# Patient Record
Sex: MALE | Race: OTHER | Employment: OTHER
[De-identification: names, ages, dates, MRNs, and addresses within clinical notes are randomized per-mention and may not be internally consistent; named-entity substitution may affect disease eponyms.]

---

## 2017-02-07 ENCOUNTER — OFFICE VISIT (OUTPATIENT)
Dept: FAMILY MEDICINE CLINIC | Facility: CLINIC | Age: 82
End: 2017-02-07

## 2017-02-07 VITALS
HEIGHT: 64 IN | SYSTOLIC BLOOD PRESSURE: 132 MMHG | DIASTOLIC BLOOD PRESSURE: 66 MMHG | TEMPERATURE: 97.9 F | BODY MASS INDEX: 26.15 KG/M2 | WEIGHT: 153.2 LBS | HEART RATE: 73 BPM

## 2017-02-07 DIAGNOSIS — E78.5 HYPERLIPIDEMIA, UNSPECIFIED HYPERLIPIDEMIA TYPE: ICD-10-CM

## 2017-02-07 DIAGNOSIS — E11.9 TYPE 2 DIABETES MELLITUS WITHOUT COMPLICATION, WITHOUT LONG-TERM CURRENT USE OF INSULIN (HCC): Primary | ICD-10-CM

## 2017-02-07 DIAGNOSIS — L03.115 CELLULITIS OF RIGHT LEG: ICD-10-CM

## 2017-02-07 PROCEDURE — 4040F PNEUMOC VAC/ADMIN/RCVD: CPT | Performed by: FAMILY MEDICINE

## 2017-02-07 PROCEDURE — G8598 ASA/ANTIPLAT THER USED: HCPCS | Performed by: FAMILY MEDICINE

## 2017-02-07 PROCEDURE — G8427 DOCREV CUR MEDS BY ELIG CLIN: HCPCS | Performed by: FAMILY MEDICINE

## 2017-02-07 PROCEDURE — G8484 FLU IMMUNIZE NO ADMIN: HCPCS | Performed by: FAMILY MEDICINE

## 2017-02-07 PROCEDURE — 1123F ACP DISCUSS/DSCN MKR DOCD: CPT | Performed by: FAMILY MEDICINE

## 2017-02-07 PROCEDURE — 1036F TOBACCO NON-USER: CPT | Performed by: FAMILY MEDICINE

## 2017-02-07 PROCEDURE — 99213 OFFICE O/P EST LOW 20 MIN: CPT | Performed by: FAMILY MEDICINE

## 2017-02-07 PROCEDURE — G8420 CALC BMI NORM PARAMETERS: HCPCS | Performed by: FAMILY MEDICINE

## 2017-02-07 ASSESSMENT — PATIENT HEALTH QUESTIONNAIRE - PHQ9
1. LITTLE INTEREST OR PLEASURE IN DOING THINGS: 0
SUM OF ALL RESPONSES TO PHQ QUESTIONS 1-9: 0
SUM OF ALL RESPONSES TO PHQ9 QUESTIONS 1 & 2: 0
2. FEELING DOWN, DEPRESSED OR HOPELESS: 0

## 2017-02-07 ASSESSMENT — ENCOUNTER SYMPTOMS
SORE THROAT: 0
CONSTIPATION: 0
NAUSEA: 0
ABDOMINAL PAIN: 0
SHORTNESS OF BREATH: 0
COUGH: 0

## 2017-02-28 PROCEDURE — 36415 COLL VENOUS BLD VENIPUNCTURE: CPT

## 2017-02-28 PROCEDURE — 84484 ASSAY OF TROPONIN QUANT: CPT

## 2017-02-28 PROCEDURE — 81001 URINALYSIS AUTO W/SCOPE: CPT

## 2017-05-09 ENCOUNTER — OFFICE VISIT (OUTPATIENT)
Dept: FAMILY MEDICINE CLINIC | Age: 82
End: 2017-05-09
Payer: MEDICARE

## 2017-05-09 VITALS
HEIGHT: 64 IN | HEART RATE: 72 BPM | WEIGHT: 154 LBS | SYSTOLIC BLOOD PRESSURE: 128 MMHG | OXYGEN SATURATION: 91 % | TEMPERATURE: 97.9 F | BODY MASS INDEX: 26.29 KG/M2 | DIASTOLIC BLOOD PRESSURE: 60 MMHG

## 2017-05-09 DIAGNOSIS — E78.5 HYPERLIPIDEMIA, UNSPECIFIED HYPERLIPIDEMIA TYPE: ICD-10-CM

## 2017-05-09 DIAGNOSIS — E11.9 TYPE 2 DIABETES MELLITUS WITHOUT COMPLICATION, WITHOUT LONG-TERM CURRENT USE OF INSULIN (HCC): Primary | ICD-10-CM

## 2017-05-09 DIAGNOSIS — Z23 NEED FOR PROPHYLACTIC VACCINATION AGAINST STREPTOCOCCUS PNEUMONIAE (PNEUMOCOCCUS): ICD-10-CM

## 2017-05-09 DIAGNOSIS — I25.810 CORONARY ARTERY DISEASE INVOLVING CORONARY BYPASS GRAFT OF NATIVE HEART WITHOUT ANGINA PECTORIS: ICD-10-CM

## 2017-05-09 LAB — HBA1C MFR BLD: 6.5 %

## 2017-05-09 PROCEDURE — G8420 CALC BMI NORM PARAMETERS: HCPCS | Performed by: FAMILY MEDICINE

## 2017-05-09 PROCEDURE — G0009 ADMIN PNEUMOCOCCAL VACCINE: HCPCS | Performed by: FAMILY MEDICINE

## 2017-05-09 PROCEDURE — 1123F ACP DISCUSS/DSCN MKR DOCD: CPT | Performed by: FAMILY MEDICINE

## 2017-05-09 PROCEDURE — 90732 PPSV23 VACC 2 YRS+ SUBQ/IM: CPT | Performed by: FAMILY MEDICINE

## 2017-05-09 PROCEDURE — 4040F PNEUMOC VAC/ADMIN/RCVD: CPT | Performed by: FAMILY MEDICINE

## 2017-05-09 PROCEDURE — G8427 DOCREV CUR MEDS BY ELIG CLIN: HCPCS | Performed by: FAMILY MEDICINE

## 2017-05-09 PROCEDURE — 1036F TOBACCO NON-USER: CPT | Performed by: FAMILY MEDICINE

## 2017-05-09 PROCEDURE — G8598 ASA/ANTIPLAT THER USED: HCPCS | Performed by: FAMILY MEDICINE

## 2017-05-09 PROCEDURE — 83036 HEMOGLOBIN GLYCOSYLATED A1C: CPT | Performed by: FAMILY MEDICINE

## 2017-05-09 PROCEDURE — 99215 OFFICE O/P EST HI 40 MIN: CPT | Performed by: FAMILY MEDICINE

## 2017-05-09 RX ORDER — CHOLECALCIFEROL (VITAMIN D3) 1250 MCG
50000 CAPSULE ORAL WEEKLY
COMMUNITY
End: 2018-03-22 | Stop reason: ALTCHOICE

## 2017-05-09 RX ORDER — AMMONIUM LACTATE 12 G/100G
1 LOTION TOPICAL PRN
COMMUNITY
End: 2018-01-28

## 2017-05-09 ASSESSMENT — ENCOUNTER SYMPTOMS
SORE THROAT: 0
NAUSEA: 0
SHORTNESS OF BREATH: 0
ABDOMINAL PAIN: 0
CONSTIPATION: 0

## 2017-05-09 ASSESSMENT — PATIENT HEALTH QUESTIONNAIRE - PHQ9
2. FEELING DOWN, DEPRESSED OR HOPELESS: 0
SUM OF ALL RESPONSES TO PHQ9 QUESTIONS 1 & 2: 0
1. LITTLE INTEREST OR PLEASURE IN DOING THINGS: 0
SUM OF ALL RESPONSES TO PHQ QUESTIONS 1-9: 0

## 2017-06-05 ENCOUNTER — HOSPITAL ENCOUNTER (OUTPATIENT)
Age: 82
Discharge: HOME | End: 2017-06-05
Payer: MEDICARE

## 2017-06-05 DIAGNOSIS — E78.5 HYPERLIPIDEMIA, UNSPECIFIED HYPERLIPIDEMIA TYPE: ICD-10-CM

## 2017-06-05 DIAGNOSIS — E11.9 TYPE 2 DIABETES MELLITUS WITHOUT COMPLICATION, WITHOUT LONG-TERM CURRENT USE OF INSULIN (HCC): ICD-10-CM

## 2017-06-05 LAB
ALBUMIN SERPL-MCNC: 3.5 G/DL (ref 3.5–5.2)
ALBUMIN/GLOBULIN RATIO: ABNORMAL (ref 1–2.5)
ALP BLD-CCNC: 80 U/L (ref 40–129)
ALT SERPL-CCNC: 24 U/L (ref 5–41)
ANION GAP SERPL CALCULATED.3IONS-SCNC: 13 MMOL/L (ref 9–17)
AST SERPL-CCNC: 20 U/L
BILIRUB SERPL-MCNC: 0.59 MG/DL (ref 0.3–1.2)
BUN BLDV-MCNC: 23 MG/DL (ref 8–23)
BUN/CREAT BLD: ABNORMAL (ref 9–20)
CALCIUM SERPL-MCNC: 8.9 MG/DL (ref 8.6–10.4)
CHLORIDE BLD-SCNC: 104 MMOL/L (ref 98–107)
CHOLESTEROL/HDL RATIO: 4.8
CHOLESTEROL: 133 MG/DL
CO2: 24 MMOL/L (ref 20–31)
CREAT SERPL-MCNC: 1.18 MG/DL (ref 0.7–1.2)
CREATININE URINE: 137 MG/DL (ref 39–259)
GFR AFRICAN AMERICAN: >60 ML/MIN
GFR NON-AFRICAN AMERICAN: 58 ML/MIN
GFR SERPL CREATININE-BSD FRML MDRD: ABNORMAL ML/MIN/{1.73_M2}
GFR SERPL CREATININE-BSD FRML MDRD: ABNORMAL ML/MIN/{1.73_M2}
GLUCOSE BLD-MCNC: 110 MG/DL (ref 70–99)
HDLC SERPL-MCNC: 28 MG/DL
LDL CHOLESTEROL: 72 MG/DL (ref 0–130)
MICROALBUMIN/CREAT 24H UR: 13 MG/L
MICROALBUMIN/CREAT UR-RTO: 9 MCG/MG CREAT
POTASSIUM SERPL-SCNC: 4.1 MMOL/L (ref 3.7–5.3)
SODIUM BLD-SCNC: 141 MMOL/L (ref 135–144)
TOTAL PROTEIN: 7.9 G/DL (ref 6.4–8.3)
TRIGL SERPL-MCNC: 167 MG/DL
VLDLC SERPL CALC-MCNC: ABNORMAL MG/DL (ref 1–30)

## 2017-06-05 PROCEDURE — 80061 LIPID PANEL: CPT

## 2017-06-05 PROCEDURE — 82570 ASSAY OF URINE CREATININE: CPT

## 2017-06-05 PROCEDURE — 80053 COMPREHEN METABOLIC PANEL: CPT

## 2017-06-05 PROCEDURE — 82043 UR ALBUMIN QUANTITATIVE: CPT

## 2017-06-05 PROCEDURE — 36415 COLL VENOUS BLD VENIPUNCTURE: CPT

## 2017-07-03 DIAGNOSIS — R35.0 FREQUENCY OF URINATION: Primary | ICD-10-CM

## 2017-07-03 RX ORDER — TAMSULOSIN HYDROCHLORIDE 0.4 MG/1
0.4 CAPSULE ORAL DAILY
Qty: 30 CAPSULE | Refills: 0 | Status: SHIPPED | OUTPATIENT
Start: 2017-07-03 | End: 2017-09-12 | Stop reason: SDUPTHER

## 2017-07-03 RX ORDER — OXYBUTYNIN CHLORIDE 5 MG/1
5 TABLET, EXTENDED RELEASE ORAL DAILY
Qty: 30 TABLET | Refills: 0 | Status: SHIPPED | OUTPATIENT
Start: 2017-07-03 | End: 2017-09-12 | Stop reason: SDUPTHER

## 2017-07-05 ENCOUNTER — TELEPHONE (OUTPATIENT)
Dept: UROLOGY | Age: 82
End: 2017-07-05

## 2017-09-01 DIAGNOSIS — R35.0 FREQUENCY OF URINATION: ICD-10-CM

## 2017-09-01 RX ORDER — OXYBUTYNIN CHLORIDE 5 MG/1
5 TABLET, EXTENDED RELEASE ORAL DAILY
Qty: 30 TABLET | Refills: 0 | OUTPATIENT
Start: 2017-09-01 | End: 2017-10-01

## 2017-09-01 RX ORDER — TAMSULOSIN HYDROCHLORIDE 0.4 MG/1
0.4 CAPSULE ORAL DAILY
Qty: 30 CAPSULE | Refills: 0 | OUTPATIENT
Start: 2017-09-01

## 2017-09-12 DIAGNOSIS — R35.0 FREQUENCY OF URINATION: ICD-10-CM

## 2017-09-12 RX ORDER — OXYBUTYNIN CHLORIDE 5 MG/1
5 TABLET, EXTENDED RELEASE ORAL DAILY
Qty: 30 TABLET | Refills: 0 | Status: SHIPPED | OUTPATIENT
Start: 2017-09-12 | End: 2017-11-08 | Stop reason: SDUPTHER

## 2017-09-12 RX ORDER — TAMSULOSIN HYDROCHLORIDE 0.4 MG/1
0.4 CAPSULE ORAL DAILY
Qty: 30 CAPSULE | Refills: 0 | Status: SHIPPED | OUTPATIENT
Start: 2017-09-12 | End: 2017-11-08 | Stop reason: SDUPTHER

## 2017-09-18 ENCOUNTER — APPOINTMENT (OUTPATIENT)
Dept: GENERAL RADIOLOGY | Age: 82
DRG: 181 | End: 2017-09-18
Payer: MEDICARE

## 2017-09-18 ENCOUNTER — APPOINTMENT (OUTPATIENT)
Dept: CT IMAGING | Age: 82
DRG: 181 | End: 2017-09-18
Payer: MEDICARE

## 2017-09-18 ENCOUNTER — HOSPITAL ENCOUNTER (INPATIENT)
Age: 82
LOS: 7 days | Discharge: HOME | DRG: 181 | End: 2017-09-25
Attending: EMERGENCY MEDICINE | Admitting: INTERNAL MEDICINE
Payer: MEDICARE

## 2017-09-18 ENCOUNTER — APPOINTMENT (OUTPATIENT)
Dept: ULTRASOUND IMAGING | Age: 82
DRG: 181 | End: 2017-09-18
Payer: MEDICARE

## 2017-09-18 DIAGNOSIS — R10.11 ABDOMINAL PAIN, RIGHT UPPER QUADRANT: Primary | ICD-10-CM

## 2017-09-18 LAB
-: ABNORMAL
ABSOLUTE EOS #: 0.3 K/UL (ref 0–0.4)
ABSOLUTE LYMPH #: 1.3 K/UL (ref 1–4.8)
ABSOLUTE MONO #: 0.9 K/UL (ref 0.1–1.2)
ALBUMIN SERPL-MCNC: 3.5 G/DL (ref 3.5–5.2)
ALBUMIN/GLOBULIN RATIO: 0.7 (ref 1–2.5)
ALP BLD-CCNC: 275 U/L (ref 40–129)
ALT SERPL-CCNC: 232 U/L (ref 5–41)
AMORPHOUS: ABNORMAL
ANION GAP SERPL CALCULATED.3IONS-SCNC: 13 MMOL/L (ref 9–17)
AST SERPL-CCNC: 289 U/L
BACTERIA: ABNORMAL
BASOPHILS # BLD: 0 %
BASOPHILS ABSOLUTE: 0 K/UL (ref 0–0.2)
BILIRUB SERPL-MCNC: 1.07 MG/DL (ref 0.3–1.2)
BILIRUBIN DIRECT: 0.48 MG/DL
BILIRUBIN URINE: NEGATIVE
BILIRUBIN, INDIRECT: 0.59 MG/DL (ref 0–1)
BUN BLDV-MCNC: 20 MG/DL (ref 8–23)
BUN/CREAT BLD: ABNORMAL (ref 9–20)
CALCIUM SERPL-MCNC: 8.8 MG/DL (ref 8.6–10.4)
CASTS UA: ABNORMAL /LPF (ref 0–2)
CHLORIDE BLD-SCNC: 97 MMOL/L (ref 98–107)
CO2: 26 MMOL/L (ref 20–31)
COLOR: YELLOW
COMMENT UA: ABNORMAL
CREAT SERPL-MCNC: 1.16 MG/DL (ref 0.7–1.2)
CRYSTALS, UA: ABNORMAL /HPF
DIFFERENTIAL TYPE: NORMAL
EOSINOPHILS RELATIVE PERCENT: 4 %
EPITHELIAL CELLS UA: ABNORMAL /HPF (ref 0–5)
GFR AFRICAN AMERICAN: >60 ML/MIN
GFR NON-AFRICAN AMERICAN: 59 ML/MIN
GFR SERPL CREATININE-BSD FRML MDRD: ABNORMAL ML/MIN/{1.73_M2}
GFR SERPL CREATININE-BSD FRML MDRD: ABNORMAL ML/MIN/{1.73_M2}
GLOBULIN: ABNORMAL G/DL (ref 1.5–3.8)
GLUCOSE BLD-MCNC: 151 MG/DL (ref 70–99)
GLUCOSE URINE: NEGATIVE
HCT VFR BLD CALC: 43.8 % (ref 41–53)
HEMOGLOBIN: 14.9 G/DL (ref 13.5–17.5)
KETONES, URINE: NEGATIVE
LACTIC ACID, WHOLE BLOOD: 1.9 MMOL/L (ref 0.7–2.1)
LACTIC ACID: NORMAL MMOL/L
LEUKOCYTE ESTERASE, URINE: ABNORMAL
LIPASE: 33 U/L (ref 13–60)
LYMPHOCYTES # BLD: 16 %
MCH RBC QN AUTO: 29.1 PG (ref 26–34)
MCHC RBC AUTO-ENTMCNC: 34.1 G/DL (ref 31–37)
MCV RBC AUTO: 85.4 FL (ref 80–100)
MONOCYTES # BLD: 11 %
MUCUS: ABNORMAL
NITRITE, URINE: NEGATIVE
OTHER OBSERVATIONS UA: ABNORMAL
PDW BLD-RTO: 14 % (ref 12.5–15.4)
PH UA: 6.5 (ref 5–8)
PLATELET # BLD: 259 K/UL (ref 140–450)
PLATELET ESTIMATE: NORMAL
PMV BLD AUTO: 6.9 FL (ref 6–12)
POC TROPONIN I: 0.01 NG/ML (ref 0–0.1)
POC TROPONIN I: 0.02 NG/ML (ref 0–0.1)
POC TROPONIN INTERP: NORMAL
POC TROPONIN INTERP: NORMAL
POTASSIUM SERPL-SCNC: 4 MMOL/L (ref 3.7–5.3)
PROTEIN UA: NEGATIVE
RBC # BLD: 5.13 M/UL (ref 4.5–5.9)
RBC # BLD: NORMAL 10*6/UL
RBC UA: ABNORMAL /HPF (ref 0–2)
RENAL EPITHELIAL, UA: ABNORMAL /HPF
SEG NEUTROPHILS: 69 %
SEGMENTED NEUTROPHILS ABSOLUTE COUNT: 5.3 K/UL (ref 1.8–7.7)
SODIUM BLD-SCNC: 136 MMOL/L (ref 135–144)
SPECIFIC GRAVITY UA: 1.01 (ref 1–1.03)
THYROXINE, FREE: 1.53 NG/DL (ref 0.93–1.7)
TOTAL PROTEIN: 8.3 G/DL (ref 6.4–8.3)
TRICHOMONAS: ABNORMAL
TSH SERPL DL<=0.05 MIU/L-ACNC: 0.26 MIU/L (ref 0.3–5)
TURBIDITY: CLEAR
URINE HGB: NEGATIVE
UROBILINOGEN, URINE: NORMAL
WBC # BLD: 7.8 K/UL (ref 3.5–11)
WBC # BLD: NORMAL 10*3/UL
WBC UA: ABNORMAL /HPF (ref 0–5)
YEAST: ABNORMAL

## 2017-09-18 PROCEDURE — 83605 ASSAY OF LACTIC ACID: CPT

## 2017-09-18 PROCEDURE — 84443 ASSAY THYROID STIM HORMONE: CPT

## 2017-09-18 PROCEDURE — 2580000003 HC RX 258: Performed by: EMERGENCY MEDICINE

## 2017-09-18 PROCEDURE — 6360000002 HC RX W HCPCS: Performed by: EMERGENCY MEDICINE

## 2017-09-18 PROCEDURE — 93005 ELECTROCARDIOGRAM TRACING: CPT

## 2017-09-18 PROCEDURE — 96374 THER/PROPH/DIAG INJ IV PUSH: CPT

## 2017-09-18 PROCEDURE — 81001 URINALYSIS AUTO W/SCOPE: CPT

## 2017-09-18 PROCEDURE — 87086 URINE CULTURE/COLONY COUNT: CPT

## 2017-09-18 PROCEDURE — 85025 COMPLETE CBC W/AUTO DIFF WBC: CPT

## 2017-09-18 PROCEDURE — 71020 XR CHEST STANDARD TWO VW: CPT

## 2017-09-18 PROCEDURE — 1200000000 HC SEMI PRIVATE

## 2017-09-18 PROCEDURE — 94762 N-INVAS EAR/PLS OXIMTRY CONT: CPT

## 2017-09-18 PROCEDURE — 83690 ASSAY OF LIPASE: CPT

## 2017-09-18 PROCEDURE — 80076 HEPATIC FUNCTION PANEL: CPT

## 2017-09-18 PROCEDURE — 84484 ASSAY OF TROPONIN QUANT: CPT

## 2017-09-18 PROCEDURE — 76705 ECHO EXAM OF ABDOMEN: CPT

## 2017-09-18 PROCEDURE — 99285 EMERGENCY DEPT VISIT HI MDM: CPT

## 2017-09-18 PROCEDURE — 74176 CT ABD & PELVIS W/O CONTRAST: CPT

## 2017-09-18 PROCEDURE — 80048 BASIC METABOLIC PNL TOTAL CA: CPT

## 2017-09-18 PROCEDURE — 84439 ASSAY OF FREE THYROXINE: CPT

## 2017-09-18 RX ADMIN — CEFTRIAXONE SODIUM 1 G: 1 INJECTION, POWDER, FOR SOLUTION INTRAMUSCULAR; INTRAVENOUS at 22:53

## 2017-09-18 ASSESSMENT — ENCOUNTER SYMPTOMS
SHORTNESS OF BREATH: 1
EYE DISCHARGE: 0
COUGH: 0
VOMITING: 0
NAUSEA: 0
EYE PAIN: 0
DIARRHEA: 0
ABDOMINAL PAIN: 1
SORE THROAT: 0

## 2017-09-18 ASSESSMENT — PAIN SCALES - GENERAL: PAINLEVEL_OUTOF10: 5

## 2017-09-19 ENCOUNTER — APPOINTMENT (OUTPATIENT)
Dept: CT IMAGING | Age: 82
DRG: 181 | End: 2017-09-19
Payer: MEDICARE

## 2017-09-19 PROBLEM — R79.89 ELEVATED LFTS: Status: ACTIVE | Noted: 2017-09-19

## 2017-09-19 PROBLEM — R91.8 PULMONARY NODULES/LESIONS, MULTIPLE: Status: ACTIVE | Noted: 2017-09-19

## 2017-09-19 PROBLEM — C79.9 METASTATIC CANCER (HCC): Status: ACTIVE | Noted: 2017-09-19

## 2017-09-19 PROBLEM — N30.00 ACUTE CYSTITIS: Status: ACTIVE | Noted: 2017-09-19

## 2017-09-19 PROBLEM — K80.20 CHOLELITHIASIS: Status: ACTIVE | Noted: 2017-09-19

## 2017-09-19 LAB
CULTURE: NORMAL
CULTURE: NORMAL
ESTIMATED AVERAGE GLUCOSE: 148 MG/DL
GLUCOSE BLD-MCNC: 104 MG/DL (ref 75–110)
GLUCOSE BLD-MCNC: 111 MG/DL (ref 75–110)
GLUCOSE BLD-MCNC: 220 MG/DL (ref 75–110)
HBA1C MFR BLD: 6.8 % (ref 4–6)
Lab: NORMAL
SPECIMEN DESCRIPTION: NORMAL
STATUS: NORMAL

## 2017-09-19 PROCEDURE — 99222 1ST HOSP IP/OBS MODERATE 55: CPT | Performed by: INTERNAL MEDICINE

## 2017-09-19 PROCEDURE — 36415 COLL VENOUS BLD VENIPUNCTURE: CPT

## 2017-09-19 PROCEDURE — 83036 HEMOGLOBIN GLYCOSYLATED A1C: CPT

## 2017-09-19 PROCEDURE — S0028 INJECTION, FAMOTIDINE, 20 MG: HCPCS | Performed by: NURSE PRACTITIONER

## 2017-09-19 PROCEDURE — 1200000000 HC SEMI PRIVATE

## 2017-09-19 PROCEDURE — 82947 ASSAY GLUCOSE BLOOD QUANT: CPT

## 2017-09-19 PROCEDURE — 6360000002 HC RX W HCPCS: Performed by: INTERNAL MEDICINE

## 2017-09-19 PROCEDURE — 6370000000 HC RX 637 (ALT 250 FOR IP): Performed by: NURSE PRACTITIONER

## 2017-09-19 PROCEDURE — 94762 N-INVAS EAR/PLS OXIMTRY CONT: CPT

## 2017-09-19 PROCEDURE — 99221 1ST HOSP IP/OBS SF/LOW 40: CPT | Performed by: SURGERY

## 2017-09-19 PROCEDURE — 2580000003 HC RX 258: Performed by: NURSE PRACTITIONER

## 2017-09-19 PROCEDURE — 99223 1ST HOSP IP/OBS HIGH 75: CPT | Performed by: INTERNAL MEDICINE

## 2017-09-19 PROCEDURE — 71250 CT THORAX DX C-: CPT

## 2017-09-19 PROCEDURE — 2500000003 HC RX 250 WO HCPCS: Performed by: NURSE PRACTITIONER

## 2017-09-19 PROCEDURE — 6360000002 HC RX W HCPCS: Performed by: NURSE PRACTITIONER

## 2017-09-19 PROCEDURE — 6370000000 HC RX 637 (ALT 250 FOR IP): Performed by: INTERNAL MEDICINE

## 2017-09-19 PROCEDURE — 2580000003 HC RX 258: Performed by: INTERNAL MEDICINE

## 2017-09-19 RX ORDER — LEVOTHYROXINE SODIUM 0.12 MG/1
125 TABLET ORAL DAILY
Status: DISCONTINUED | OUTPATIENT
Start: 2017-09-20 | End: 2017-09-25 | Stop reason: HOSPADM

## 2017-09-19 RX ORDER — POTASSIUM CHLORIDE 7.45 MG/ML
10 INJECTION INTRAVENOUS PRN
Status: DISCONTINUED | OUTPATIENT
Start: 2017-09-19 | End: 2017-09-25 | Stop reason: HOSPADM

## 2017-09-19 RX ORDER — SODIUM CHLORIDE 9 MG/ML
INJECTION, SOLUTION INTRAVENOUS CONTINUOUS
Status: DISCONTINUED | OUTPATIENT
Start: 2017-09-19 | End: 2017-09-21

## 2017-09-19 RX ORDER — OXYBUTYNIN CHLORIDE 5 MG/1
5 TABLET, EXTENDED RELEASE ORAL DAILY
Status: DISCONTINUED | OUTPATIENT
Start: 2017-09-19 | End: 2017-09-25 | Stop reason: HOSPADM

## 2017-09-19 RX ORDER — GLIMEPIRIDE 2 MG/1
2 TABLET ORAL 2 TIMES DAILY WITH MEALS
Status: DISCONTINUED | OUTPATIENT
Start: 2017-09-19 | End: 2017-09-25 | Stop reason: HOSPADM

## 2017-09-19 RX ORDER — NICOTINE POLACRILEX 4 MG
15 LOZENGE BUCCAL PRN
Status: DISCONTINUED | OUTPATIENT
Start: 2017-09-19 | End: 2017-09-25 | Stop reason: HOSPADM

## 2017-09-19 RX ORDER — MAGNESIUM SULFATE 1 G/100ML
1 INJECTION INTRAVENOUS PRN
Status: DISCONTINUED | OUTPATIENT
Start: 2017-09-19 | End: 2017-09-25 | Stop reason: HOSPADM

## 2017-09-19 RX ORDER — ATORVASTATIN CALCIUM 20 MG/1
20 TABLET, FILM COATED ORAL NIGHTLY
Status: DISCONTINUED | OUTPATIENT
Start: 2017-09-19 | End: 2017-09-19

## 2017-09-19 RX ORDER — ALBUTEROL SULFATE 2.5 MG/3ML
2.5 SOLUTION RESPIRATORY (INHALATION)
Status: DISCONTINUED | OUTPATIENT
Start: 2017-09-19 | End: 2017-09-19

## 2017-09-19 RX ORDER — ONDANSETRON 2 MG/ML
4 INJECTION INTRAMUSCULAR; INTRAVENOUS EVERY 6 HOURS PRN
Status: DISCONTINUED | OUTPATIENT
Start: 2017-09-19 | End: 2017-09-25 | Stop reason: HOSPADM

## 2017-09-19 RX ORDER — SODIUM CHLORIDE 0.9 % (FLUSH) 0.9 %
10 SYRINGE (ML) INJECTION PRN
Status: DISCONTINUED | OUTPATIENT
Start: 2017-09-19 | End: 2017-09-25 | Stop reason: HOSPADM

## 2017-09-19 RX ORDER — DEXTROSE MONOHYDRATE 25 G/50ML
12.5 INJECTION, SOLUTION INTRAVENOUS PRN
Status: DISCONTINUED | OUTPATIENT
Start: 2017-09-19 | End: 2017-09-25 | Stop reason: HOSPADM

## 2017-09-19 RX ORDER — SODIUM CHLORIDE 0.9 % (FLUSH) 0.9 %
10 SYRINGE (ML) INJECTION EVERY 12 HOURS SCHEDULED
Status: DISCONTINUED | OUTPATIENT
Start: 2017-09-19 | End: 2017-09-25 | Stop reason: HOSPADM

## 2017-09-19 RX ORDER — DIPHENHYDRAMINE HCL 25 MG
25 TABLET ORAL EVERY 6 HOURS PRN
Status: DISCONTINUED | OUTPATIENT
Start: 2017-09-19 | End: 2017-09-25 | Stop reason: HOSPADM

## 2017-09-19 RX ORDER — ALBUTEROL SULFATE 2.5 MG/3ML
2.5 SOLUTION RESPIRATORY (INHALATION) EVERY 6 HOURS PRN
Status: DISCONTINUED | OUTPATIENT
Start: 2017-09-19 | End: 2017-09-25 | Stop reason: HOSPADM

## 2017-09-19 RX ORDER — DEXTROSE MONOHYDRATE 50 MG/ML
100 INJECTION, SOLUTION INTRAVENOUS PRN
Status: DISCONTINUED | OUTPATIENT
Start: 2017-09-19 | End: 2017-09-25 | Stop reason: HOSPADM

## 2017-09-19 RX ORDER — TAMSULOSIN HYDROCHLORIDE 0.4 MG/1
0.4 CAPSULE ORAL DAILY
Status: DISCONTINUED | OUTPATIENT
Start: 2017-09-19 | End: 2017-09-25 | Stop reason: HOSPADM

## 2017-09-19 RX ADMIN — DIPHENHYDRAMINE HCL 25 MG: 25 TABLET ORAL at 14:43

## 2017-09-19 RX ADMIN — INSULIN LISPRO 2 UNITS: 100 INJECTION, SOLUTION INTRAVENOUS; SUBCUTANEOUS at 22:58

## 2017-09-19 RX ADMIN — CEFTRIAXONE SODIUM 1 G: 1 INJECTION, POWDER, FOR SOLUTION INTRAMUSCULAR; INTRAVENOUS at 18:49

## 2017-09-19 RX ADMIN — TAMSULOSIN HYDROCHLORIDE 0.4 MG: 0.4 CAPSULE ORAL at 14:44

## 2017-09-19 RX ADMIN — GLIMEPIRIDE 2 MG: 2 TABLET ORAL at 18:55

## 2017-09-19 RX ADMIN — ENOXAPARIN SODIUM 40 MG: 40 INJECTION SUBCUTANEOUS at 14:43

## 2017-09-19 RX ADMIN — FAMOTIDINE 20 MG: 10 INJECTION, SOLUTION INTRAVENOUS at 14:43

## 2017-09-19 RX ADMIN — OXYBUTYNIN CHLORIDE 5 MG: 5 TABLET, FILM COATED, EXTENDED RELEASE ORAL at 14:44

## 2017-09-19 RX ADMIN — SODIUM CHLORIDE: 9 INJECTION, SOLUTION INTRAVENOUS at 14:40

## 2017-09-19 RX ADMIN — LEVOTHYROXINE SODIUM 137 MCG: 112 TABLET ORAL at 14:43

## 2017-09-20 LAB
ABSOLUTE EOS #: 0.3 K/UL (ref 0–0.4)
ABSOLUTE LYMPH #: 1.6 K/UL (ref 1–4.8)
ABSOLUTE MONO #: 0.8 K/UL (ref 0.1–1.2)
ALBUMIN SERPL-MCNC: 3 G/DL (ref 3.5–5.2)
ALBUMIN/GLOBULIN RATIO: 0.6 (ref 1–2.5)
ALP BLD-CCNC: 223 U/L (ref 40–129)
ALT SERPL-CCNC: 122 U/L (ref 5–41)
ANION GAP SERPL CALCULATED.3IONS-SCNC: 15 MMOL/L (ref 9–17)
AST SERPL-CCNC: 69 U/L
BASOPHILS # BLD: 1 %
BASOPHILS ABSOLUTE: 0 K/UL (ref 0–0.2)
BILIRUB SERPL-MCNC: 0.51 MG/DL (ref 0.3–1.2)
BILIRUBIN DIRECT: 0.15 MG/DL
BILIRUBIN, INDIRECT: 0.36 MG/DL (ref 0–1)
BUN BLDV-MCNC: 14 MG/DL (ref 8–23)
BUN/CREAT BLD: ABNORMAL (ref 9–20)
CALCIUM IONIZED: 1.09 MMOL/L (ref 1.13–1.33)
CALCIUM SERPL-MCNC: 8.5 MG/DL (ref 8.6–10.4)
CHLORIDE BLD-SCNC: 102 MMOL/L (ref 98–107)
CO2: 23 MMOL/L (ref 20–31)
CREAT SERPL-MCNC: 1.23 MG/DL (ref 0.7–1.2)
DIFFERENTIAL TYPE: NORMAL
EOSINOPHILS RELATIVE PERCENT: 4 %
GFR AFRICAN AMERICAN: >60 ML/MIN
GFR NON-AFRICAN AMERICAN: 55 ML/MIN
GFR SERPL CREATININE-BSD FRML MDRD: ABNORMAL ML/MIN/{1.73_M2}
GFR SERPL CREATININE-BSD FRML MDRD: ABNORMAL ML/MIN/{1.73_M2}
GLOBULIN: ABNORMAL G/DL (ref 1.5–3.8)
GLUCOSE BLD-MCNC: 107 MG/DL (ref 75–110)
GLUCOSE BLD-MCNC: 121 MG/DL (ref 75–110)
GLUCOSE BLD-MCNC: 122 MG/DL (ref 70–99)
GLUCOSE BLD-MCNC: 185 MG/DL (ref 75–110)
GLUCOSE BLD-MCNC: 245 MG/DL (ref 75–110)
HCT VFR BLD CALC: 45.7 % (ref 41–53)
HEMOGLOBIN: 15.5 G/DL (ref 13.5–17.5)
INR BLD: 1.1
LACTIC ACID, WHOLE BLOOD: 1.3 MMOL/L (ref 0.7–2.1)
LACTIC ACID: NORMAL MMOL/L
LIPASE: 27 U/L (ref 13–60)
LYMPHOCYTES # BLD: 19 %
MCH RBC QN AUTO: 29.5 PG (ref 26–34)
MCHC RBC AUTO-ENTMCNC: 33.9 G/DL (ref 31–37)
MCV RBC AUTO: 86.9 FL (ref 80–100)
MONOCYTES # BLD: 9 %
PDW BLD-RTO: 14.2 % (ref 12.5–15.4)
PLATELET # BLD: 268 K/UL (ref 140–450)
PLATELET ESTIMATE: NORMAL
PMV BLD AUTO: 6.6 FL (ref 6–12)
POTASSIUM SERPL-SCNC: 4.5 MMOL/L (ref 3.7–5.3)
PROTHROMBIN TIME: 11.6 SEC (ref 9.4–12.6)
RBC # BLD: 5.26 M/UL (ref 4.5–5.9)
RBC # BLD: NORMAL 10*6/UL
SEG NEUTROPHILS: 67 %
SEGMENTED NEUTROPHILS ABSOLUTE COUNT: 5.7 K/UL (ref 1.8–7.7)
SODIUM BLD-SCNC: 140 MMOL/L (ref 135–144)
THYROGLOBULIN AB: >3000 IU/ML (ref 0–40)
THYROGLOBULIN: 0.4 NG/ML (ref 0.3–49.7)
TOTAL PROTEIN: 7.9 G/DL (ref 6.4–8.3)
WBC # BLD: 8.5 K/UL (ref 3.5–11)
WBC # BLD: NORMAL 10*3/UL

## 2017-09-20 PROCEDURE — 6370000000 HC RX 637 (ALT 250 FOR IP): Performed by: NURSE PRACTITIONER

## 2017-09-20 PROCEDURE — 84432 ASSAY OF THYROGLOBULIN: CPT

## 2017-09-20 PROCEDURE — S0028 INJECTION, FAMOTIDINE, 20 MG: HCPCS | Performed by: NURSE PRACTITIONER

## 2017-09-20 PROCEDURE — 6370000000 HC RX 637 (ALT 250 FOR IP): Performed by: INTERNAL MEDICINE

## 2017-09-20 PROCEDURE — 99232 SBSQ HOSP IP/OBS MODERATE 35: CPT | Performed by: INTERNAL MEDICINE

## 2017-09-20 PROCEDURE — 85610 PROTHROMBIN TIME: CPT

## 2017-09-20 PROCEDURE — 83690 ASSAY OF LIPASE: CPT

## 2017-09-20 PROCEDURE — 36415 COLL VENOUS BLD VENIPUNCTURE: CPT

## 2017-09-20 PROCEDURE — 76937 US GUIDE VASCULAR ACCESS: CPT

## 2017-09-20 PROCEDURE — 82947 ASSAY GLUCOSE BLOOD QUANT: CPT

## 2017-09-20 PROCEDURE — 83605 ASSAY OF LACTIC ACID: CPT

## 2017-09-20 PROCEDURE — 86800 THYROGLOBULIN ANTIBODY: CPT

## 2017-09-20 PROCEDURE — 1200000000 HC SEMI PRIVATE

## 2017-09-20 PROCEDURE — 2500000003 HC RX 250 WO HCPCS: Performed by: NURSE PRACTITIONER

## 2017-09-20 PROCEDURE — 94762 N-INVAS EAR/PLS OXIMTRY CONT: CPT

## 2017-09-20 PROCEDURE — 99232 SBSQ HOSP IP/OBS MODERATE 35: CPT | Performed by: SURGERY

## 2017-09-20 PROCEDURE — 6360000002 HC RX W HCPCS: Performed by: INTERNAL MEDICINE

## 2017-09-20 PROCEDURE — 2580000003 HC RX 258: Performed by: INTERNAL MEDICINE

## 2017-09-20 PROCEDURE — 80048 BASIC METABOLIC PNL TOTAL CA: CPT

## 2017-09-20 PROCEDURE — 82330 ASSAY OF CALCIUM: CPT

## 2017-09-20 PROCEDURE — 80076 HEPATIC FUNCTION PANEL: CPT

## 2017-09-20 PROCEDURE — 99233 SBSQ HOSP IP/OBS HIGH 50: CPT | Performed by: INTERNAL MEDICINE

## 2017-09-20 PROCEDURE — 85025 COMPLETE CBC W/AUTO DIFF WBC: CPT

## 2017-09-20 PROCEDURE — 6360000002 HC RX W HCPCS: Performed by: NURSE PRACTITIONER

## 2017-09-20 PROCEDURE — 2580000003 HC RX 258: Performed by: NURSE PRACTITIONER

## 2017-09-20 RX ORDER — NICOTINE POLACRILEX 4 MG
15 LOZENGE BUCCAL PRN
Status: DISCONTINUED | OUTPATIENT
Start: 2017-09-20 | End: 2017-09-25 | Stop reason: HOSPADM

## 2017-09-20 RX ORDER — DEXTROSE MONOHYDRATE 50 MG/ML
100 INJECTION, SOLUTION INTRAVENOUS PRN
Status: DISCONTINUED | OUTPATIENT
Start: 2017-09-20 | End: 2017-09-25 | Stop reason: HOSPADM

## 2017-09-20 RX ORDER — DEXTROSE MONOHYDRATE 25 G/50ML
12.5 INJECTION, SOLUTION INTRAVENOUS PRN
Status: DISCONTINUED | OUTPATIENT
Start: 2017-09-20 | End: 2017-09-25 | Stop reason: HOSPADM

## 2017-09-20 RX ADMIN — GLIMEPIRIDE 2 MG: 2 TABLET ORAL at 09:23

## 2017-09-20 RX ADMIN — OXYBUTYNIN CHLORIDE 5 MG: 5 TABLET, FILM COATED, EXTENDED RELEASE ORAL at 09:23

## 2017-09-20 RX ADMIN — FAMOTIDINE 20 MG: 10 INJECTION, SOLUTION INTRAVENOUS at 20:48

## 2017-09-20 RX ADMIN — CEFTRIAXONE SODIUM 1 G: 1 INJECTION, POWDER, FOR SOLUTION INTRAMUSCULAR; INTRAVENOUS at 19:29

## 2017-09-20 RX ADMIN — TAMSULOSIN HYDROCHLORIDE 0.4 MG: 0.4 CAPSULE ORAL at 09:23

## 2017-09-20 RX ADMIN — INSULIN LISPRO 1 UNITS: 100 INJECTION, SOLUTION INTRAVENOUS; SUBCUTANEOUS at 20:48

## 2017-09-20 RX ADMIN — INSULIN LISPRO 2 UNITS: 100 INJECTION, SOLUTION INTRAVENOUS; SUBCUTANEOUS at 13:15

## 2017-09-20 RX ADMIN — GLIMEPIRIDE 2 MG: 2 TABLET ORAL at 19:29

## 2017-09-20 RX ADMIN — ENOXAPARIN SODIUM 40 MG: 40 INJECTION SUBCUTANEOUS at 09:23

## 2017-09-20 RX ADMIN — LEVOTHYROXINE SODIUM 125 MCG: 125 TABLET ORAL at 09:22

## 2017-09-20 RX ADMIN — Medication 10 ML: at 09:22

## 2017-09-20 RX ADMIN — FAMOTIDINE 20 MG: 10 INJECTION, SOLUTION INTRAVENOUS at 09:23

## 2017-09-21 ENCOUNTER — APPOINTMENT (OUTPATIENT)
Dept: GENERAL RADIOLOGY | Age: 82
DRG: 181 | End: 2017-09-21
Payer: MEDICARE

## 2017-09-21 ENCOUNTER — APPOINTMENT (OUTPATIENT)
Dept: CT IMAGING | Age: 82
DRG: 181 | End: 2017-09-21
Payer: MEDICARE

## 2017-09-21 LAB
ANION GAP SERPL CALCULATED.3IONS-SCNC: 17 MMOL/L (ref 9–17)
BUN BLDV-MCNC: 14 MG/DL (ref 8–23)
BUN/CREAT BLD: ABNORMAL (ref 9–20)
CALCIUM SERPL-MCNC: 8.6 MG/DL (ref 8.6–10.4)
CHLORIDE BLD-SCNC: 102 MMOL/L (ref 98–107)
CO2: 19 MMOL/L (ref 20–31)
CREAT SERPL-MCNC: 1.29 MG/DL (ref 0.7–1.2)
GFR AFRICAN AMERICAN: >60 ML/MIN
GFR NON-AFRICAN AMERICAN: 52 ML/MIN
GFR SERPL CREATININE-BSD FRML MDRD: ABNORMAL ML/MIN/{1.73_M2}
GFR SERPL CREATININE-BSD FRML MDRD: ABNORMAL ML/MIN/{1.73_M2}
GLUCOSE BLD-MCNC: 149 MG/DL (ref 70–99)
GLUCOSE BLD-MCNC: 170 MG/DL (ref 75–110)
GLUCOSE BLD-MCNC: 92 MG/DL (ref 75–110)
POTASSIUM SERPL-SCNC: 4.4 MMOL/L (ref 3.7–5.3)
SODIUM BLD-SCNC: 138 MMOL/L (ref 135–144)

## 2017-09-21 PROCEDURE — S0028 INJECTION, FAMOTIDINE, 20 MG: HCPCS | Performed by: NURSE PRACTITIONER

## 2017-09-21 PROCEDURE — 36415 COLL VENOUS BLD VENIPUNCTURE: CPT

## 2017-09-21 PROCEDURE — 6370000000 HC RX 637 (ALT 250 FOR IP): Performed by: INTERNAL MEDICINE

## 2017-09-21 PROCEDURE — 2580000003 HC RX 258: Performed by: INTERNAL MEDICINE

## 2017-09-21 PROCEDURE — 32405 CT NEEDLE BIOPSY LUNG PERCUTANEOUS: CPT

## 2017-09-21 PROCEDURE — 6360000002 HC RX W HCPCS: Performed by: RADIOLOGY

## 2017-09-21 PROCEDURE — 88333 PATH CONSLTJ SURG CYTO XM 1: CPT

## 2017-09-21 PROCEDURE — 6370000000 HC RX 637 (ALT 250 FOR IP): Performed by: NURSE PRACTITIONER

## 2017-09-21 PROCEDURE — 71010 XR CHEST PORTABLE: CPT

## 2017-09-21 PROCEDURE — 1200000000 HC SEMI PRIVATE

## 2017-09-21 PROCEDURE — 2500000003 HC RX 250 WO HCPCS: Performed by: NURSE PRACTITIONER

## 2017-09-21 PROCEDURE — 88342 IMHCHEM/IMCYTCHM 1ST ANTB: CPT

## 2017-09-21 PROCEDURE — 94762 N-INVAS EAR/PLS OXIMTRY CONT: CPT

## 2017-09-21 PROCEDURE — 88305 TISSUE EXAM BY PATHOLOGIST: CPT

## 2017-09-21 PROCEDURE — 6360000002 HC RX W HCPCS

## 2017-09-21 PROCEDURE — 99232 SBSQ HOSP IP/OBS MODERATE 35: CPT | Performed by: INTERNAL MEDICINE

## 2017-09-21 PROCEDURE — 88173 CYTOPATH EVAL FNA REPORT: CPT

## 2017-09-21 PROCEDURE — 0BBJ3ZX EXCISION OF LEFT LOWER LUNG LOBE, PERCUTANEOUS APPROACH, DIAGNOSTIC: ICD-10-PCS | Performed by: RADIOLOGY

## 2017-09-21 PROCEDURE — 10022 CT FNA WITH IMAGING GUIDED: CPT

## 2017-09-21 PROCEDURE — 2580000003 HC RX 258: Performed by: NURSE PRACTITIONER

## 2017-09-21 PROCEDURE — 82947 ASSAY GLUCOSE BLOOD QUANT: CPT

## 2017-09-21 PROCEDURE — 80048 BASIC METABOLIC PNL TOTAL CA: CPT

## 2017-09-21 PROCEDURE — 6360000002 HC RX W HCPCS: Performed by: INTERNAL MEDICINE

## 2017-09-21 RX ORDER — FENTANYL CITRATE 50 UG/ML
INJECTION, SOLUTION INTRAMUSCULAR; INTRAVENOUS
Status: COMPLETED | OUTPATIENT
Start: 2017-09-21 | End: 2017-09-21

## 2017-09-21 RX ORDER — MIDAZOLAM HYDROCHLORIDE 1 MG/ML
INJECTION INTRAMUSCULAR; INTRAVENOUS
Status: COMPLETED | OUTPATIENT
Start: 2017-09-21 | End: 2017-09-21

## 2017-09-21 RX ORDER — ACETAMINOPHEN 325 MG/1
650 TABLET ORAL EVERY 4 HOURS PRN
Status: DISCONTINUED | OUTPATIENT
Start: 2017-09-21 | End: 2017-09-25 | Stop reason: HOSPADM

## 2017-09-21 RX ORDER — SODIUM CHLORIDE 9 MG/ML
INJECTION, SOLUTION INTRAVENOUS CONTINUOUS
Status: DISCONTINUED | OUTPATIENT
Start: 2017-09-21 | End: 2017-09-24

## 2017-09-21 RX ADMIN — OXYBUTYNIN CHLORIDE 5 MG: 5 TABLET, FILM COATED, EXTENDED RELEASE ORAL at 17:10

## 2017-09-21 RX ADMIN — MIDAZOLAM HYDROCHLORIDE 0.5 MG: 1 INJECTION, SOLUTION INTRAMUSCULAR; INTRAVENOUS at 11:43

## 2017-09-21 RX ADMIN — INSULIN LISPRO 1 UNITS: 100 INJECTION, SOLUTION INTRAVENOUS; SUBCUTANEOUS at 17:21

## 2017-09-21 RX ADMIN — GLIMEPIRIDE 2 MG: 2 TABLET ORAL at 17:11

## 2017-09-21 RX ADMIN — FENTANYL CITRATE 50 MCG: 50 INJECTION INTRAMUSCULAR; INTRAVENOUS at 11:43

## 2017-09-21 RX ADMIN — Medication 10 ML: at 21:49

## 2017-09-21 RX ADMIN — CEFTRIAXONE SODIUM 1 G: 1 INJECTION, POWDER, FOR SOLUTION INTRAMUSCULAR; INTRAVENOUS at 17:20

## 2017-09-21 RX ADMIN — FAMOTIDINE 20 MG: 10 INJECTION, SOLUTION INTRAVENOUS at 21:49

## 2017-09-21 RX ADMIN — TAMSULOSIN HYDROCHLORIDE 0.4 MG: 0.4 CAPSULE ORAL at 17:10

## 2017-09-21 RX ADMIN — FAMOTIDINE 20 MG: 10 INJECTION, SOLUTION INTRAVENOUS at 17:13

## 2017-09-21 ASSESSMENT — PAIN SCALES - GENERAL: PAINLEVEL_OUTOF10: 0

## 2017-09-22 PROBLEM — L28.2 PRURITIC RASH: Status: ACTIVE | Noted: 2017-09-22

## 2017-09-22 LAB
GLUCOSE BLD-MCNC: 136 MG/DL (ref 75–110)
GLUCOSE BLD-MCNC: 203 MG/DL (ref 75–110)
GLUCOSE BLD-MCNC: 250 MG/DL (ref 75–110)
GLUCOSE BLD-MCNC: 77 MG/DL (ref 75–110)

## 2017-09-22 PROCEDURE — 6360000002 HC RX W HCPCS: Performed by: INTERNAL MEDICINE

## 2017-09-22 PROCEDURE — 99232 SBSQ HOSP IP/OBS MODERATE 35: CPT | Performed by: INTERNAL MEDICINE

## 2017-09-22 PROCEDURE — 97116 GAIT TRAINING THERAPY: CPT

## 2017-09-22 PROCEDURE — 82947 ASSAY GLUCOSE BLOOD QUANT: CPT

## 2017-09-22 PROCEDURE — G8978 MOBILITY CURRENT STATUS: HCPCS

## 2017-09-22 PROCEDURE — 99232 SBSQ HOSP IP/OBS MODERATE 35: CPT | Performed by: SURGERY

## 2017-09-22 PROCEDURE — 1200000000 HC SEMI PRIVATE

## 2017-09-22 PROCEDURE — 2580000003 HC RX 258: Performed by: INTERNAL MEDICINE

## 2017-09-22 PROCEDURE — 97161 PT EVAL LOW COMPLEX 20 MIN: CPT

## 2017-09-22 PROCEDURE — 6370000000 HC RX 637 (ALT 250 FOR IP): Performed by: NURSE PRACTITIONER

## 2017-09-22 PROCEDURE — S0028 INJECTION, FAMOTIDINE, 20 MG: HCPCS | Performed by: NURSE PRACTITIONER

## 2017-09-22 PROCEDURE — 6370000000 HC RX 637 (ALT 250 FOR IP): Performed by: INTERNAL MEDICINE

## 2017-09-22 PROCEDURE — 2500000003 HC RX 250 WO HCPCS: Performed by: NURSE PRACTITIONER

## 2017-09-22 PROCEDURE — 6360000002 HC RX W HCPCS: Performed by: NURSE PRACTITIONER

## 2017-09-22 PROCEDURE — 2580000003 HC RX 258: Performed by: NURSE PRACTITIONER

## 2017-09-22 PROCEDURE — G8979 MOBILITY GOAL STATUS: HCPCS

## 2017-09-22 PROCEDURE — G8980 MOBILITY D/C STATUS: HCPCS

## 2017-09-22 PROCEDURE — 94762 N-INVAS EAR/PLS OXIMTRY CONT: CPT

## 2017-09-22 RX ORDER — DIAPER,BRIEF,INFANT-TODD,DISP
EACH MISCELLANEOUS 4 TIMES DAILY
Status: DISCONTINUED | OUTPATIENT
Start: 2017-09-22 | End: 2017-09-25 | Stop reason: HOSPADM

## 2017-09-22 RX ADMIN — FAMOTIDINE 20 MG: 10 INJECTION, SOLUTION INTRAVENOUS at 21:00

## 2017-09-22 RX ADMIN — INSULIN LISPRO 3 UNITS: 100 INJECTION, SOLUTION INTRAVENOUS; SUBCUTANEOUS at 12:18

## 2017-09-22 RX ADMIN — Medication 10 ML: at 09:12

## 2017-09-22 RX ADMIN — ENOXAPARIN SODIUM 40 MG: 40 INJECTION SUBCUTANEOUS at 09:03

## 2017-09-22 RX ADMIN — FAMOTIDINE 20 MG: 10 INJECTION, SOLUTION INTRAVENOUS at 09:03

## 2017-09-22 RX ADMIN — HYDROCORTISONE: 10 CREAM TOPICAL at 21:13

## 2017-09-22 RX ADMIN — INSULIN LISPRO 1 UNITS: 100 INJECTION, SOLUTION INTRAVENOUS; SUBCUTANEOUS at 21:01

## 2017-09-22 RX ADMIN — OXYBUTYNIN CHLORIDE 5 MG: 5 TABLET, FILM COATED, EXTENDED RELEASE ORAL at 09:03

## 2017-09-22 RX ADMIN — GLIMEPIRIDE 2 MG: 2 TABLET ORAL at 09:03

## 2017-09-22 RX ADMIN — GLIMEPIRIDE 2 MG: 2 TABLET ORAL at 18:45

## 2017-09-22 RX ADMIN — CEFTRIAXONE SODIUM 1 G: 1 INJECTION, POWDER, FOR SOLUTION INTRAMUSCULAR; INTRAVENOUS at 18:45

## 2017-09-22 RX ADMIN — HYDROCORTISONE: 10 CREAM TOPICAL at 18:57

## 2017-09-22 RX ADMIN — LEVOTHYROXINE SODIUM 125 MCG: 125 TABLET ORAL at 09:02

## 2017-09-22 RX ADMIN — TAMSULOSIN HYDROCHLORIDE 0.4 MG: 0.4 CAPSULE ORAL at 09:03

## 2017-09-23 LAB
GLUCOSE BLD-MCNC: 125 MG/DL (ref 75–110)
GLUCOSE BLD-MCNC: 181 MG/DL (ref 75–110)
GLUCOSE BLD-MCNC: 182 MG/DL (ref 75–110)
GLUCOSE BLD-MCNC: 212 MG/DL (ref 75–110)

## 2017-09-23 PROCEDURE — 82947 ASSAY GLUCOSE BLOOD QUANT: CPT

## 2017-09-23 PROCEDURE — 1200000000 HC SEMI PRIVATE

## 2017-09-23 PROCEDURE — 99232 SBSQ HOSP IP/OBS MODERATE 35: CPT | Performed by: INTERNAL MEDICINE

## 2017-09-23 PROCEDURE — 6360000002 HC RX W HCPCS: Performed by: NURSE PRACTITIONER

## 2017-09-23 PROCEDURE — 6370000000 HC RX 637 (ALT 250 FOR IP): Performed by: INTERNAL MEDICINE

## 2017-09-23 PROCEDURE — 6370000000 HC RX 637 (ALT 250 FOR IP): Performed by: NURSE PRACTITIONER

## 2017-09-23 PROCEDURE — S0028 INJECTION, FAMOTIDINE, 20 MG: HCPCS | Performed by: NURSE PRACTITIONER

## 2017-09-23 PROCEDURE — 2500000003 HC RX 250 WO HCPCS: Performed by: NURSE PRACTITIONER

## 2017-09-23 PROCEDURE — 94762 N-INVAS EAR/PLS OXIMTRY CONT: CPT

## 2017-09-23 PROCEDURE — 2580000003 HC RX 258: Performed by: NURSE PRACTITIONER

## 2017-09-23 RX ORDER — FAMOTIDINE 20 MG/1
20 TABLET, FILM COATED ORAL 2 TIMES DAILY
Status: DISCONTINUED | OUTPATIENT
Start: 2017-09-23 | End: 2017-09-25

## 2017-09-23 RX ADMIN — FAMOTIDINE 20 MG: 20 TABLET, FILM COATED ORAL at 21:48

## 2017-09-23 RX ADMIN — FAMOTIDINE 20 MG: 10 INJECTION, SOLUTION INTRAVENOUS at 09:51

## 2017-09-23 RX ADMIN — GLIMEPIRIDE 2 MG: 2 TABLET ORAL at 09:42

## 2017-09-23 RX ADMIN — ENOXAPARIN SODIUM 40 MG: 40 INJECTION SUBCUTANEOUS at 09:51

## 2017-09-23 RX ADMIN — TAMSULOSIN HYDROCHLORIDE 0.4 MG: 0.4 CAPSULE ORAL at 09:42

## 2017-09-23 RX ADMIN — Medication 10 ML: at 21:48

## 2017-09-23 RX ADMIN — LEVOTHYROXINE SODIUM 125 MCG: 125 TABLET ORAL at 06:49

## 2017-09-23 RX ADMIN — GLIMEPIRIDE 2 MG: 2 TABLET ORAL at 18:03

## 2017-09-23 RX ADMIN — INSULIN LISPRO 1 UNITS: 100 INJECTION, SOLUTION INTRAVENOUS; SUBCUTANEOUS at 21:54

## 2017-09-23 RX ADMIN — INSULIN LISPRO 1 UNITS: 100 INJECTION, SOLUTION INTRAVENOUS; SUBCUTANEOUS at 13:00

## 2017-09-23 RX ADMIN — INSULIN LISPRO 2 UNITS: 100 INJECTION, SOLUTION INTRAVENOUS; SUBCUTANEOUS at 17:59

## 2017-09-23 RX ADMIN — OXYBUTYNIN CHLORIDE 5 MG: 5 TABLET, FILM COATED, EXTENDED RELEASE ORAL at 09:42

## 2017-09-23 ASSESSMENT — PAIN SCALES - GENERAL: PAINLEVEL_OUTOF10: 0

## 2017-09-24 PROBLEM — N17.9 AKI (ACUTE KIDNEY INJURY) (HCC): Status: ACTIVE | Noted: 2017-09-24

## 2017-09-24 LAB
ABSOLUTE EOS #: 0.4 K/UL (ref 0–0.4)
ABSOLUTE LYMPH #: 1.7 K/UL (ref 1–4.8)
ABSOLUTE MONO #: 0.7 K/UL (ref 0.1–1.2)
ALBUMIN SERPL-MCNC: 3.2 G/DL (ref 3.5–5.2)
ALBUMIN/GLOBULIN RATIO: 0.7 (ref 1–2.5)
ALP BLD-CCNC: 148 U/L (ref 40–129)
ALT SERPL-CCNC: 70 U/L (ref 5–41)
ANION GAP SERPL CALCULATED.3IONS-SCNC: 13 MMOL/L (ref 9–17)
AST SERPL-CCNC: 40 U/L
BASOPHILS # BLD: 1 %
BASOPHILS ABSOLUTE: 0 K/UL (ref 0–0.2)
BILIRUB SERPL-MCNC: 0.4 MG/DL (ref 0.3–1.2)
BUN BLDV-MCNC: 17 MG/DL (ref 8–23)
BUN/CREAT BLD: ABNORMAL (ref 9–20)
CALCIUM SERPL-MCNC: 8.7 MG/DL (ref 8.6–10.4)
CHLORIDE BLD-SCNC: 103 MMOL/L (ref 98–107)
CO2: 25 MMOL/L (ref 20–31)
CREAT SERPL-MCNC: 1.33 MG/DL (ref 0.7–1.2)
DIFFERENTIAL TYPE: NORMAL
EOSINOPHILS RELATIVE PERCENT: 5 %
GFR AFRICAN AMERICAN: >60 ML/MIN
GFR NON-AFRICAN AMERICAN: 51 ML/MIN
GFR SERPL CREATININE-BSD FRML MDRD: ABNORMAL ML/MIN/{1.73_M2}
GFR SERPL CREATININE-BSD FRML MDRD: ABNORMAL ML/MIN/{1.73_M2}
GLUCOSE BLD-MCNC: 117 MG/DL (ref 75–110)
GLUCOSE BLD-MCNC: 130 MG/DL (ref 75–110)
GLUCOSE BLD-MCNC: 164 MG/DL (ref 70–99)
GLUCOSE BLD-MCNC: 170 MG/DL (ref 75–110)
GLUCOSE BLD-MCNC: 191 MG/DL (ref 75–110)
HCT VFR BLD CALC: 46.3 % (ref 41–53)
HEMOGLOBIN: 15.9 G/DL (ref 13.5–17.5)
LYMPHOCYTES # BLD: 22 %
MCH RBC QN AUTO: 29.4 PG (ref 26–34)
MCHC RBC AUTO-ENTMCNC: 34.3 G/DL (ref 31–37)
MCV RBC AUTO: 85.8 FL (ref 80–100)
MONOCYTES # BLD: 9 %
PDW BLD-RTO: 14.1 % (ref 12.5–15.4)
PHOSPHORUS: 2.8 MG/DL (ref 2.5–4.5)
PLATELET # BLD: 333 K/UL (ref 140–450)
PLATELET ESTIMATE: NORMAL
PMV BLD AUTO: 6.7 FL (ref 6–12)
POTASSIUM SERPL-SCNC: 4.3 MMOL/L (ref 3.7–5.3)
RBC # BLD: 5.4 M/UL (ref 4.5–5.9)
RBC # BLD: NORMAL 10*6/UL
SEG NEUTROPHILS: 63 %
SEGMENTED NEUTROPHILS ABSOLUTE COUNT: 4.9 K/UL (ref 1.8–7.7)
SERUM OSMOLALITY: 297 MOSM/KG (ref 275–295)
SODIUM BLD-SCNC: 141 MMOL/L (ref 135–144)
TOTAL PROTEIN: 8 G/DL (ref 6.4–8.3)
WBC # BLD: 7.7 K/UL (ref 3.5–11)
WBC # BLD: NORMAL 10*3/UL

## 2017-09-24 PROCEDURE — 6370000000 HC RX 637 (ALT 250 FOR IP): Performed by: NURSE PRACTITIONER

## 2017-09-24 PROCEDURE — 6360000002 HC RX W HCPCS: Performed by: NURSE PRACTITIONER

## 2017-09-24 PROCEDURE — 94762 N-INVAS EAR/PLS OXIMTRY CONT: CPT

## 2017-09-24 PROCEDURE — 99232 SBSQ HOSP IP/OBS MODERATE 35: CPT | Performed by: INTERNAL MEDICINE

## 2017-09-24 PROCEDURE — 6370000000 HC RX 637 (ALT 250 FOR IP): Performed by: INTERNAL MEDICINE

## 2017-09-24 PROCEDURE — 2580000003 HC RX 258: Performed by: NURSE PRACTITIONER

## 2017-09-24 PROCEDURE — 80053 COMPREHEN METABOLIC PANEL: CPT

## 2017-09-24 PROCEDURE — 99232 SBSQ HOSP IP/OBS MODERATE 35: CPT | Performed by: FAMILY MEDICINE

## 2017-09-24 PROCEDURE — 82947 ASSAY GLUCOSE BLOOD QUANT: CPT

## 2017-09-24 PROCEDURE — 2580000003 HC RX 258: Performed by: FAMILY MEDICINE

## 2017-09-24 PROCEDURE — 1200000000 HC SEMI PRIVATE

## 2017-09-24 PROCEDURE — 83930 ASSAY OF BLOOD OSMOLALITY: CPT

## 2017-09-24 PROCEDURE — 84100 ASSAY OF PHOSPHORUS: CPT

## 2017-09-24 PROCEDURE — 36415 COLL VENOUS BLD VENIPUNCTURE: CPT

## 2017-09-24 PROCEDURE — 85025 COMPLETE CBC W/AUTO DIFF WBC: CPT

## 2017-09-24 RX ORDER — SODIUM CHLORIDE 9 MG/ML
INJECTION, SOLUTION INTRAVENOUS CONTINUOUS
Status: DISCONTINUED | OUTPATIENT
Start: 2017-09-24 | End: 2017-09-25 | Stop reason: HOSPADM

## 2017-09-24 RX ADMIN — GLIMEPIRIDE 2 MG: 2 TABLET ORAL at 09:31

## 2017-09-24 RX ADMIN — INSULIN LISPRO 1 UNITS: 100 INJECTION, SOLUTION INTRAVENOUS; SUBCUTANEOUS at 18:45

## 2017-09-24 RX ADMIN — FAMOTIDINE 20 MG: 20 TABLET, FILM COATED ORAL at 09:31

## 2017-09-24 RX ADMIN — LEVOTHYROXINE SODIUM 125 MCG: 125 TABLET ORAL at 06:02

## 2017-09-24 RX ADMIN — Medication 10 ML: at 21:36

## 2017-09-24 RX ADMIN — ENOXAPARIN SODIUM 40 MG: 40 INJECTION SUBCUTANEOUS at 09:31

## 2017-09-24 RX ADMIN — TAMSULOSIN HYDROCHLORIDE 0.4 MG: 0.4 CAPSULE ORAL at 09:31

## 2017-09-24 RX ADMIN — Medication 10 ML: at 09:34

## 2017-09-24 RX ADMIN — GLIMEPIRIDE 2 MG: 2 TABLET ORAL at 18:35

## 2017-09-24 RX ADMIN — SODIUM CHLORIDE: 9 INJECTION, SOLUTION INTRAVENOUS at 21:46

## 2017-09-24 RX ADMIN — OXYBUTYNIN CHLORIDE 5 MG: 5 TABLET, FILM COATED, EXTENDED RELEASE ORAL at 09:31

## 2017-09-24 RX ADMIN — FAMOTIDINE 20 MG: 20 TABLET, FILM COATED ORAL at 21:40

## 2017-09-24 ASSESSMENT — ENCOUNTER SYMPTOMS
NAUSEA: 0
VOMITING: 0
DIARRHEA: 0
ABDOMINAL PAIN: 0
SHORTNESS OF BREATH: 0
WHEEZING: 0
CONSTIPATION: 0

## 2017-09-25 VITALS
TEMPERATURE: 98 F | OXYGEN SATURATION: 96 % | HEIGHT: 64 IN | HEART RATE: 60 BPM | WEIGHT: 145.8 LBS | DIASTOLIC BLOOD PRESSURE: 65 MMHG | RESPIRATION RATE: 27 BRPM | SYSTOLIC BLOOD PRESSURE: 126 MMHG | BODY MASS INDEX: 24.89 KG/M2

## 2017-09-25 LAB
-: NORMAL
AMORPHOUS: NORMAL
ANION GAP SERPL CALCULATED.3IONS-SCNC: 11 MMOL/L (ref 9–17)
BACTERIA: NORMAL
BILIRUBIN URINE: NEGATIVE
BUN BLDV-MCNC: 16 MG/DL (ref 8–23)
BUN/CREAT BLD: ABNORMAL (ref 9–20)
CALCIUM SERPL-MCNC: 8.6 MG/DL (ref 8.6–10.4)
CASTS UA: NORMAL /LPF (ref 0–8)
CHLORIDE BLD-SCNC: 105 MMOL/L (ref 98–107)
CO2: 22 MMOL/L (ref 20–31)
COLOR: YELLOW
COMMENT UA: ABNORMAL
CREAT SERPL-MCNC: 1.22 MG/DL (ref 0.7–1.2)
CREATININE URINE: 54.1 MG/DL (ref 39–259)
CRYSTALS, UA: NORMAL /HPF
EOSINOPHIL,URINE: NORMAL
EPITHELIAL CELLS UA: NORMAL /HPF (ref 0–5)
GFR AFRICAN AMERICAN: >60 ML/MIN
GFR NON-AFRICAN AMERICAN: 56 ML/MIN
GFR SERPL CREATININE-BSD FRML MDRD: ABNORMAL ML/MIN/{1.73_M2}
GFR SERPL CREATININE-BSD FRML MDRD: ABNORMAL ML/MIN/{1.73_M2}
GLUCOSE BLD-MCNC: 104 MG/DL (ref 75–110)
GLUCOSE BLD-MCNC: 121 MG/DL (ref 70–99)
GLUCOSE BLD-MCNC: 151 MG/DL (ref 75–110)
GLUCOSE BLD-MCNC: 209 MG/DL (ref 75–110)
GLUCOSE URINE: NEGATIVE
KETONES, URINE: NEGATIVE
LEUKOCYTE ESTERASE, URINE: ABNORMAL
MICROALBUMIN/CREAT 24H UR: <12 MG/L
MICROALBUMIN/CREAT UR-RTO: 22 MCG/MG CREAT
MUCUS: NORMAL
NITRITE, URINE: NEGATIVE
OSMOLALITY URINE: 411 MOSM/KG (ref 80–1300)
OTHER OBSERVATIONS UA: NORMAL
PH UA: 6.5 (ref 5–8)
POTASSIUM SERPL-SCNC: 4.1 MMOL/L (ref 3.7–5.3)
PROTEIN UA: NEGATIVE
RBC UA: NORMAL /HPF (ref 0–4)
RENAL EPITHELIAL, UA: NORMAL /HPF
SODIUM BLD-SCNC: 138 MMOL/L (ref 135–144)
SODIUM,UR: 121 MMOL/L
SPECIFIC GRAVITY UA: 1.01 (ref 1–1.03)
SURGICAL PATHOLOGY REPORT: NORMAL
SURGICAL PATHOLOGY REPORT: NORMAL
TRICHOMONAS: NORMAL
TURBIDITY: CLEAR
URINE HGB: NEGATIVE
UROBILINOGEN, URINE: NORMAL
WBC UA: NORMAL /HPF (ref 0–5)
YEAST: NORMAL

## 2017-09-25 PROCEDURE — 82043 UR ALBUMIN QUANTITATIVE: CPT

## 2017-09-25 PROCEDURE — 82947 ASSAY GLUCOSE BLOOD QUANT: CPT

## 2017-09-25 PROCEDURE — 82570 ASSAY OF URINE CREATININE: CPT

## 2017-09-25 PROCEDURE — 99232 SBSQ HOSP IP/OBS MODERATE 35: CPT | Performed by: INTERNAL MEDICINE

## 2017-09-25 PROCEDURE — 99239 HOSP IP/OBS DSCHRG MGMT >30: CPT | Performed by: FAMILY MEDICINE

## 2017-09-25 PROCEDURE — 83935 ASSAY OF URINE OSMOLALITY: CPT

## 2017-09-25 PROCEDURE — 94762 N-INVAS EAR/PLS OXIMTRY CONT: CPT

## 2017-09-25 PROCEDURE — 81001 URINALYSIS AUTO W/SCOPE: CPT

## 2017-09-25 PROCEDURE — 80048 BASIC METABOLIC PNL TOTAL CA: CPT

## 2017-09-25 PROCEDURE — 36415 COLL VENOUS BLD VENIPUNCTURE: CPT

## 2017-09-25 PROCEDURE — 84300 ASSAY OF URINE SODIUM: CPT

## 2017-09-25 PROCEDURE — 6360000002 HC RX W HCPCS: Performed by: NURSE PRACTITIONER

## 2017-09-25 PROCEDURE — 6370000000 HC RX 637 (ALT 250 FOR IP): Performed by: NURSE PRACTITIONER

## 2017-09-25 PROCEDURE — 6370000000 HC RX 637 (ALT 250 FOR IP): Performed by: INTERNAL MEDICINE

## 2017-09-25 PROCEDURE — 87086 URINE CULTURE/COLONY COUNT: CPT

## 2017-09-25 PROCEDURE — 87205 SMEAR GRAM STAIN: CPT

## 2017-09-25 RX ORDER — FAMOTIDINE 20 MG/1
20 TABLET, FILM COATED ORAL DAILY
Status: DISCONTINUED | OUTPATIENT
Start: 2017-09-26 | End: 2017-09-25 | Stop reason: HOSPADM

## 2017-09-25 RX ORDER — FAMOTIDINE 20 MG/1
20 TABLET, FILM COATED ORAL DAILY
Qty: 60 TABLET | Refills: 3 | Status: SHIPPED | OUTPATIENT
Start: 2017-09-26 | End: 2018-06-13

## 2017-09-25 RX ADMIN — HYDROCORTISONE: 10 CREAM TOPICAL at 09:00

## 2017-09-25 RX ADMIN — GLIMEPIRIDE 2 MG: 2 TABLET ORAL at 11:07

## 2017-09-25 RX ADMIN — INSULIN LISPRO 2 UNITS: 100 INJECTION, SOLUTION INTRAVENOUS; SUBCUTANEOUS at 12:24

## 2017-09-25 RX ADMIN — TAMSULOSIN HYDROCHLORIDE 0.4 MG: 0.4 CAPSULE ORAL at 11:07

## 2017-09-25 RX ADMIN — OXYBUTYNIN CHLORIDE 5 MG: 5 TABLET, FILM COATED, EXTENDED RELEASE ORAL at 11:07

## 2017-09-25 RX ADMIN — ENOXAPARIN SODIUM 40 MG: 40 INJECTION SUBCUTANEOUS at 09:42

## 2017-09-25 RX ADMIN — LEVOTHYROXINE SODIUM 125 MCG: 125 TABLET ORAL at 09:42

## 2017-09-25 RX ADMIN — FAMOTIDINE 20 MG: 20 TABLET, FILM COATED ORAL at 11:07

## 2017-09-25 ASSESSMENT — ENCOUNTER SYMPTOMS
WHEEZING: 0
DIARRHEA: 0
SHORTNESS OF BREATH: 0
ABDOMINAL PAIN: 0
CONSTIPATION: 0
VOMITING: 0
NAUSEA: 0

## 2017-09-25 ASSESSMENT — PAIN SCALES - GENERAL: PAINLEVEL_OUTOF10: 0

## 2017-09-26 LAB
CULTURE: NO GROWTH
CULTURE: NORMAL
Lab: NORMAL
SPECIMEN DESCRIPTION: NORMAL
STATUS: NORMAL

## 2017-10-03 ENCOUNTER — OFFICE VISIT (OUTPATIENT)
Dept: ONCOLOGY | Age: 82
End: 2017-10-03
Payer: MEDICARE

## 2017-10-03 VITALS
HEART RATE: 66 BPM | BODY MASS INDEX: 26.14 KG/M2 | WEIGHT: 152.3 LBS | TEMPERATURE: 98.6 F | DIASTOLIC BLOOD PRESSURE: 64 MMHG | SYSTOLIC BLOOD PRESSURE: 123 MMHG

## 2017-10-03 DIAGNOSIS — C73 PAPILLARY CARCINOMA OF THYROID (HCC): Primary | ICD-10-CM

## 2017-10-03 DIAGNOSIS — C78.00 MALIGNANT NEOPLASM METASTATIC TO LUNG, UNSPECIFIED LATERALITY (HCC): ICD-10-CM

## 2017-10-03 PROCEDURE — G8598 ASA/ANTIPLAT THER USED: HCPCS | Performed by: INTERNAL MEDICINE

## 2017-10-03 PROCEDURE — G8484 FLU IMMUNIZE NO ADMIN: HCPCS | Performed by: INTERNAL MEDICINE

## 2017-10-03 PROCEDURE — G8427 DOCREV CUR MEDS BY ELIG CLIN: HCPCS | Performed by: INTERNAL MEDICINE

## 2017-10-03 PROCEDURE — 1123F ACP DISCUSS/DSCN MKR DOCD: CPT | Performed by: INTERNAL MEDICINE

## 2017-10-03 PROCEDURE — 1111F DSCHRG MED/CURRENT MED MERGE: CPT | Performed by: INTERNAL MEDICINE

## 2017-10-03 PROCEDURE — G8419 CALC BMI OUT NRM PARAM NOF/U: HCPCS | Performed by: INTERNAL MEDICINE

## 2017-10-03 PROCEDURE — 99211 OFF/OP EST MAY X REQ PHY/QHP: CPT

## 2017-10-03 PROCEDURE — 4040F PNEUMOC VAC/ADMIN/RCVD: CPT | Performed by: INTERNAL MEDICINE

## 2017-10-03 PROCEDURE — 1036F TOBACCO NON-USER: CPT | Performed by: INTERNAL MEDICINE

## 2017-10-03 PROCEDURE — 99215 OFFICE O/P EST HI 40 MIN: CPT | Performed by: INTERNAL MEDICINE

## 2017-10-03 NOTE — PROGRESS NOTES
irritation       Current Outpatient Prescriptions   Medication Sig Dispense Refill    famotidine (PEPCID) 20 MG tablet Take 1 tablet by mouth daily 60 tablet 3    oxybutynin (DITROPAN-XL) 5 MG extended release tablet Take 1 tablet by mouth daily Pt must make an appointment for further refills 30 tablet 0    tamsulosin (FLOMAX) 0.4 MG capsule Take 1 capsule by mouth daily Pt must make an appointment before any further refills will be given 30 capsule 0    Cholecalciferol (VITAMIN D3) 13596 UNITS CAPS Take 50,000 Units by mouth once a week      ammonium lactate (LAC-HYDRIN) 12 % lotion Apply 1 Bottle topically as needed for Dry Skin Apply topically as needed.  tolnaftate (TINACTIN) 1 % external solution Apply 1 Bottle topically nightly Apply topically nightly.  levothyroxine (LEVOXYL) 137 MCG tablet TAKE 1 TABLET DAILY      Sennosides 25 MG TABS Take 1 tablet by mouth daily as needed      nitroGLYCERIN (NITROSTAT) 0.4 MG SL tablet Place 0.4 mg under the tongue. As directed      glimepiride (AMARYL) 4 MG tablet Take 2 mg by mouth daily       docusate sodium (COLACE) 100 MG capsule Take 100 mg by mouth 2 times daily as needed for Constipation.  atorvastatin (LIPITOR) 20 MG tablet   Take 20 mg by mouth nightly       aspirin 81 MG tablet Take 81 mg by mouth daily. No current facility-administered medications for this visit. Social History     Social History    Marital status:       Spouse name: N/A    Number of children: N/A    Years of education: N/A     Occupational History    Retired [employer]     Social History Main Topics    Smoking status: Never Smoker    Smokeless tobacco: Never Used    Alcohol use No    Drug use: No    Sexual activity: Not on file     Other Topics Concern    Not on file     Social History Narrative       Family History   Problem Relation Age of Onset    Cancer Mother      stomach    Other Father      pneumonia      REVIEW OF SYSTEM: medicine hepatobiliary imaging study may be helpful. ASSESSMENT:    Mr. Bala Yepez is a 35-year-old gentleman with a history of metastatic papillary thyroid cancer. He has history of total thyroidectomy in 2010 and also had modified radical neck dissection for metastatic lymph nodes. I discussed the results of lung biopsy with patient and his family. I explained that papillary thyroid cancer usually to have good prognosis despite metastatic disease. His disease is limited to the lungs only. Among patients with small pulmonary metastases but no other metastases outside of the neck, the 10-year survival rate is 30 to 50 percent. At this point for his recurrent disease I discussed the option of treatments Including TSH suppression, radioactive iodine and tyrosine kinase inhibitors. He is in relatively good health. He is planning to have appointment with endocrinologist at [facility]. I will see him in 4-6 weeks for further recommendations  . During today's visit, the patient and the family had a number of reasonable questions which were answered to their satisfaction. They verbalized understanding of the information provided and they agreed to proceed as outlined above. PLAN:   Return to clinic in 4-6 weeks  [facility] for second opinion      Dustin Gee MD  Hematologist/Medical Oncologist        This note is created with the assistance of a speech recognition program.  While intending to generate a document that actually reflects the content of the visit, the document can still have some errors including those of syntax and sound a like substitutions which may escape proof reading. It such instances, actual meaning can be extrapolated by contextual diversion.

## 2017-10-03 NOTE — MR AVS SNAPSHOT
Abdominal pain, right upper quadrant    Acute cystitis    Elevated LFTs    Metastatic cancer ([facility]    Pulmonary nodule    Type 2 diabetes mellitus without complication, without long-term current use of insulin ([facility]    Coronary artery disease involving coronary bypass graft of native heart without angina pectoris    Hyperlipidemia    Cervical radiculopathy, chronic    Arthropathy of cervical facet joint (HCC)    Cervical facet joint syndrome    Cervical spondylosis    Cervical disc herniation    Spinal stenosis in cervical region    Vertigo    Acquired hypothyroidism    H/O malignant neoplasm of thyroid    Cervicalgia    Degeneration of cervical intervertebral disc    Sprain of neck    Block, bundle branch, left    Mitral valve insufficiency      Immunizations as of 10/3/2017     Name Date    H1N1 2/23/2010    Influenza Virus Vaccine 11/9/2015, 10/1/2013    Influenza Whole 9/15/2011    Influenza, Quadv, 3 Years and older, IM 10/4/2016    Pneumococcal Polysaccharide (Grticcnpu22) 5/9/2017, 11/9/2015, 9/15/2011      Preventive Care        Date Due    Tetanus Combination Vaccine (1 - Tdap) 2/13/1947    Zoster Vaccine 2/13/1988    Yearly Flu Vaccine (1) 9/1/2017    Pneumococcal Vaccines (two) for all adults aged 72 and over (2 of 2 - PCV13) 5/9/2018            [employer] Signup           Our records indicate that you have an active [employer] account. You can view your After Visit Summary by going to [email or web address] and logging in with your [employer] username and password. If you don't have a [employer] username and password but a parent or guardian has access to your record, the parent or guardian should login with their own [employer] username and password and access your record to view the After Visit Summary. Additional Information  If you have questions, please contact the physician practice where you receive care. Remember, [employer] is NOT to be used for urgent needs.  For

## 2017-10-04 ENCOUNTER — APPOINTMENT (OUTPATIENT)
Dept: GENERAL RADIOLOGY | Age: 82
End: 2017-10-04
Payer: MEDICARE

## 2017-10-04 ENCOUNTER — HOSPITAL ENCOUNTER (EMERGENCY)
Age: 82
Discharge: HOME | End: 2017-10-04
Attending: EMERGENCY MEDICINE
Payer: MEDICARE

## 2017-10-04 VITALS
SYSTOLIC BLOOD PRESSURE: 114 MMHG | RESPIRATION RATE: 20 BRPM | HEIGHT: 64 IN | OXYGEN SATURATION: 99 % | WEIGHT: 152 LBS | HEART RATE: 66 BPM | DIASTOLIC BLOOD PRESSURE: 67 MMHG | TEMPERATURE: 97 F | BODY MASS INDEX: 25.95 KG/M2

## 2017-10-04 DIAGNOSIS — R07.81 RIB PAIN ON RIGHT SIDE: Primary | ICD-10-CM

## 2017-10-04 PROCEDURE — 71101 X-RAY EXAM UNILAT RIBS/CHEST: CPT

## 2017-10-04 PROCEDURE — 6370000000 HC RX 637 (ALT 250 FOR IP): Performed by: EMERGENCY MEDICINE

## 2017-10-04 PROCEDURE — 99283 EMERGENCY DEPT VISIT LOW MDM: CPT

## 2017-10-04 RX ORDER — IBUPROFEN 800 MG/1
800 TABLET ORAL ONCE
Status: COMPLETED | OUTPATIENT
Start: 2017-10-04 | End: 2017-10-04

## 2017-10-04 RX ORDER — IBUPROFEN 800 MG/1
800 TABLET ORAL EVERY 8 HOURS PRN
Qty: 30 TABLET | Refills: 0 | Status: SHIPPED | OUTPATIENT
Start: 2017-10-04 | End: 2017-12-12 | Stop reason: ALTCHOICE

## 2017-10-04 RX ADMIN — IBUPROFEN 800 MG: 800 TABLET, FILM COATED ORAL at 13:20

## 2017-10-04 ASSESSMENT — PAIN DESCRIPTION - ORIENTATION: ORIENTATION: RIGHT

## 2017-10-04 ASSESSMENT — PAIN DESCRIPTION - LOCATION: LOCATION: RIB CAGE

## 2017-10-04 ASSESSMENT — PAIN DESCRIPTION - PAIN TYPE: TYPE: ACUTE PAIN

## 2017-10-04 ASSESSMENT — PAIN SCALES - GENERAL
PAINLEVEL_OUTOF10: 6
PAINLEVEL_OUTOF10: 6

## 2017-10-04 NOTE — ED PROVIDER NOTES
I performed a history and physical examination of the patient and discussed management with the resident. I reviewed the residents note and agree with the documented findings and plan of care. Any areas of disagreement are noted on the chart. I was personally present for the key portions of any procedures. I have documented in the chart those procedures where I was not present during the key portions. I have reviewed the emergency nurses triage note. I agree with the chief complaint, past medical history, past surgical history, allergies, medications, social and family history as documented unless otherwise noted below. Documentation of the HPI, Physical Exam and Medical Decision Making performed by medical students or scribes is based on my personal performance of the HPI, PE and MDM. For Phys Assistant/ Nurse Practitioner cases/documentation I have personally evaluated this patient and have completed at least one if not all key elements of the E/M (history, physical exam, and MDM). Additional findings are as noted. Entirely asymptomatic now. Normal vitals, normal xrays. Has follow up.   Comfortable - will discharge    Bobby Ochoa MD  Attending Emergency  Physician           Bobby Ochoa MD  10/04/17 [id number]

## 2017-10-04 NOTE — ED AVS SNAPSHOT
further refills will be given       tolnaftate 1 % external solution   Commonly known as:  TINACTIN   Apply 1 Bottle topically nightly Apply topically nightly. Vitamin D3 05893 units Caps   Take 50,000 Units by mouth once a week            Where to Get Your Medications      You can get these medications from any pharmacy     Bring a paper prescription for each of these medications     ibuprofen 800 MG tablet               Allergies as of 10/4/2017        Reactions    Seasonal     Other reaction(s): Other: See Comments  seasonal upper respiratory irritation      Immunizations as of 10/4/2017     Name Date Dose VIS Date Route    H1N1 2/23/2010   -- --    Influenza Virus Vaccine 11/9/2015 0.5 mL 8/7/2015 Intramuscular    Influenza Virus Vaccine 10/1/2013 -- -- --    Influenza Whole 9/15/2011   -- --    Influenza, Quadv, 3 Years and older, IM 10/4/2016 0.5 mL 8/7/2015 Intramuscular    Pneumococcal Polysaccharide (Derbqxvwz46) 5/9/2017 0.5 mL 4/24/2015 Intramuscular    Pneumococcal Polysaccharide (Pvicoczts46) 11/9/2015 0.5 mL 10/6/2009 Intramuscular    Pneumococcal Polysaccharide (Epimwfhnf59) 9/15/2011   -- --         After Visit Summary    This summary was created for you. Thank you for entrusting your care to us. The following information includes details about your hospital/visit stay along with steps you should take to help with your recovery once you leave the hospital.  In this packet, you will find information about the topics listed below:    · Instructions about your medications including a list of your home medications  · A summary of your hospital visit  · Follow-up appointments once you have left the hospital  · Your care plan at home      You may receive a survey regarding the care you received during your stay. Your input is valuable to us. We encourage you to complete and return your survey in the envelope provided. We hope you will choose us in the future for your healthcare needs. Patient Information     Patient Name BOBO Vo 1928      Care Provided at:     Name Address Phone       [facility] Domitila Weiss [address] [place] [id number] [phone]            Your Visit    Here you will find information about your visit, including the reason for your visit. Please take this sheet with you when you visit your doctor or other health care provider in the future. It will help determine the best possible medical care for you at that time. If you have any questions once you leave the hospital, please call the department phone number listed below. Diagnoses this visit     Your diagnosis was RIB PAIN ON RIGHT SIDE. Visit Information     Date of Visit Department Dept Phone    10/4/2017 [facility] [phone]      You were seen by     You were seen by Estella Enriquez MD and Meet Menchaca MD.       Follow-up Appointments    Below is a list of your follow-up and future appointments. This may not be a complete list as you may have made appointments directly with providers that we are not aware of or your providers may have made some for you. Please call your providers to confirm appointments. It is important to keep your appointments. Please bring your current insurance card, photo ID, co-pay, and all medication bottles to your appointment. If self-pay, payment is expected at the time of service. Follow-up Information     Follow up with Mason Boston MD. Call today. Specialty:  Family Medicine    Why:  for follow up of your rib pain    Contact information:    [address] [facility] [address]  [phone]        Future Appointments     10/9/2017 1:45 PM     Appointment with Mason Boston MD at [address] [address] ([phone])   Please arrive 15 minutes prior to appointment, bring photo ID and insurance card.    Fabienne Uriarte  [id number]       2017 2:40 PM Appointment with Crystal Rodriguez MD at [facility] ([phone])   Please arrive 15 minutes prior to appointment, bring photo ID and insurance card. [facility]         Preventive Care        Date Due    Tetanus Combination Vaccine (1 - Tdap) 2/13/1947    Zoster Vaccine 2/13/1988    Yearly Flu Vaccine (1) 9/1/2017    Pneumococcal Vaccines (two) for all adults aged 72 and over (2 of 2 - PCV13) 5/9/2018                 Care Plan Once You Return Home    This section includes instructions you will need to follow once you leave the hospital.  Your care team will discuss these with you, so you and those caring for you know how to best care for your health needs at home. This section may also include educational information about certain health topics that may be of help to you. Important Information if you smoke or are exposed to smoking       SMOKING: QUIT SMOKING. THIS IS THE MOST IMPORTANT ACTION YOU CAN TAKE TO IMPROVE YOUR CURRENT AND FUTURE HEALTH. Call the [address] [place] [address] [place] [facility] [place] NOW ([phone])    Smoking harms nonsmokers. When nonsmokers are around people who smoke, they absorb nicotine, carbon monoxide, and other ingredients of tobacco smoke. DO NOT SMOKE AROUND CHILDREN     Children exposed to secondhand smoke are at an increased risk of:  Sudden Infant Death Syndrome (SIDS), acute respiratory infections, inflammation of the middle ear, and severe asthma. Over a longer time, it causes heart disease and lung cancer. There is no safe level of exposure to secondhand smoke. [employer] Signup     Our records indicate that you have an active [employer] account. You can view your After Visit Summary by going to [email or web address] and logging in with your digitalbox username and password.       If you don't have a [employer] username and password but a parent or guardian has access to your record, the parent or guardian should login with their own [employer] username and password and access your record to view the After Visit Summary. Additional Information  If you have questions, please contact the physician practice where you receive care. Remember, MyChart is NOT to be used for urgent needs. For medical emergencies, dial 911. For questions regarding your MyChart account call [phone]. If you have a clinical question, please call your doctor's office. View your information online  ? Review your current list of  medications, immunization, and allergies. ? Review your future test results online . ? Review your discharge instructions provided by your caregivers at discharge    Certain functionality such as prescription refills, scheduling appointments or sending messages to your provider are not activated if your provider does not use [employer] in his/her office    For questions regarding your MyChart account call [phone]. If you have a clinical question, please call your doctor's office. The information on all pages of the After Visit Summary has been reviewed with me, the patient and/or responsible adult, by my health care provider(s). I had the opportunity to ask questions regarding this information. I understand I should dispose of my armband safely at home to protect my health information. A complete copy of the After Visit Summary has been given to me, the patient and/or responsible adult. Patient Signature/Responsible Adult: ___________________________________    Nurse Signature: ___________________________________  Resident/MLP Signature: ___________________________________  Attending Signature: ___________________________________    Date:____________Time:____________              Discharge Instructions       Please take this medication as prescribed and only as needed for pain. Please follow-up with family doctor by calling today to set up an appointment within the next week. Please continue home medication as prescribed. He can also take this allergy medication as needed. Please come back to the ER for any worsening symptoms including worsening chest pain, shortness of breath, wheezing, or any other concerning symptoms. Musculoskeletal Chest Pain: Care Instructions  Your Care Instructions  Chest pain is not always a sign that something is wrong with your heart or that you have another serious problem. The doctor thinks your chest pain is caused by strained muscles or ligaments, inflamed chest cartilage, or another problem in your chest, rather than by your heart. You may need more tests to find the cause of your chest pain. Follow-up care is a key part of your treatment and safety. Be sure to make and go to all appointments, and call your doctor if you are having problems. Its also a good idea to know your test results and keep a list of the medicines you take. How can you care for yourself at home? · Take pain medicines exactly as directed. ¨ If the doctor gave you a prescription medicine for pain, take it as prescribed. ¨ If you are not taking a prescription pain medicine, ask your doctor if you can take an over-the-counter medicine. · Rest and protect the sore area. · Stop, change, or take a break from any activity that may be causing your pain or soreness. · Put ice or a cold pack on the sore area for 10 to 20 minutes at a time. Try to do this every 1 to 2 hours for the next 3 days (when you are awake) or until the swelling goes down. Put a thin cloth between the ice and your skin. · After 2 or 3 days, apply a heating pad set on low or a warm cloth to the area that hurts. Some doctors suggest that you go back and forth between hot and cold. · Do not wrap or tape your ribs for support.  This may cause you to take smaller breaths, which could increase your risk of lung problems. · Mentholated creams such as Bengay or Icy Hot may soothe sore muscles. Follow the instructions on the package. · Follow your doctor's instructions for exercising. · Gentle stretching and massage may help you get better faster. Stretch slowly to the point just before pain begins, and hold the stretch for at least 15 to 30 seconds. Do this 3 or 4 times a day. Stretch just after you have applied heat. · As your pain gets better, slowly return to your normal activities. Any increased pain may be a sign that you need to rest a while longer. When should you call for help? Call 911 anytime you think you may need emergency care. For example, call if:  · You have chest pain or pressure. This may occur with:  ¨ Sweating. ¨ Shortness of breath. ¨ Nausea or vomiting. ¨ Pain that spreads from the chest to the neck, jaw, or one or both shoulders or arms. ¨ Dizziness or lightheadedness. ¨ A fast or uneven pulse. After calling 911, chew 1 adult-strength aspirin. Wait for an ambulance. Do not try to drive yourself. · You have sudden chest pain and shortness of breath, or you cough up blood. Call your doctor now or seek immediate medical care if:  · You have any trouble breathing. · Your chest pain gets worse. · Your chest pain occurs consistently with exercise and is relieved by rest.  Watch closely for changes in your health, and be sure to contact your doctor if:  · Your chest pain does not get better after 1 week. Where can you learn more? Go to [email or web address] and sign in to your [employer] account. Enter 1894 4412 in the [facility] box to learn more about \"Musculoskeletal Chest Pain: Care Instructions. \"     If you do not have an account, please click on the \"Sign Up Now\" link. Current as of: March 20, 2017  Content Version: 11.3  © 8281-5559 [employer], [employer].  Care instructions adapted under

## 2017-10-04 NOTE — ED NOTES
HPI    Patient is a 79 yo male with a past medical history of thyroid cancer and recently discovered lung cancer presents today with chief complaint of left sided chest wall pain. He states the pain started 1.5 hours ago while he was watching the television. He states the pain is non-radiating, sharp, and constant in the right axillary line 4th intercostal space and 5th rib. The pain doesn't get worse upon palpation or movement of his arms during ROM testing. Nothing makes the pain better or worse. He did not eat breakfast before the pain started and did not take any pain medications. He denies any shortness of breath, abdominal pain, numbness, tingling, diaphoresis. He states he has had similar pain before in the left sided axillary line but it subsided within an hour.

## 2017-10-05 ENCOUNTER — CARE COORDINATION (OUTPATIENT)
Dept: CARE COORDINATION | Age: 82
End: 2017-10-05

## 2017-10-05 LAB
EKG ATRIAL RATE: 60 BPM
EKG P-R INTERVAL: 234 MS
EKG Q-T INTERVAL: 510 MS
EKG QRS DURATION: 200 MS
EKG QTC CALCULATION (BAZETT): 510 MS
EKG R AXIS: -40 DEGREES
EKG T AXIS: -5 DEGREES
EKG VENTRICULAR RATE: 60 BPM

## 2017-10-05 NOTE — CARE COORDINATION
Ambulatory Care Coordination ED Follow up Call       Reason for ED Visit:  Right rib pain  Care Management Risk Score: CMRS 1  How are you feeling? :     significantly improved  Patient Reports the following:  none             Contact [facility] regarding any worsening symptoms from above. Did you call your PCP prior to going to the ED? No          Post Discharge Status:  What health concerns since you left the Emergency Room?  na    Do you have wounds that you are caring for at home? No    Do you have a follow up appt scheduled? yes    Review of Instructions:                                 Do you have any questions regarding your discharge instructions?:  No  Medications:    What questions do you have about your medications? No  Are you taking your medications as directed? If not - why? Yes   Can you afford your medications? yes  ADLS:  Do you need assistance of any kind at home? No   What assistance is needed?  na      FU appts/Provider:    Future Appointments  Date Time Provider Olga Duffy   10/9/2017 1:45 PM MD [facility]   11/14/2017 2:40 PM Trinna Nyhan, MD [facility] Maintenance Due   Topic Date Due    DTaP/Tdap/Td vaccine (1 - Tdap) [birth year]/13/1947    Zostavax vaccine  02/13/1988    Flu vaccine (1) 09/01/2017     Patient advised to contact PCP office to have HM items/records faxed to PCP Office directly?   N/A   Patient reports pain has decreased in severity, reminded of appt with pcp for Monday 10/9/17

## 2017-10-06 ASSESSMENT — ENCOUNTER SYMPTOMS
WHEEZING: 0
RHINORRHEA: 0
COUGH: 0
SHORTNESS OF BREATH: 0
ABDOMINAL PAIN: 0
SORE THROAT: 0
VOMITING: 0
NAUSEA: 0
DIARRHEA: 0
CONSTIPATION: 0

## 2017-10-06 NOTE — ED PROVIDER NOTES
[facility] ED  Emergency Department Encounter  Emergency Medicine Resident     Pt Name: Hernan Forrester  MRN: [id number]  [place] 2/13/1928  Date of evaluation: 10/6/17  PCP:  Sunshine Chamberlain MD    [address] [place] [address]       Chief Complaint   Patient presents with    Rib Pain     right side pain for 1 day, pt denies any chest pain or SOB. Pt denies any injury. Family states recently diagnosed with lung cancer. NAD noted. Will continue to monitor        HISTORY OF PRESENT ILLNESS  (Location/Symptom, Timing/Onset, Context/Setting, Quality, Duration, Modifying Factors, Severity.)      Hernan Forrester is a 80 y.o. male who presents with Complaints of right rib pain for the last day. Patient has a history of lung cancer and follows up with / Kylah Hernandez. Should also follows up with his family doctor. Patient accompanied by daughters who state that this was a recent diagnosis and that patient has not started any treatment yet. Patient states that he had a similar pain on the left side years ago that resolved on its own. Patient denies taking any medication for this current right rib pain. He denies any recent trauma, associated shortness of breath, chest pain, cough, URI symptoms, abdominal pain, nausea, vomiting, fevers, chills, or changes in bowel or urinary habits. PAST MEDICAL / SURGICAL / SOCIAL / FAMILY HISTORY      has a past medical history of Cancer ([address] Carotid artery stenosis; Cataract; Cerebrovascular disease; Heart attack ([address] Hypertension; Hypothyroidism; and Type II or unspecified type diabetes mellitus without mention of complication, not stated as uncontrolled. has a past surgical history that includes shoulder surgery; Cardiac surgery; Coronary artery bypass graft; Appendectomy; Total Thyroidectomy; and Pacemaker insertion (03/2015). Social History     Social History    Marital status:       Spouse name: N/A    Number of children: N/A    Years of education: N/A by mouth 2 times daily as needed for Constipation. Historical Provider, MD   atorvastatin (LIPITOR) 20 MG tablet   Take 20 mg by mouth nightly     Historical Provider, MD   aspirin 81 MG tablet Take 81 mg by mouth daily. Historical Provider, MD       REVIEW OF SYSTEMS    (2-9 systems for level 4, 10 or more for level 5)      Review of Systems   Constitutional: Negative for activity change, appetite change, chills and fever. HENT: Negative for congestion, rhinorrhea and sore throat. Eyes: Negative for visual disturbance. Respiratory: Negative for cough, shortness of breath and wheezing. Cardiovascular: Negative for chest pain. Gastrointestinal: Negative for abdominal pain, constipation, diarrhea, nausea and vomiting. Endocrine: Negative for polyuria. Genitourinary: Negative for dysuria, frequency, hematuria and urgency. Musculoskeletal: Positive for arthralgias. Negative for neck pain and neck stiffness. Neurological: Negative for headaches. PHYSICAL EXAM   (up to 7 for level 4, 8 or more for level 5)      INITIAL VITALS:   /67  Pulse 66  Temp 97 °F (36.1 °C) (Oral)   Resp 20  Ht 5' 4\" (1.626 m)  Wt 152 lb (68.9 kg)  SpO2 99%  BMI 26.09 kg/m2    Physical Exam   Constitutional: He is oriented to person, place, and time. He appears well-developed and well-nourished. No distress. Patient is alert and oriented x4, does not appear to be in acute distress, lying comfortably in bed   HENT:   Head: Normocephalic and atraumatic. Eyes: Conjunctivae and EOM are normal. Pupils are equal, round, and reactive to light. Right eye exhibits no discharge. Left eye exhibits no discharge. Neck: Normal range of motion. Neck supple. Cardiovascular: Normal rate, regular rhythm, normal heart sounds and intact distal pulses. Exam reveals no gallop and no friction rub. No murmur heard. Pulmonary/Chest: Effort normal and breath sounds normal. No respiratory distress.  He has no wheezes. He has no rales. He exhibits no tenderness. Abdominal: Soft. Bowel sounds are normal. He exhibits no distension and no mass. There is no tenderness. There is no rebound and no guarding. Musculoskeletal: Normal range of motion. He exhibits no edema, tenderness or deformity. On exam patient had mild tenderness to palpation of the right mid axillary lower rib, no signs of infection, no bruising,   Neurological: He is alert and oriented to person, place, and time. Skin: Skin is warm and dry. He is not diaphoretic. DIFFERENTIAL  DIAGNOSIS     PLAN (LABS / IMAGING / EKG):  Orders Placed This Encounter   Procedures    XR RIBS RIGHT INCLUDE CHEST (MIN 3 VIEWS)       MEDICATIONS ORDERED:  Orders Placed This Encounter   Medications    ibuprofen (ADVIL;MOTRIN) tablet 800 mg    ibuprofen (ADVIL;MOTRIN) 800 MG tablet     Sig: Take 1 tablet by mouth every 8 hours as needed for Pain     Dispense:  30 tablet     Refill:  0       DDX: Costochondritis, rib fracture, cellulitis, PE, pneumonia,    DIAGNOSTIC RESULTS / EMERGENCY DEPARTMENT COURSE / MDM     LABS:  No results found for this visit on 10/04/17. IMPRESSION: 20-year-old male with history of lung cancer presented with complaints of right-sided rib pain ×1 day without trauma. Stable vitals. Patient does not appear to be in acute distress but did have some tenderness about patient in the right mid axillary rib region. We'll obtain chest x-ray and we'll also give patient was given for pain. Results were unremarkable for any acute findings. Upon reevaluation patient symptom had completely improved and he is denying complaints at this time. Patient states that he has follow-up with his oncologist.  Patient advised to also follow up with his family doctor by calling today to set up a follow-up appointment within a week. Pt will be sent home with Motrin given that that did help improve his symptoms.   Patient also advised to come back to the ER for

## 2017-10-09 ENCOUNTER — OFFICE VISIT (OUTPATIENT)
Dept: FAMILY MEDICINE CLINIC | Age: 82
End: 2017-10-09
Payer: MEDICARE

## 2017-10-09 VITALS
HEART RATE: 60 BPM | SYSTOLIC BLOOD PRESSURE: 132 MMHG | WEIGHT: 154.4 LBS | OXYGEN SATURATION: 97 % | HEIGHT: 64 IN | DIASTOLIC BLOOD PRESSURE: 64 MMHG | TEMPERATURE: 97.5 F | BODY MASS INDEX: 26.36 KG/M2

## 2017-10-09 DIAGNOSIS — E11.9 TYPE 2 DIABETES MELLITUS WITHOUT COMPLICATION, WITHOUT LONG-TERM CURRENT USE OF INSULIN (HCC): Primary | ICD-10-CM

## 2017-10-09 DIAGNOSIS — I25.810 CORONARY ARTERY DISEASE INVOLVING CORONARY BYPASS GRAFT OF NATIVE HEART WITHOUT ANGINA PECTORIS: ICD-10-CM

## 2017-10-09 PROCEDURE — 1111F DSCHRG MED/CURRENT MED MERGE: CPT | Performed by: FAMILY MEDICINE

## 2017-10-09 PROCEDURE — 1036F TOBACCO NON-USER: CPT | Performed by: FAMILY MEDICINE

## 2017-10-09 PROCEDURE — 1123F ACP DISCUSS/DSCN MKR DOCD: CPT | Performed by: FAMILY MEDICINE

## 2017-10-09 PROCEDURE — G8427 DOCREV CUR MEDS BY ELIG CLIN: HCPCS | Performed by: FAMILY MEDICINE

## 2017-10-09 PROCEDURE — G8598 ASA/ANTIPLAT THER USED: HCPCS | Performed by: FAMILY MEDICINE

## 2017-10-09 PROCEDURE — 4040F PNEUMOC VAC/ADMIN/RCVD: CPT | Performed by: FAMILY MEDICINE

## 2017-10-09 PROCEDURE — 99213 OFFICE O/P EST LOW 20 MIN: CPT | Performed by: FAMILY MEDICINE

## 2017-10-09 PROCEDURE — G8484 FLU IMMUNIZE NO ADMIN: HCPCS | Performed by: FAMILY MEDICINE

## 2017-10-09 PROCEDURE — G8419 CALC BMI OUT NRM PARAM NOF/U: HCPCS | Performed by: FAMILY MEDICINE

## 2017-10-09 RX ORDER — GLIMEPIRIDE 2 MG/1
2 TABLET ORAL
Status: ON HOLD | COMMUNITY
End: 2020-01-01 | Stop reason: SDUPTHER

## 2017-10-09 RX ORDER — CALCIUM CARBONATE 200(500)MG
1 TABLET,CHEWABLE ORAL DAILY
Status: ON HOLD | COMMUNITY
End: 2018-03-07

## 2017-10-09 ASSESSMENT — ENCOUNTER SYMPTOMS
SORE THROAT: 0
NAUSEA: 0
ABDOMINAL PAIN: 0
DIARRHEA: 0
CONSTIPATION: 0
COUGH: 0
SHORTNESS OF BREATH: 0

## 2017-10-09 NOTE — PROGRESS NOTES
Subjective:      Patient ID: Mayo Eid is a 80 y.o. male. Visit Information    Have you changed or started any medications since your last visit including any over-the-counter medicines, vitamins, or herbal medicines? Yes- Amaryl decreased to 2 mg daily. Are you having any side effects from any of your medications? -  no  Have you stopped taking any of your medications? Is so, why? -  yes - Sennosides 25 mg. Have you seen any other physician or provider since your last visit? Yes - Records Obtained  Have you had any other diagnostic tests since your last visit? Yes - Records Obtained  Have you been seen in the emergency room and/or had an admission to a hospital since we last saw you? Yes - Records Obtained  Have you had your routine dental cleaning in the past 6 months? no    Have you activated your [employer] account? If not, what are your barriers? Yes     Patient Care Team:  Khalif Curran MD as PCP - Falvia Ghosh MD as PCP - S Attributed Provider  Huyen Thompson MD as Consulting Physician (Gastroenterology)    Medical History Review  Past Medical, Family, and Social History reviewed and does not contribute to the patient presenting condition    Health Maintenance   Topic Date Due    DTaP/Tdap/Td vaccine (1 - Tdap) 02/13/1947    Zostavax vaccine  02/13/1988    Flu vaccine (1) 09/01/2017    Pneumococcal low/med risk (2 of 2 - PCV13) 05/09/2018     HPI  This 63-year-old male is seen in the office today follow-up from the hospital he had right rib pain and they gave him some Motrin which helped him has. He also has history of for diabetes his blood sugars have been running good he is on Amaryl, also he has gallstones and the lung . nodule he will be going to [facility] they think it may be metastasis from the thyroid. He was also treated for UTI recently  Review of Systems   Constitutional: Negative for appetite change.    HENT: Negative for ear pain, postnasal drip and sore throat. Eyes: Negative for visual disturbance. Respiratory: Negative for cough and shortness of breath. Cardiovascular: Negative for chest pain and leg swelling. Gastrointestinal: Negative for abdominal pain, constipation, diarrhea and nausea. Endocrine: Negative for polydipsia and polyuria. Genitourinary: Negative for frequency and urgency. Musculoskeletal: Negative for arthralgias. Neurological: Negative for dizziness and headaches. Objective:   Physical Exam   Constitutional: He is oriented to person, place, and time. He appears well-developed and well-nourished. /64   Pulse 60   Temp 97.5 °F (36.4 °C) (Oral)   Ht 5' 4\" (1.626 m)   Wt 154 lb 6.4 oz (70 kg)   SpO2 97%   BMI 26.50 kg/m²    HENT:   Head: Normocephalic. Mouth/Throat: Oropharynx is clear and moist.   Cardiovascular: Normal rate and regular rhythm. Pulmonary/Chest: Breath sounds normal.   Abdominal: Soft. There is no tenderness. Musculoskeletal: Normal range of motion. He exhibits no edema. Lymphadenopathy:     He has no cervical adenopathy. Neurological: He is alert and oriented to person, place, and time. Nursing note and vitals reviewed. Assessment:      1. Type 2 diabetes mellitus without complication, without long-term current use of insulin ([facility])  Controlled    2. Coronary artery disease involving coronary bypass graft of native heart without angina pectoris  Stable under care of a cardiologist           Plan:        No orders of the defined types were placed in this encounter. No orders of the defined types were placed in this encounter. Return in about 3 months (around 1/9/2018) for diabetes.     Continue current medications reviewed from the chart

## 2017-11-08 DIAGNOSIS — R35.0 FREQUENCY OF URINATION: ICD-10-CM

## 2017-11-08 RX ORDER — TAMSULOSIN HYDROCHLORIDE 0.4 MG/1
0.4 CAPSULE ORAL DAILY
Qty: 30 CAPSULE | Refills: 0 | Status: SHIPPED | OUTPATIENT
Start: 2017-11-08 | End: 2017-12-05 | Stop reason: SDUPTHER

## 2017-11-08 RX ORDER — OXYBUTYNIN CHLORIDE 5 MG/1
5 TABLET, EXTENDED RELEASE ORAL DAILY
Qty: 30 TABLET | Refills: 0 | Status: SHIPPED | OUTPATIENT
Start: 2017-11-08 | End: 2017-12-05 | Stop reason: SDUPTHER

## 2017-11-08 NOTE — TELEPHONE ENCOUNTER
Pt was last seen in the office on 6/21/16 and has an upcoming appt on 12/5/17. Ok to refill one month supply to last until then.

## 2017-12-05 ENCOUNTER — OFFICE VISIT (OUTPATIENT)
Dept: UROLOGY | Age: 82
End: 2017-12-05
Payer: MEDICARE

## 2017-12-05 VITALS
SYSTOLIC BLOOD PRESSURE: 119 MMHG | DIASTOLIC BLOOD PRESSURE: 60 MMHG | HEART RATE: 60 BPM | WEIGHT: 155.2 LBS | HEIGHT: 64 IN | TEMPERATURE: 97.3 F | BODY MASS INDEX: 26.5 KG/M2

## 2017-12-05 DIAGNOSIS — N13.8 BPH WITH OBSTRUCTION/LOWER URINARY TRACT SYMPTOMS: Primary | ICD-10-CM

## 2017-12-05 DIAGNOSIS — R35.0 FREQUENCY OF URINATION: ICD-10-CM

## 2017-12-05 DIAGNOSIS — N40.1 BPH WITH OBSTRUCTION/LOWER URINARY TRACT SYMPTOMS: Primary | ICD-10-CM

## 2017-12-05 PROCEDURE — G8484 FLU IMMUNIZE NO ADMIN: HCPCS | Performed by: UROLOGY

## 2017-12-05 PROCEDURE — G8419 CALC BMI OUT NRM PARAM NOF/U: HCPCS | Performed by: UROLOGY

## 2017-12-05 PROCEDURE — 99214 OFFICE O/P EST MOD 30 MIN: CPT | Performed by: UROLOGY

## 2017-12-05 PROCEDURE — 1123F ACP DISCUSS/DSCN MKR DOCD: CPT | Performed by: UROLOGY

## 2017-12-05 PROCEDURE — G8598 ASA/ANTIPLAT THER USED: HCPCS | Performed by: UROLOGY

## 2017-12-05 PROCEDURE — 1036F TOBACCO NON-USER: CPT | Performed by: UROLOGY

## 2017-12-05 PROCEDURE — 4040F PNEUMOC VAC/ADMIN/RCVD: CPT | Performed by: UROLOGY

## 2017-12-05 PROCEDURE — G8427 DOCREV CUR MEDS BY ELIG CLIN: HCPCS | Performed by: UROLOGY

## 2017-12-05 RX ORDER — OXYBUTYNIN CHLORIDE 5 MG/1
5 TABLET, EXTENDED RELEASE ORAL DAILY
Qty: 90 TABLET | Refills: 0 | Status: SHIPPED | OUTPATIENT
Start: 2017-12-05 | End: 2018-02-16 | Stop reason: SDUPTHER

## 2017-12-05 RX ORDER — TAMSULOSIN HYDROCHLORIDE 0.4 MG/1
0.4 CAPSULE ORAL DAILY
Qty: 90 CAPSULE | Refills: 3 | Status: SHIPPED | OUTPATIENT
Start: 2017-12-05 | End: 2018-10-16 | Stop reason: SDUPTHER

## 2017-12-05 ASSESSMENT — ENCOUNTER SYMPTOMS
ABDOMINAL PAIN: 0
WHEEZING: 0
VOMITING: 0
COLOR CHANGE: 0
EYE PAIN: 0
SHORTNESS OF BREATH: 0
EYE REDNESS: 0
BACK PAIN: 0
NAUSEA: 0
COUGH: 0

## 2017-12-05 NOTE — PROGRESS NOTES
Lab/Culture results: none    Imaging Reviewed during this Office Visit: none    (results were independently reviewed by physician and radiology report verified)    PAST MEDICAL, FAMILY AND SOCIAL HISTORY UPDATE:  Past Medical History:   Diagnosis Date    Cancer [facility]     Carotid artery stenosis     Cataract     Cerebrovascular disease     Heart attack     Hypertension     Hypothyroidism     Type II or unspecified type diabetes mellitus without mention of complication, not stated as uncontrolled      Past Surgical History:   Procedure Laterality Date    APPENDECTOMY      CARDIAC SURGERY      CORONARY ARTERY BYPASS GRAFT      PACEMAKER INSERTION  03/2015    SHOULDER SURGERY      TOTAL THYROIDECTOMY       Family History   Problem Relation Age of Onset    Cancer Mother      stomach    Other Father      pneumonia     Outpatient Prescriptions Marked as Taking for the 12/5/17 encounter (Office Visit) with Angelique Lipscomb MD   Medication Sig Dispense Refill    oxybutynin (DITROPAN-XL) 5 MG extended release tablet Take 1 tablet by mouth daily 90 tablet 0    tamsulosin (FLOMAX) 0.4 MG capsule Take 1 capsule by mouth daily 90 capsule 3    glimepiride (AMARYL) 2 MG tablet Take 2 mg by mouth every morning (before breakfast)      calcium carbonate (TUMS) 500 MG chewable tablet Take 1 tablet by mouth daily      ibuprofen (ADVIL;MOTRIN) 800 MG tablet Take 1 tablet by mouth every 8 hours as needed for Pain 30 tablet 0    Cholecalciferol (VITAMIN D3) 68766 UNITS CAPS Take 50,000 Units by mouth once a week      ammonium lactate (LAC-HYDRIN) 12 % lotion Apply 1 Bottle topically as needed for Dry Skin Apply topically as needed.  levothyroxine (LEVOXYL) 137 MCG tablet TAKE 1 TABLET DAILY      nitroGLYCERIN (NITROSTAT) 0.4 MG SL tablet Place 0.4 mg under the tongue.  As directed      docusate sodium (COLACE) 100 MG capsule Take 100 mg by mouth nightly       atorvastatin (LIPITOR) 20 MG tablet   Take 20 mg by Plan:         No new issues. Doing well with Flomax and Oxybutynin. Refills provided. F/U in 1 year. Return in about 1 year (around 12/5/2018). Prescriptions Ordered:  Orders Placed This Encounter   Medications    oxybutynin (DITROPAN-XL) 5 MG extended release tablet     Sig: Take 1 tablet by mouth daily     Dispense:  90 tablet     Refill:  0    tamsulosin (FLOMAX) 0.4 MG capsule     Sig: Take 1 capsule by mouth daily     Dispense:  90 capsule     Refill:  3      Orders Placed:  No orders of the defined types were placed in this encounter.          Alex Ayala MD

## 2017-12-12 ENCOUNTER — OFFICE VISIT (OUTPATIENT)
Dept: FAMILY MEDICINE CLINIC | Age: 82
End: 2017-12-12
Payer: MEDICARE

## 2017-12-12 VITALS
DIASTOLIC BLOOD PRESSURE: 68 MMHG | BODY MASS INDEX: 26.56 KG/M2 | SYSTOLIC BLOOD PRESSURE: 112 MMHG | OXYGEN SATURATION: 97 % | TEMPERATURE: 97.4 F | WEIGHT: 155.6 LBS | HEART RATE: 66 BPM | HEIGHT: 64 IN

## 2017-12-12 DIAGNOSIS — I25.810 CORONARY ARTERY DISEASE INVOLVING CORONARY BYPASS GRAFT OF NATIVE HEART WITHOUT ANGINA PECTORIS: ICD-10-CM

## 2017-12-12 DIAGNOSIS — Z23 NEED FOR INFLUENZA VACCINATION: ICD-10-CM

## 2017-12-12 DIAGNOSIS — E11.9 TYPE 2 DIABETES MELLITUS WITHOUT COMPLICATION, WITHOUT LONG-TERM CURRENT USE OF INSULIN (HCC): Primary | ICD-10-CM

## 2017-12-12 DIAGNOSIS — E78.5 HYPERLIPIDEMIA, UNSPECIFIED HYPERLIPIDEMIA TYPE: ICD-10-CM

## 2017-12-12 PROBLEM — L28.2 PRURITIC RASH: Status: RESOLVED | Noted: 2017-09-22 | Resolved: 2017-12-12

## 2017-12-12 PROCEDURE — G8598 ASA/ANTIPLAT THER USED: HCPCS | Performed by: FAMILY MEDICINE

## 2017-12-12 PROCEDURE — G8484 FLU IMMUNIZE NO ADMIN: HCPCS | Performed by: FAMILY MEDICINE

## 2017-12-12 PROCEDURE — 99214 OFFICE O/P EST MOD 30 MIN: CPT | Performed by: FAMILY MEDICINE

## 2017-12-12 PROCEDURE — 1123F ACP DISCUSS/DSCN MKR DOCD: CPT | Performed by: FAMILY MEDICINE

## 2017-12-12 PROCEDURE — 4040F PNEUMOC VAC/ADMIN/RCVD: CPT | Performed by: FAMILY MEDICINE

## 2017-12-12 PROCEDURE — G8427 DOCREV CUR MEDS BY ELIG CLIN: HCPCS | Performed by: FAMILY MEDICINE

## 2017-12-12 PROCEDURE — G0008 ADMIN INFLUENZA VIRUS VAC: HCPCS | Performed by: FAMILY MEDICINE

## 2017-12-12 PROCEDURE — 1036F TOBACCO NON-USER: CPT | Performed by: FAMILY MEDICINE

## 2017-12-12 PROCEDURE — G8419 CALC BMI OUT NRM PARAM NOF/U: HCPCS | Performed by: FAMILY MEDICINE

## 2017-12-12 PROCEDURE — 90686 IIV4 VACC NO PRSV 0.5 ML IM: CPT | Performed by: FAMILY MEDICINE

## 2017-12-12 ASSESSMENT — ENCOUNTER SYMPTOMS
BACK PAIN: 0
COUGH: 0
CONSTIPATION: 0
ABDOMINAL PAIN: 0
SHORTNESS OF BREATH: 0
SINUS PRESSURE: 0
SORE THROAT: 0
NAUSEA: 0
DIARRHEA: 0

## 2018-01-27 ENCOUNTER — APPOINTMENT (OUTPATIENT)
Dept: CT IMAGING | Age: 83
End: 2018-01-27
Payer: MEDICARE

## 2018-01-27 ENCOUNTER — APPOINTMENT (OUTPATIENT)
Dept: GENERAL RADIOLOGY | Age: 83
End: 2018-01-27
Payer: MEDICARE

## 2018-01-27 ENCOUNTER — HOSPITAL ENCOUNTER (EMERGENCY)
Age: 83
Discharge: HOME | End: 2018-01-28
Attending: EMERGENCY MEDICINE
Payer: MEDICARE

## 2018-01-27 VITALS
RESPIRATION RATE: 29 BRPM | DIASTOLIC BLOOD PRESSURE: 58 MMHG | SYSTOLIC BLOOD PRESSURE: 152 MMHG | HEART RATE: 65 BPM | OXYGEN SATURATION: 95 %

## 2018-01-27 DIAGNOSIS — S80.812A ABRASION OF LEFT LOWER EXTREMITY, INITIAL ENCOUNTER: ICD-10-CM
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
## 2018-01-27 DIAGNOSIS — M79.604 RIGHT LEG PAIN: ICD-10-CM

## 2018-01-27 PROCEDURE — 99285 EMERGENCY DEPT VISIT HI MDM: CPT

## 2018-01-27 PROCEDURE — 71045 X-RAY EXAM CHEST 1 VIEW: CPT

## 2018-01-27 PROCEDURE — 72170 X-RAY EXAM OF PELVIS: CPT

## 2018-01-27 ASSESSMENT — PAIN DESCRIPTION - ORIENTATION: ORIENTATION: RIGHT

## 2018-01-27 ASSESSMENT — PAIN SCALES - WONG BAKER: WONGBAKER_NUMERICALRESPONSE: 6

## 2018-01-28 ENCOUNTER — APPOINTMENT (OUTPATIENT)
Dept: CT IMAGING | Age: 83
End: 2018-01-28
Payer: MEDICARE

## 2018-01-28 ENCOUNTER — APPOINTMENT (OUTPATIENT)
Dept: GENERAL RADIOLOGY | Age: 83
End: 2018-01-28
Payer: MEDICARE

## 2018-01-28 VITALS
DIASTOLIC BLOOD PRESSURE: 56 MMHG | HEART RATE: 63 BPM | RESPIRATION RATE: 16 BRPM | TEMPERATURE: 97.6 F | BODY MASS INDEX: 27.31 KG/M2 | OXYGEN SATURATION: 97 % | WEIGHT: 160 LBS | HEIGHT: 64 IN | SYSTOLIC BLOOD PRESSURE: 121 MMHG

## 2018-01-28 DIAGNOSIS — S16.1XXA STRAIN OF NECK MUSCLE, INITIAL ENCOUNTER: Primary | ICD-10-CM

## 2018-01-28 DIAGNOSIS — S16.1XXA ACUTE STRAIN OF NECK MUSCLE, INITIAL ENCOUNTER: ICD-10-CM

## 2018-01-28 LAB
ABSOLUTE EOS #: 0.4 K/UL (ref 0–0.4)
ABSOLUTE IMMATURE GRANULOCYTE: ABNORMAL K/UL (ref 0–0.3)
ABSOLUTE LYMPH #: 1.6 K/UL (ref 1–4.8)
ABSOLUTE MONO #: 0.8 K/UL (ref 0.1–1.3)
ALBUMIN SERPL-MCNC: 3.4 G/DL (ref 3.5–5.2)
ALBUMIN/GLOBULIN RATIO: ABNORMAL (ref 1–2.5)
ALP BLD-CCNC: 110 U/L (ref 40–129)
ALT SERPL-CCNC: 21 U/L (ref 5–41)
ANION GAP SERPL CALCULATED.3IONS-SCNC: 13 MMOL/L (ref 9–17)
AST SERPL-CCNC: 20 U/L
BASOPHILS # BLD: 1 % (ref 0–2)
BASOPHILS ABSOLUTE: 0.1 K/UL (ref 0–0.2)
BILIRUB SERPL-MCNC: 0.34 MG/DL (ref 0.3–1.2)
BILIRUBIN DIRECT: 0.09 MG/DL
BILIRUBIN, INDIRECT: 0.25 MG/DL (ref 0–1)
BUN BLDV-MCNC: 17 MG/DL (ref 8–23)
BUN/CREAT BLD: ABNORMAL (ref 9–20)
CALCIUM SERPL-MCNC: 8.7 MG/DL (ref 8.6–10.4)
CHLORIDE BLD-SCNC: 99 MMOL/L (ref 98–107)
CO2: 25 MMOL/L (ref 20–31)
CREAT SERPL-MCNC: 1.06 MG/DL (ref 0.7–1.2)
DIFFERENTIAL TYPE: ABNORMAL
EOSINOPHILS RELATIVE PERCENT: 4 % (ref 0–4)
ETHANOL PERCENT: <0.01 %
ETHANOL: <10 MG/DL
GFR AFRICAN AMERICAN: >60 ML/MIN
GFR NON-AFRICAN AMERICAN: >60 ML/MIN
GFR SERPL CREATININE-BSD FRML MDRD: ABNORMAL ML/MIN/{1.73_M2}
GFR SERPL CREATININE-BSD FRML MDRD: ABNORMAL ML/MIN/{1.73_M2}
GLOBULIN: ABNORMAL G/DL (ref 1.5–3.8)
GLUCOSE BLD-MCNC: 211 MG/DL (ref 70–99)
HCT VFR BLD CALC: 45.6 % (ref 41–53)
HEMOGLOBIN: 15.8 G/DL (ref 13.5–17.5)
IMMATURE GRANULOCYTES: ABNORMAL %
LIPASE: 31 U/L (ref 13–60)
LYMPHOCYTES # BLD: 19 % (ref 24–44)
MCH RBC QN AUTO: 29.7 PG (ref 26–34)
MCHC RBC AUTO-ENTMCNC: 34.6 G/DL (ref 31–37)
MCV RBC AUTO: 85.8 FL (ref 80–100)
MONOCYTES # BLD: 9 % (ref 1–7)
NRBC AUTOMATED: ABNORMAL PER 100 WBC
PDW BLD-RTO: 13.9 % (ref 11.5–14.9)
PLATELET # BLD: 245 K/UL (ref 150–450)
PLATELET ESTIMATE: ABNORMAL
PMV BLD AUTO: 7.3 FL (ref 6–12)
POTASSIUM SERPL-SCNC: 4.7 MMOL/L (ref 3.7–5.3)
RBC # BLD: 5.31 M/UL (ref 4.5–5.9)
RBC # BLD: ABNORMAL 10*6/UL
SEG NEUTROPHILS: 67 % (ref 36–66)
SEGMENTED NEUTROPHILS ABSOLUTE COUNT: 5.5 K/UL (ref 1.3–9.1)
SODIUM BLD-SCNC: 137 MMOL/L (ref 135–144)
TOTAL PROTEIN: 8.3 G/DL (ref 6.4–8.3)
TROPONIN INTERP: NORMAL
TROPONIN T: <0.03 NG/ML
WBC # BLD: 8.3 K/UL (ref 3.5–11)
WBC # BLD: ABNORMAL 10*3/UL

## 2018-01-28 PROCEDURE — 90715 TDAP VACCINE 7 YRS/> IM: CPT | Performed by: EMERGENCY MEDICINE

## 2018-01-28 PROCEDURE — G0480 DRUG TEST DEF 1-7 CLASSES: HCPCS

## 2018-01-28 PROCEDURE — 83690 ASSAY OF LIPASE: CPT

## 2018-01-28 PROCEDURE — 73590 X-RAY EXAM OF LOWER LEG: CPT

## 2018-01-28 PROCEDURE — 73552 X-RAY EXAM OF FEMUR 2/>: CPT

## 2018-01-28 PROCEDURE — 85025 COMPLETE CBC W/AUTO DIFF WBC: CPT

## 2018-01-28 PROCEDURE — 72125 CT NECK SPINE W/O DYE: CPT

## 2018-01-28 PROCEDURE — 70450 CT HEAD/BRAIN W/O DYE: CPT

## 2018-01-28 PROCEDURE — 84484 ASSAY OF TROPONIN QUANT: CPT

## 2018-01-28 PROCEDURE — 2580000003 HC RX 258: Performed by: EMERGENCY MEDICINE

## 2018-01-28 PROCEDURE — 6370000000 HC RX 637 (ALT 250 FOR IP): Performed by: EMERGENCY MEDICINE

## 2018-01-28 PROCEDURE — 90471 IMMUNIZATION ADMIN: CPT | Performed by: EMERGENCY MEDICINE

## 2018-01-28 PROCEDURE — 99283 EMERGENCY DEPT VISIT LOW MDM: CPT

## 2018-01-28 PROCEDURE — 74177 CT ABD & PELVIS W/CONTRAST: CPT

## 2018-01-28 PROCEDURE — 6360000004 HC RX CONTRAST MEDICATION: Performed by: EMERGENCY MEDICINE

## 2018-01-28 PROCEDURE — 73562 X-RAY EXAM OF KNEE 3: CPT

## 2018-01-28 PROCEDURE — 96375 TX/PRO/DX INJ NEW DRUG ADDON: CPT

## 2018-01-28 PROCEDURE — 80076 HEPATIC FUNCTION PANEL: CPT

## 2018-01-28 PROCEDURE — 96374 THER/PROPH/DIAG INJ IV PUSH: CPT

## 2018-01-28 PROCEDURE — 6360000002 HC RX W HCPCS: Performed by: EMERGENCY MEDICINE

## 2018-01-28 PROCEDURE — 80048 BASIC METABOLIC PNL TOTAL CA: CPT

## 2018-01-28 PROCEDURE — 36415 COLL VENOUS BLD VENIPUNCTURE: CPT

## 2018-01-28 RX ORDER — MORPHINE SULFATE 2 MG/ML
2 INJECTION, SOLUTION INTRAMUSCULAR; INTRAVENOUS ONCE
Status: COMPLETED | OUTPATIENT
Start: 2018-01-28 | End: 2018-01-28

## 2018-01-28 RX ORDER — SODIUM CHLORIDE 0.9 % (FLUSH) 0.9 %
10 SYRINGE (ML) INJECTION PRN
Status: DISCONTINUED | OUTPATIENT
Start: 2018-01-28 | End: 2018-01-28 | Stop reason: HOSPADM

## 2018-01-28 RX ORDER — HYDROCODONE BITARTRATE AND ACETAMINOPHEN 5; 325 MG/1; MG/1
1 TABLET ORAL ONCE
Status: COMPLETED | OUTPATIENT
Start: 2018-01-28 | End: 2018-01-28

## 2018-01-28 RX ORDER — POLYETHYLENE GLYCOL 3350 17 G/17G
17 POWDER, FOR SOLUTION ORAL DAILY PRN
Qty: 527 G | Refills: 1 | Status: SHIPPED | OUTPATIENT
Start: 2018-01-28 | End: 2018-03-07 | Stop reason: HOSPADM

## 2018-01-28 RX ORDER — ONDANSETRON 2 MG/ML
4 INJECTION INTRAMUSCULAR; INTRAVENOUS ONCE
Status: COMPLETED | OUTPATIENT
Start: 2018-01-28 | End: 2018-01-28

## 2018-01-28 RX ORDER — 0.9 % SODIUM CHLORIDE 0.9 %
100 INTRAVENOUS SOLUTION INTRAVENOUS ONCE
Status: COMPLETED | OUTPATIENT
Start: 2018-01-28 | End: 2018-01-28

## 2018-01-28 RX ORDER — HYDROCODONE BITARTRATE AND ACETAMINOPHEN 5; 325 MG/1; MG/1
1 TABLET ORAL EVERY 8 HOURS PRN
Qty: 12 TABLET | Refills: 0 | Status: SHIPPED | OUTPATIENT
Start: 2018-01-28 | End: 2018-01-31

## 2018-01-28 RX ADMIN — TETANUS TOXOID, REDUCED DIPHTHERIA TOXOID AND ACELLULAR PERTUSSIS VACCINE, ADSORBED 0.5 ML: 5; 2.5; 8; 8; 2.5 SUSPENSION INTRAMUSCULAR at 02:29

## 2018-01-28 RX ADMIN — SODIUM CHLORIDE 100 ML: 9 INJECTION, SOLUTION INTRAVENOUS at 01:32

## 2018-01-28 RX ADMIN — ONDANSETRON 4 MG: 2 INJECTION INTRAMUSCULAR; INTRAVENOUS at 01:53

## 2018-01-28 RX ADMIN — HYDROCODONE BITARTRATE AND ACETAMINOPHEN 1 TABLET: 5; 325 TABLET ORAL at 16:47

## 2018-01-28 RX ADMIN — Medication 10 ML: at 01:32

## 2018-01-28 RX ADMIN — MORPHINE SULFATE 2 MG: 2 INJECTION, SOLUTION INTRAMUSCULAR; INTRAVENOUS at 01:53

## 2018-01-28 RX ADMIN — IOPAMIDOL 100 ML: 755 INJECTION, SOLUTION INTRAVENOUS at 01:32

## 2018-01-28 ASSESSMENT — PAIN DESCRIPTION - PAIN TYPE: TYPE: ACUTE PAIN

## 2018-01-28 ASSESSMENT — PAIN SCALES - GENERAL
PAINLEVEL_OUTOF10: 8
PAINLEVEL_OUTOF10: 6
PAINLEVEL_OUTOF10: 3
PAINLEVEL_OUTOF10: 10
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
## 2018-02-01 ENCOUNTER — OFFICE VISIT (OUTPATIENT)
Dept: FAMILY MEDICINE CLINIC | Age: 83
End: 2018-02-01
Payer: MEDICARE

## 2018-02-01 VITALS
DIASTOLIC BLOOD PRESSURE: 58 MMHG | HEART RATE: 65 BPM | SYSTOLIC BLOOD PRESSURE: 118 MMHG | OXYGEN SATURATION: 98 % | TEMPERATURE: 97.9 F | BODY MASS INDEX: 26.91 KG/M2 | WEIGHT: 156.8 LBS

## 2018-02-01 DIAGNOSIS — E11.9 TYPE 2 DIABETES MELLITUS WITHOUT COMPLICATION, WITHOUT LONG-TERM CURRENT USE OF INSULIN (HCC): ICD-10-CM

## 2018-02-01 DIAGNOSIS — S16.1XXA STRAIN OF NECK MUSCLE, INITIAL ENCOUNTER: Primary | ICD-10-CM

## 2018-02-01 PROCEDURE — 99213 OFFICE O/P EST LOW 20 MIN: CPT | Performed by: FAMILY MEDICINE

## 2018-02-01 ASSESSMENT — ENCOUNTER SYMPTOMS
NAUSEA: 0
COUGH: 0
SHORTNESS OF BREATH: 0
DIARRHEA: 0
ABDOMINAL PAIN: 0
CONSTIPATION: 0
SORE THROAT: 0

## 2018-02-16 DIAGNOSIS — R35.0 FREQUENCY OF URINATION: ICD-10-CM

## 2018-02-16 RX ORDER — OXYBUTYNIN CHLORIDE 5 MG/1
TABLET, EXTENDED RELEASE ORAL
Qty: 90 TABLET | Refills: 3 | Status: SHIPPED | OUTPATIENT
Start: 2018-02-16 | End: 2018-03-29 | Stop reason: ALTCHOICE

## 2018-02-26 ENCOUNTER — OFFICE VISIT (OUTPATIENT)
Dept: FAMILY MEDICINE CLINIC | Age: 83
End: 2018-02-26
Payer: MEDICARE

## 2018-02-26 VITALS
TEMPERATURE: 97.9 F | WEIGHT: 160.4 LBS | BODY MASS INDEX: 27.53 KG/M2 | SYSTOLIC BLOOD PRESSURE: 128 MMHG | OXYGEN SATURATION: 98 % | HEART RATE: 75 BPM | DIASTOLIC BLOOD PRESSURE: 78 MMHG

## 2018-02-26 DIAGNOSIS — E11.9 TYPE 2 DIABETES MELLITUS WITHOUT COMPLICATION, WITHOUT LONG-TERM CURRENT USE OF INSULIN (HCC): ICD-10-CM

## 2018-02-26 DIAGNOSIS — S81.802A WOUND OF LEFT LOWER EXTREMITY, INITIAL ENCOUNTER: Primary | ICD-10-CM

## 2018-02-26 PROCEDURE — 99213 OFFICE O/P EST LOW 20 MIN: CPT | Performed by: FAMILY MEDICINE

## 2018-02-26 RX ORDER — CEPHALEXIN 500 MG/1
500 CAPSULE ORAL 2 TIMES DAILY
Qty: 14 CAPSULE | Refills: 0 | Status: SHIPPED | OUTPATIENT
Start: 2018-02-26 | End: 2018-02-28

## 2018-02-26 ASSESSMENT — ENCOUNTER SYMPTOMS
ABDOMINAL PAIN: 0
NAUSEA: 0
SHORTNESS OF BREATH: 0
CONSTIPATION: 0
SORE THROAT: 0
DIARRHEA: 0

## 2018-02-26 NOTE — PROGRESS NOTES
shortness of breath. Cardiovascular: Positive for leg swelling. Negative for chest pain. Gastrointestinal: Negative for abdominal pain, constipation, diarrhea and nausea. Endocrine: Negative for polydipsia and polyuria. Genitourinary: Negative for frequency and urgency. Musculoskeletal: Negative for arthralgias. Skin: Positive for wound. Neurological: Negative for dizziness and headaches. Objective:   Physical Exam   Constitutional: He is oriented to person, place, and time. He appears well-developed and well-nourished. /78 (Site: Left Arm, Position: Sitting, Cuff Size: Small Adult)   Pulse 75   Temp 97.9 °F (36.6 °C) (Oral)   Wt 160 lb 6.4 oz (72.8 kg)   SpO2 98%   BMI 27.53 kg/m²    HENT:   Head: Normocephalic. Mouth/Throat: Oropharynx is clear and moist.   Cardiovascular: Normal rate and regular rhythm. Pulmonary/Chest: Breath sounds normal.   Abdominal: Soft. There is no tenderness. Musculoskeletal: He exhibits edema. He exhibits no tenderness. Minimal pedal edema present   Lymphadenopathy:     He has no cervical adenopathy. Neurological: He is alert and oriented to person, place, and time. Skin:   Scabbed wound present on his left leg there is some redness present   Nursing note and vitals reviewed. Assessment:      1. Wound of left lower extremity, initial encounter  Start antibiotics    2. Type 2 diabetes mellitus without complication, without long-term current use of insulin ([facility] [address]  Controlled            Plan:        No orders of the defined types were placed in this encounter.     Orders Placed This Encounter   Medications    cephALEXin (KEFLEX) 500 MG capsule     Sig: Take 1 capsule by mouth 2 times daily     Dispense:  14 capsule     Refill:  0     Keep scheduled appointment  Continue current medications reviewed from the chart

## 2018-02-27 ENCOUNTER — TELEPHONE (OUTPATIENT)
Dept: FAMILY MEDICINE CLINIC | Age: 83
End: 2018-02-27

## 2018-02-27 ENCOUNTER — HOSPITAL ENCOUNTER (INPATIENT)
Age: 83
LOS: 9 days | Discharge: NURSING, REHAB OR LONG-TERM CARE | DRG: 418 | End: 2018-03-09
Attending: EMERGENCY MEDICINE | Admitting: INTERNAL MEDICINE
Payer: MEDICARE

## 2018-02-27 ENCOUNTER — APPOINTMENT (OUTPATIENT)
Dept: CT IMAGING | Age: 83
DRG: 418 | End: 2018-02-27
Payer: MEDICARE

## 2018-02-27 ENCOUNTER — APPOINTMENT (OUTPATIENT)
Dept: GENERAL RADIOLOGY | Age: 83
DRG: 418 | End: 2018-02-27
Payer: MEDICARE

## 2018-02-27 DIAGNOSIS — R79.89 ELEVATED LFTS: Primary | ICD-10-CM

## 2018-02-27 DIAGNOSIS — K80.50 BILIARY COLIC: ICD-10-CM

## 2018-02-27 LAB
ABSOLUTE EOS #: 0.1 K/UL (ref 0–0.4)
ABSOLUTE IMMATURE GRANULOCYTE: ABNORMAL K/UL (ref 0–0.3)
ABSOLUTE LYMPH #: 0.4 K/UL (ref 1–4.8)
ABSOLUTE MONO #: 0.5 K/UL (ref 0.1–1.3)
ALBUMIN SERPL-MCNC: 3.6 G/DL (ref 3.5–5.2)
ALBUMIN/GLOBULIN RATIO: ABNORMAL (ref 1–2.5)
ALP BLD-CCNC: 264 U/L (ref 40–129)
ALT SERPL-CCNC: 258 U/L (ref 5–41)
ANION GAP SERPL CALCULATED.3IONS-SCNC: 13 MMOL/L (ref 9–17)
AST SERPL-CCNC: 345 U/L
BASOPHILS # BLD: 0 % (ref 0–2)
BASOPHILS ABSOLUTE: 0 K/UL (ref 0–0.2)
BILIRUB SERPL-MCNC: 2.44 MG/DL (ref 0.3–1.2)
BUN BLDV-MCNC: 22 MG/DL (ref 8–23)
BUN/CREAT BLD: ABNORMAL (ref 9–20)
CALCIUM SERPL-MCNC: 8.8 MG/DL (ref 8.6–10.4)
CHLORIDE BLD-SCNC: 96 MMOL/L (ref 98–107)
CO2: 26 MMOL/L (ref 20–31)
CREAT SERPL-MCNC: 1.13 MG/DL (ref 0.7–1.2)
DIFFERENTIAL TYPE: ABNORMAL
EKG ATRIAL RATE: 250 BPM
EKG P AXIS: 42 DEGREES
EKG Q-T INTERVAL: 416 MS
EKG QRS DURATION: 164 MS
EKG QTC CALCULATION (BAZETT): 436 MS
EKG R AXIS: -50 DEGREES
EKG T AXIS: 114 DEGREES
EKG VENTRICULAR RATE: 66 BPM
EOSINOPHILS RELATIVE PERCENT: 1 % (ref 0–4)
GFR AFRICAN AMERICAN: >60 ML/MIN
GFR NON-AFRICAN AMERICAN: >60 ML/MIN
GFR SERPL CREATININE-BSD FRML MDRD: ABNORMAL ML/MIN/{1.73_M2}
GFR SERPL CREATININE-BSD FRML MDRD: ABNORMAL ML/MIN/{1.73_M2}
GLUCOSE BLD-MCNC: 193 MG/DL (ref 75–110)
GLUCOSE BLD-MCNC: 199 MG/DL (ref 70–99)
HCT VFR BLD CALC: 44.4 % (ref 41–53)
HEMOGLOBIN: 15.2 G/DL (ref 13.5–17.5)
IMMATURE GRANULOCYTES: ABNORMAL %
LIPASE: 30 U/L (ref 13–60)
LYMPHOCYTES # BLD: 4 % (ref 24–44)
MCH RBC QN AUTO: 29.7 PG (ref 26–34)
MCHC RBC AUTO-ENTMCNC: 34.3 G/DL (ref 31–37)
MCV RBC AUTO: 86.5 FL (ref 80–100)
MONOCYTES # BLD: 5 % (ref 1–7)
NRBC AUTOMATED: ABNORMAL PER 100 WBC
PDW BLD-RTO: 14.3 % (ref 11.5–14.9)
PLATELET # BLD: 205 K/UL (ref 150–450)
PLATELET ESTIMATE: ABNORMAL
PMV BLD AUTO: 7.6 FL (ref 6–12)
POTASSIUM SERPL-SCNC: 4.5 MMOL/L (ref 3.7–5.3)
RBC # BLD: 5.13 M/UL (ref 4.5–5.9)
RBC # BLD: ABNORMAL 10*6/UL
SEG NEUTROPHILS: 90 % (ref 36–66)
SEGMENTED NEUTROPHILS ABSOLUTE COUNT: 9.5 K/UL (ref 1.3–9.1)
SODIUM BLD-SCNC: 135 MMOL/L (ref 135–144)
TOTAL PROTEIN: 8.3 G/DL (ref 6.4–8.3)
TROPONIN INTERP: NORMAL
TROPONIN T: <0.03 NG/ML
WBC # BLD: 10.7 K/UL (ref 3.5–11)
WBC # BLD: ABNORMAL 10*3/UL

## 2018-02-27 PROCEDURE — 93005 ELECTROCARDIOGRAM TRACING: CPT

## 2018-02-27 PROCEDURE — 71045 X-RAY EXAM CHEST 1 VIEW: CPT

## 2018-02-27 PROCEDURE — 36415 COLL VENOUS BLD VENIPUNCTURE: CPT

## 2018-02-27 PROCEDURE — 85025 COMPLETE CBC W/AUTO DIFF WBC: CPT

## 2018-02-27 PROCEDURE — 99285 EMERGENCY DEPT VISIT HI MDM: CPT

## 2018-02-27 PROCEDURE — 6360000002 HC RX W HCPCS: Performed by: EMERGENCY MEDICINE

## 2018-02-27 PROCEDURE — 2580000003 HC RX 258: Performed by: EMERGENCY MEDICINE

## 2018-02-27 PROCEDURE — 82947 ASSAY GLUCOSE BLOOD QUANT: CPT

## 2018-02-27 PROCEDURE — 74177 CT ABD & PELVIS W/CONTRAST: CPT

## 2018-02-27 PROCEDURE — 96374 THER/PROPH/DIAG INJ IV PUSH: CPT

## 2018-02-27 PROCEDURE — 80053 COMPREHEN METABOLIC PANEL: CPT

## 2018-02-27 PROCEDURE — 6360000004 HC RX CONTRAST MEDICATION: Performed by: EMERGENCY MEDICINE

## 2018-02-27 PROCEDURE — 84484 ASSAY OF TROPONIN QUANT: CPT

## 2018-02-27 PROCEDURE — 83690 ASSAY OF LIPASE: CPT

## 2018-02-27 RX ORDER — SODIUM CHLORIDE 0.9 % (FLUSH) 0.9 %
10 SYRINGE (ML) INJECTION PRN
Status: DISCONTINUED | OUTPATIENT
Start: 2018-02-27 | End: 2018-02-28

## 2018-02-27 RX ORDER — ONDANSETRON 2 MG/ML
4 INJECTION INTRAMUSCULAR; INTRAVENOUS ONCE
Status: COMPLETED | OUTPATIENT
Start: 2018-02-27 | End: 2018-02-27

## 2018-02-27 RX ORDER — 0.9 % SODIUM CHLORIDE 0.9 %
100 INTRAVENOUS SOLUTION INTRAVENOUS ONCE
Status: COMPLETED | OUTPATIENT
Start: 2018-02-27 | End: 2018-02-27

## 2018-02-27 RX ORDER — SULFAMETHOXAZOLE AND TRIMETHOPRIM 800; 160 MG/1; MG/1
1 TABLET ORAL 2 TIMES DAILY
Qty: 20 TABLET | Refills: 0 | Status: SHIPPED | OUTPATIENT
Start: 2018-02-27 | End: 2018-02-28

## 2018-02-27 RX ADMIN — Medication 10 ML: at 23:00

## 2018-02-27 RX ADMIN — SODIUM CHLORIDE 100 ML: 9 INJECTION, SOLUTION INTRAVENOUS at 23:00

## 2018-02-27 RX ADMIN — IOPAMIDOL 100 ML: 755 INJECTION, SOLUTION INTRAVENOUS at 22:57

## 2018-02-27 RX ADMIN — ONDANSETRON 4 MG: 2 INJECTION INTRAMUSCULAR; INTRAVENOUS at 21:55

## 2018-02-27 ASSESSMENT — PAIN DESCRIPTION - FREQUENCY: FREQUENCY: CONTINUOUS

## 2018-02-27 ASSESSMENT — PAIN DESCRIPTION - ORIENTATION: ORIENTATION: LEFT

## 2018-02-27 ASSESSMENT — PAIN SCALES - GENERAL: PAINLEVEL_OUTOF10: 7

## 2018-02-27 ASSESSMENT — PAIN DESCRIPTION - DESCRIPTORS: DESCRIPTORS: CONSTANT

## 2018-02-27 ASSESSMENT — PAIN DESCRIPTION - ONSET: ONSET: ON-GOING

## 2018-02-27 NOTE — LETTER
[address] [address]  Phone: [phone]             March 2, 2018      To Whom It May Concern:        Sincerely,       Yan Trammell RN         Signature:__________________________________

## 2018-02-28 ENCOUNTER — APPOINTMENT (OUTPATIENT)
Dept: ULTRASOUND IMAGING | Age: 83
DRG: 418 | End: 2018-02-28
Payer: MEDICARE

## 2018-02-28 PROBLEM — K81.0 ACUTE CHOLECYSTITIS: Status: ACTIVE | Noted: 2018-02-28

## 2018-02-28 PROBLEM — N39.0 UTI (URINARY TRACT INFECTION): Status: ACTIVE | Noted: 2018-02-28

## 2018-02-28 PROBLEM — R10.9 ABDOMINAL PAIN: Status: ACTIVE | Noted: 2018-02-28

## 2018-02-28 PROBLEM — K80.20 SYMPTOMATIC CHOLELITHIASIS: Status: ACTIVE | Noted: 2018-02-28

## 2018-02-28 PROBLEM — R74.8 ELEVATED LIVER ENZYMES: Status: ACTIVE | Noted: 2018-02-28

## 2018-02-28 LAB
-: NORMAL
ALBUMIN SERPL-MCNC: 3 G/DL (ref 3.5–5.2)
ALBUMIN/GLOBULIN RATIO: ABNORMAL (ref 1–2.5)
ALP BLD-CCNC: 222 U/L (ref 40–129)
ALT SERPL-CCNC: 225 U/L (ref 5–41)
AMORPHOUS: NORMAL
ANION GAP SERPL CALCULATED.3IONS-SCNC: 13 MMOL/L (ref 9–17)
ANION GAP SERPL CALCULATED.3IONS-SCNC: 13 MMOL/L (ref 9–17)
AST SERPL-CCNC: 196 U/L
BACTERIA: NORMAL
BILIRUB SERPL-MCNC: 3.64 MG/DL (ref 0.3–1.2)
BILIRUBIN URINE: ABNORMAL
BUN BLDV-MCNC: 21 MG/DL (ref 8–23)
BUN BLDV-MCNC: 21 MG/DL (ref 8–23)
BUN/CREAT BLD: ABNORMAL (ref 9–20)
BUN/CREAT BLD: ABNORMAL (ref 9–20)
CALCIUM SERPL-MCNC: 8.3 MG/DL (ref 8.6–10.4)
CALCIUM SERPL-MCNC: 8.3 MG/DL (ref 8.6–10.4)
CASTS UA: NORMAL /LPF
CHLORIDE BLD-SCNC: 99 MMOL/L (ref 98–107)
CHLORIDE BLD-SCNC: 99 MMOL/L (ref 98–107)
CO2: 24 MMOL/L (ref 20–31)
CO2: 24 MMOL/L (ref 20–31)
COLOR: ABNORMAL
COMMENT UA: ABNORMAL
CREAT SERPL-MCNC: 1.33 MG/DL (ref 0.7–1.2)
CREAT SERPL-MCNC: 1.33 MG/DL (ref 0.7–1.2)
CRYSTALS, UA: NORMAL /HPF
EPITHELIAL CELLS UA: NORMAL /HPF
GFR AFRICAN AMERICAN: >60 ML/MIN
GFR AFRICAN AMERICAN: >60 ML/MIN
GFR NON-AFRICAN AMERICAN: 51 ML/MIN
GFR NON-AFRICAN AMERICAN: 51 ML/MIN
GFR SERPL CREATININE-BSD FRML MDRD: ABNORMAL ML/MIN/{1.73_M2}
GLUCOSE BLD-MCNC: 152 MG/DL (ref 70–99)
GLUCOSE BLD-MCNC: 152 MG/DL (ref 70–99)
GLUCOSE URINE: NEGATIVE
HCT VFR BLD CALC: 44.4 % (ref 41–53)
HEMOGLOBIN: 14.8 G/DL (ref 13.5–17.5)
INR BLD: 1.2
KETONES, URINE: NEGATIVE
LEUKOCYTE ESTERASE, URINE: NEGATIVE
MCH RBC QN AUTO: 29.1 PG (ref 26–34)
MCHC RBC AUTO-ENTMCNC: 33.2 G/DL (ref 31–37)
MCV RBC AUTO: 87.5 FL (ref 80–100)
MUCUS: NORMAL
NITRITE, URINE: POSITIVE
NRBC AUTOMATED: ABNORMAL PER 100 WBC
OTHER OBSERVATIONS UA: NORMAL
PDW BLD-RTO: 13.8 % (ref 11.5–14.9)
PH UA: 6 (ref 5–8)
PLATELET # BLD: 211 K/UL (ref 150–450)
PMV BLD AUTO: 7.6 FL (ref 6–12)
POTASSIUM SERPL-SCNC: 4.6 MMOL/L (ref 3.7–5.3)
POTASSIUM SERPL-SCNC: 4.6 MMOL/L (ref 3.7–5.3)
PROTEIN UA: ABNORMAL
PROTHROMBIN TIME: 12.1 SEC (ref 9.7–12)
RBC # BLD: 5.08 M/UL (ref 4.5–5.9)
RBC UA: NORMAL /HPF
RENAL EPITHELIAL, UA: NORMAL /HPF
SODIUM BLD-SCNC: 136 MMOL/L (ref 135–144)
SODIUM BLD-SCNC: 136 MMOL/L (ref 135–144)
SPECIFIC GRAVITY UA: 1.06 (ref 1–1.03)
TOTAL PROTEIN: 7.2 G/DL (ref 6.4–8.3)
TRICHOMONAS: NORMAL
TROPONIN INTERP: NORMAL
TROPONIN T: <0.03 NG/ML
TURBIDITY: CLEAR
URINE HGB: NEGATIVE
UROBILINOGEN, URINE: ABNORMAL
WBC # BLD: 15.3 K/UL (ref 3.5–11)
WBC UA: NORMAL /HPF
YEAST: NORMAL

## 2018-02-28 PROCEDURE — 99223 1ST HOSP IP/OBS HIGH 75: CPT | Performed by: INTERNAL MEDICINE

## 2018-02-28 PROCEDURE — 87086 URINE CULTURE/COLONY COUNT: CPT

## 2018-02-28 PROCEDURE — 6370000000 HC RX 637 (ALT 250 FOR IP): Performed by: NURSE PRACTITIONER

## 2018-02-28 PROCEDURE — 2580000003 HC RX 258: Performed by: NURSE PRACTITIONER

## 2018-02-28 PROCEDURE — 85027 COMPLETE CBC AUTOMATED: CPT

## 2018-02-28 PROCEDURE — 2580000003 HC RX 258: Performed by: EMERGENCY MEDICINE

## 2018-02-28 PROCEDURE — 76705 ECHO EXAM OF ABDOMEN: CPT

## 2018-02-28 PROCEDURE — 6360000002 HC RX W HCPCS: Performed by: NURSE PRACTITIONER

## 2018-02-28 PROCEDURE — 2580000003 HC RX 258: Performed by: SURGERY

## 2018-02-28 PROCEDURE — 80053 COMPREHEN METABOLIC PANEL: CPT

## 2018-02-28 PROCEDURE — 1200000000 HC SEMI PRIVATE

## 2018-02-28 PROCEDURE — 80048 BASIC METABOLIC PNL TOTAL CA: CPT

## 2018-02-28 PROCEDURE — 6360000002 HC RX W HCPCS: Performed by: EMERGENCY MEDICINE

## 2018-02-28 PROCEDURE — 99222 1ST HOSP IP/OBS MODERATE 55: CPT | Performed by: INTERNAL MEDICINE

## 2018-02-28 PROCEDURE — 85610 PROTHROMBIN TIME: CPT

## 2018-02-28 PROCEDURE — 36415 COLL VENOUS BLD VENIPUNCTURE: CPT

## 2018-02-28 RX ORDER — MORPHINE SULFATE 2 MG/ML
2 INJECTION, SOLUTION INTRAMUSCULAR; INTRAVENOUS
Status: DISCONTINUED | OUTPATIENT
Start: 2018-02-28 | End: 2018-03-02

## 2018-02-28 RX ORDER — BISACODYL 10 MG
10 SUPPOSITORY, RECTAL RECTAL DAILY PRN
Status: DISCONTINUED | OUTPATIENT
Start: 2018-02-28 | End: 2018-03-09 | Stop reason: HOSPADM

## 2018-02-28 RX ORDER — SODIUM CHLORIDE 0.9 % (FLUSH) 0.9 %
10 SYRINGE (ML) INJECTION PRN
Status: DISCONTINUED | OUTPATIENT
Start: 2018-02-28 | End: 2018-03-09 | Stop reason: HOSPADM

## 2018-02-28 RX ORDER — ERGOCALCIFEROL 1.25 MG/1
50000 CAPSULE ORAL WEEKLY
Status: DISCONTINUED | OUTPATIENT
Start: 2018-03-03 | End: 2018-03-09 | Stop reason: HOSPADM

## 2018-02-28 RX ORDER — SODIUM CHLORIDE 9 MG/ML
INJECTION, SOLUTION INTRAVENOUS CONTINUOUS
Status: DISCONTINUED | OUTPATIENT
Start: 2018-02-28 | End: 2018-03-03

## 2018-02-28 RX ORDER — POTASSIUM CHLORIDE 20 MEQ/1
40 TABLET, EXTENDED RELEASE ORAL PRN
Status: DISCONTINUED | OUTPATIENT
Start: 2018-02-28 | End: 2018-03-09 | Stop reason: HOSPADM

## 2018-02-28 RX ORDER — POTASSIUM CHLORIDE 7.45 MG/ML
10 INJECTION INTRAVENOUS PRN
Status: DISCONTINUED | OUTPATIENT
Start: 2018-02-28 | End: 2018-03-09 | Stop reason: HOSPADM

## 2018-02-28 RX ORDER — FAMOTIDINE 20 MG/1
20 TABLET, FILM COATED ORAL
Status: DISCONTINUED | OUTPATIENT
Start: 2018-02-28 | End: 2018-03-09 | Stop reason: HOSPADM

## 2018-02-28 RX ORDER — POTASSIUM CHLORIDE 20MEQ/15ML
40 LIQUID (ML) ORAL PRN
Status: DISCONTINUED | OUTPATIENT
Start: 2018-02-28 | End: 2018-03-09 | Stop reason: HOSPADM

## 2018-02-28 RX ORDER — SODIUM CHLORIDE 0.9 % (FLUSH) 0.9 %
10 SYRINGE (ML) INJECTION EVERY 12 HOURS SCHEDULED
Status: DISCONTINUED | OUTPATIENT
Start: 2018-02-28 | End: 2018-03-09 | Stop reason: HOSPADM

## 2018-02-28 RX ORDER — CALCIUM CARBONATE 200(500)MG
1 TABLET,CHEWABLE ORAL DAILY
Status: DISCONTINUED | OUTPATIENT
Start: 2018-02-28 | End: 2018-03-09 | Stop reason: HOSPADM

## 2018-02-28 RX ORDER — ASPIRIN 81 MG/1
81 TABLET ORAL DAILY
Status: DISCONTINUED | OUTPATIENT
Start: 2018-02-28 | End: 2018-03-02

## 2018-02-28 RX ORDER — MORPHINE SULFATE 4 MG/ML
4 INJECTION, SOLUTION INTRAMUSCULAR; INTRAVENOUS
Status: DISCONTINUED | OUTPATIENT
Start: 2018-02-28 | End: 2018-03-02

## 2018-02-28 RX ORDER — ACETAMINOPHEN 325 MG/1
650 TABLET ORAL EVERY 4 HOURS PRN
Status: DISCONTINUED | OUTPATIENT
Start: 2018-02-28 | End: 2018-03-09 | Stop reason: HOSPADM

## 2018-02-28 RX ORDER — OXYBUTYNIN CHLORIDE 5 MG/1
5 TABLET, EXTENDED RELEASE ORAL
Status: DISCONTINUED | OUTPATIENT
Start: 2018-02-28 | End: 2018-03-09 | Stop reason: HOSPADM

## 2018-02-28 RX ORDER — LEVOTHYROXINE SODIUM 137 UG/1
137 TABLET ORAL DAILY
Status: DISCONTINUED | OUTPATIENT
Start: 2018-02-28 | End: 2018-03-09 | Stop reason: HOSPADM

## 2018-02-28 RX ORDER — TAMSULOSIN HYDROCHLORIDE 0.4 MG/1
0.4 CAPSULE ORAL NIGHTLY
Status: DISCONTINUED | OUTPATIENT
Start: 2018-02-28 | End: 2018-03-09 | Stop reason: HOSPADM

## 2018-02-28 RX ORDER — NITROGLYCERIN 0.4 MG/1
0.4 TABLET SUBLINGUAL EVERY 5 MIN PRN
Status: DISCONTINUED | OUTPATIENT
Start: 2018-02-28 | End: 2018-03-09 | Stop reason: HOSPADM

## 2018-02-28 RX ORDER — ATORVASTATIN CALCIUM 20 MG/1
20 TABLET, FILM COATED ORAL NIGHTLY
Status: DISCONTINUED | OUTPATIENT
Start: 2018-02-28 | End: 2018-03-09 | Stop reason: HOSPADM

## 2018-02-28 RX ORDER — ONDANSETRON 2 MG/ML
4 INJECTION INTRAMUSCULAR; INTRAVENOUS EVERY 6 HOURS PRN
Status: DISCONTINUED | OUTPATIENT
Start: 2018-02-28 | End: 2018-03-02

## 2018-02-28 RX ORDER — DOCUSATE SODIUM 100 MG/1
100 CAPSULE, LIQUID FILLED ORAL NIGHTLY
Status: DISCONTINUED | OUTPATIENT
Start: 2018-02-28 | End: 2018-03-09 | Stop reason: HOSPADM

## 2018-02-28 RX ORDER — MAGNESIUM SULFATE 1 G/100ML
1 INJECTION INTRAVENOUS PRN
Status: DISCONTINUED | OUTPATIENT
Start: 2018-02-28 | End: 2018-03-09 | Stop reason: HOSPADM

## 2018-02-28 RX ADMIN — PIPERACILLIN SODIUM AND TAZOBACTAM SODIUM 3.38 G: 3; .375 INJECTION, POWDER, LYOPHILIZED, FOR SOLUTION INTRAVENOUS at 09:10

## 2018-02-28 RX ADMIN — PIPERACILLIN SODIUM AND TAZOBACTAM SODIUM 3.38 G: 3; .375 INJECTION, POWDER, LYOPHILIZED, FOR SOLUTION INTRAVENOUS at 23:24

## 2018-02-28 RX ADMIN — SODIUM CHLORIDE: 9 INJECTION, SOLUTION INTRAVENOUS at 09:10

## 2018-02-28 RX ADMIN — MORPHINE SULFATE 2 MG: 2 INJECTION, SOLUTION INTRAMUSCULAR; INTRAVENOUS at 10:39

## 2018-02-28 RX ADMIN — TAMSULOSIN HYDROCHLORIDE 0.4 MG: 0.4 CAPSULE ORAL at 20:08

## 2018-02-28 RX ADMIN — OXYBUTYNIN CHLORIDE 5 MG: 5 TABLET, FILM COATED, EXTENDED RELEASE ORAL at 18:37

## 2018-02-28 RX ADMIN — PIPERACILLIN SODIUM,TAZOBACTAM SODIUM 3.38 G: 3; .375 INJECTION, POWDER, FOR SOLUTION INTRAVENOUS at 00:02

## 2018-02-28 RX ADMIN — ENOXAPARIN SODIUM 40 MG: 40 INJECTION SUBCUTANEOUS at 09:45

## 2018-02-28 RX ADMIN — FAMOTIDINE 20 MG: 20 TABLET, FILM COATED ORAL at 18:37

## 2018-02-28 RX ADMIN — Medication 10 ML: at 09:14

## 2018-02-28 RX ADMIN — ATORVASTATIN CALCIUM 20 MG: 20 TABLET, FILM COATED ORAL at 20:08

## 2018-02-28 RX ADMIN — PIPERACILLIN SODIUM AND TAZOBACTAM SODIUM 3.38 G: 3; .375 INJECTION, POWDER, LYOPHILIZED, FOR SOLUTION INTRAVENOUS at 17:20

## 2018-02-28 ASSESSMENT — ENCOUNTER SYMPTOMS
SORE THROAT: 0
SPUTUM PRODUCTION: 0
CONSTIPATION: 0
BACK PAIN: 0
ORTHOPNEA: 0
COUGH: 0
BLURRED VISION: 0
DOUBLE VISION: 0
VOMITING: 1
SHORTNESS OF BREATH: 0
ABDOMINAL PAIN: 1
NAUSEA: 1
DIARRHEA: 0
WHEEZING: 0

## 2018-02-28 ASSESSMENT — PAIN SCALES - GENERAL
PAINLEVEL_OUTOF10: 3
PAINLEVEL_OUTOF10: 2
PAINLEVEL_OUTOF10: 0
PAINLEVEL_OUTOF10: 0
PAINLEVEL_OUTOF10: 6

## 2018-02-28 ASSESSMENT — PAIN DESCRIPTION - ONSET: ONSET: GRADUAL

## 2018-02-28 ASSESSMENT — PAIN DESCRIPTION - DESCRIPTORS: DESCRIPTORS: CONSTANT;CRAMPING

## 2018-02-28 ASSESSMENT — PAIN DESCRIPTION - FREQUENCY: FREQUENCY: INTERMITTENT

## 2018-02-28 ASSESSMENT — PAIN DESCRIPTION - PAIN TYPE: TYPE: ACUTE PAIN

## 2018-02-28 NOTE — PLAN OF CARE
Problem: Falls - Risk of  Intervention: Fall precautions  Pt. Free of falls and injuries this shift. Goal: Absence of falls  Outcome: Ongoing      Problem: Risk for Impaired Skin Integrity  Goal: Tissue integrity - skin and mucous membranes  Structural intactness and normal physiological function of skin and  mucous membranes. Outcome: Ongoing    Intervention: SKIN ASSESSMENT  Skin integrity improved/maintained this shift. See head to toe assessment.

## 2018-02-28 NOTE — ED PROVIDER NOTES
abdominal lab work. We'll give IV fluids, antiemetics and reassess. We'll also get cardiac enzymes and EKG in case he is having an atypical presentation of ACS. DIAGNOSTIC RESULTS     EKG: All EKG's are interpreted by the Emergency Department Physician who either signs or Co-signs this chart in the absence of a cardiologist.    EKG Interpretation    Interpreted by emergency department physician at 9:49 PM.    Compared to EKG from May 2015    Rhythm: normal sinus   Rate: normal  Axis: left  Ectopy: none  Conduction: left bundle branch block (complete)  ST Segments: nonspecific changes and depression in  I and aVl  T Waves: non specific changes  Q Waves: nonspecific    EKG  Impression: non-specific EKG, no significant changes from May 2015      RADIOLOGY:   I directly visualized the following  images and reviewed the radiologist interpretations:  CT ABDOMEN PELVIS W IV CONTRAST Additional Contrast? None   Preliminary Result   1. Hydropic gallbladder with gallstones. If there is concern for   cholecystitis, an ultrasound is recommended for further evaluation. 2. Otherwise no significant change. 3. Redemonstration of innumerable pulmonary nodules. 4. Colonic diverticulosis scratch the most in sigmoid colon, without evidence   of diverticulitis. 5. Stable prostatomegaly. 6. Fat containing right inguinal hernia.          XR CHEST PORTABLE   Final Result   No acute disease                 ED BEDSIDE ULTRASOUND:      LABS:  Labs Reviewed   CBC WITH AUTO DIFFERENTIAL - Abnormal; Notable for the following:        Result Value    Seg Neutrophils 90 (*)     Lymphocytes 4 (*)     Segs Absolute 9.50 (*)     Absolute Lymph # 0.40 (*)     All other components within normal limits   COMPREHENSIVE METABOLIC PANEL - Abnormal; Notable for the following:     Glucose 199 (*)     Chloride 96 (*)     Alkaline Phosphatase 264 (*)      (*)      (*)     Total Bilirubin 2.44 (*)     All other components within normal limits   POC GLUCOSE FINGERSTICK - Abnormal; Notable for the following:     POC Glucose 193 (*)     All other components within normal limits   TROPONIN   LIPASE   TROPONIN   UA W/REFLEX CULTURE         EMERGENCY DEPARTMENT COURSE:   Vitals:    Vitals:    02/27/18 2118 02/27/18 2120 02/27/18 2313   BP:  (!) 136/45 (!) 119/42   Pulse:  63 63   Resp:  19 29   Temp:  97.4 °F (36.3 °C) 98.3 °F (36.8 °C)   TempSrc: Oral Oral Oral   SpO2:  97% 92%   Weight: 160 lb (72.6 kg)     Height: 5' 5\" (1.651 m)       12:16 AM  CT scan shows no definitive evidence of acute cholecystitis however there is evidence of distention of the gallbladder as well as significant amount of gallstones. Patient's LFTs are also significantly elevated as above. Spoke with [address] [place] [address] practitioner with staff medicine service who accepted admission. I spoke with Dr. Carmen Gonzales general surgeon on call and discussed the case. He stated that he would see the patient in the morning. We'll give dose of IV antibiotics at this time in the event that this is an acute cholecystitis. CRITICAL CARE:      CONSULTS:  IP CONSULT TO INTERNAL MEDICINE  IP CONSULT TO GENERAL SURGERY      PROCEDURES:      FINAL IMPRESSION      1. Elevated LFTs    2.  Biliary colic            DISPOSITION/PLAN   DISPOSITION Admitted 02/28/2018 12:01:07 AM          PATIENT REFERRED TO:  Osvaldo Silver MD  [facility] [id number] [phone]            DISCHARGE MEDICATIONS:  New Prescriptions    No medications on file       (Please note that portions of this note were completed with a voice recognition program.  Efforts were made to edit the dictations but occasionally words are mis-transcribed.)    Damon Lundberg DO  Attending Emergency Physician          Damon Lundberg DO  02/28/18 0017

## 2018-02-28 NOTE — CONSULTS
General Surgery Consult      Pt Name: Marlowe Cabot  MRN: [id number]  YOB: 1928  Date of evaluation: 2/28/2018  Primary Care Physician: Sarahi Rivers MD   Patient evaluated at the request of  Dr. Safia Barton  Reason for evaluation: RUQ pain    SUBJECTIVE:   History of Chief Complaint:    Marlowe Cabot is a 80 y.o. male who presents with RUQ pain and nausea. He had a similar attack back in September and was seen by an outside surgeon and scheduled for surgery. This was cancelled as the patient was found to have a \"spot on his lung\" that the surgeon wanted to be worked up. The patient by then was feeling better. He returns with similar symptoms. CT scan was done in ED, which shows a distended hydropic gallbladder and multiple stones, with one stone appearing to be lodged in the cystic duct. LFTs were mildly elevated. Past Medical History   has a past medical history of Cancer ([address] Carotid artery stenosis; Cataract; Cerebrovascular disease; Heart attack; Hypertension; Hypothyroidism; and Type II or unspecified type diabetes mellitus without mention of complication, not stated as uncontrolled. Past Surgical History   has a past surgical history that includes shoulder surgery; Cardiac surgery; Coronary artery bypass graft; Appendectomy; Total Thyroidectomy; and Pacemaker insertion (03/2015). Medications  Prior to Admission medications    Medication Sig Start Date End Date Taking?  Authorizing Provider   oxybutynin (DITROPAN-XL) 5 MG extended release tablet TAKE 1 TABLET DAILY 2/16/18   Evelyn Perry MD   tamsulosin (FLOMAX) 0.4 MG capsule Take 1 capsule by mouth daily 12/5/17   Evelyn Perry MD   glimepiride (AMARYL) 2 MG tablet Take 2 mg by mouth every morning (before breakfast)    Historical Provider, MD   calcium carbonate (TUMS) 500 MG chewable tablet Take 1 tablet by mouth daily    Historical Provider, MD   famotidine (PEPCID) 20 MG tablet Take 1 tablet by mouth daily 9/26/17   Gustavo Mcgill MD   Cholecalciferol (VITAMIN D3) 17584 UNITS CAPS Take 50,000 Units by mouth once a week    Historical Provider, MD   levothyroxine (LEVOXYL) 137 MCG tablet TAKE 1 TABLET DAILY 7/29/16   Historical Provider, MD   nitroGLYCERIN (NITROSTAT) 0.4 MG SL tablet Place 0.4 mg under the tongue. As directed    Historical Provider, MD   docusate sodium (COLACE) 100 MG capsule Take 100 mg by mouth nightly     Historical Provider, MD   atorvastatin (LIPITOR) 20 MG tablet   Take 20 mg by mouth nightly     Historical Provider, MD   aspirin 81 MG tablet Take 81 mg by mouth daily. Historical Provider, MD     Allergies  is allergic to seasonal and keflex [cephalexin]. Family History  family history includes Cancer in his mother; Other in his father. Social History   reports that he has never smoked. He has never used smokeless tobacco. He reports that he does not drink alcohol or use drugs. Review of Systems:  General Denies any fever or chills  HEENT Denies any diplopia, tinnitus or vertigo  Resp Denies any shortness of breath, cough or wheezing  Cardiac Denies any chest pain, palpitations, claudication or edema  GI Denies any melena, hematochezia, hematemesis or pyrosis   Denies any frequency, urgency, hesitancy or incontinence  Heme Denies bruising or bleeding easily  Endocrine positive for DM  Neuro Denies any focal motor or sensory deficits    OBJECTIVE:   VITALS:  height is 5' 5\" (1.651 m) and weight is 160 lb (72.6 kg). His temperature is 98.9 °F (37.2 °C). His blood pressure is 109/43 (abnormal) and his pulse is 63. His respiration is 20 and oxygen saturation is 94%. CONSTITUTIONAL: Alert and oriented times 2, no acute distress and cooperative to examination with proper mood and affect. Poor historian  SKIN: Skin color, texture, turgor normal. No rashes or lesions. LYMPH: no cervical nodes, no inguinal nodes  HEENT: Head is normocephalic, atraumatic.  EOMI, PERRLA  NECK: Supple, symmetrical, trachea midline, no adenopathy, thyroid symmetric, not enlarged and no tenderness, skin normal  CHEST/LUNGS: chest symmetric with normal A/P diameter, normal respiratory rate and rhythm, lungs clear to auscultation without wheezes, rales or rhonchi. No accessory muscle use. CARDIOVASCULAR: Heart regular rate and rhythm   ABDOMEN: Normal shape. Normal bowel sounds. No bruits. Soft, nondistended, no masses or organomegaly. no evidence of hernia. Percussion: Normal without hepatosplenomegally. Tenderness: RUQ, with equivocal Kinney's  RECTAL: deferred, not clinically indicated  NEUROLOGIC: There are no focalizing motor or sensory deficits. CN II-XII are grossly intact.   EXTREMITIES: no cyanosis, no clubbing and no edema    LABS:     CBC with Differential:    Lab Results   Component Value Date    WBC 15.3 02/28/2018    RBC 5.08 02/28/2018    HGB 14.8 02/28/2018    HCT 44.4 02/28/2018     02/28/2018    MCV 87.5 02/28/2018    MCH 29.1 02/28/2018    MCHC 33.2 02/28/2018    RDW 13.8 02/28/2018    LYMPHOPCT 4 02/27/2018    MONOPCT 5 02/27/2018    BASOPCT 0 02/27/2018    MONOSABS 0.50 02/27/2018    LYMPHSABS 0.40 02/27/2018    EOSABS 0.10 02/27/2018    BASOSABS 0.00 02/27/2018    DIFFTYPE NOT REPORTED 02/27/2018     CMP:    Lab Results   Component Value Date     02/28/2018    K 4.6 02/28/2018    CL 99 02/28/2018    CO2 24 02/28/2018    BUN 21 02/28/2018    CREATININE 1.33 02/28/2018    GFRAA >60 02/28/2018    LABGLOM 51 02/28/2018    GLUCOSE 152 02/28/2018    GLUCOSE 143 04/16/2012    PROT 8.3 02/27/2018    LABALBU 3.6 02/27/2018    CALCIUM 8.3 02/28/2018    BILITOT 2.44 02/27/2018    ALKPHOS 264 02/27/2018     02/27/2018     02/27/2018     Albumin:    Lab Results   Component Value Date    LABALBU 3.6 02/27/2018     Calcium:    Lab Results   Component Value Date    CALCIUM 8.3 02/28/2018     Magnesium:    Lab Results   Component Value Date    MG 2.2 12/13/2012     Phosphorus:    Lab Results   Component Value Date    PHOS 2.8 09/24/2017     PT/INR:    Lab Results   Component Value Date    PROTIME 12.1 02/28/2018    INR 1.2 02/28/2018     U/A:    Lab Results   Component Value Date    COLORU YELLOW 09/25/2017    PROTEINU NEGATIVE 09/25/2017    PHUR 6.5 09/25/2017    WBCUA 10 TO 20 09/25/2017    RBCUA None 09/25/2017    MUCUS NOT REPORTED 09/25/2017    TRICHOMONAS NOT REPORTED 09/25/2017    YEAST NOT REPORTED 09/25/2017    BACTERIA NOT REPORTED 09/25/2017    SPECGRAV 1.010 09/25/2017    LEUKOCYTESUR MODERATE 09/25/2017    UROBILINOGEN Normal 09/25/2017    BILIRUBINUR NEGATIVE 09/25/2017    GLUCOSEU NEGATIVE 09/25/2017    AMORPHOUS NOT REPORTED 09/25/2017     AMYLASE:    Lab Results   Component Value Date    AMYLASE 118 12/13/2012     LIPASE:    Lab Results   Component Value Date    LIPASE 30 02/27/2018       RADIOLOGY:   I have personally reviewed the following films:  CT scan abd/pelvis:     Lower Chest: Postsurgical changes from prior CABG. Dense coronary artery  calcification is noted. Pacer leads are in the area of the right atrium and  right ventricle. Innumerable pulmonary nodules are again noted. Organs: Diffuse hepatic steatosis. No significant intrahepatic bile duct  dilatation. Stable 8 mm hypodensity in segment 4 of the liver, too small to  characterize, but likely represents a cyst.  There is an 8 mm vague  hypodensity in segment 2 of the liver, indeterminate, but may represent a  cyst or hemangioma. Hydropic gallbladder with gallstones. Bilateral adrenal  glands are unremarkable. Spleen demonstrates no acute abnormality. Stable  cysts in the right kidney measuring up to 15 mm. Stable cysts in the left  kidney measuring up to 3.5 cm. Additional scattered subcentimeter  hypodensities in both kidneys are too small to characterize, but likely  represent additional cysts. Otherwise bilateral kidneys enhance  symmetrically and without acute abnormality. No hydronephrosis.   Pancreas is  slightly atrophic without acute abnormality. GI/Bowel: No definite acute gastric abnormality. No acute small bowel  abnormality. No evidence of small bowel obstruction. Sigmoid  diverticulosis. No evidence of diverticulitis. No acute colonic  abnormality. Appendix is not visualized. No significant inflammatory change  is identified in the right lower quadrant to suggest acute appendicitis. Pelvis: Stable prostatomegaly. Peritoneum/Retroperitoneum: Fat containing right inguinal hernia. No  significant free fluid. No significant lymphadenopathy. No free air. IVC  is unremarkable. There is moderate atherosclerotic disease of the abdominal  aorta without evidence of aneurysmal dilatation. Bones/Soft Tissues: Osteopenia. Stable minimal wedging of the L3 vertebral  body. Moderate multilevel degenerative changes. Impression:      1. Hydropic gallbladder with gallstones. If there is concern for  cholecystitis, an ultrasound is recommended for further evaluation. 2. Otherwise no significant change. 3. Redemonstration of innumerable pulmonary nodules. 4. Colonic diverticulosis mostly in sigmoid colon, without evidence of  diverticulitis. 5. Stable prostatomegaly. 6. Fat containing right inguinal hernia. US:   PENDING       IMPRESSION:   1. Chronic cholecystitis    Principal Problem:    Symptomatic cholelithiasis  Active Problems:    Acquired hypothyroidism    Type 2 diabetes mellitus without complication, without long-term current use of insulin (HCC)    Abdominal pain    UTI (urinary tract infection)    Elevated liver enzymes  Resolved Problems:    * No resolved hospital problems. *      PLAN:   1. Await US results  2. Plan laparoscopic cholecystectomy pending medical clearance  3. Repeat CMP  4. If bilirubin persistent or worsens, may need MRCP  5. Antibiotics  6. Will follow closely        Thank you for this interesting consult and for allowing us to participate in the care of your patient. Please feel free to contact me with any questions or concerns.

## 2018-02-28 NOTE — H&P
*    Plan:    RUQ/epigastric abdominal pain  -liver enzymes elevated  -CT abdomen- hydropic gallbladder with gallstones  -GB  Ultrasound in am  -IV zosyn initiated in ED  --Continue on admission  -General Surgery consult with Dr. Garry Rico  --contacted in ED; will see in am    Urinary tract infection  -urine positive for nitrites  -Currently on Zosyn  -Urine culture pending    Diabetes Mellitus  -hold oral hypoglycemics for now  -POCT ac and hs with sliding scale coverage    Wound on left knee (proximal tibia)  -Wound care to see    Consultations:     [address] [facility]   2/28/2018  7:02 AM    [facility]: NORMAN Beard [address] Phone [phone]       IM Attending    Pt seen and examined today   I have discussed the care of pt , including pertinent history and exam findings,  with the resident. I have reviewed the key elements of all parts of the encounter with the resident. I agree with the assessment, plan and orders as documented by the resident.     Pt with hydropic gallbladder abnormal LFT   Likely acute cholecystitis   Elevated alk ph MRCP     Electronically signed by Marie Daniels MD on 2/28/2018 at 11:10 AM

## 2018-02-28 NOTE — ED NOTES
Called for report to [address] [place] [address] room is 68 NOT 8 [facility], RN  02/28/18 100 [address] [place] [address] ROBE Case  02/28/18 0093

## 2018-02-28 NOTE — CARE COORDINATION
CASE MANAGEMENT NOTE:    Admission Date:  2/27/2018 Gabe Duenas is a 80 y.o.  male    Admitted for : Acute cholecystitis [K81.0]  Symptomatic cholelithiasis [K80.20]    Met with:  Patient's Daughter, Kristi Marrufo, Via Phone, Per Pt's request    PCP:  Hortensia Ambriz                                Insurance:  [employer] Medicare      Current Residence/ Living Arrangements:  at home dependent on family care, Lives w/ Daughter            Current Services PTA:  No    Is patient agreeable to VNS: Yes    Freedom of choice provided: Yes         VNS chosen:  Yes, [address]    DME:  straight cane, walker, wheelchair and shower chair, Grab Bars in [address] [facility] [facility]: No    Nebulizer: No    Supplier: N/A    Potential Assistance Needed: Yes, VNS for possible Post op needs    SNF needed: No, Daughter states, he will refuse    Pharmacy:  [facility] [address]     Does Patient want to use MEDS to BEDS? No    Family Members/Caregivers that pt would like involved in their care:    Yes    If yes, list name here:  Daughter Kelle Jon    Transportation Provider:  Family                      Discharge Plan:  2/28/18 [employer] Medicare Pt. Lives in 2 story home, stays on 1st floor, w/ Daughter. DME cane, walker, w/c, [place], Grab bars in [place]. Agreeable to VNS, [facility] is following, Carlos will need signed/completed. GB US, MRI/MRCP, surgery/GI following. If Surgery is needed, pt. Needs medical clearance. IV Zosyn. Will continue to follow for needs. //KB               Readmission Risk              Readmission Risk:        9.5       Age 72 or Greater:  1    Admitted from SNF or Requires Paid or Family Care:  0    Currently has CHF,COPD,ARF,CRI,or is on dialysis:  0    Takes more than 5 Prescription Medications:  4    Takes Digoxin,Insulin,Anticoagulants,Narcotics or ASA/Plavix:  [address] in Past 12 Months:  0    On Disability:  0    Patient Considers own Health:  2.5          Electronically signed by: Joan Herring RN on 2/28/2018 at 9:32 AM

## 2018-02-28 NOTE — CARE COORDINATION
Notified, Lisa Aguirre, From [facility] ([facility]), VNS, pt's Daughter would like service for her Dad when dc home. He will run benefits, & inform writer.

## 2018-02-28 NOTE — FLOWSHEET NOTE
02/28/18 1113   Encounter Summary   Services provided to: Patient   Referral/Consult From: Zara   Continue Visiting (2/28/17)   Complexity of Encounter Low   Length of Encounter 15 minutes   Spiritual/Caodaism   Type Ritual   Intervention Anointing   Sacraments   Sacrament of Sick-Anointing Anointed  (Fr Brito 2/28/18)

## 2018-03-01 ENCOUNTER — ANESTHESIA EVENT (OUTPATIENT)
Dept: OPERATING ROOM | Age: 83
DRG: 418 | End: 2018-03-01
Payer: MEDICARE

## 2018-03-01 PROBLEM — S81.802A WOUND, OPEN, KNEE, LOWER LEG, OR ANKLE WITH COMPLICATION, LEFT, INITIAL ENCOUNTER: Status: ACTIVE | Noted: 2018-03-01

## 2018-03-01 PROBLEM — S91.002A WOUND, OPEN, KNEE, LOWER LEG, OR ANKLE WITH COMPLICATION, LEFT, INITIAL ENCOUNTER: Status: ACTIVE | Noted: 2018-03-01

## 2018-03-01 PROBLEM — S81.002A WOUND, OPEN, KNEE, LOWER LEG, OR ANKLE WITH COMPLICATION, LEFT, INITIAL ENCOUNTER: Status: ACTIVE | Noted: 2018-03-01

## 2018-03-01 LAB
ALBUMIN SERPL-MCNC: 2.5 G/DL (ref 3.5–5.2)
ALBUMIN/GLOBULIN RATIO: ABNORMAL (ref 1–2.5)
ALP BLD-CCNC: 192 U/L (ref 40–129)
ALT SERPL-CCNC: 162 U/L (ref 5–41)
ANION GAP SERPL CALCULATED.3IONS-SCNC: 12 MMOL/L (ref 9–17)
AST SERPL-CCNC: 104 U/L
BILIRUB SERPL-MCNC: 3.95 MG/DL (ref 0.3–1.2)
BILIRUBIN DIRECT: 3.11 MG/DL
BILIRUBIN, INDIRECT: 0.84 MG/DL (ref 0–1)
BUN BLDV-MCNC: 20 MG/DL (ref 8–23)
BUN/CREAT BLD: ABNORMAL (ref 9–20)
CALCIUM SERPL-MCNC: 8.3 MG/DL (ref 8.6–10.4)
CHLORIDE BLD-SCNC: 103 MMOL/L (ref 98–107)
CO2: 23 MMOL/L (ref 20–31)
CREAT SERPL-MCNC: 1.47 MG/DL (ref 0.7–1.2)
CULTURE: NO GROWTH
CULTURE: NORMAL
GFR AFRICAN AMERICAN: 55 ML/MIN
GFR NON-AFRICAN AMERICAN: 45 ML/MIN
GFR SERPL CREATININE-BSD FRML MDRD: ABNORMAL ML/MIN/{1.73_M2}
GFR SERPL CREATININE-BSD FRML MDRD: ABNORMAL ML/MIN/{1.73_M2}
GLOBULIN: ABNORMAL G/DL (ref 1.5–3.8)
GLUCOSE BLD-MCNC: 66 MG/DL (ref 70–99)
HCT VFR BLD CALC: 41.4 % (ref 41–53)
HEMOGLOBIN: 13.8 G/DL (ref 13.5–17.5)
Lab: NORMAL
MCH RBC QN AUTO: 29.3 PG (ref 26–34)
MCHC RBC AUTO-ENTMCNC: 33.4 G/DL (ref 31–37)
MCV RBC AUTO: 87.8 FL (ref 80–100)
NRBC AUTOMATED: NORMAL PER 100 WBC
PDW BLD-RTO: 14.1 % (ref 11.5–14.9)
PLATELET # BLD: 181 K/UL (ref 150–450)
PMV BLD AUTO: 7.5 FL (ref 6–12)
POTASSIUM SERPL-SCNC: 4.3 MMOL/L (ref 3.7–5.3)
RBC # BLD: 4.71 M/UL (ref 4.5–5.9)
SODIUM BLD-SCNC: 138 MMOL/L (ref 135–144)
SPECIMEN DESCRIPTION: NORMAL
SPECIMEN DESCRIPTION: NORMAL
STATUS: NORMAL
TOTAL PROTEIN: 6.9 G/DL (ref 6.4–8.3)
WBC # BLD: 8.5 K/UL (ref 3.5–11)

## 2018-03-01 PROCEDURE — 99232 SBSQ HOSP IP/OBS MODERATE 35: CPT | Performed by: INTERNAL MEDICINE

## 2018-03-01 PROCEDURE — 2580000003 HC RX 258: Performed by: SURGERY

## 2018-03-01 PROCEDURE — 85027 COMPLETE CBC AUTOMATED: CPT

## 2018-03-01 PROCEDURE — 99232 SBSQ HOSP IP/OBS MODERATE 35: CPT | Performed by: NURSE PRACTITIONER

## 2018-03-01 PROCEDURE — 6360000002 HC RX W HCPCS: Performed by: NURSE PRACTITIONER

## 2018-03-01 PROCEDURE — 6370000000 HC RX 637 (ALT 250 FOR IP): Performed by: NURSE PRACTITIONER

## 2018-03-01 PROCEDURE — 1200000000 HC SEMI PRIVATE

## 2018-03-01 PROCEDURE — 36415 COLL VENOUS BLD VENIPUNCTURE: CPT

## 2018-03-01 PROCEDURE — 80048 BASIC METABOLIC PNL TOTAL CA: CPT

## 2018-03-01 PROCEDURE — 80076 HEPATIC FUNCTION PANEL: CPT

## 2018-03-01 PROCEDURE — 2580000003 HC RX 258: Performed by: NURSE PRACTITIONER

## 2018-03-01 RX ADMIN — ENOXAPARIN SODIUM 30 MG: 30 INJECTION SUBCUTANEOUS at 11:55

## 2018-03-01 RX ADMIN — PIPERACILLIN SODIUM AND TAZOBACTAM SODIUM 3.38 G: 3; .375 INJECTION, POWDER, LYOPHILIZED, FOR SOLUTION INTRAVENOUS at 17:08

## 2018-03-01 RX ADMIN — ATORVASTATIN CALCIUM 20 MG: 20 TABLET, FILM COATED ORAL at 20:51

## 2018-03-01 RX ADMIN — OXYBUTYNIN CHLORIDE 5 MG: 5 TABLET, FILM COATED, EXTENDED RELEASE ORAL at 17:08

## 2018-03-01 RX ADMIN — DOCUSATE SODIUM 100 MG: 100 CAPSULE, LIQUID FILLED ORAL at 20:51

## 2018-03-01 RX ADMIN — SODIUM CHLORIDE: 9 INJECTION, SOLUTION INTRAVENOUS at 14:40

## 2018-03-01 RX ADMIN — TAMSULOSIN HYDROCHLORIDE 0.4 MG: 0.4 CAPSULE ORAL at 20:51

## 2018-03-01 RX ADMIN — FAMOTIDINE 20 MG: 20 TABLET, FILM COATED ORAL at 17:08

## 2018-03-01 RX ADMIN — PIPERACILLIN SODIUM AND TAZOBACTAM SODIUM 3.38 G: 3; .375 INJECTION, POWDER, LYOPHILIZED, FOR SOLUTION INTRAVENOUS at 07:53

## 2018-03-01 NOTE — PROGRESS NOTES
I spoke with the consult line at 10:09 am on 3/1/18. They took the information and gave it to Dr. Nael Enriquez. [address] [place] [address]

## 2018-03-01 NOTE — PROGRESS NOTES
General Surgery Progress Note            PATIENT NAME: Mariluz Lerner     TODAY'S DATE: 3/1/2018, 10:50 AM    SUBJECTIVE:    Pt  seen and examined. MRI unable to be done as the patient has a pacemaker     OBJECTIVE:   VITALS:  BP (!) 93/42   Pulse 62   Temp 98.1 °F (36.7 °C) (Oral)   Resp 18   Ht 5' 5\" (1.651 m)   Wt 160 lb (72.6 kg)   SpO2 96%   BMI 26.63 kg/m²      INTAKE/OUTPUT:      Intake/Output Summary (Last 24 hours) at 03/01/18 1050  Last data filed at 03/01/18 1012   Gross per 24 hour   Intake                0 ml   Output              775 ml   Net             -775 ml                 CONSTITUTIONAL:  awake and alert. no apparent distress  HEART:   Regular  LUNGS:   Clear  ABDOMEN:   Abdomen soft, minimally tender in the right upper quadrant.   Kinney's is equivocal, non-distended  EXTREMITIES:   No edema or tenderness    Data:  CBC with Differential:    Lab Results   Component Value Date    WBC 8.5 03/01/2018    RBC 4.71 03/01/2018    HGB 13.8 03/01/2018    HCT 41.4 03/01/2018     03/01/2018    MCV 87.8 03/01/2018    MCH 29.3 03/01/2018    MCHC 33.4 03/01/2018    RDW 14.1 03/01/2018    LYMPHOPCT 4 02/27/2018    MONOPCT 5 02/27/2018    BASOPCT 0 02/27/2018    MONOSABS 0.50 02/27/2018    LYMPHSABS 0.40 02/27/2018    EOSABS 0.10 02/27/2018    BASOSABS 0.00 02/27/2018    DIFFTYPE NOT REPORTED 02/27/2018     CMP:    Lab Results   Component Value Date     03/01/2018    K 4.3 03/01/2018     03/01/2018    CO2 23 03/01/2018    BUN 20 03/01/2018    CREATININE 1.47 03/01/2018    GFRAA 55 03/01/2018    LABGLOM 45 03/01/2018    GLUCOSE 66 03/01/2018    GLUCOSE 143 04/16/2012    PROT 6.9 03/01/2018    LABALBU 2.5 03/01/2018    CALCIUM 8.3 03/01/2018    BILITOT 3.95 03/01/2018    ALKPHOS 192 03/01/2018     03/01/2018     03/01/2018         ASSESSMENT     Principal Problem:    Symptomatic cholelithiasis  Active Problems:    Acquired hypothyroidism    Type 2 diabetes mellitus

## 2018-03-01 NOTE — CONSULTS
INITIAL CONSULTATION     HISTORY OF PRESENT ILLNESS: Rina Motta is a 80 y.o. maleadmitted to the hospital on 2/27/2018 for abd pain. Right upper quadrant/epigastric. Constant. Mild. Nausea and 2 episodes of vomiting. No fever or chills. No prior GI problems. No clear aggravating factors. He was found to have elevated LFTs. CT scan was suggestive of hydrops gallbladder.      PAST MEDICAL HISTORY:     Past Medical History:   Diagnosis Date    Cancer [facility])     Carotid artery stenosis     Cataract     Cerebrovascular disease     Heart attack     Hypertension     Hypothyroidism     Type II or unspecified type diabetes mellitus without mention of complication, not stated as uncontrolled         Past Surgical History:   Procedure Laterality Date    APPENDECTOMY      CARDIAC SURGERY      CORONARY ARTERY BYPASS GRAFT      PACEMAKER INSERTION  03/2015    SHOULDER SURGERY      TOTAL THYROIDECTOMY            CURRENT MEDICATIONS:       Current Facility-Administered Medications:     enoxaparin (LOVENOX) injection 30 mg, 30 mg, Subcutaneous, Daily, Pearl Wetzel CNP    aspirin EC tablet 81 mg, 81 mg, Oral, Daily, Pearl Wetzel CNP    atorvastatin (LIPITOR) tablet 20 mg, 20 mg, Oral, Nightly, Pearl Wetzel CNP, 20 mg at 02/28/18 2008    calcium carbonate (TUMS) chewable tablet 500 mg, 1 tablet, Oral, Daily, Pearl Wetzel CNP  [facility]  [START ON 3/3/2018] vitamin D (ERGOCALCIFEROL) capsule 50,000 Units, 50,000 Units, Oral, Weekly, Pearl Wetzel CNP    docusate sodium (COLACE) capsule 100 mg, 100 mg, Oral, Nightly, Pearl Wetzel CNP    famotidine (PEPCID) tablet 20 mg, 20 mg, Oral, Dinner, Pearl Wetzel CNP, 20 mg at 02/28/18 1837    levothyroxine (SYNTHROID) tablet 137 mcg, 137 mcg, Oral, Daily, Pearl Wetzel CNP    nitroGLYCERIN (NITROSTAT) SL tablet 0.4 mg, 0.4 mg, Sublingual, Q5 Min PRN, Pearl Wetzel CNP    oxybutynin (DITROPAN-XL) extended release tablet 5 mg, 5 mg, Oral, Dinner, Pearl Wetzel CNP, 5 mg at 02/28/18 1837    tamsulosin (FLOMAX) capsule 0.4 mg, 0.4 mg, Oral, Nightly, Curlene Binning, CNP, 0.4 mg at 02/28/18 2008    sodium chloride flush 0.9 % injection 10 mL, 10 mL, Intravenous, 2 times per day, Curlene Binning, CNP, 10 mL at 02/28/18 0914    sodium chloride flush 0.9 % injection 10 mL, 10 mL, Intravenous, PRN, Curlene Binning, CNP    potassium chloride (KLOR-CON M) extended release tablet 40 mEq, 40 mEq, Oral, PRN **OR** potassium chloride 20 MEQ/15ML (10%) oral solution 40 mEq, 40 mEq, Oral, PRN **OR** potassium chloride 10 mEq/100 mL IVPB (Peripheral Line), 10 mEq, Intravenous, PRN, Curlene Binning, CNP    magnesium sulfate 1 g in dextrose 5% 100 mL IVPB, 1 g, Intravenous, PRN, Curlene Binning, CNP    acetaminophen (TYLENOL) tablet 650 mg, 650 mg, Oral, Q4H PRN, Curlene Binning, CNP    morphine (PF) injection 2 mg, 2 mg, Intravenous, Q2H PRN, 2 mg at 02/28/18 1039 **OR** morphine injection 4 mg, 4 mg, Intravenous, Q2H PRN, Curlene Binning, CNP    magnesium hydroxide (MILK OF MAGNESIA) 400 MG/5ML suspension 30 mL, 30 mL, Oral, Daily PRN, Curlene Binning, CNP    bisacodyl (DULCOLAX) suppository 10 mg, 10 mg, Rectal, Daily PRN, Curlene Binning, CNP    ondansetron [facility]) injection 4 mg, 4 mg, Intravenous, Q6H PRN, Curlene Binning, CNP    piperacillin-tazobactam (ZOSYN) 3.375 g in dextrose 5 % 50 mL IVPB (mini-bag), 3.375 g, Intravenous, Q8H, Curlene Binning, CNP, Last Rate: 12.5 mL/hr at 03/01/18 0753, 3.375 g at 03/01/18 0753    0.9 % sodium chloride infusion, , Intravenous, Continuous, Yoselin Smith DO, Last Rate: 75 mL/hr at 02/28/18 0910       ALLERGIES:   Allergies   Allergen Reactions    Seasonal      Other reaction(s): Other: See Comments  seasonal upper respiratory irritation    Keflex [Cephalexin] Nausea And Vomiting          FAMILY HISTORY: The patient's family history was reviewed. SOCIAL HISTORY:   Social History     Social History    Marital status:       Spouse name: N/A    Number of subcentimeter hypodensities in both kidneys are too small to characterize, but likely represent additional cysts. Otherwise bilateral kidneys enhance symmetrically and without acute abnormality. No hydronephrosis. Pancreas is slightly atrophic without acute abnormality. GI/Bowel: No definite acute gastric abnormality. No acute small bowel abnormality. No evidence of small bowel obstruction. Sigmoid diverticulosis. No evidence of diverticulitis. No acute colonic abnormality. Appendix is not visualized. No significant inflammatory change is identified in the right lower quadrant to suggest acute appendicitis. Pelvis: Stable prostatomegaly. Peritoneum/Retroperitoneum: Fat containing right inguinal hernia. No significant free fluid. No significant lymphadenopathy. No free air. IVC is unremarkable. There is moderate atherosclerotic disease of the abdominal aorta without evidence of aneurysmal dilatation. Bones/Soft Tissues: Osteopenia. Stable minimal wedging of the L3 vertebral body. Moderate multilevel degenerative changes. 1. Hydropic gallbladder with gallstones. If there is concern for cholecystitis, an ultrasound is recommended for further evaluation. 2. Otherwise no significant change. 3. Redemonstration of innumerable pulmonary nodules. 4. Colonic diverticulosis mostly in sigmoid colon, without evidence of diverticulitis. 5. Stable prostatomegaly. 6. Fat containing right inguinal hernia. Us Gallbladder Ruq    Result Date: 2/28/2018  EXAMINATION: RIGHT UPPER QUADRANT ULTRASOUND 2/28/2018 10:45 am COMPARISON: 09/18/2017, CT 02/27/2017 HISTORY: ORDERING SYSTEM PROVIDED HISTORY: ABDOMINAL PAIN TECHNOLOGIST PROVIDED HISTORY: Ordering Physician Provided Reason for Exam: RUQ PAIN Acuity: Acute Type of Exam: Initial FINDINGS: LIVER:  Image portions of the liver show increased echogenicity with coarse echotexture suggesting hepatic steatosis with regional sparing by the gallbladder.  BILIARY SYSTEM:

## 2018-03-01 NOTE — CARE COORDINATION
ONGOING DISCHARGE PLAN:    Spoke with patient regarding discharge plan and patient confirms that plan is to return home with daughter and vns orlando Gupta started, signed, needs completed at d/c  Poss GB surgery , needs medical clearance  Cont on IV zosn    Will continue to follow for additional discharge needs.     Electronically signed by Ramona Johnson RN on 3/1/2018 at 9:26 AM

## 2018-03-01 NOTE — PROGRESS NOTES
Interval Follow-Up Note     Subjective:  Main problem biliary colic. He continues to report mild right upper quadrant discomfort. No nausea or vomiting. No chest pain shortness breath. He could not get MRI due to history of pacemaker. OBJECTIVE:   BP (!) 93/42   Pulse 62   Temp 98.1 °F (36.7 °C) (Oral)   Resp 18   Ht 5' 5\" (1.651 m)   Wt 160 lb (72.6 kg)   SpO2 96%   BMI 26.63 kg/m²   Body mass index is 26.63 kg/m². GEN: A O x 3 in NAD   HEENT: Conjunctivae clear. Eyelids normal. No lymphadenopathy. No thyroid mass. CV: s1s2, RRR, no rubs. PULM: No accessory muscle use. Normal breath sounds bilaterally. ABD/GI: Soft mild tenderness in right upper quadrant/gallbladder area ND +BS  EXT: No edema or rash. Warm skin. No joint swelling. Limited neuro exam intact.      Lab Results   Component Value Date    WBC 8.5 03/01/2018    HGB 13.8 03/01/2018    HCT 41.4 03/01/2018    MCV 87.8 03/01/2018     03/01/2018     03/01/2018    K 4.3 03/01/2018     03/01/2018    CO2 23 03/01/2018    BUN 20 03/01/2018    CREATININE 1.47 (H) 03/01/2018    LABPROT 7.1 12/13/2012    LABALBU 2.5 (L) 03/01/2018    BILITOT 3.95 (H) 03/01/2018    ALKPHOS 192 (H) 03/01/2018     (H) 03/01/2018     (H) 03/01/2018      Lab Results   Component Value Date    RBC 4.71 03/01/2018    HGB 13.8 03/01/2018    MCV 87.8 03/01/2018    MCH 29.3 03/01/2018    MCHC 33.4 03/01/2018    RDW 14.1 03/01/2018    MPV 7.5 03/01/2018    BASOPCT 0 02/27/2018    LYMPHSABS 0.40 (L) 02/27/2018    MONOSABS 0.50 02/27/2018    NEUTROABS 9.50 (H) 02/27/2018    EOSABS 0.10 02/27/2018    BASOSABS 0.00 02/27/2018        Past Medical History:   Diagnosis Date    Cancer ([facility] [address]     Carotid artery stenosis     Cataract     Cerebrovascular disease     Heart attack     Hypertension     Hypothyroidism     Type II or unspecified type diabetes mellitus without mention of complication, not stated as uncontrolled           Current Facility-Administered Medications:     enoxaparin (LOVENOX) injection 30 mg, 30 mg, Subcutaneous, Daily, Vena Jenniffer, CNP    aspirin EC tablet 81 mg, 81 mg, Oral, Daily, Vena Jenniffer, CNP    atorvastatin (LIPITOR) tablet 20 mg, 20 mg, Oral, Nightly, Vena Jenniffer, CNP, 20 mg at 02/28/18 2008    calcium carbonate (TUMS) chewable tablet 500 mg, 1 tablet, Oral, Daily, Vena Jenniffer, CNP  [facility]  [START ON 3/3/2018] vitamin D (ERGOCALCIFEROL) capsule 50,000 Units, 50,000 Units, Oral, Weekly, Vena Jenniffer, CNP    docusate sodium (COLACE) capsule 100 mg, 100 mg, Oral, Nightly, Vena Jenniffer, CNP    famotidine (PEPCID) tablet 20 mg, 20 mg, Oral, Dinner, Vena Jenniffer, CNP, 20 mg at 02/28/18 1837    levothyroxine (SYNTHROID) tablet 137 mcg, 137 mcg, Oral, Daily, Vena Jenniffer, CNP    nitroGLYCERIN (NITROSTAT) SL tablet 0.4 mg, 0.4 mg, Sublingual, Q5 Min PRN, Vena Jenniffer, CNP    oxybutynin (DITROPAN-XL) extended release tablet 5 mg, 5 mg, Oral, Dinner, Vena Jenniffer, CNP, 5 mg at 02/28/18 1837    tamsulosin [facility]) capsule 0.4 mg, 0.4 mg, Oral, Nightly, Vena Jenniffer, CNP, 0.4 mg at 02/28/18 2008    sodium chloride flush 0.9 % injection 10 mL, 10 mL, Intravenous, 2 times per day, Vena Jenniffer, CNP, 10 mL at 02/28/18 0914    sodium chloride flush 0.9 % injection 10 mL, 10 mL, Intravenous, PRN, Vena Jenniffer, CNP    potassium chloride (KLOR-CON M) extended release tablet 40 mEq, 40 mEq, Oral, PRN **OR** potassium chloride 20 MEQ/15ML (10%) oral solution 40 mEq, 40 mEq, Oral, PRN **OR** potassium chloride 10 mEq/100 mL IVPB (Peripheral Line), 10 mEq, Intravenous, PRN, Vena Jenniffer, CNP    magnesium sulfate 1 g in dextrose 5% 100 mL IVPB, 1 g, Intravenous, PRN, Vena Jenniffer, CNP    acetaminophen (TYLENOL) tablet 650 mg, 650 mg, Oral, Q4H PRN, Vena Jenniffer, CNP    morphine (PF) injection 2 mg, 2 mg, Intravenous, Q2H PRN, 2 mg at 02/28/18 1039 **OR** morphine injection 4 mg, 4 mg, Intravenous, Q2H PRN, Vena Jenniffer, CNP  Vivian Pettit magnesium hydroxide (MILK OF MAGNESIA) 400 MG/5ML suspension 30 mL, 30 mL, Oral, Daily PRN, Shekhar Hoot, CNP    bisacodyl (DULCOLAX) suppository 10 mg, 10 mg, Rectal, Daily PRN, Shekhar Hoot, CNP    ondansetron [facility]) injection 4 mg, 4 mg, Intravenous, Q6H PRN, Shekhar Hoot, CNP    piperacillin-tazobactam (ZOSYN) 3.375 g in dextrose 5 % 50 mL IVPB (mini-bag), 3.375 g, Intravenous, Q8H, Shekhar Hongot, CNP, Last Rate: 12.5 mL/hr at 03/01/18 0753, 3.375 g at 03/01/18 0753    0.9 % sodium chloride infusion, , Intravenous, Continuous, Wael Ora [place], DO, Last Rate: 75 mL/hr at 02/28/18 0910     Ct Abdomen Pelvis W Iv Contrast Additional Contrast? None    Result Date: 3/1/2018  EXAMINATION: CT OF THE ABDOMEN AND PELVIS WITH CONTRAST, 2/27/2018 11:01 pm TECHNIQUE: CT of the abdomen and pelvis was performed with the administration of intravenous contrast. Multiplanar reformatted images are provided for review. Dose modulation, iterative reconstruction, and/or weight based adjustment of the mA/kV was utilized to reduce the radiation dose to as low as reasonably achievable. COMPARISON: None HISTORY: ORDERING SYSTEM PROVIDED HISTORY: RUQ pain TECHNOLOGIST PROVIDED HISTORY: Additional Contrast?->None Ordering Physician Provided Reason for Exam: rt side abd pain Acuity: Unknown Type of Exam:  Unknown FINDINGS: Lower Chest: Postsurgical changes from prior CABG. Dense coronary artery calcification is noted. Pacer leads are in the area of the right atrium and right ventricle. Innumerable pulmonary nodules are again noted. Organs: Diffuse hepatic steatosis. No significant intrahepatic bile duct dilatation. Stable 8 mm hypodensity in segment 4 of the liver, too small to characterize, but likely represents a cyst.  There is an 8 mm vague hypodensity in segment 2 of the liver, indeterminate, but may represent a cyst or hemangioma. Hydropic gallbladder with gallstones. Bilateral adrenal glands are unremarkable.   Spleen demonstrates no acute abnormality. Stable cysts in the right kidney measuring up to 15 mm. Stable cysts in the left kidney measuring up to 3.5 cm. Additional scattered subcentimeter hypodensities in both kidneys are too small to characterize, but likely represent additional cysts. Otherwise bilateral kidneys enhance symmetrically and without acute abnormality. No hydronephrosis. Pancreas is slightly atrophic without acute abnormality. GI/Bowel: No definite acute gastric abnormality. No acute small bowel abnormality. No evidence of small bowel obstruction. Sigmoid diverticulosis. No evidence of diverticulitis. No acute colonic abnormality. Appendix is not visualized. No significant inflammatory change is identified in the right lower quadrant to suggest acute appendicitis. Pelvis: Stable prostatomegaly. Peritoneum/Retroperitoneum: Fat containing right inguinal hernia. No significant free fluid. No significant lymphadenopathy. No free air. IVC is unremarkable. There is moderate atherosclerotic disease of the abdominal aorta without evidence of aneurysmal dilatation. Bones/Soft Tissues: Osteopenia. Stable minimal wedging of the L3 vertebral body. Moderate multilevel degenerative changes. 1. Hydropic gallbladder with gallstones. If there is concern for cholecystitis, an ultrasound is recommended for further evaluation. 2. Otherwise no significant change. 3. Redemonstration of innumerable pulmonary nodules. 4. Colonic diverticulosis mostly in sigmoid colon, without evidence of diverticulitis. 5. Stable prostatomegaly. 6. Fat containing right inguinal hernia.      Us Gallbladder Ruq    Result Date: 2/28/2018  EXAMINATION: RIGHT UPPER QUADRANT ULTRASOUND 2/28/2018 10:45 am COMPARISON: 09/18/2017, CT 02/27/2017 HISTORY: ORDERING SYSTEM PROVIDED HISTORY: ABDOMINAL PAIN TECHNOLOGIST PROVIDED HISTORY: Ordering Physician Provided Reason for Exam: RUQ PAIN Acuity: Acute Type of Exam: Initial FINDINGS:

## 2018-03-01 NOTE — CONSULTS
[facility])            Consult        Date of Admission:  2/27/2018  Date of Consultation:  3/1/2018      PCP:  Katharine Limon MD      Reason for the consult :Cardiac risks stratification prior to cholecystectomy    History of Present Illness:  Doreen Malik is a 80 y.o. male  With known coronary artery disease prior coronary artery bypass was admitted with abdominal pain is found to have acute cholecystitis. I was asked to see him for cardiac risk assessment prior to surgery      PMH:   has a past medical history of Cancer ([address] Carotid artery stenosis; Cataract; Cerebrovascular disease; Heart attack; Hypertension; Hypothyroidism; and Type II or unspecified type diabetes mellitus without mention of complication, not stated as uncontrolled. PSH:   has a past surgical history that includes shoulder surgery; Cardiac surgery; Coronary artery bypass graft; Appendectomy; Total Thyroidectomy; and Pacemaker insertion (03/2015). Allergies: Allergies   Allergen Reactions    Seasonal      Other reaction(s): Other: See Comments  seasonal upper respiratory irritation    Keflex [Cephalexin] Nausea And Vomiting        Home Meds:    Prior to Admission medications    Medication Sig Start Date End Date Taking?  Authorizing Provider   oxybutynin (DITROPAN-XL) 5 MG extended release tablet TAKE 1 TABLET DAILY 2/16/18   Jossue Meadows MD   tamsulosin (FLOMAX) 0.4 MG capsule Take 1 capsule by mouth daily 12/5/17   Jossue Meadows MD   glimepiride (AMARYL) 2 MG tablet Take 2 mg by mouth every morning (before breakfast)    Historical Provider, MD   calcium carbonate (TUMS) 500 MG chewable tablet Take 1 tablet by mouth daily    Historical Provider, MD   famotidine (PEPCID) 20 MG tablet Take 1 tablet by mouth daily 9/26/17   Violet Smith MD   Cholecalciferol (VITAMIN D3) 78797 UNITS CAPS Take 50,000 Units by mouth once a week    Historical Provider, MD   levothyroxine (LEVOXYL) 137 MCG tablet Headaches, hearing loss or vertigo. · Cardiovascular: Per HPI  · Respiratory: No cough or wheezing, no sputum production. No hemoptysis     · Gastrointestinal: abdominal pains  · Genitourinary: No dysuria,hematuria. · Musculoskeletal:  No gait disturbance, weakness or joint complaints. · Integumentary: No rash or pruritis. · Neurological: No headache, No Hx CVA/TIA  · Psychiatric: No anxiety, or depression. · Endocrine: No temperature intolerance. · Hematologic/Lymphatic: No abnormal bruising or bleeding     Physical Exam   Vital Signs: BP (!) 108/58   Pulse 65   Temp 97.9 °F (36.6 °C) (Oral)   Resp 22   Ht 5' 5\" (1.651 m)   Wt 160 lb (72.6 kg)   SpO2 96%   BMI 26.63 kg/m²        Admission Weight: 160 lb (72.6 kg)     General appearance: Alert oriented and cooperative. In no acute distress    Skin:  Warm and Dry to touch    Head: Normocephalic, without obvious abnormality, atraumatic    Eyes: Conjunctivae unremarkable, EOM's intact. Neck: No JVD, No carotid bruit. Neck supple, trachea midline    Lungs: Clear to ausculation bilaterally, no use of accessory muscles. Heart[de-identified] Normal first and second heart sound    Abdomen: tender right upper quadrant    Extremities: No edema    Neurologic: Oriented to time, person and place, affect appropriate. No focal/major motor defects noted.       Psychiatric:  Appropriate mood, memory and judgment      Pertinent Testing:       EKG:  Paced rhythm          Labs:      CBC:   Recent Labs      02/27/18 2150 02/28/18   0732  03/01/18   0718   WBC  10.7  15.3*  8.5   HGB  15.2  14.8  13.8   HCT  44.4  44.4  41.4   MCV  86.5  87.5  87.8   PLT  205  211  181     BMP:   Recent Labs      02/27/18   2150  02/28/18   0732  03/01/18   0718   NA  135  136  136  138   K  4.5  4.6  4.6  4.3   CL  96*  99  99  103   CO2  26  24  24  23   BUN  22  21  21  20   CREATININE  1.13  1.33*  1.33*  1.47*     PT/INR:   Recent Labs      02/28/18   0732   PROTIME  12.1*   INR

## 2018-03-01 NOTE — PROGRESS NOTES
[facility].    Date:   3/1/2018  Patient name:  Otho Severs  Date of admission:  2/27/2018  9:12 PM  MRN:   [id number]  YOB: 1928      HPI overnight no fever chills nausea no vomiting          REVIEW OF SYSTEMS:    · Cardiovascular: Negative for lightheadedness/orthostatic symptoms ,chest pain, dyspnea on exertion, palpitations or loss of consciousness. · Respiratory: Negative for cough or wheezing, sputum production, hemoptysis, pleuritic pain. Gastrointestinal: Positive for a right upper quadrant pain nausea but no vomiting loss of appetite    OBJECTIVE:    BP (!) 108/58   Pulse 64   Temp 97.9 °F (36.6 °C) (Oral)   Resp 22   Ht 5' 5\" (1.651 m)   Wt 160 lb (72.6 kg)   SpO2 96%   BMI 26.63 kg/m²      · Lungs: clear to auscultation bilaterally,no wheeze. · Heart: regular rate and rhythm, S1, S2 normal, no murmur, click, rub or gallop  · Abdomen: soft, non-tender; bowel sounds normal; no masses,  no organomegaly  · Extremities: extremities normal, atraumatic, no cyanosis or edema  · Neurological:  Reflexes normal and symmetric. Sensation grossly normal  · Skin - no rash, no lump   · Eye- no icterus no redness  · GI-oral mucosa moist,  · Lymphatic system-no lymphadenopathy no splenomegaly  · Mouth- mucous membrane moist  · Head-normocephalic atraumatic  · Neck- supple no carotid bruit,Thyroid not palpable. Laboratory Testing:  CBC:   Recent Labs      03/01/18   0718   WBC  8.5   HGB  13.8   PLT  181     BMP:    Recent Labs      02/27/18   2150  02/28/18   0732  03/01/18   0718   NA  135  136  136  138   K  4.5  4.6  4.6  4.3   CL  96*  99  99  103   CO2  26  24  24  23   BUN  22  21  21  20   CREATININE  1.13  1.33*  1.33*  1.47*   GLUCOSE  199*  152*  152*  66*     S. Calcium:  Recent Labs      03/01/18   0718   CALCIUM  8.3*     S.  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    Phone [phone]   Fax: [phone]  Answering Service: [phone]

## 2018-03-01 NOTE — PROGRESS NOTES
lower leg, just below knee with dry adherent eschar. The patients pain isPain Level: 0 Pain Type: Acute pain. Assessment:     Patient Active Problem List   Diagnosis    Degeneration of cervical intervertebral disc    Vertigo    Acquired hypothyroidism    Spinal stenosis in cervical region    Cervical facet joint syndrome    Cervical spondylosis    Cervical disc herniation    Cervical radiculopathy, chronic    Arthropathy of cervical facet joint    Hyperlipidemia    Heart block AV second degree    H/O malignant neoplasm of thyroid    Block, bundle branch, left    Mitral valve insufficiency    Type 2 diabetes mellitus without complication, without long-term current use of insulin (HCC)    Coronary artery disease involving coronary bypass graft of native heart without angina pectoris    Pulmonary nodules/lesions, multiple    Calculus of gallbladder    Elevated LFTs    Metastatic cancer (HCC)    Pulmonary nodule    Calculus of bile duct without cholecystitis and without obstruction    Acute cholecystitis    Abdominal pain    Symptomatic cholelithiasis    UTI (urinary tract infection)    Elevated liver enzymes    Biliary colic       Measurements:  Wound 03/01/18 Leg left lower leg wound, just below knee (Active)   Number of days: 0       Incision 05/05/15 Chest Left (Active)   Number of days: 1031          Plan:     Treatment:  plurogel (left in room) to wound bed, cover with dry dressing.  Change daily           Electronically signed by BRIAN Singh, NP-C on 3/1/2018 at 11:23 AM

## 2018-03-01 NOTE — FLOWSHEET NOTE
03/01/18 1150   Encounter Summary   Services provided to: Patient   Referral/Consult From: Zara Jimenez Visiting (3/1/18)   Volunteer Visit Yes   Complexity of Encounter Low   Length of Encounter 15 minutes   Spiritual/Sikh   Type Spiritual support   Intervention Prayer

## 2018-03-02 ENCOUNTER — APPOINTMENT (OUTPATIENT)
Dept: GENERAL RADIOLOGY | Age: 83
DRG: 418 | End: 2018-03-02
Payer: MEDICARE

## 2018-03-02 ENCOUNTER — ANESTHESIA (OUTPATIENT)
Dept: OPERATING ROOM | Age: 83
DRG: 418 | End: 2018-03-02
Payer: MEDICARE

## 2018-03-02 VITALS — SYSTOLIC BLOOD PRESSURE: 133 MMHG | TEMPERATURE: 98.6 F | OXYGEN SATURATION: 90 % | DIASTOLIC BLOOD PRESSURE: 61 MMHG

## 2018-03-02 LAB
ALBUMIN SERPL-MCNC: 2.6 G/DL (ref 3.5–5.2)
ALBUMIN SERPL-MCNC: 2.7 G/DL (ref 3.5–5.2)
ALBUMIN SERPL-MCNC: 2.9 G/DL (ref 3.5–5.2)
ALBUMIN/GLOBULIN RATIO: ABNORMAL (ref 1–2.5)
ALP BLD-CCNC: 232 U/L (ref 40–129)
ALP BLD-CCNC: 252 U/L (ref 40–129)
ALP BLD-CCNC: 274 U/L (ref 40–129)
ALT SERPL-CCNC: 137 U/L (ref 5–41)
ALT SERPL-CCNC: 177 U/L (ref 5–41)
ALT SERPL-CCNC: 187 U/L (ref 5–41)
ANION GAP SERPL CALCULATED.3IONS-SCNC: 12 MMOL/L (ref 9–17)
ANION GAP SERPL CALCULATED.3IONS-SCNC: 16 MMOL/L (ref 9–17)
ANION GAP SERPL CALCULATED.3IONS-SCNC: 19 MMOL/L (ref 9–17)
AST SERPL-CCNC: 155 U/L
AST SERPL-CCNC: 158 U/L
AST SERPL-CCNC: 88 U/L
BILIRUB SERPL-MCNC: 2.41 MG/DL (ref 0.3–1.2)
BILIRUB SERPL-MCNC: 2.71 MG/DL (ref 0.3–1.2)
BILIRUB SERPL-MCNC: 2.79 MG/DL (ref 0.3–1.2)
BUN BLDV-MCNC: 18 MG/DL (ref 8–23)
BUN BLDV-MCNC: 19 MG/DL (ref 8–23)
BUN BLDV-MCNC: 19 MG/DL (ref 8–23)
BUN/CREAT BLD: ABNORMAL (ref 9–20)
CALCIUM SERPL-MCNC: 7.8 MG/DL (ref 8.6–10.4)
CALCIUM SERPL-MCNC: 7.9 MG/DL (ref 8.6–10.4)
CALCIUM SERPL-MCNC: 8.1 MG/DL (ref 8.6–10.4)
CHLORIDE BLD-SCNC: 101 MMOL/L (ref 98–107)
CHLORIDE BLD-SCNC: 102 MMOL/L (ref 98–107)
CHLORIDE BLD-SCNC: 103 MMOL/L (ref 98–107)
CO2: 15 MMOL/L (ref 20–31)
CO2: 19 MMOL/L (ref 20–31)
CO2: 22 MMOL/L (ref 20–31)
CREAT SERPL-MCNC: 1.36 MG/DL (ref 0.7–1.2)
CREAT SERPL-MCNC: 1.45 MG/DL (ref 0.7–1.2)
CREAT SERPL-MCNC: 1.46 MG/DL (ref 0.7–1.2)
GFR AFRICAN AMERICAN: 55 ML/MIN
GFR AFRICAN AMERICAN: 55 ML/MIN
GFR AFRICAN AMERICAN: 60 ML/MIN
GFR NON-AFRICAN AMERICAN: 45 ML/MIN
GFR NON-AFRICAN AMERICAN: 46 ML/MIN
GFR NON-AFRICAN AMERICAN: 49 ML/MIN
GFR SERPL CREATININE-BSD FRML MDRD: ABNORMAL ML/MIN/{1.73_M2}
GLUCOSE BLD-MCNC: 114 MG/DL (ref 70–99)
GLUCOSE BLD-MCNC: 198 MG/DL (ref 70–99)
GLUCOSE BLD-MCNC: 201 MG/DL (ref 70–99)
HCT VFR BLD CALC: 42.8 % (ref 41–53)
HEMOGLOBIN: 14.6 G/DL (ref 13.5–17.5)
MCH RBC QN AUTO: 29.6 PG (ref 26–34)
MCHC RBC AUTO-ENTMCNC: 34.1 G/DL (ref 31–37)
MCV RBC AUTO: 86.7 FL (ref 80–100)
NRBC AUTOMATED: NORMAL PER 100 WBC
PDW BLD-RTO: 14 % (ref 11.5–14.9)
PLATELET # BLD: 215 K/UL (ref 150–450)
PMV BLD AUTO: 7.6 FL (ref 6–12)
POTASSIUM SERPL-SCNC: 3.9 MMOL/L (ref 3.7–5.3)
POTASSIUM SERPL-SCNC: 4 MMOL/L (ref 3.7–5.3)
POTASSIUM SERPL-SCNC: 4 MMOL/L (ref 3.7–5.3)
RBC # BLD: 4.94 M/UL (ref 4.5–5.9)
SODIUM BLD-SCNC: 135 MMOL/L (ref 135–144)
SODIUM BLD-SCNC: 137 MMOL/L (ref 135–144)
SODIUM BLD-SCNC: 137 MMOL/L (ref 135–144)
TOTAL PROTEIN: 7.1 G/DL (ref 6.4–8.3)
TOTAL PROTEIN: 7.4 G/DL (ref 6.4–8.3)
TOTAL PROTEIN: 7.8 G/DL (ref 6.4–8.3)
WBC # BLD: 6.8 K/UL (ref 3.5–11)

## 2018-03-02 PROCEDURE — 6360000002 HC RX W HCPCS: Performed by: INTERNAL MEDICINE

## 2018-03-02 PROCEDURE — 36415 COLL VENOUS BLD VENIPUNCTURE: CPT

## 2018-03-02 PROCEDURE — 2580000003 HC RX 258: Performed by: SURGERY

## 2018-03-02 PROCEDURE — 3209999900 FLUORO FOR SURGICAL PROCEDURES

## 2018-03-02 PROCEDURE — 88304 TISSUE EXAM BY PATHOLOGIST: CPT

## 2018-03-02 PROCEDURE — C1729 CATH, DRAINAGE: HCPCS | Performed by: SURGERY

## 2018-03-02 PROCEDURE — 80053 COMPREHEN METABOLIC PANEL: CPT

## 2018-03-02 PROCEDURE — 85027 COMPLETE CBC AUTOMATED: CPT

## 2018-03-02 PROCEDURE — 6370000000 HC RX 637 (ALT 250 FOR IP): Performed by: NURSE PRACTITIONER

## 2018-03-02 PROCEDURE — 2580000003 HC RX 258: Performed by: NURSE PRACTITIONER

## 2018-03-02 PROCEDURE — 2580000003 HC RX 258

## 2018-03-02 PROCEDURE — 2780000010 HC IMPLANT OTHER: Performed by: SURGERY

## 2018-03-02 PROCEDURE — 2500000003 HC RX 250 WO HCPCS: Performed by: SURGERY

## 2018-03-02 PROCEDURE — 2500000003 HC RX 250 WO HCPCS

## 2018-03-02 PROCEDURE — 2720000010 HC SURG SUPPLY STERILE: Performed by: SURGERY

## 2018-03-02 PROCEDURE — 6360000002 HC RX W HCPCS: Performed by: NURSE PRACTITIONER

## 2018-03-02 PROCEDURE — C1758 CATHETER, URETERAL: HCPCS | Performed by: SURGERY

## 2018-03-02 PROCEDURE — 3700000001 HC ADD 15 MINUTES (ANESTHESIA): Performed by: SURGERY

## 2018-03-02 PROCEDURE — 0FT44ZZ RESECTION OF GALLBLADDER, PERCUTANEOUS ENDOSCOPIC APPROACH: ICD-10-PCS | Performed by: SURGERY

## 2018-03-02 PROCEDURE — 6360000002 HC RX W HCPCS: Performed by: SURGERY

## 2018-03-02 PROCEDURE — 3600000003 HC SURGERY LEVEL 3 BASE: Performed by: SURGERY

## 2018-03-02 PROCEDURE — 7100000000 HC PACU RECOVERY - FIRST 15 MIN: Performed by: SURGERY

## 2018-03-02 PROCEDURE — 7100000001 HC PACU RECOVERY - ADDTL 15 MIN: Performed by: SURGERY

## 2018-03-02 PROCEDURE — 6360000002 HC RX W HCPCS

## 2018-03-02 PROCEDURE — 2000000000 HC ICU R&B

## 2018-03-02 PROCEDURE — 6360000002 HC RX W HCPCS: Performed by: ANESTHESIOLOGY

## 2018-03-02 PROCEDURE — 94762 N-INVAS EAR/PLS OXIMTRY CONT: CPT

## 2018-03-02 PROCEDURE — 99232 SBSQ HOSP IP/OBS MODERATE 35: CPT | Performed by: INTERNAL MEDICINE

## 2018-03-02 PROCEDURE — 3700000000 HC ANESTHESIA ATTENDED CARE: Performed by: SURGERY

## 2018-03-02 PROCEDURE — 3600000013 HC SURGERY LEVEL 3 ADDTL 15MIN: Performed by: SURGERY

## 2018-03-02 RX ORDER — FENTANYL CITRATE 50 UG/ML
100 INJECTION, SOLUTION INTRAMUSCULAR; INTRAVENOUS
Status: DISCONTINUED | OUTPATIENT
Start: 2018-03-02 | End: 2018-03-09 | Stop reason: HOSPADM

## 2018-03-02 RX ORDER — NEOSTIGMINE METHYLSULFATE 5 MG/5 ML
SYRINGE (ML) INTRAVENOUS PRN
Status: DISCONTINUED | OUTPATIENT
Start: 2018-03-02 | End: 2018-03-02 | Stop reason: SDUPTHER

## 2018-03-02 RX ORDER — MORPHINE SULFATE 2 MG/ML
2 INJECTION, SOLUTION INTRAMUSCULAR; INTRAVENOUS EVERY 5 MIN PRN
Status: DISCONTINUED | OUTPATIENT
Start: 2018-03-02 | End: 2018-03-02 | Stop reason: HOSPADM

## 2018-03-02 RX ORDER — HYDRALAZINE HYDROCHLORIDE 20 MG/ML
10 INJECTION INTRAMUSCULAR; INTRAVENOUS EVERY 4 HOURS PRN
Status: DISCONTINUED | OUTPATIENT
Start: 2018-03-02 | End: 2018-03-09 | Stop reason: HOSPADM

## 2018-03-02 RX ORDER — MEPERIDINE HYDROCHLORIDE 50 MG/ML
12.5 INJECTION INTRAMUSCULAR; INTRAVENOUS; SUBCUTANEOUS EVERY 5 MIN PRN
Status: DISCONTINUED | OUTPATIENT
Start: 2018-03-02 | End: 2018-03-02 | Stop reason: HOSPADM

## 2018-03-02 RX ORDER — FENTANYL CITRATE 50 UG/ML
50 INJECTION, SOLUTION INTRAMUSCULAR; INTRAVENOUS EVERY 5 MIN PRN
Status: DISCONTINUED | OUTPATIENT
Start: 2018-03-02 | End: 2018-03-02 | Stop reason: HOSPADM

## 2018-03-02 RX ORDER — HYDROCODONE BITARTRATE AND ACETAMINOPHEN 5; 325 MG/1; MG/1
1 TABLET ORAL PRN
Status: DISCONTINUED | OUTPATIENT
Start: 2018-03-02 | End: 2018-03-02 | Stop reason: HOSPADM

## 2018-03-02 RX ORDER — ONDANSETRON 2 MG/ML
4 INJECTION INTRAMUSCULAR; INTRAVENOUS EVERY 4 HOURS PRN
Status: DISCONTINUED | OUTPATIENT
Start: 2018-03-02 | End: 2018-03-09 | Stop reason: HOSPADM

## 2018-03-02 RX ORDER — LIDOCAINE HYDROCHLORIDE 10 MG/ML
INJECTION, SOLUTION EPIDURAL; INFILTRATION; INTRACAUDAL; PERINEURAL PRN
Status: DISCONTINUED | OUTPATIENT
Start: 2018-03-02 | End: 2018-03-02 | Stop reason: SDUPTHER

## 2018-03-02 RX ORDER — FENTANYL CITRATE 50 UG/ML
25 INJECTION, SOLUTION INTRAMUSCULAR; INTRAVENOUS EVERY 5 MIN PRN
Status: COMPLETED | OUTPATIENT
Start: 2018-03-02 | End: 2018-03-02

## 2018-03-02 RX ORDER — ROCURONIUM BROMIDE 10 MG/ML
INJECTION, SOLUTION INTRAVENOUS PRN
Status: DISCONTINUED | OUTPATIENT
Start: 2018-03-02 | End: 2018-03-02 | Stop reason: SDUPTHER

## 2018-03-02 RX ORDER — DIPHENHYDRAMINE HYDROCHLORIDE 50 MG/ML
12.5 INJECTION INTRAMUSCULAR; INTRAVENOUS
Status: DISCONTINUED | OUTPATIENT
Start: 2018-03-02 | End: 2018-03-02 | Stop reason: HOSPADM

## 2018-03-02 RX ORDER — CLINDAMYCIN PHOSPHATE 150 MG/ML
INJECTION, SOLUTION INTRAVENOUS
Status: DISPENSED
Start: 2018-03-02 | End: 2018-03-02

## 2018-03-02 RX ORDER — ONDANSETRON 2 MG/ML
4 INJECTION INTRAMUSCULAR; INTRAVENOUS
Status: COMPLETED | OUTPATIENT
Start: 2018-03-02 | End: 2018-03-02

## 2018-03-02 RX ORDER — SODIUM CHLORIDE 9 MG/ML
INJECTION, SOLUTION INTRAVENOUS CONTINUOUS PRN
Status: DISCONTINUED | OUTPATIENT
Start: 2018-03-02 | End: 2018-03-02 | Stop reason: SDUPTHER

## 2018-03-02 RX ORDER — FENTANYL CITRATE 50 UG/ML
50 INJECTION, SOLUTION INTRAMUSCULAR; INTRAVENOUS
Status: DISCONTINUED | OUTPATIENT
Start: 2018-03-02 | End: 2018-03-09 | Stop reason: HOSPADM

## 2018-03-02 RX ORDER — ASPIRIN 81 MG/1
81 TABLET ORAL DAILY
Status: DISCONTINUED | OUTPATIENT
Start: 2018-03-04 | End: 2018-03-09 | Stop reason: HOSPADM

## 2018-03-02 RX ORDER — BUPIVACAINE HYDROCHLORIDE AND EPINEPHRINE 5; 5 MG/ML; UG/ML
INJECTION, SOLUTION EPIDURAL; INTRACAUDAL; PERINEURAL PRN
Status: DISCONTINUED | OUTPATIENT
Start: 2018-03-02 | End: 2018-03-02 | Stop reason: HOSPADM

## 2018-03-02 RX ORDER — HYDROCODONE BITARTRATE AND ACETAMINOPHEN 5; 325 MG/1; MG/1
2 TABLET ORAL PRN
Status: DISCONTINUED | OUTPATIENT
Start: 2018-03-02 | End: 2018-03-02 | Stop reason: HOSPADM

## 2018-03-02 RX ORDER — LABETALOL HYDROCHLORIDE 5 MG/ML
5 INJECTION, SOLUTION INTRAVENOUS EVERY 10 MIN PRN
Status: DISCONTINUED | OUTPATIENT
Start: 2018-03-02 | End: 2018-03-02 | Stop reason: HOSPADM

## 2018-03-02 RX ORDER — HYDRALAZINE HYDROCHLORIDE 20 MG/ML
5 INJECTION INTRAMUSCULAR; INTRAVENOUS EVERY 10 MIN PRN
Status: DISCONTINUED | OUTPATIENT
Start: 2018-03-02 | End: 2018-03-02 | Stop reason: HOSPADM

## 2018-03-02 RX ORDER — GLYCOPYRROLATE 0.2 MG/ML
INJECTION INTRAMUSCULAR; INTRAVENOUS PRN
Status: DISCONTINUED | OUTPATIENT
Start: 2018-03-02 | End: 2018-03-02 | Stop reason: SDUPTHER

## 2018-03-02 RX ORDER — MIDAZOLAM HYDROCHLORIDE 1 MG/ML
INJECTION INTRAMUSCULAR; INTRAVENOUS PRN
Status: DISCONTINUED | OUTPATIENT
Start: 2018-03-02 | End: 2018-03-02 | Stop reason: SDUPTHER

## 2018-03-02 RX ORDER — ETOMIDATE 2 MG/ML
INJECTION INTRAVENOUS PRN
Status: DISCONTINUED | OUTPATIENT
Start: 2018-03-02 | End: 2018-03-02 | Stop reason: SDUPTHER

## 2018-03-02 RX ADMIN — SODIUM CHLORIDE: 9 INJECTION, SOLUTION INTRAVENOUS at 09:56

## 2018-03-02 RX ADMIN — ROCURONIUM BROMIDE 30 MG: 10 INJECTION INTRAVENOUS at 10:03

## 2018-03-02 RX ADMIN — FENTANYL CITRATE 50 MCG: 50 INJECTION, SOLUTION INTRAMUSCULAR; INTRAVENOUS at 15:39

## 2018-03-02 RX ADMIN — TAMSULOSIN HYDROCHLORIDE 0.4 MG: 0.4 CAPSULE ORAL at 22:49

## 2018-03-02 RX ADMIN — PIPERACILLIN SODIUM AND TAZOBACTAM SODIUM 3.38 G: 3; .375 INJECTION, POWDER, LYOPHILIZED, FOR SOLUTION INTRAVENOUS at 15:34

## 2018-03-02 RX ADMIN — LIDOCAINE HYDROCHLORIDE 45 MG: 10 INJECTION, SOLUTION EPIDURAL; INFILTRATION; INTRACAUDAL; PERINEURAL at 10:03

## 2018-03-02 RX ADMIN — HYDRALAZINE HYDROCHLORIDE 10 MG: 20 INJECTION INTRAMUSCULAR; INTRAVENOUS at 15:23

## 2018-03-02 RX ADMIN — DEXTROSE 900 MG: 50 INJECTION, SOLUTION INTRAVENOUS at 10:14

## 2018-03-02 RX ADMIN — HYDROMORPHONE HYDROCHLORIDE 0.5 MG: 1 INJECTION, SOLUTION INTRAMUSCULAR; INTRAVENOUS; SUBCUTANEOUS at 22:49

## 2018-03-02 RX ADMIN — FENTANYL CITRATE 50 MCG: 50 INJECTION INTRAMUSCULAR; INTRAVENOUS at 12:15

## 2018-03-02 RX ADMIN — ROCURONIUM BROMIDE 10 MG: 10 INJECTION INTRAVENOUS at 10:59

## 2018-03-02 RX ADMIN — FENTANYL CITRATE 50 MCG: 50 INJECTION INTRAMUSCULAR; INTRAVENOUS at 12:03

## 2018-03-02 RX ADMIN — PIPERACILLIN SODIUM AND TAZOBACTAM SODIUM 3.38 G: 3; .375 INJECTION, POWDER, LYOPHILIZED, FOR SOLUTION INTRAVENOUS at 00:35

## 2018-03-02 RX ADMIN — HYDROMORPHONE HYDROCHLORIDE 0.5 MG: 1 INJECTION, SOLUTION INTRAMUSCULAR; INTRAVENOUS; SUBCUTANEOUS at 12:54

## 2018-03-02 RX ADMIN — FENTANYL CITRATE 50 MCG: 50 INJECTION INTRAMUSCULAR; INTRAVENOUS at 12:24

## 2018-03-02 RX ADMIN — HYDROMORPHONE HYDROCHLORIDE 0.5 MG: 1 INJECTION, SOLUTION INTRAMUSCULAR; INTRAVENOUS; SUBCUTANEOUS at 20:44

## 2018-03-02 RX ADMIN — ATORVASTATIN CALCIUM 20 MG: 20 TABLET, FILM COATED ORAL at 22:49

## 2018-03-02 RX ADMIN — FENTANYL CITRATE 25 MCG: 50 INJECTION INTRAMUSCULAR; INTRAVENOUS at 10:29

## 2018-03-02 RX ADMIN — MIDAZOLAM 1 MG: 1 INJECTION INTRAMUSCULAR; INTRAVENOUS at 10:00

## 2018-03-02 RX ADMIN — Medication 3 MG: at 11:58

## 2018-03-02 RX ADMIN — MORPHINE SULFATE 2 MG: 2 INJECTION, SOLUTION INTRAMUSCULAR; INTRAVENOUS at 14:03

## 2018-03-02 RX ADMIN — GLYCOPYRROLATE 0.6 MG: 0.2 INJECTION, SOLUTION INTRAMUSCULAR; INTRAVENOUS at 11:58

## 2018-03-02 RX ADMIN — FENTANYL CITRATE 100 MCG: 50 INJECTION, SOLUTION INTRAMUSCULAR; INTRAVENOUS at 17:03

## 2018-03-02 RX ADMIN — ETOMIDATE 10 MG: 2 INJECTION, SOLUTION INTRAVENOUS at 10:03

## 2018-03-02 RX ADMIN — FENTANYL CITRATE 25 MCG: 50 INJECTION INTRAMUSCULAR; INTRAVENOUS at 10:03

## 2018-03-02 RX ADMIN — LEVOTHYROXINE SODIUM 137 MCG: 0.14 TABLET ORAL at 05:17

## 2018-03-02 RX ADMIN — ONDANSETRON 4 MG: 2 INJECTION INTRAMUSCULAR; INTRAVENOUS at 12:01

## 2018-03-02 RX ADMIN — FENTANYL CITRATE 50 MCG: 50 INJECTION INTRAMUSCULAR; INTRAVENOUS at 10:33

## 2018-03-02 ASSESSMENT — PULMONARY FUNCTION TESTS
PIF_VALUE: 27
PIF_VALUE: 25
PIF_VALUE: 26
PIF_VALUE: 29
PIF_VALUE: 26
PIF_VALUE: 28
PIF_VALUE: 19
PIF_VALUE: 24
PIF_VALUE: 18
PIF_VALUE: 16
PIF_VALUE: 25
PIF_VALUE: 26
PIF_VALUE: 29
PIF_VALUE: 27
PIF_VALUE: 25
PIF_VALUE: 1
PIF_VALUE: 32
PIF_VALUE: 29
PIF_VALUE: 11
PIF_VALUE: 24
PIF_VALUE: 16
PIF_VALUE: 28
PIF_VALUE: 26
PIF_VALUE: 23
PIF_VALUE: 27
PIF_VALUE: 27
PIF_VALUE: 26
PIF_VALUE: 29
PIF_VALUE: 28
PIF_VALUE: 15
PIF_VALUE: 24
PIF_VALUE: 21
PIF_VALUE: 26
PIF_VALUE: 24
PIF_VALUE: 16
PIF_VALUE: 29
PIF_VALUE: 0
PIF_VALUE: 22
PIF_VALUE: 29
PIF_VALUE: 26
PIF_VALUE: 2
PIF_VALUE: 27
PIF_VALUE: 0
PIF_VALUE: 0
PIF_VALUE: 24
PIF_VALUE: 27
PIF_VALUE: 29
PIF_VALUE: 16
PIF_VALUE: 22
PIF_VALUE: 26
PIF_VALUE: 27
PIF_VALUE: 27
PIF_VALUE: 24
PIF_VALUE: 26
PIF_VALUE: 17
PIF_VALUE: 15
PIF_VALUE: 15
PIF_VALUE: 30
PIF_VALUE: 28
PIF_VALUE: 0
PIF_VALUE: 17
PIF_VALUE: 0
PIF_VALUE: 28
PIF_VALUE: 15
PIF_VALUE: 16
PIF_VALUE: 29
PIF_VALUE: 1
PIF_VALUE: 31
PIF_VALUE: 16
PIF_VALUE: 26
PIF_VALUE: 14
PIF_VALUE: 28
PIF_VALUE: 15
PIF_VALUE: 25
PIF_VALUE: 15
PIF_VALUE: 16
PIF_VALUE: 16
PIF_VALUE: 14
PIF_VALUE: 27
PIF_VALUE: 16
PIF_VALUE: 28
PIF_VALUE: 25
PIF_VALUE: 25
PIF_VALUE: 24
PIF_VALUE: 16
PIF_VALUE: 24
PIF_VALUE: 27
PIF_VALUE: 16
PIF_VALUE: 21
PIF_VALUE: 16
PIF_VALUE: 30
PIF_VALUE: 30
PIF_VALUE: 28
PIF_VALUE: 25
PIF_VALUE: 28
PIF_VALUE: 28
PIF_VALUE: 26
PIF_VALUE: 2
PIF_VALUE: 13
PIF_VALUE: 25
PIF_VALUE: 12
PIF_VALUE: 25
PIF_VALUE: 0
PIF_VALUE: 28
PIF_VALUE: 15
PIF_VALUE: 28
PIF_VALUE: 29
PIF_VALUE: 29
PIF_VALUE: 30
PIF_VALUE: 27
PIF_VALUE: 15
PIF_VALUE: 29
PIF_VALUE: 26
PIF_VALUE: 29
PIF_VALUE: 16
PIF_VALUE: 2
PIF_VALUE: 24
PIF_VALUE: 28
PIF_VALUE: 1
PIF_VALUE: 26
PIF_VALUE: 29
PIF_VALUE: 27
PIF_VALUE: 29
PIF_VALUE: 31
PIF_VALUE: 15
PIF_VALUE: 15
PIF_VALUE: 16
PIF_VALUE: 29
PIF_VALUE: 23
PIF_VALUE: 16
PIF_VALUE: 14
PIF_VALUE: 29
PIF_VALUE: 29
PIF_VALUE: 28
PIF_VALUE: 17
PIF_VALUE: 27
PIF_VALUE: 16
PIF_VALUE: 26
PIF_VALUE: 25
PIF_VALUE: 29
PIF_VALUE: 31
PIF_VALUE: 1
PIF_VALUE: 28

## 2018-03-02 ASSESSMENT — PAIN SCALES - GENERAL
PAINLEVEL_OUTOF10: 5
PAINLEVEL_OUTOF10: 7
PAINLEVEL_OUTOF10: 6
PAINLEVEL_OUTOF10: 0
PAINLEVEL_OUTOF10: 5
PAINLEVEL_OUTOF10: 4
PAINLEVEL_OUTOF10: 8
PAINLEVEL_OUTOF10: 10
PAINLEVEL_OUTOF10: 7

## 2018-03-02 ASSESSMENT — PAIN DESCRIPTION - PROGRESSION: CLINICAL_PROGRESSION: NOT CHANGED

## 2018-03-02 ASSESSMENT — PAIN DESCRIPTION - LOCATION: LOCATION: ABDOMEN

## 2018-03-02 ASSESSMENT — PAIN DESCRIPTION - ORIENTATION: ORIENTATION: MID

## 2018-03-02 ASSESSMENT — PAIN DESCRIPTION - ONSET: ONSET: AWAKENED FROM SLEEP

## 2018-03-02 ASSESSMENT — PAIN DESCRIPTION - PAIN TYPE: TYPE: SURGICAL PAIN

## 2018-03-02 ASSESSMENT — PAIN DESCRIPTION - FREQUENCY: FREQUENCY: CONTINUOUS

## 2018-03-02 ASSESSMENT — PAIN DESCRIPTION - DESCRIPTORS: DESCRIPTORS: ACHING

## 2018-03-02 NOTE — PROGRESS NOTES
Dr. Naveen Baumann here to see the patient. Update to urine, and firm abdomen. He states that is a little firm, but no more distended than earlier.

## 2018-03-02 NOTE — PROGRESS NOTES
[facility].    Date:   3/2/2018  Patient name:  Augustus Avina  Date of admission:  2/27/2018  9:12 PM  MRN:   [id number]  YOB: 1928      HPI overnight no fever chills nausea no vomiting  Post op drowsy            REVIEW OF SYSTEMS:    · Cardiovascular: Negative for lightheadedness/orthostatic symptoms ,chest pain, dyspnea on exertion, palpitations or loss of consciousness. · Respiratory: Negative for cough or wheezing, sputum production, hemoptysis, pleuritic pain. Gastrointestinal: post op abdo pain  Drowsy  Drain in plac  e    OBJECTIVE:    BP (!) 120/49   Pulse 60   Temp 97.7 °F (36.5 °C) (Infrared)   Resp 23   Ht 5' 5\" (1.651 m)   Wt 160 lb (72.6 kg)   SpO2 94%   BMI 26.63 kg/m²    drowsy  · Lungs: clear to auscultation bilaterally,no wheeze. · Heart: regular rate and rhythm, S1, S2 normal, no murmur, click, rub or gallop  · Abdomen soft tender with drain in situe  · Post lap geraldo  ·   · Extremities: extremities normal, atraumatic, no cyanosis or edema  · Neurological:  Reflexes normal and symmetric. Sensation grossly normal  · Skin - no rash, no lump   · Eye- no icterus no redness  · GI-oral mucosa moist,  · Lymphatic system-no lymphadenopathy no splenomegaly  · Mouth- mucous membrane moist  · Head-normocephalic atraumatic  · Neck- supple no carotid bruit,Thyroid not palpable. Laboratory Testing:  CBC:   Recent Labs      03/02/18   0655   WBC  6.8   HGB  14.6   PLT  215     BMP:    Recent Labs      03/01/18   0718  03/02/18   0655  03/02/18   1305   NA  138  137  137   K  4.3  4.0  3.9   CL  103  103  102   CO2  23  22  19*   BUN  20  19  19   CREATININE  1.47*  1.45*  1.46*   GLUCOSE  66*  114*  201*     S. Calcium:  Recent Labs      03/02/18   1305   CALCIUM  7.8*     S. Ionized Calcium:No results for input(s): IONCA in the last 72 hours.   S. Magnesium:No results for input(s): MG in the last 72 hours.  S. Phosphorus:No results for input(s): PHOS in the last 72 hours. S. Glucose:  Recent Labs      02/27/18   2116   POCGLU  193*     Glycosylated hemoglobin A1C: No results for input(s): LABA1C in the last 72 hours. INR:   Recent Labs      02/28/18   0732   INR  1.2     Hepatic functions:   Recent Labs      03/01/18   0718   03/02/18   1305   ALKPHOS  192*   < >  252*   ALT  162*   < >  177*   AST  104*   < >  158*   PROT  6.9   < >  7.4   BILITOT  3.95*   < >  2.71*   BILIDIR  3.11*   --    --    LABALBU  2.5*   < >  2.7*    < > = values in this interval not displayed. Pancreatic functions:No results for input(s): LACTA, AMYLASE in the last 72 hours. S. Lactic Acid: No results for input(s): LACTA in the last 72 hours. Cardiac enzymes:No results for input(s): CKTOTAL, CKMB, CKMBINDEX, TROPONINI in the last 72 hours. BNP:No results for input(s): BNP in the last 72 hours. Lipid profile: No results for input(s): CHOL, TRIG, HDL, LDLCALC in the last 72 hours. Invalid input(s): LDL  Blood Gases: No results found for: PH, PCO2, PO2, HCO3, O2SAT  Thyroid functions:   Lab Results   Component Value Date    TSH 0.26 09/18/2017        Imaging/Diagonstics:  EKG: Normal sinus rhythm    CXR: No acute cardiopulmonary findings.           ASSESSMENT:    Patient Active Problem List   Diagnosis    Degeneration of cervical intervertebral disc    Vertigo    Acquired hypothyroidism    Spinal stenosis in cervical region    Cervical facet joint syndrome    Cervical spondylosis    Cervical disc herniation    Cervical radiculopathy, chronic    Arthropathy of cervical facet joint    Hyperlipidemia    Heart block AV second degree    H/O malignant neoplasm of thyroid    Block, bundle branch, left    Mitral valve insufficiency    Type 2 diabetes mellitus without complication, without long-term current use of insulin (HCC)    Coronary artery disease involving coronary bypass graft of native heart without angina pectoris    Pulmonary nodules/lesions, multiple    Calculus of gallbladder    Elevated LFTs    Metastatic cancer (HCC)    Pulmonary nodule    Calculus of bile duct without cholecystitis and without obstruction    Acute cholecystitis    Abdominal pain    Symptomatic cholelithiasis    UTI (urinary tract infection)    Elevated liver enzymes    Biliary colic    Wound, open, knee, lower leg, or ankle with complication, left, initial encounter       PLAN:  Elderly gentleman with a right upper quadrant pain acute cholecystitis with gallstones  With papillary thyroid carcinoma status post surgery with the metastatic 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    Phone [phone]   Fax: [phone]  Answering Service: [phone]

## 2018-03-02 NOTE — ANESTHESIA PRE PROCEDURE
Department of Anesthesiology  Preprocedure Note       Name:  Ivon Armstrong   Age:  80 y.o.  :  1928                                          MRN:  [id number]         Date:  3/1/2018      Surgeon: Sloane Colin):  Philippe Graham DO    Procedure: Procedure(s):  CHOLECYSTECTOMY LAPAROSCOPIC WITH CHOLANGIOGRAM    Medications prior to admission:   Prior to Admission medications    Medication Sig Start Date End Date Taking? Authorizing Provider   oxybutynin (DITROPAN-XL) 5 MG extended release tablet TAKE 1 TABLET DAILY 18   Mariella Navarro MD   tamsulosin (FLOMAX) 0.4 MG capsule Take 1 capsule by mouth daily 17   Mariella Navarro MD   glimepiride (AMARYL) 2 MG tablet Take 2 mg by mouth every morning (before breakfast)    Historical Provider, MD   calcium carbonate (TUMS) 500 MG chewable tablet Take 1 tablet by mouth daily    Historical Provider, MD   famotidine (PEPCID) 20 MG tablet Take 1 tablet by mouth daily 17   Bennie Mayfield MD   Cholecalciferol (VITAMIN D3) 20637 UNITS CAPS Take 50,000 Units by mouth once a week    Historical Provider, MD   levothyroxine (LEVOXYL) 137 MCG tablet TAKE 1 TABLET DAILY 16   Historical Provider, MD   nitroGLYCERIN (NITROSTAT) 0.4 MG SL tablet Place 0.4 mg under the tongue. As directed    Historical Provider, MD   docusate sodium (COLACE) 100 MG capsule Take 100 mg by mouth nightly     Historical Provider, MD   atorvastatin (LIPITOR) 20 MG tablet   Take 20 mg by mouth nightly     Historical Provider, MD   aspirin 81 MG tablet Take 81 mg by mouth daily.       Historical Provider, MD       Current medications:    Current Facility-Administered Medications   Medication Dose Route Frequency Provider Last Rate Last Dose    enoxaparin (LOVENOX) injection 30 mg  30 mg Subcutaneous Daily Sonia Alexus CNP   30 mg at 18 1155    aspirin EC tablet 81 mg  81 mg Oral Daily Sonia ABENA Vaughan        atorvastatin (LIPITOR) tablet 20 mg  20 mg Oral Nightly Sonia ABENA Vaughan 20 mg at 02/28/18 2008    calcium carbonate (TUMS) chewable tablet 500 mg  1 tablet Oral Daily Pearl Wetzel CNP        [START ON 3/3/2018] vitamin D (ERGOCALCIFEROL) capsule 50,000 Units  50,000 Units Oral Weekly Pearl Wetzel CNP        docusate sodium (COLACE) capsule 100 mg  100 mg Oral Nightly Pearl Wetzel CNP        famotidine (PEPCID) tablet 20 mg  20 mg Oral Dinner Pearl Wetzel CNP   20 mg at 03/01/18 1708    levothyroxine (SYNTHROID) tablet 137 mcg  137 mcg Oral Daily Pearl Wetzel CNP        nitroGLYCERIN (NITROSTAT) SL tablet 0.4 mg  0.4 mg Sublingual Q5 Min PRN Pearl Wetzel CNP        oxybutynin (DITROPAN-XL) extended release tablet 5 mg  5 mg Oral Dinner Pearl Wetzel CNP   5 mg at 03/01/18 1708    tamsulosin (FLOMAX) capsule 0.4 mg  0.4 mg Oral Nightly Pearl Wetzel CNP   0.4 mg at 02/28/18 2008    sodium chloride flush 0.9 % injection 10 mL  10 mL Intravenous 2 times per day Pearl Wetzel CNP   10 mL at 02/28/18 0914    sodium chloride flush 0.9 % injection 10 mL  10 mL Intravenous PRN Pearl Wetzel CNP        potassium chloride (KLOR-CON M) extended release tablet 40 mEq  40 mEq Oral PRN Pearl Wetzel CNP        Or    potassium chloride 20 MEQ/15ML (10%) oral solution 40 mEq  40 mEq Oral PRN Pearl Wetzel CNP        Or    potassium chloride 10 mEq/100 mL IVPB (Peripheral Line)  10 mEq Intravenous PRN Pearl Wetzel CNP        magnesium sulfate 1 g in dextrose 5% 100 mL IVPB  1 g Intravenous PRN Paerl Wetzel CNP        acetaminophen (TYLENOL) tablet 650 mg  650 mg Oral Q4H PRN Pearl Wetzel CNP        morphine (PF) injection 2 mg  2 mg Intravenous Q2H PRN Pearl Wetzel CNP   2 mg at 02/28/18 1039    Or    morphine injection 4 mg  4 mg Intravenous Q2H PRN Pearl Wetzel CNP        magnesium hydroxide (MILK OF MAGNESIA) 400 MG/5ML suspension 30 mL  30 mL Oral Daily PRN Pearl Wetzel CNP        bisacodyl (DULCOLAX) suppository 10 mg  10 mg Rectal Daily PRN Pearl Wetzel, ABENA       [facility] ondansetron (ZOFRAN) injection 4 mg  4 mg Intravenous Q6H PRN Claudia Cavazos CNP        piperacillin-tazobactam (ZOSYN) 3.375 g in dextrose 5 % 50 mL IVPB (mini-bag)  3.375 g Intravenous Q8H Claudia Cavazos CNP 12.5 mL/hr at 03/01/18 1708 3.375 g at 03/01/18 1708    0.9 % sodium chloride infusion   Intravenous Continuous Yoselin Welch DO 75 mL/hr at 03/01/18 1440         Allergies: Allergies   Allergen Reactions    Seasonal      Other reaction(s):  Other: See Comments  seasonal upper respiratory irritation    Keflex [Cephalexin] Nausea And Vomiting       Problem List:    Patient Active Problem List   Diagnosis Code    Degeneration of cervical intervertebral disc M50.30    Vertigo R42    Acquired hypothyroidism E03.9    Spinal stenosis in cervical region M48.02    Cervical facet joint syndrome M12.88    Cervical spondylosis M47.812    Cervical disc herniation M50.20    Cervical radiculopathy, chronic M54.12    Arthropathy of cervical facet joint M12.88    Hyperlipidemia E78.5    Heart block AV second degree I44.1    H/O malignant neoplasm of thyroid Z85.850    Block, bundle branch, left I44.7    Mitral valve insufficiency I34.0    Type 2 diabetes mellitus without complication, without long-term current use of insulin (HCC) E11.9    Coronary artery disease involving coronary bypass graft of native heart without angina pectoris I25.810    Pulmonary nodules/lesions, multiple R91.8    Calculus of gallbladder K80.20    Elevated LFTs R79.89    Metastatic cancer (HCC) C79.9    Pulmonary nodule R91.1    Calculus of bile duct without cholecystitis and without obstruction K80.50    Acute cholecystitis K81.0    Abdominal pain R10.9    Symptomatic cholelithiasis K80.20    UTI (urinary tract infection) N39.0    Elevated liver enzymes O53.5    Biliary colic G35.81    Wound, open, knee, lower leg, or ankle with complication, left, initial encounter S81.002A, F04.779C, I05.197H       Past Medical History:        Diagnosis Date    Cancer ([facility] [address]     Carotid artery stenosis     Cataract     Cerebrovascular disease     Heart attack     Hypertension     Hypothyroidism     Type II or unspecified type diabetes mellitus without mention of complication, not stated as uncontrolled        Past Surgical History:        Procedure Laterality Date    APPENDECTOMY      CARDIAC SURGERY      CORONARY ARTERY BYPASS GRAFT      PACEMAKER INSERTION  03/2015    SHOULDER SURGERY      TOTAL THYROIDECTOMY         Social History:    Social History   Substance Use Topics    Smoking status: Never Smoker    Smokeless tobacco: Never Used    Alcohol use No                                Counseling given: Not Answered      Vital Signs (Current):   Vitals:    03/01/18 0637 03/01/18 1427 03/01/18 1551 03/01/18 1800   BP: (!) 93/42 (!) 108/58  (!) 113/51   Pulse: 62 65 64 60   Resp: 18 22  20   Temp: 98.1 °F (36.7 °C) 97.9 °F (36.6 °C)  97.6 °F (36.4 °C)   TempSrc: Oral Oral  Oral   SpO2: 96% 96%  97%   Weight:       Height:                                                  BP Readings from Last 3 Encounters:   03/01/18 (!) 113/51   02/26/18 128/78   02/01/18 (!) 118/58       NPO Status:                                                                                 BMI:   Wt Readings from Last 3 Encounters:   02/28/18 160 lb (72.6 kg)   02/26/18 160 lb 6.4 oz (72.8 kg)   02/01/18 156 lb 12.8 oz (71.1 kg)     Body mass index is 26.63 kg/m².     CBC:   Lab Results   Component Value Date    WBC 8.5 03/01/2018    RBC 4.71 03/01/2018    HGB 13.8 03/01/2018    HCT 41.4 03/01/2018    MCV 87.8 03/01/2018    RDW 14.1 03/01/2018     03/01/2018       CMP:   Lab Results   Component Value Date     03/01/2018    K 4.3 03/01/2018     03/01/2018    CO2 23 03/01/2018    BUN 20 03/01/2018    CREATININE 1.47 03/01/2018    GFRAA 55 03/01/2018    LABGLOM 45 03/01/2018    GLUCOSE 66 03/01/2018    GLUCOSE 143 04/16/2012    PROT 6.9

## 2018-03-03 LAB
ALBUMIN SERPL-MCNC: 2.7 G/DL (ref 3.5–5.2)
ALBUMIN/GLOBULIN RATIO: ABNORMAL (ref 1–2.5)
ALP BLD-CCNC: 230 U/L (ref 40–129)
ALT SERPL-CCNC: 139 U/L (ref 5–41)
ANION GAP SERPL CALCULATED.3IONS-SCNC: 16 MMOL/L (ref 9–17)
AST SERPL-CCNC: 89 U/L
BILIRUB SERPL-MCNC: 1.94 MG/DL (ref 0.3–1.2)
BUN BLDV-MCNC: 16 MG/DL (ref 8–23)
BUN/CREAT BLD: ABNORMAL (ref 9–20)
CALCIUM SERPL-MCNC: 7.8 MG/DL (ref 8.6–10.4)
CHLORIDE BLD-SCNC: 100 MMOL/L (ref 98–107)
CO2: 21 MMOL/L (ref 20–31)
CREAT SERPL-MCNC: 1.56 MG/DL (ref 0.7–1.2)
GFR AFRICAN AMERICAN: 51 ML/MIN
GFR NON-AFRICAN AMERICAN: 42 ML/MIN
GFR SERPL CREATININE-BSD FRML MDRD: ABNORMAL ML/MIN/{1.73_M2}
GFR SERPL CREATININE-BSD FRML MDRD: ABNORMAL ML/MIN/{1.73_M2}
GLUCOSE BLD-MCNC: 176 MG/DL (ref 70–99)
HCT VFR BLD CALC: 44.6 % (ref 41–53)
HEMOGLOBIN: 15 G/DL (ref 13.5–17.5)
MCH RBC QN AUTO: 29.3 PG (ref 26–34)
MCHC RBC AUTO-ENTMCNC: 33.7 G/DL (ref 31–37)
MCV RBC AUTO: 86.9 FL (ref 80–100)
NRBC AUTOMATED: ABNORMAL PER 100 WBC
PDW BLD-RTO: 14 % (ref 11.5–14.9)
PLATELET # BLD: 220 K/UL (ref 150–450)
PMV BLD AUTO: 7.1 FL (ref 6–12)
POTASSIUM SERPL-SCNC: 4.3 MMOL/L (ref 3.7–5.3)
RBC # BLD: 5.13 M/UL (ref 4.5–5.9)
SODIUM BLD-SCNC: 137 MMOL/L (ref 135–144)
TOTAL PROTEIN: 7.4 G/DL (ref 6.4–8.3)
WBC # BLD: 11.4 K/UL (ref 3.5–11)

## 2018-03-03 PROCEDURE — 2060000000 HC ICU INTERMEDIATE R&B

## 2018-03-03 PROCEDURE — 99232 SBSQ HOSP IP/OBS MODERATE 35: CPT | Performed by: INTERNAL MEDICINE

## 2018-03-03 PROCEDURE — 36415 COLL VENOUS BLD VENIPUNCTURE: CPT

## 2018-03-03 PROCEDURE — 6370000000 HC RX 637 (ALT 250 FOR IP): Performed by: NURSE PRACTITIONER

## 2018-03-03 PROCEDURE — 2580000003 HC RX 258: Performed by: NURSE PRACTITIONER

## 2018-03-03 PROCEDURE — 94762 N-INVAS EAR/PLS OXIMTRY CONT: CPT

## 2018-03-03 PROCEDURE — 85027 COMPLETE CBC AUTOMATED: CPT

## 2018-03-03 PROCEDURE — 6360000002 HC RX W HCPCS: Performed by: SURGERY

## 2018-03-03 PROCEDURE — 6360000002 HC RX W HCPCS: Performed by: NURSE PRACTITIONER

## 2018-03-03 PROCEDURE — 80053 COMPREHEN METABOLIC PANEL: CPT

## 2018-03-03 RX ADMIN — HYDROMORPHONE HYDROCHLORIDE 0.5 MG: 1 INJECTION, SOLUTION INTRAMUSCULAR; INTRAVENOUS; SUBCUTANEOUS at 02:54

## 2018-03-03 RX ADMIN — LEVOTHYROXINE SODIUM 137 MCG: 0.14 TABLET ORAL at 08:04

## 2018-03-03 RX ADMIN — FENTANYL CITRATE 100 MCG: 50 INJECTION, SOLUTION INTRAMUSCULAR; INTRAVENOUS at 22:56

## 2018-03-03 RX ADMIN — TAMSULOSIN HYDROCHLORIDE 0.4 MG: 0.4 CAPSULE ORAL at 21:18

## 2018-03-03 RX ADMIN — PIPERACILLIN SODIUM AND TAZOBACTAM SODIUM 3.38 G: 3; .375 INJECTION, POWDER, LYOPHILIZED, FOR SOLUTION INTRAVENOUS at 17:34

## 2018-03-03 RX ADMIN — PIPERACILLIN SODIUM AND TAZOBACTAM SODIUM 3.38 G: 3; .375 INJECTION, POWDER, LYOPHILIZED, FOR SOLUTION INTRAVENOUS at 23:59

## 2018-03-03 RX ADMIN — FENTANYL CITRATE 100 MCG: 50 INJECTION, SOLUTION INTRAMUSCULAR; INTRAVENOUS at 05:25

## 2018-03-03 RX ADMIN — DOCUSATE SODIUM 100 MG: 100 CAPSULE, LIQUID FILLED ORAL at 21:19

## 2018-03-03 RX ADMIN — PIPERACILLIN SODIUM AND TAZOBACTAM SODIUM 3.38 G: 3; .375 INJECTION, POWDER, LYOPHILIZED, FOR SOLUTION INTRAVENOUS at 08:01

## 2018-03-03 RX ADMIN — PIPERACILLIN SODIUM AND TAZOBACTAM SODIUM 3.38 G: 3; .375 INJECTION, POWDER, LYOPHILIZED, FOR SOLUTION INTRAVENOUS at 00:17

## 2018-03-03 RX ADMIN — FENTANYL CITRATE 100 MCG: 50 INJECTION, SOLUTION INTRAMUSCULAR; INTRAVENOUS at 09:18

## 2018-03-03 RX ADMIN — OXYBUTYNIN CHLORIDE 5 MG: 5 TABLET, FILM COATED, EXTENDED RELEASE ORAL at 17:40

## 2018-03-03 RX ADMIN — FAMOTIDINE 20 MG: 20 TABLET, FILM COATED ORAL at 17:40

## 2018-03-03 RX ADMIN — FENTANYL CITRATE 100 MCG: 50 INJECTION, SOLUTION INTRAMUSCULAR; INTRAVENOUS at 17:40

## 2018-03-03 RX ADMIN — ERGOCALCIFEROL 50000 UNITS: 1.25 CAPSULE ORAL at 08:04

## 2018-03-03 RX ADMIN — Medication 500 MG: at 08:09

## 2018-03-03 RX ADMIN — ENOXAPARIN SODIUM 30 MG: 30 INJECTION SUBCUTANEOUS at 08:11

## 2018-03-03 RX ADMIN — ATORVASTATIN CALCIUM 20 MG: 20 TABLET, FILM COATED ORAL at 21:18

## 2018-03-03 ASSESSMENT — PAIN SCALES - GENERAL
PAINLEVEL_OUTOF10: 2
PAINLEVEL_OUTOF10: 2
PAINLEVEL_OUTOF10: 0
PAINLEVEL_OUTOF10: 7
PAINLEVEL_OUTOF10: 8
PAINLEVEL_OUTOF10: 9
PAINLEVEL_OUTOF10: 8
PAINLEVEL_OUTOF10: 3

## 2018-03-03 NOTE — PLAN OF CARE
Problem: Falls - Risk of  Goal: Absence of falls  Outcome: Ongoing  Pt remains free from falls. Bed alarm on. Call light within reach. Problem: Risk for Impaired Skin Integrity  Goal: Tissue integrity - skin and mucous membranes  Structural intactness and normal physiological function of skin and  mucous membranes. Outcome: Ongoing  Patient turned q 2 hours when in bed. Pressure redistribution device(s) in use. Barrier cream applied when providing foreign care.     Problem: Pain:  Goal: Pain level will decrease  Pain level will decrease   Outcome: Ongoing  Pt medicated PRN for pain    Problem: Nutrition  Goal: Optimal nutrition therapy  Outcome: Ongoing  Pt on clear liquid diet

## 2018-03-03 NOTE — PROGRESS NOTES
for input(s): PHOS in the last 72 hours. S. Glucose:  No results for input(s): POCGLU in the last 72 hours. Glycosylated hemoglobin A1C: No results for input(s): LABA1C in the last 72 hours. INR:   No results for input(s): INR in the last 72 hours. Hepatic functions:   Recent Labs      03/01/18   0718   03/03/18   0504   ALKPHOS  192*   < >  230*   ALT  162*   < >  139*   AST  104*   < >  89*   PROT  6.9   < >  7.4   BILITOT  3.95*   < >  1.94*   BILIDIR  3.11*   --    --    LABALBU  2.5*   < >  2.7*    < > = values in this interval not displayed. Pancreatic functions:No results for input(s): LACTA, AMYLASE in the last 72 hours. S. Lactic Acid: No results for input(s): LACTA in the last 72 hours. Cardiac enzymes:No results for input(s): CKTOTAL, CKMB, CKMBINDEX, TROPONINI in the last 72 hours. BNP:No results for input(s): BNP in the last 72 hours. Lipid profile: No results for input(s): CHOL, TRIG, HDL, LDLCALC in the last 72 hours. Invalid input(s): LDL  Blood Gases: No results found for: PH, PCO2, PO2, HCO3, O2SAT  Thyroid functions:   Lab Results   Component Value Date    TSH 0.26 09/18/2017        Imaging/Diagonstics:  EKG: Normal sinus rhythm    CXR: No acute cardiopulmonary findings.           ASSESSMENT:    Patient Active Problem List   Diagnosis    Degeneration of cervical intervertebral disc    Vertigo    Acquired hypothyroidism    Spinal stenosis in cervical region    Cervical facet joint syndrome    Cervical spondylosis    Cervical disc herniation    Cervical radiculopathy, chronic    Arthropathy of cervical facet joint    Hyperlipidemia    Heart block AV second degree    H/O malignant neoplasm of thyroid    Block, bundle branch, left    Mitral valve insufficiency    Type 2 diabetes mellitus without complication, without long-term current use of insulin (HCC)    Coronary artery disease involving coronary bypass graft of native heart without angina pectoris    Pulmonary

## 2018-03-03 NOTE — PROGRESS NOTES
GI Progress notes    3/3/2018   11:16 AM    Name:  Moody Redman  MRN:    [id number]     Acct:     [de-identified]   Room:  2009/2009-01  IP Day: 3     Admit Date: 2/27/2018  9:12 PM  PCP: Parul Martinez MD    Subjective:     C/C:   Chief Complaint   Patient presents with    Illness       Interval History: Status: improved. Had GB surgery yesterday and is improving now  LFTs are getting  Better  Has no nausea or any vomiting  Has no bleeding  Operative findings were discussed with Dr Martine Rubinstein was not done sec to thin bile duct? ROS:  Constitutional: negative for chills, fevers and sweats  palpitations  Gastrointestinal: +  abdominal pain, constipation, diarrhea, nausea and vomiting      Medications: Allergies: Allergies   Allergen Reactions    Seasonal      Other reaction(s):  Other: See Comments  seasonal upper respiratory irritation    Keflex [Cephalexin] Nausea And Vomiting       Current Meds:   [START ON 3/4/2018] aspirin EC tablet 81 mg Daily   enoxaparin (LOVENOX) injection 30 mg Daily   ondansetron (ZOFRAN) injection 4 mg Q4H PRN   fentaNYL (SUBLIMAZE) injection 50 mcg Q1H PRN   Or    fentaNYL (SUBLIMAZE) injection 100 mcg Q1H PRN   hydrALAZINE (APRESOLINE) injection 10 mg Q4H PRN   HYDROmorphone (DILAUDID) injection 0.5 mg Q2H PRN   atorvastatin (LIPITOR) tablet 20 mg Nightly   calcium carbonate (TUMS) chewable tablet 500 mg Daily   vitamin D (ERGOCALCIFEROL) capsule 50,000 Units Weekly   docusate sodium (COLACE) capsule 100 mg Nightly   famotidine (PEPCID) tablet 20 mg Dinner   levothyroxine (SYNTHROID) tablet 137 mcg Daily   nitroGLYCERIN (NITROSTAT) SL tablet 0.4 mg Q5 Min PRN   oxybutynin (DITROPAN-XL) extended release tablet 5 mg Dinner   tamsulosin (FLOMAX) capsule 0.4 mg Nightly   sodium chloride flush 0.9 % injection 10 mL 2 times per day   sodium chloride flush 0.9 % injection 10 mL PRN   potassium chloride (KLOR-CON M) extended release tablet 40 mEq PRN   Or    potassium chloride 20 11.4 03/03/2018    RBC 5.13 03/03/2018    HGB 15.0 03/03/2018    HCT 44.6 03/03/2018     03/03/2018    MCV 86.9 03/03/2018    MCH 29.3 03/03/2018    MCHC 33.7 03/03/2018    RDW 14.0 03/03/2018    LYMPHOPCT 4 02/27/2018    MONOPCT 5 02/27/2018    BASOPCT 0 02/27/2018    MONOSABS 0.50 02/27/2018    LYMPHSABS 0.40 02/27/2018    EOSABS 0.10 02/27/2018    BASOSABS 0.00 02/27/2018    DIFFTYPE NOT REPORTED 02/27/2018     Hemoglobin/Hematocrit:  Lab Results   Component Value Date    HGB 15.0 03/03/2018    HCT 44.6 03/03/2018     CMP:  Lab Results   Component Value Date     03/03/2018    K 4.3 03/03/2018     03/03/2018    CO2 21 03/03/2018    BUN 16 03/03/2018    CREATININE 1.56 03/03/2018    GFRAA 51 03/03/2018    LABGLOM 42 03/03/2018    GLUCOSE 176 03/03/2018    GLUCOSE 143 04/16/2012    PROT 7.4 03/03/2018    LABALBU 2.7 03/03/2018    CALCIUM 7.8 03/03/2018    BILITOT 1.94 03/03/2018    ALKPHOS 230 03/03/2018    AST 89 03/03/2018     03/03/2018     BMP:  Lab Results   Component Value Date     03/03/2018    K 4.3 03/03/2018     03/03/2018    CO2 21 03/03/2018    BUN 16 03/03/2018    LABALBU 2.7 03/03/2018    CREATININE 1.56 03/03/2018    CALCIUM 7.8 03/03/2018    GFRAA 51 03/03/2018    LABGLOM 42 03/03/2018    GLUCOSE 176 03/03/2018    GLUCOSE 143 04/16/2012     PT/INR:    Lab Results   Component Value Date    PROTIME 12.1 02/28/2018    INR 1.2 02/28/2018     PTT:    Lab Results   Component Value Date    APTT 26.7 12/13/2012   [APTT}    Physical Examination:        General appearance: alert, cooperative and no distress  Mental Status: oriented to person, place and time and normal affect    Abdomen: soft, post op tender, nondistended, bowel sounds present     Assessment:        Primary Problem  Symptomatic cholelithiasis     Active Hospital Problems    Diagnosis Date Noted    Wound, open, knee, lower leg, or ankle with complication, left, initial encounter [S81.002A, S89.271I,

## 2018-03-03 NOTE — PLAN OF CARE
Problem: Falls - Risk of  Goal: Absence of falls  Outcome: Ongoing  Patient uses call light. Has made no attempts to get out of bed. Problem: Risk for Impaired Skin Integrity  Goal: Tissue integrity - skin and mucous membranes  Structural intactness and normal physiological function of skin and  mucous membranes. Outcome: Ongoing  Patient has the three stab wounds from surgery, a drain and a wound to left leg just below the knee and the dressing was changed by wound care nurse. Problem: Pain:  Goal: Pain level will decrease  Pain level will decrease   Outcome: Ongoing  Patient complained of pain as charted. Medicated as ordered.

## 2018-03-04 ENCOUNTER — APPOINTMENT (OUTPATIENT)
Dept: GENERAL RADIOLOGY | Age: 83
DRG: 418 | End: 2018-03-04
Payer: MEDICARE

## 2018-03-04 LAB
ALBUMIN SERPL-MCNC: 2.5 G/DL (ref 3.5–5.2)
ALBUMIN/GLOBULIN RATIO: ABNORMAL (ref 1–2.5)
ALP BLD-CCNC: 209 U/L (ref 40–129)
ALT SERPL-CCNC: 102 U/L (ref 5–41)
AMYLASE: 45 U/L (ref 28–100)
ANION GAP SERPL CALCULATED.3IONS-SCNC: 15 MMOL/L (ref 9–17)
AST SERPL-CCNC: 48 U/L
BILIRUB SERPL-MCNC: 1.56 MG/DL (ref 0.3–1.2)
BILIRUBIN DIRECT: 0.7 MG/DL
BILIRUBIN, INDIRECT: 0.86 MG/DL (ref 0–1)
BNP INTERPRETATION: ABNORMAL
BUN BLDV-MCNC: 13 MG/DL (ref 8–23)
BUN/CREAT BLD: ABNORMAL (ref 9–20)
CALCIUM SERPL-MCNC: 8.8 MG/DL (ref 8.6–10.4)
CHLORIDE BLD-SCNC: 100 MMOL/L (ref 98–107)
CO2: 23 MMOL/L (ref 20–31)
CREAT SERPL-MCNC: 1.36 MG/DL (ref 0.7–1.2)
GFR AFRICAN AMERICAN: 60 ML/MIN
GFR NON-AFRICAN AMERICAN: 49 ML/MIN
GFR SERPL CREATININE-BSD FRML MDRD: ABNORMAL ML/MIN/{1.73_M2}
GFR SERPL CREATININE-BSD FRML MDRD: ABNORMAL ML/MIN/{1.73_M2}
GLOBULIN: ABNORMAL G/DL (ref 1.5–3.8)
GLUCOSE BLD-MCNC: 209 MG/DL (ref 70–99)
HCT VFR BLD CALC: 44.9 % (ref 41–53)
HEMOGLOBIN: 14.8 G/DL (ref 13.5–17.5)
LIPASE: 24 U/L (ref 13–60)
MCH RBC QN AUTO: 29.4 PG (ref 26–34)
MCHC RBC AUTO-ENTMCNC: 32.9 G/DL (ref 31–37)
MCV RBC AUTO: 89.2 FL (ref 80–100)
NRBC AUTOMATED: ABNORMAL PER 100 WBC
PDW BLD-RTO: 14.5 % (ref 11.5–14.9)
PLATELET # BLD: 227 K/UL (ref 150–450)
PMV BLD AUTO: 7.2 FL (ref 6–12)
POTASSIUM SERPL-SCNC: 4.5 MMOL/L (ref 3.7–5.3)
PRO-BNP: 1427 PG/ML
RBC # BLD: 5.03 M/UL (ref 4.5–5.9)
SODIUM BLD-SCNC: 138 MMOL/L (ref 135–144)
TOTAL PROTEIN: 8.1 G/DL (ref 6.4–8.3)
WBC # BLD: 16.1 K/UL (ref 3.5–11)

## 2018-03-04 PROCEDURE — 6360000002 HC RX W HCPCS: Performed by: NURSE PRACTITIONER

## 2018-03-04 PROCEDURE — 6360000002 HC RX W HCPCS: Performed by: SURGERY

## 2018-03-04 PROCEDURE — 82150 ASSAY OF AMYLASE: CPT

## 2018-03-04 PROCEDURE — 71045 X-RAY EXAM CHEST 1 VIEW: CPT

## 2018-03-04 PROCEDURE — 2580000003 HC RX 258: Performed by: NURSE PRACTITIONER

## 2018-03-04 PROCEDURE — 83880 ASSAY OF NATRIURETIC PEPTIDE: CPT

## 2018-03-04 PROCEDURE — 83690 ASSAY OF LIPASE: CPT

## 2018-03-04 PROCEDURE — 99232 SBSQ HOSP IP/OBS MODERATE 35: CPT | Performed by: INTERNAL MEDICINE

## 2018-03-04 PROCEDURE — 80048 BASIC METABOLIC PNL TOTAL CA: CPT

## 2018-03-04 PROCEDURE — 6370000000 HC RX 637 (ALT 250 FOR IP): Performed by: INTERNAL MEDICINE

## 2018-03-04 PROCEDURE — 36415 COLL VENOUS BLD VENIPUNCTURE: CPT

## 2018-03-04 PROCEDURE — 80076 HEPATIC FUNCTION PANEL: CPT

## 2018-03-04 PROCEDURE — 6370000000 HC RX 637 (ALT 250 FOR IP): Performed by: NURSE PRACTITIONER

## 2018-03-04 PROCEDURE — 2060000000 HC ICU INTERMEDIATE R&B

## 2018-03-04 PROCEDURE — 85027 COMPLETE CBC AUTOMATED: CPT

## 2018-03-04 PROCEDURE — 6370000000 HC RX 637 (ALT 250 FOR IP): Performed by: SURGERY

## 2018-03-04 RX ORDER — METOPROLOL SUCCINATE 25 MG/1
25 TABLET, EXTENDED RELEASE ORAL DAILY
Status: DISCONTINUED | OUTPATIENT
Start: 2018-03-04 | End: 2018-03-09 | Stop reason: HOSPADM

## 2018-03-04 RX ORDER — POLYETHYLENE GLYCOL 3350 17 G/17G
17 POWDER, FOR SOLUTION ORAL DAILY
Status: DISCONTINUED | OUTPATIENT
Start: 2018-03-04 | End: 2018-03-09 | Stop reason: HOSPADM

## 2018-03-04 RX ADMIN — PIPERACILLIN SODIUM AND TAZOBACTAM SODIUM 3.38 G: 3; .375 INJECTION, POWDER, LYOPHILIZED, FOR SOLUTION INTRAVENOUS at 17:14

## 2018-03-04 RX ADMIN — ATORVASTATIN CALCIUM 20 MG: 20 TABLET, FILM COATED ORAL at 20:30

## 2018-03-04 RX ADMIN — FAMOTIDINE 20 MG: 20 TABLET, FILM COATED ORAL at 17:14

## 2018-03-04 RX ADMIN — LEVOTHYROXINE SODIUM 137 MCG: 0.14 TABLET ORAL at 05:03

## 2018-03-04 RX ADMIN — PIPERACILLIN SODIUM AND TAZOBACTAM SODIUM 3.38 G: 3; .375 INJECTION, POWDER, LYOPHILIZED, FOR SOLUTION INTRAVENOUS at 08:33

## 2018-03-04 RX ADMIN — DOCUSATE SODIUM 100 MG: 100 CAPSULE, LIQUID FILLED ORAL at 20:30

## 2018-03-04 RX ADMIN — FENTANYL CITRATE 100 MCG: 50 INJECTION, SOLUTION INTRAMUSCULAR; INTRAVENOUS at 20:30

## 2018-03-04 RX ADMIN — POLYETHYLENE GLYCOL 3350 17 G: 17 POWDER, FOR SOLUTION ORAL at 16:09

## 2018-03-04 RX ADMIN — METOPROLOL SUCCINATE 25 MG: 25 TABLET, EXTENDED RELEASE ORAL at 20:35

## 2018-03-04 RX ADMIN — Medication 10 ML: at 08:39

## 2018-03-04 RX ADMIN — ASPIRIN 81 MG: 81 TABLET, COATED ORAL at 08:31

## 2018-03-04 RX ADMIN — Medication 10 ML: at 16:05

## 2018-03-04 RX ADMIN — PIPERACILLIN SODIUM AND TAZOBACTAM SODIUM 3.38 G: 3; .375 INJECTION, POWDER, LYOPHILIZED, FOR SOLUTION INTRAVENOUS at 23:27

## 2018-03-04 RX ADMIN — OXYBUTYNIN CHLORIDE 5 MG: 5 TABLET, FILM COATED, EXTENDED RELEASE ORAL at 17:14

## 2018-03-04 RX ADMIN — FENTANYL CITRATE 100 MCG: 50 INJECTION, SOLUTION INTRAMUSCULAR; INTRAVENOUS at 16:04

## 2018-03-04 RX ADMIN — TAMSULOSIN HYDROCHLORIDE 0.4 MG: 0.4 CAPSULE ORAL at 20:30

## 2018-03-04 RX ADMIN — FENTANYL CITRATE 50 MCG: 50 INJECTION, SOLUTION INTRAMUSCULAR; INTRAVENOUS at 08:33

## 2018-03-04 RX ADMIN — Medication 500 MG: at 08:32

## 2018-03-04 RX ADMIN — ENOXAPARIN SODIUM 30 MG: 30 INJECTION SUBCUTANEOUS at 08:31

## 2018-03-04 ASSESSMENT — PAIN SCALES - GENERAL
PAINLEVEL_OUTOF10: 2
PAINLEVEL_OUTOF10: 6
PAINLEVEL_OUTOF10: 5
PAINLEVEL_OUTOF10: 7
PAINLEVEL_OUTOF10: 10

## 2018-03-04 NOTE — PROGRESS NOTES
No events overnight  Denies cp/sob/palpitations    Vitals:    03/04/18 0724   BP: 137/60   Pulse: 64   Resp: 18   Temp: 98.2 °F (36.8 °C)   SpO2: 95%     nad  rrr no mrg  ctab  abd s/nt/nd  Skin intact      bnp modestly elevated  cxr underinflated, lung fields unchanged from prior    Impression/  1. CAD hx CABG, doing well no symptoms. Remains on aspirin and statin  2. Lung volumes diminished, encourage IS  3. ICM, EF 30%  4. Remains on room air, no complaints  Plan/  Continue to monitor  I do not see signs of CHF at this point, will f/u tomorrow. If sob, reevaluate for diuretic need. Start toprol 25mg daily for chronic CHF.

## 2018-03-04 NOTE — PROGRESS NOTES
for input(s): PHOS in the last 72 hours. S. Glucose:  No results for input(s): POCGLU in the last 72 hours. Glycosylated hemoglobin A1C: No results for input(s): LABA1C in the last 72 hours. INR:   No results for input(s): INR in the last 72 hours. Hepatic functions:   Recent Labs      03/04/18   0530   ALKPHOS  209*   ALT  102*   AST  48*   PROT  8.1   BILITOT  1.56*   BILIDIR  0.70*   LABALBU  2.5*     Pancreatic functions:  Recent Labs      03/04/18   0530   AMYLASE  39     S. Lactic Acid: No results for input(s): LACTA in the last 72 hours. Cardiac enzymes:No results for input(s): CKTOTAL, CKMB, CKMBINDEX, TROPONINI in the last 72 hours. BNP:No results for input(s): BNP in the last 72 hours. Lipid profile: No results for input(s): CHOL, TRIG, HDL, LDLCALC in the last 72 hours. Invalid input(s): LDL  Blood Gases: No results found for: PH, PCO2, PO2, HCO3, O2SAT  Thyroid functions:   Lab Results   Component Value Date    TSH 0.26 09/18/2017        Imaging/Diagonstics:  EKG: Normal sinus rhythm    CXR: No acute cardiopulmonary findings.           ASSESSMENT:    Patient Active Problem List   Diagnosis    Degeneration of cervical intervertebral disc    Vertigo    Acquired hypothyroidism    Spinal stenosis in cervical region    Cervical facet joint syndrome    Cervical spondylosis    Cervical disc herniation    Cervical radiculopathy, chronic    Arthropathy of cervical facet joint    Hyperlipidemia    Heart block AV second degree    H/O malignant neoplasm of thyroid    Block, bundle branch, left    Mitral valve insufficiency    Type 2 diabetes mellitus without complication, without long-term current use of insulin (HCC)    Coronary artery disease involving coronary bypass graft of native heart without angina pectoris    Pulmonary nodules/lesions, multiple    Calculus of gallbladder    Elevated LFTs    Metastatic cancer ([facility] [address]    Pulmonary nodule    Calculus of bile duct without cholecystitis and without obstruction    Acute cholecystitis    Abdominal pain    Symptomatic cholelithiasis    UTI (urinary tract infection)    Elevated liver enzymes    Biliary colic    Wound, open, knee, lower leg, or ankle with complication, left, initial encounter       PLAN:  Elderly gentleman with a right upper quadrant pain acute cholecystitis with gallstones  With papillary thyroid carcinoma status post surgery with the 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    Phone [phone]   Fax: [phone]  Answering Service: [phone]

## 2018-03-04 NOTE — PROGRESS NOTES
GI Progress notes    3/4/2018   10:08 AM    Name:  Bryant Olivia  MRN:    [id number]     Acct:     [de-identified]   Room:  2100/2100-01  IP Day: 4     Admit Date: 2/27/2018  9:12 PM  PCP: Pedrito Monge MD    Subjective:     C/C:   Chief Complaint   Patient presents with    Illness       Interval History: Status: not changed. He is transferred out of ICU now  Has been c/o some abdominal pressure and is not able to pass any gas he says  Has been eating some  No nausea  No bleeding  LFTs are slowly improving      ROS:  Constitutional: negative for chills, fevers and sweats    Gastrointestinal: +  abdominal pain, constipation, diarrhea, nausea and vomiting      Medications: Allergies: Allergies   Allergen Reactions    Seasonal      Other reaction(s):  Other: See Comments  seasonal upper respiratory irritation    Keflex [Cephalexin] Nausea And Vomiting       Current Meds:   aspirin EC tablet 81 mg Daily   enoxaparin (LOVENOX) injection 30 mg Daily   ondansetron (ZOFRAN) injection 4 mg Q4H PRN   fentaNYL (SUBLIMAZE) injection 50 mcg Q1H PRN   Or    fentaNYL (SUBLIMAZE) injection 100 mcg Q1H PRN   hydrALAZINE (APRESOLINE) injection 10 mg Q4H PRN   HYDROmorphone (DILAUDID) injection 0.5 mg Q2H PRN   atorvastatin (LIPITOR) tablet 20 mg Nightly   calcium carbonate (TUMS) chewable tablet 500 mg Daily   vitamin D (ERGOCALCIFEROL) capsule 50,000 Units Weekly   docusate sodium (COLACE) capsule 100 mg Nightly   famotidine (PEPCID) tablet 20 mg Dinner   levothyroxine (SYNTHROID) tablet 137 mcg Daily   nitroGLYCERIN (NITROSTAT) SL tablet 0.4 mg Q5 Min PRN   oxybutynin (DITROPAN-XL) extended release tablet 5 mg Dinner   tamsulosin (FLOMAX) capsule 0.4 mg Nightly   sodium chloride flush 0.9 % injection 10 mL 2 times per day   sodium chloride flush 0.9 % injection 10 mL PRN   potassium chloride (KLOR-CON M) extended release tablet 40 mEq PRN   Or    potassium chloride 20 MEQ/15ML (10%) oral solution 40 mEq PRN   Or potassium chloride 10 mEq/100 mL IVPB (Peripheral Line) PRN   magnesium sulfate 1 g in dextrose 5% 100 mL IVPB PRN   acetaminophen (TYLENOL) tablet 650 mg Q4H PRN   magnesium hydroxide (MILK OF MAGNESIA) 400 MG/5ML suspension 30 mL Daily PRN   bisacodyl (DULCOLAX) suppository 10 mg Daily PRN   piperacillin-tazobactam (ZOSYN) 3.375 g in dextrose 5 % 50 mL IVPB (mini-bag) Q8H       Data:     Code Status:  Full Code    Family History   Problem Relation Age of Onset    Cancer Mother      stomach    Other Father      pneumonia       Social History     Social History    Marital status:      Spouse name: N/A    Number of children: N/A    Years of education: N/A     Occupational History    Retired [employer]     Social History Main Topics    Smoking status: Never Smoker    Smokeless tobacco: Never Used    Alcohol use No    Drug use: No    Sexual activity: Not on file     Other Topics Concern    Not on file     Social History Narrative    No narrative on file       Vitals:  /60   Pulse 64   Temp 98.2 °F (36.8 °C) (Oral)   Resp 18   Ht 5' 5\" (1.651 m)   Wt 160 lb 7.9 oz (72.8 kg)   SpO2 95%   BMI 26.71 kg/m²   Temp (24hrs), Av.1 °F (36.7 °C), Min:97.9 °F (36.6 °C), Max:98.4 °F (36.9 °C)    No results for input(s): POCGLU in the last 72 hours. I/O (24Hr):     Intake/Output Summary (Last 24 hours) at 18 1008  Last data filed at 18 0800   Gross per 24 hour   Intake             1320 ml   Output              645 ml   Net              675 ml       Labs:      CBC: Lab Results   Component Value Date    WBC 16.1 2018    RBC 5.03 2018    HGB 14.8 2018    HCT 44.9 2018    MCV 89.2 2018    MCH 29.4 2018    MCHC 32.9 2018    RDW 14.5 2018     2018    MPV 7.2 2018     CBC with Differential:  Lab Results   Component Value Date    WBC 16.1 2018    RBC 5.03 2018    HGB 14.8 2018    HCT 44.9 2018 02/28/2018    Symptomatic cholelithiasis [K80.20] 02/28/2018    UTI (urinary tract infection) [N39.0] 02/28/2018    Elevated liver enzymes [R74.8] 02/28/2018    Type 2 diabetes mellitus without complication, without long-term current use of insulin ([facility] [E11.9] 10/04/2016    Acquired hypothyroidism [E03.9] 09/19/2014     Past Medical History:   Diagnosis Date    Cancer ([facility]     Carotid artery stenosis     Cataract     Cerebrovascular disease     Heart attack     Hypertension     Hypothyroidism     Type II or unspecified type diabetes mellitus without mention of complication, not stated as uncontrolled         Plan:        1. Cont surgical f/u  2. F/u LFTs  3. May need KUB  4. Surgeon to see today  5.  He was encouraged to ambulate     Explained to the patient and d/W Nursing Staff  Will F/U with you  Please call or Page for any issues or change in status  Thanks    Electronically signed by Mirela Garces MD on 3/4/2018 at 10:08 AM

## 2018-03-05 ENCOUNTER — APPOINTMENT (OUTPATIENT)
Dept: GENERAL RADIOLOGY | Age: 83
DRG: 418 | End: 2018-03-05
Payer: MEDICARE

## 2018-03-05 LAB
ALBUMIN SERPL-MCNC: 2.9 G/DL (ref 3.5–5.2)
ALBUMIN/GLOBULIN RATIO: ABNORMAL (ref 1–2.5)
ALP BLD-CCNC: 200 U/L (ref 40–129)
ALT SERPL-CCNC: 78 U/L (ref 5–41)
ANION GAP SERPL CALCULATED.3IONS-SCNC: 13 MMOL/L (ref 9–17)
AST SERPL-CCNC: 34 U/L
BILIRUB SERPL-MCNC: 1.42 MG/DL (ref 0.3–1.2)
BILIRUBIN DIRECT: 0.59 MG/DL
BILIRUBIN, INDIRECT: 0.83 MG/DL (ref 0–1)
BUN BLDV-MCNC: 13 MG/DL (ref 8–23)
BUN/CREAT BLD: ABNORMAL (ref 9–20)
CALCIUM SERPL-MCNC: 9.2 MG/DL (ref 8.6–10.4)
CHLORIDE BLD-SCNC: 100 MMOL/L (ref 98–107)
CO2: 24 MMOL/L (ref 20–31)
CREAT SERPL-MCNC: 1.35 MG/DL (ref 0.7–1.2)
GFR AFRICAN AMERICAN: >60 ML/MIN
GFR NON-AFRICAN AMERICAN: 50 ML/MIN
GFR SERPL CREATININE-BSD FRML MDRD: ABNORMAL ML/MIN/{1.73_M2}
GFR SERPL CREATININE-BSD FRML MDRD: ABNORMAL ML/MIN/{1.73_M2}
GLOBULIN: ABNORMAL G/DL (ref 1.5–3.8)
GLUCOSE BLD-MCNC: 206 MG/DL (ref 70–99)
HCT VFR BLD CALC: 44.4 % (ref 41–53)
HEMOGLOBIN: 14.9 G/DL (ref 13.5–17.5)
MCH RBC QN AUTO: 29.3 PG (ref 26–34)
MCHC RBC AUTO-ENTMCNC: 33.6 G/DL (ref 31–37)
MCV RBC AUTO: 87.1 FL (ref 80–100)
NRBC AUTOMATED: ABNORMAL PER 100 WBC
PDW BLD-RTO: 14.6 % (ref 11.5–14.9)
PLATELET # BLD: 250 K/UL (ref 150–450)
PMV BLD AUTO: 7.2 FL (ref 6–12)
POTASSIUM SERPL-SCNC: 4.3 MMOL/L (ref 3.7–5.3)
RBC # BLD: 5.09 M/UL (ref 4.5–5.9)
SODIUM BLD-SCNC: 137 MMOL/L (ref 135–144)
SURGICAL PATHOLOGY REPORT: NORMAL
TOTAL PROTEIN: 8.7 G/DL (ref 6.4–8.3)
WBC # BLD: 14.6 K/UL (ref 3.5–11)

## 2018-03-05 PROCEDURE — 80076 HEPATIC FUNCTION PANEL: CPT

## 2018-03-05 PROCEDURE — 2580000003 HC RX 258: Performed by: NURSE PRACTITIONER

## 2018-03-05 PROCEDURE — 99232 SBSQ HOSP IP/OBS MODERATE 35: CPT | Performed by: INTERNAL MEDICINE

## 2018-03-05 PROCEDURE — 2060000000 HC ICU INTERMEDIATE R&B

## 2018-03-05 PROCEDURE — 93970 EXTREMITY STUDY: CPT

## 2018-03-05 PROCEDURE — 74018 RADEX ABDOMEN 1 VIEW: CPT

## 2018-03-05 PROCEDURE — 6370000000 HC RX 637 (ALT 250 FOR IP): Performed by: INTERNAL MEDICINE

## 2018-03-05 PROCEDURE — 6370000000 HC RX 637 (ALT 250 FOR IP): Performed by: NURSE PRACTITIONER

## 2018-03-05 PROCEDURE — 36415 COLL VENOUS BLD VENIPUNCTURE: CPT

## 2018-03-05 PROCEDURE — 85027 COMPLETE CBC AUTOMATED: CPT

## 2018-03-05 PROCEDURE — 6370000000 HC RX 637 (ALT 250 FOR IP): Performed by: SURGERY

## 2018-03-05 PROCEDURE — 6360000002 HC RX W HCPCS: Performed by: NURSE PRACTITIONER

## 2018-03-05 PROCEDURE — 6360000002 HC RX W HCPCS: Performed by: SURGERY

## 2018-03-05 PROCEDURE — 80048 BASIC METABOLIC PNL TOTAL CA: CPT

## 2018-03-05 RX ADMIN — Medication 10 ML: at 09:40

## 2018-03-05 RX ADMIN — OXYBUTYNIN CHLORIDE 5 MG: 5 TABLET, FILM COATED, EXTENDED RELEASE ORAL at 16:42

## 2018-03-05 RX ADMIN — PIPERACILLIN SODIUM AND TAZOBACTAM SODIUM 3.38 G: 3; .375 INJECTION, POWDER, LYOPHILIZED, FOR SOLUTION INTRAVENOUS at 18:53

## 2018-03-05 RX ADMIN — BISACODYL 10 MG: 10 SUPPOSITORY RECTAL at 11:47

## 2018-03-05 RX ADMIN — ENOXAPARIN SODIUM 30 MG: 30 INJECTION SUBCUTANEOUS at 09:37

## 2018-03-05 RX ADMIN — Medication 500 MG: at 09:37

## 2018-03-05 RX ADMIN — Medication 10 ML: at 19:53

## 2018-03-05 RX ADMIN — METOPROLOL SUCCINATE 25 MG: 25 TABLET, EXTENDED RELEASE ORAL at 09:37

## 2018-03-05 RX ADMIN — TAMSULOSIN HYDROCHLORIDE 0.4 MG: 0.4 CAPSULE ORAL at 19:53

## 2018-03-05 RX ADMIN — PIPERACILLIN SODIUM AND TAZOBACTAM SODIUM 3.38 G: 3; .375 INJECTION, POWDER, LYOPHILIZED, FOR SOLUTION INTRAVENOUS at 11:01

## 2018-03-05 RX ADMIN — ASPIRIN 81 MG: 81 TABLET, COATED ORAL at 09:37

## 2018-03-05 RX ADMIN — FAMOTIDINE 20 MG: 20 TABLET, FILM COATED ORAL at 16:42

## 2018-03-05 RX ADMIN — DOCUSATE SODIUM 100 MG: 100 CAPSULE, LIQUID FILLED ORAL at 19:53

## 2018-03-05 RX ADMIN — POLYETHYLENE GLYCOL 3350 17 G: 17 POWDER, FOR SOLUTION ORAL at 09:39

## 2018-03-05 RX ADMIN — ATORVASTATIN CALCIUM 20 MG: 20 TABLET, FILM COATED ORAL at 19:53

## 2018-03-05 RX ADMIN — LEVOTHYROXINE SODIUM 137 MCG: 0.14 TABLET ORAL at 05:13

## 2018-03-05 ASSESSMENT — PAIN SCALES - GENERAL: PAINLEVEL_OUTOF10: 0

## 2018-03-05 NOTE — PROGRESS NOTES
No cp  No sob  abd pain    rrr  Distended abd   Vitals:    03/05/18 1439   BP: (!) 145/62   Pulse: 68   Resp: 20   Temp: 98.6 °F (37 °C)   SpO2: 96%     n  Impression/  1. CAD  2. ICM, EF 30%  3. Abdominal distension, surgery evaluating, KUB ordered.     No changes

## 2018-03-05 NOTE — PROGRESS NOTES
diabetes mellitus without complication, without long-term current use of insulin (HCC)    Abdominal pain    UTI (urinary tract infection)    Elevated liver enzymes    Biliary colic    Wound, open, knee, lower leg, or ankle with complication, left, initial encounter  Resolved Problems:    * No resolved hospital problems. *  Status post laparoscopic cholecystectomy    Plan  1. We will obtain a KUB  2. Dulcolax suppository  3.  Up as tolerated

## 2018-03-05 NOTE — PROGRESS NOTES
Physical Therapy  DATE: 3/5/2018    NAME: Lupe Sanderson  MRN: [id number]   : 1928    Patient not seen this date for Physical Therapy due to:  [] Blood transfusion in progress  [] Hemodialysis  []  Patient Declined  [] Spine Precautions   [] Strict Bedrest  [] Surgery/ Procedure  [] Testing      [x] Other Await Doppler scan. [] PT being discontinued at this time. Patient independent. No further needs. [] PT being discontinued at this time as the patient has been transferred to palliative care. No further needs.     Vlad Hernandez, PT

## 2018-03-05 NOTE — FLOWSHEET NOTE
03/05/18 1229   Encounter Summary   Services provided to: Patient   Referral/Consult From: Zara Jimenez Visiting (3/5/18)   Volunteer Visit Yes   Complexity of Encounter Low   Length of Encounter 15 minutes   Spiritual/Rastafarian   Type Spiritual support   Intervention Communion   Sacraments   Communion Patient received communion

## 2018-03-06 LAB
-: ABNORMAL
ALBUMIN SERPL-MCNC: 2.3 G/DL (ref 3.5–5.2)
ALBUMIN/GLOBULIN RATIO: ABNORMAL (ref 1–2.5)
ALP BLD-CCNC: 155 U/L (ref 40–129)
ALT SERPL-CCNC: 54 U/L (ref 5–41)
AMORPHOUS: ABNORMAL
ANION GAP SERPL CALCULATED.3IONS-SCNC: 13 MMOL/L (ref 9–17)
AST SERPL-CCNC: 32 U/L
BACTERIA: ABNORMAL
BILIRUB SERPL-MCNC: 1.19 MG/DL (ref 0.3–1.2)
BILIRUBIN DIRECT: 0.37 MG/DL
BILIRUBIN URINE: NEGATIVE
BILIRUBIN, INDIRECT: 0.82 MG/DL (ref 0–1)
BUN BLDV-MCNC: 15 MG/DL (ref 8–23)
BUN/CREAT BLD: ABNORMAL (ref 9–20)
CALCIUM SERPL-MCNC: 8.5 MG/DL (ref 8.6–10.4)
CASTS UA: ABNORMAL /LPF
CHLORIDE BLD-SCNC: 100 MMOL/L (ref 98–107)
CO2: 23 MMOL/L (ref 20–31)
COLOR: YELLOW
COMMENT UA: ABNORMAL
CREAT SERPL-MCNC: 1.27 MG/DL (ref 0.7–1.2)
CRYSTALS, UA: ABNORMAL /HPF
EPITHELIAL CELLS UA: ABNORMAL /HPF
GFR AFRICAN AMERICAN: >60 ML/MIN
GFR NON-AFRICAN AMERICAN: 53 ML/MIN
GFR SERPL CREATININE-BSD FRML MDRD: ABNORMAL ML/MIN/{1.73_M2}
GFR SERPL CREATININE-BSD FRML MDRD: ABNORMAL ML/MIN/{1.73_M2}
GLOBULIN: ABNORMAL G/DL (ref 1.5–3.8)
GLUCOSE BLD-MCNC: 214 MG/DL (ref 70–99)
GLUCOSE URINE: NEGATIVE
HCT VFR BLD CALC: 41.3 % (ref 41–53)
HEMOGLOBIN: 14.1 G/DL (ref 13.5–17.5)
KETONES, URINE: NEGATIVE
LEUKOCYTE ESTERASE, URINE: NEGATIVE
MCH RBC QN AUTO: 29.6 PG (ref 26–34)
MCHC RBC AUTO-ENTMCNC: 34.3 G/DL (ref 31–37)
MCV RBC AUTO: 86.5 FL (ref 80–100)
MUCUS: ABNORMAL
NITRITE, URINE: NEGATIVE
NRBC AUTOMATED: NORMAL PER 100 WBC
OTHER OBSERVATIONS UA: ABNORMAL
PDW BLD-RTO: 14.7 % (ref 11.5–14.9)
PH UA: 6 (ref 5–8)
PLATELET # BLD: 276 K/UL (ref 150–450)
PMV BLD AUTO: 7.2 FL (ref 6–12)
POTASSIUM SERPL-SCNC: 3.9 MMOL/L (ref 3.7–5.3)
PROTEIN UA: ABNORMAL
RBC # BLD: 4.77 M/UL (ref 4.5–5.9)
RBC UA: ABNORMAL /HPF
RENAL EPITHELIAL, UA: ABNORMAL /HPF
SODIUM BLD-SCNC: 136 MMOL/L (ref 135–144)
SPECIFIC GRAVITY UA: 1.02 (ref 1–1.03)
TOTAL PROTEIN: 7.5 G/DL (ref 6.4–8.3)
TRICHOMONAS: ABNORMAL
TURBIDITY: ABNORMAL
URINE HGB: ABNORMAL
UROBILINOGEN, URINE: NORMAL
WBC # BLD: 10.8 K/UL (ref 3.5–11)
WBC UA: ABNORMAL /HPF
YEAST: ABNORMAL

## 2018-03-06 PROCEDURE — 6370000000 HC RX 637 (ALT 250 FOR IP): Performed by: INTERNAL MEDICINE

## 2018-03-06 PROCEDURE — 97535 SELF CARE MNGMENT TRAINING: CPT

## 2018-03-06 PROCEDURE — 99232 SBSQ HOSP IP/OBS MODERATE 35: CPT | Performed by: INTERNAL MEDICINE

## 2018-03-06 PROCEDURE — 81001 URINALYSIS AUTO W/SCOPE: CPT

## 2018-03-06 PROCEDURE — G8980 MOBILITY D/C STATUS: HCPCS

## 2018-03-06 PROCEDURE — G8988 SELF CARE GOAL STATUS: HCPCS

## 2018-03-06 PROCEDURE — 36415 COLL VENOUS BLD VENIPUNCTURE: CPT

## 2018-03-06 PROCEDURE — 6360000002 HC RX W HCPCS: Performed by: SURGERY

## 2018-03-06 PROCEDURE — 99233 SBSQ HOSP IP/OBS HIGH 50: CPT | Performed by: INTERNAL MEDICINE

## 2018-03-06 PROCEDURE — 97162 PT EVAL MOD COMPLEX 30 MIN: CPT

## 2018-03-06 PROCEDURE — 6360000002 HC RX W HCPCS: Performed by: NURSE PRACTITIONER

## 2018-03-06 PROCEDURE — 6370000000 HC RX 637 (ALT 250 FOR IP): Performed by: NURSE PRACTITIONER

## 2018-03-06 PROCEDURE — 97166 OT EVAL MOD COMPLEX 45 MIN: CPT

## 2018-03-06 PROCEDURE — 2580000003 HC RX 258: Performed by: NURSE PRACTITIONER

## 2018-03-06 PROCEDURE — G8987 SELF CARE CURRENT STATUS: HCPCS

## 2018-03-06 PROCEDURE — G8978 MOBILITY CURRENT STATUS: HCPCS

## 2018-03-06 PROCEDURE — 6370000000 HC RX 637 (ALT 250 FOR IP): Performed by: SURGERY

## 2018-03-06 PROCEDURE — 80076 HEPATIC FUNCTION PANEL: CPT

## 2018-03-06 PROCEDURE — 85027 COMPLETE CBC AUTOMATED: CPT

## 2018-03-06 PROCEDURE — 80048 BASIC METABOLIC PNL TOTAL CA: CPT

## 2018-03-06 PROCEDURE — 97530 THERAPEUTIC ACTIVITIES: CPT

## 2018-03-06 PROCEDURE — 1200000000 HC SEMI PRIVATE

## 2018-03-06 PROCEDURE — 87086 URINE CULTURE/COLONY COUNT: CPT

## 2018-03-06 RX ADMIN — ASPIRIN 81 MG: 81 TABLET, COATED ORAL at 08:56

## 2018-03-06 RX ADMIN — OXYBUTYNIN CHLORIDE 5 MG: 5 TABLET, FILM COATED, EXTENDED RELEASE ORAL at 17:09

## 2018-03-06 RX ADMIN — ACETAMINOPHEN 650 MG: 325 TABLET, FILM COATED ORAL at 22:04

## 2018-03-06 RX ADMIN — PIPERACILLIN SODIUM AND TAZOBACTAM SODIUM 3.38 G: 3; .375 INJECTION, POWDER, LYOPHILIZED, FOR SOLUTION INTRAVENOUS at 18:46

## 2018-03-06 RX ADMIN — DOCUSATE SODIUM 100 MG: 100 CAPSULE, LIQUID FILLED ORAL at 20:36

## 2018-03-06 RX ADMIN — POLYETHYLENE GLYCOL 3350 17 G: 17 POWDER, FOR SOLUTION ORAL at 08:57

## 2018-03-06 RX ADMIN — FAMOTIDINE 20 MG: 20 TABLET, FILM COATED ORAL at 17:09

## 2018-03-06 RX ADMIN — PIPERACILLIN SODIUM AND TAZOBACTAM SODIUM 3.38 G: 3; .375 INJECTION, POWDER, LYOPHILIZED, FOR SOLUTION INTRAVENOUS at 03:49

## 2018-03-06 RX ADMIN — TAMSULOSIN HYDROCHLORIDE 0.4 MG: 0.4 CAPSULE ORAL at 20:36

## 2018-03-06 RX ADMIN — LEVOTHYROXINE SODIUM 137 MCG: 0.14 TABLET ORAL at 05:04

## 2018-03-06 RX ADMIN — ENOXAPARIN SODIUM 30 MG: 30 INJECTION SUBCUTANEOUS at 08:56

## 2018-03-06 RX ADMIN — ATORVASTATIN CALCIUM 20 MG: 20 TABLET, FILM COATED ORAL at 20:36

## 2018-03-06 RX ADMIN — Medication 500 MG: at 08:56

## 2018-03-06 RX ADMIN — PIPERACILLIN SODIUM AND TAZOBACTAM SODIUM 3.38 G: 3; .375 INJECTION, POWDER, LYOPHILIZED, FOR SOLUTION INTRAVENOUS at 10:50

## 2018-03-06 RX ADMIN — METOPROLOL SUCCINATE 25 MG: 25 TABLET, EXTENDED RELEASE ORAL at 08:56

## 2018-03-06 ASSESSMENT — PAIN DESCRIPTION - PAIN TYPE
TYPE: SURGICAL PAIN;CHRONIC PAIN
TYPE: ACUTE PAIN
TYPE: SURGICAL PAIN;ACUTE PAIN

## 2018-03-06 ASSESSMENT — PAIN SCALES - GENERAL
PAINLEVEL_OUTOF10: 9

## 2018-03-06 ASSESSMENT — PAIN DESCRIPTION - LOCATION
LOCATION: BACK;LEG;ABDOMEN
LOCATION: GENERALIZED
LOCATION: GENERALIZED

## 2018-03-06 ASSESSMENT — PAIN DESCRIPTION - FREQUENCY
FREQUENCY: CONTINUOUS
FREQUENCY: CONTINUOUS

## 2018-03-06 ASSESSMENT — PAIN DESCRIPTION - DESCRIPTORS
DESCRIPTORS: ACHING
DESCRIPTORS: DISCOMFORT;CONSTANT

## 2018-03-06 ASSESSMENT — PAIN DESCRIPTION - PROGRESSION: CLINICAL_PROGRESSION: NOT CHANGED
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
## 2018-03-06 NOTE — PROGRESS NOTES
Discussed updates and plan of care with Juan Nunez, daughter over the phone with patient's permission.

## 2018-03-06 NOTE — PROGRESS NOTES
on RL LE  Distance: 4 stepsx 2  Comments: BSC> BED> CHair              Assessment   Body structures, Functions, Activity limitations: Decreased functional mobility ; Decreased ROM; Decreased strength;Decreased balance  Assessment: Pt needs mod a x 2 for mobility with RW, R Knee/ankle ROM impaired with pain, limiting activity. Treatment Diagnosis: Impaired mobility  Prognosis: Fair  Decision Making: Medium Complexity  Patient Education: PT POC/GOAL, posture, sit to stand  REQUIRES PT FOLLOW UP: Yes  Activity Tolerance  Activity Tolerance: Patient limited by pain; Patient limited by fatigue     Discharge Recommendations:  [address]  Times per week: 5 to 7 x/wk  Current Treatment Recommendations: Strengthening, ROM, Balance Training, Functional Mobility Training, Transfer Training, Gait Training, Safety Education & Training, Patient/Caregiver Education & Training  Safety Devices  Type of devices: Call light within reach, Chair alarm in place, Gait belt, Left in chair, Nurse notified    G-Code  PT G-Codes  Functional Assessment Tool Used: AM-PAC Mobility without Stair Climbing Inpatient   Score: 9  Functional Limitation: Mobility: Walking and moving around  Mobility: Walking and Moving Around Current Status (): At least 60 percent but less than 80 percent impaired, limited or restricted  Mobility: Walking and Moving Around Discharge Status (): At least 40 percent but less than 60 percent impaired, limited or restricted  OutComes Score                                           AM-PAC Score     AM-PAC Inpatient Mobility without Stair Climbing Raw Score : 9  AM-PAC Inpatient without Stair Climbing T-Scale Score : 32.44  Mobility Inpatient CMS 0-100% Score: 76.07  Mobility Inpatient without Stair CMS G-Code Modifier : CL       Goals  Short term goals  Time Frame for Short term goals: 7 visits.   Short term goal 1: Bed mobility mod a  Short term goal 2: Transfers modA  Short

## 2018-03-06 NOTE — PROGRESS NOTES
[facility]    Date:   3/6/2018  Patient name:  Marlowe Cabot  Date of admission:  2/27/2018  9:12 PM  MRN:   [id number]  YOB: 1928      HPI overnight no fever chills nausea no vomiting              REVIEW OF SYSTEMS:    · Cardiovascular: Negative for lightheadedness/orthostatic symptoms ,chest pain, dyspnea on exertion, palpitations or loss of consciousness. · Respiratory: Negative for cough or wheezing, sputum production, hemoptysis, pleuritic pain. Gastrointestinal: post op abdo pain    Drain in plac      OBJECTIVE:    /66   Pulse 60   Temp 97.5 °F (36.4 °C) (Oral)   Resp 16   Ht 5' 5\" (1.651 m)   Wt 161 lb 9.6 oz (73.3 kg)   SpO2 93%   BMI 26.89 kg/m²      · Lungs: clear to auscultation bilaterally,no wheeze. · Heart: regular rate and rhythm, S1, S2 normal, no murmur, click, rub or gallop  · Abdomen soft tender with drain in situe  · Post lap geraldo  ·   · Extremities: extremities normal, atraumatic, no cyanosis or edema  · Neurological:  Reflexes normal and symmetric. Sensation grossly normal  · Skin - no rash, no lump   · Eye- no icterus no redness  · GI-oral mucosa moist,  · Lymphatic system-no lymphadenopathy no splenomegaly  · Mouth- mucous membrane moist  · Head-normocephalic atraumatic  · Neck- supple no carotid bruit,Thyroid not palpable. Laboratory Testing:  CBC:   Recent Labs      03/06/18   0525   WBC  10.8   HGB  14.1   PLT  276     BMP:    Recent Labs      03/04/18   0530  03/05/18   0603  03/06/18   0525   NA  138  137  136   K  4.5  4.3  3.9   CL  100  100  100   CO2  23  24  23   BUN  13  13  15   CREATININE  1.36*  1.35*  1.27*   GLUCOSE  209*  206*  214*     S. Calcium:  Recent Labs      03/06/18   0525   CALCIUM  8.5*     S. Ionized Calcium:No results for input(s): IONCA in the last 72 hours. S. Magnesium:No results for input(s): MG in the last 72 hours.   S. 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 biopsy-proven  Patient wants to go for surgery case discussed with the cardiology  From medical standpoint is a high risk but clear for surgery  March 2  Post op seen in icu  extubaed  Drain  in place  March 3  Bilirubin improved  Case discussed with dr Deanna Sullivan  He was unable to get pass catheter in cbd due to diff anamtomy too thin  Uncomplicated lap geraldo  Ok to step down  F/u iwht lft  On dvt prophy  March 4  dvt scan  Diet is advanced  March 5  dvt scan pending  D/c plan  lft noted  March 6  Had bm  Drain is less now  dvt scan negative  D/c plan to [facility] vs home with home care  PT input pending        MD LE Ramirez[address], [address] [place] [address].    Phone [phone]   Fax: [phone]  Answering Service: [phone]

## 2018-03-06 NOTE — PROGRESS NOTES
tablet 40 mEq PRN   Or    potassium chloride 20 MEQ/15ML (10%) oral solution 40 mEq PRN   Or    potassium chloride 10 mEq/100 mL IVPB (Peripheral Line) PRN   magnesium sulfate 1 g in dextrose 5% 100 mL IVPB PRN   acetaminophen (TYLENOL) tablet 650 mg Q4H PRN   magnesium hydroxide (MILK OF MAGNESIA) 400 MG/5ML suspension 30 mL Daily PRN   bisacodyl (DULCOLAX) suppository 10 mg Daily PRN   piperacillin-tazobactam (ZOSYN) 3.375 g in dextrose 5 % 50 mL IVPB (mini-bag) Q8H       Data:     Code Status:  Full Code    Family History   Problem Relation Age of Onset    Cancer Mother      stomach    Other Father      pneumonia       Social History     Social History    Marital status:      Spouse name: N/A    Number of children: N/A    Years of education: N/A     Occupational History    Retired [employer]     Social History Main Topics    Smoking status: Never Smoker    Smokeless tobacco: Never Used    Alcohol use No    Drug use: No    Sexual activity: Not on file     Other Topics Concern    Not on file     Social History Narrative    No narrative on file       Vitals:  BP (!) 143/61   Pulse 65   Temp 97.8 °F (36.6 °C) (Oral)   Resp 16   Ht 5' 5\" (1.651 m)   Wt 161 lb 9.6 oz (73.3 kg)   SpO2 97%   BMI 26.89 kg/m²   Temp (24hrs), Av.9 °F (36.6 °C), Min:97.5 °F (36.4 °C), Max:98.6 °F (37 °C)    No results for input(s): POCGLU in the last 72 hours. I/O (24Hr):     Intake/Output Summary (Last 24 hours) at 18 1403  Last data filed at 18 0856   Gross per 24 hour   Intake           314.79 ml   Output              485 ml   Net          -170.21 ml       Labs:      CBC: Lab Results   Component Value Date    WBC 10.8 2018    RBC 4.77 2018    HGB 14.1 2018    HCT 41.3 2018    MCV 86.5 2018    MCH 29.6 2018    MCHC 34.3 2018    RDW 14.7 2018     2018    MPV 7.2 2018     CBC with Differential:  Lab Results   Component Value
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
## 2018-03-06 NOTE — PROGRESS NOTES
During hourly rounding, patient awake, and asked to use urinal.  Assisted him with it and 100 ml output. Changed brief which was soiled with urine. Repositioned for comfort x 2 assist.   Patient denies further needs. Sample was compromised before it could be sent to lab for UA, which was ordered. New urinal and specimin cup placed in room.

## 2018-03-06 NOTE — PROGRESS NOTES
Occupational Therapy  OCCUPATIONAL THERAPY -  PAC  Daily Activity  Used For G-Code Determination  Date: 3/6/2018  Patient Name: Mariluz Lerner      MRN: [id number]  Account #: [de-identified]  : 1928  (80 y.o.)Gender: male     How much help from another person does the patient currently need? (Higher the score, the more Independent) Admission   Score Goal  Score Discharge  Score   1. Putting on and taking off regular lower body clothing? 1 2    2. Bathing (including washing, rinsing, drying)? 1 2    3. Toileting, which includes using toilet, bedpan, or urinal? 1 2    4. Putting on and taking off regular upper body clothing? 3 4    5. Taking care of personal grooming such as brushing teeth? 3 3    6. Eating meals? 3 3    Total Score -  Modifier    12  CL 16  CK    OT Self-Care Functions Charge #  G Code   [id number]  OT  [id number]  OT  [id number]  OT        1. Unable =  Total/Dependent Assist    2. A Lot =   Maximum/Moderate Assist   3. A Little =  Minimum/Contact Guard Assist/Supervision     4.  None =  Modified [place]/Independent    Raw Score Scale Score Scale Score Standard Error Approximate Degree of Functional Impairment Modifier   24 57.54 7.36 0% CH   23 51.12 5.88 16% CI   22-20 47.10-42.03 4.81-3.55 26%-38% CJ   19-15 40.22-34.69 3.20-2.65 43%-56% CK   14-10 33.39-27.13 2.61-2.96 60%-75% CL   9-7 25.33-20.13 3.17-3.68 80%-92% CM   6 17.07 3.74 100% CN         Dave Avalos, OT

## 2018-03-06 NOTE — CARE COORDINATION
ONGOING DISCHARGE PLAN:    LSW following the patient for discharge planning with anticipated discharge to [facility]. Physical Therapy recommending SNF. Active order for IV zosyn. Will continue to follow for additional discharge needs.     Electronically signed by Yesika Lou RN on 3/6/2018 at 3:45 PM

## 2018-03-07 LAB
ALBUMIN SERPL-MCNC: 2.4 G/DL (ref 3.5–5.2)
ALBUMIN/GLOBULIN RATIO: ABNORMAL (ref 1–2.5)
ALP BLD-CCNC: 160 U/L (ref 40–129)
ALT SERPL-CCNC: 49 U/L (ref 5–41)
ANION GAP SERPL CALCULATED.3IONS-SCNC: 14 MMOL/L (ref 9–17)
AST SERPL-CCNC: 31 U/L
BILIRUB SERPL-MCNC: 1.14 MG/DL (ref 0.3–1.2)
BILIRUBIN DIRECT: 0.42 MG/DL
BILIRUBIN, INDIRECT: 0.72 MG/DL (ref 0–1)
BUN BLDV-MCNC: 16 MG/DL (ref 8–23)
BUN/CREAT BLD: ABNORMAL (ref 9–20)
CALCIUM SERPL-MCNC: 9 MG/DL (ref 8.6–10.4)
CHLORIDE BLD-SCNC: 97 MMOL/L (ref 98–107)
CO2: 25 MMOL/L (ref 20–31)
CREAT SERPL-MCNC: 1.26 MG/DL (ref 0.7–1.2)
GFR AFRICAN AMERICAN: >60 ML/MIN
GFR NON-AFRICAN AMERICAN: 54 ML/MIN
GFR SERPL CREATININE-BSD FRML MDRD: ABNORMAL ML/MIN/{1.73_M2}
GFR SERPL CREATININE-BSD FRML MDRD: ABNORMAL ML/MIN/{1.73_M2}
GLOBULIN: ABNORMAL G/DL (ref 1.5–3.8)
GLUCOSE BLD-MCNC: 120 MG/DL (ref 75–110)
GLUCOSE BLD-MCNC: 201 MG/DL (ref 70–99)
GLUCOSE BLD-MCNC: 224 MG/DL (ref 75–110)
GLUCOSE BLD-MCNC: 233 MG/DL (ref 75–110)
HCT VFR BLD CALC: 44.4 % (ref 41–53)
HEMOGLOBIN: 15 G/DL (ref 13.5–17.5)
MCH RBC QN AUTO: 30.2 PG (ref 26–34)
MCHC RBC AUTO-ENTMCNC: 33.7 G/DL (ref 31–37)
MCV RBC AUTO: 89.8 FL (ref 80–100)
NRBC AUTOMATED: ABNORMAL PER 100 WBC
PDW BLD-RTO: 14.9 % (ref 11.5–14.9)
PLATELET # BLD: 326 K/UL (ref 150–450)
PMV BLD AUTO: 7.1 FL (ref 6–12)
POTASSIUM SERPL-SCNC: 4.2 MMOL/L (ref 3.7–5.3)
RBC # BLD: 4.95 M/UL (ref 4.5–5.9)
SODIUM BLD-SCNC: 136 MMOL/L (ref 135–144)
TOTAL PROTEIN: 8.3 G/DL (ref 6.4–8.3)
WBC # BLD: 12 K/UL (ref 3.5–11)

## 2018-03-07 PROCEDURE — 80076 HEPATIC FUNCTION PANEL: CPT

## 2018-03-07 PROCEDURE — 6370000000 HC RX 637 (ALT 250 FOR IP): Performed by: INTERNAL MEDICINE

## 2018-03-07 PROCEDURE — 99232 SBSQ HOSP IP/OBS MODERATE 35: CPT | Performed by: INTERNAL MEDICINE

## 2018-03-07 PROCEDURE — 2580000003 HC RX 258: Performed by: NURSE PRACTITIONER

## 2018-03-07 PROCEDURE — 6370000000 HC RX 637 (ALT 250 FOR IP): Performed by: NURSE PRACTITIONER

## 2018-03-07 PROCEDURE — 97530 THERAPEUTIC ACTIVITIES: CPT

## 2018-03-07 PROCEDURE — 85027 COMPLETE CBC AUTOMATED: CPT

## 2018-03-07 PROCEDURE — 6370000000 HC RX 637 (ALT 250 FOR IP): Performed by: SURGERY

## 2018-03-07 PROCEDURE — 80048 BASIC METABOLIC PNL TOTAL CA: CPT

## 2018-03-07 PROCEDURE — 6360000002 HC RX W HCPCS: Performed by: SURGERY

## 2018-03-07 PROCEDURE — 82947 ASSAY GLUCOSE BLOOD QUANT: CPT

## 2018-03-07 PROCEDURE — 97535 SELF CARE MNGMENT TRAINING: CPT

## 2018-03-07 PROCEDURE — 6360000002 HC RX W HCPCS: Performed by: NURSE PRACTITIONER

## 2018-03-07 PROCEDURE — 1200000000 HC SEMI PRIVATE

## 2018-03-07 PROCEDURE — 36415 COLL VENOUS BLD VENIPUNCTURE: CPT

## 2018-03-07 RX ORDER — CALCIUM CARBONATE 200(500)MG
1 TABLET,CHEWABLE ORAL 2 TIMES DAILY PRN
Qty: 30 TABLET | Refills: 0 | DISCHARGE
Start: 2018-03-07

## 2018-03-07 RX ORDER — ACETAMINOPHEN 325 MG/1
650 TABLET ORAL EVERY 6 HOURS PRN
Qty: 120 TABLET | Refills: 0 | DISCHARGE
Start: 2018-03-07 | End: 2018-04-17 | Stop reason: ALTCHOICE

## 2018-03-07 RX ORDER — POLYETHYLENE GLYCOL 3350 17 G/17G
17 POWDER, FOR SOLUTION ORAL DAILY
Qty: 527 G | Refills: 1 | Status: ON HOLD | OUTPATIENT
Start: 2018-03-08 | End: 2018-04-09 | Stop reason: HOSPADM

## 2018-03-07 RX ORDER — METOPROLOL SUCCINATE 25 MG/1
25 TABLET, EXTENDED RELEASE ORAL DAILY
Qty: 30 TABLET | Refills: 3 | Status: SHIPPED | OUTPATIENT
Start: 2018-03-08 | End: 2018-04-17 | Stop reason: ALTCHOICE

## 2018-03-07 RX ORDER — HYDROCODONE BITARTRATE AND ACETAMINOPHEN 5; 325 MG/1; MG/1
1 TABLET ORAL EVERY 6 HOURS PRN
Status: DISCONTINUED | OUTPATIENT
Start: 2018-03-07 | End: 2018-03-09 | Stop reason: HOSPADM

## 2018-03-07 RX ADMIN — ATORVASTATIN CALCIUM 20 MG: 20 TABLET, FILM COATED ORAL at 19:42

## 2018-03-07 RX ADMIN — Medication 500 MG: at 09:10

## 2018-03-07 RX ADMIN — ACETAMINOPHEN 650 MG: 325 TABLET, FILM COATED ORAL at 14:52

## 2018-03-07 RX ADMIN — LEVOTHYROXINE SODIUM 137 MCG: 0.14 TABLET ORAL at 06:58

## 2018-03-07 RX ADMIN — POLYETHYLENE GLYCOL 3350 17 G: 17 POWDER, FOR SOLUTION ORAL at 09:10

## 2018-03-07 RX ADMIN — TAMSULOSIN HYDROCHLORIDE 0.4 MG: 0.4 CAPSULE ORAL at 19:42

## 2018-03-07 RX ADMIN — PIPERACILLIN SODIUM AND TAZOBACTAM SODIUM 3.38 G: 3; .375 INJECTION, POWDER, LYOPHILIZED, FOR SOLUTION INTRAVENOUS at 12:20

## 2018-03-07 RX ADMIN — ASPIRIN 81 MG: 81 TABLET, COATED ORAL at 09:10

## 2018-03-07 RX ADMIN — ACETAMINOPHEN 650 MG: 325 TABLET, FILM COATED ORAL at 09:10

## 2018-03-07 RX ADMIN — PIPERACILLIN SODIUM AND TAZOBACTAM SODIUM 3.38 G: 3; .375 INJECTION, POWDER, LYOPHILIZED, FOR SOLUTION INTRAVENOUS at 21:00

## 2018-03-07 RX ADMIN — PIPERACILLIN SODIUM AND TAZOBACTAM SODIUM 3.38 G: 3; .375 INJECTION, POWDER, LYOPHILIZED, FOR SOLUTION INTRAVENOUS at 03:12

## 2018-03-07 RX ADMIN — OXYBUTYNIN CHLORIDE 5 MG: 5 TABLET, FILM COATED, EXTENDED RELEASE ORAL at 19:42

## 2018-03-07 RX ADMIN — METOPROLOL SUCCINATE 25 MG: 25 TABLET, EXTENDED RELEASE ORAL at 09:10

## 2018-03-07 RX ADMIN — ENOXAPARIN SODIUM 30 MG: 30 INJECTION SUBCUTANEOUS at 09:10

## 2018-03-07 RX ADMIN — FAMOTIDINE 20 MG: 20 TABLET, FILM COATED ORAL at 19:42

## 2018-03-07 ASSESSMENT — PAIN SCALES - GENERAL
PAINLEVEL_OUTOF10: 6
PAINLEVEL_OUTOF10: 7
PAINLEVEL_OUTOF10: 8
PAINLEVEL_OUTOF10: 9

## 2018-03-07 ASSESSMENT — PAIN DESCRIPTION - PROGRESSION: CLINICAL_PROGRESSION: NOT CHANGED

## 2018-03-07 ASSESSMENT — PAIN DESCRIPTION - PAIN TYPE
TYPE: SURGICAL PAIN
TYPE: SURGICAL PAIN

## 2018-03-07 ASSESSMENT — PAIN DESCRIPTION - ORIENTATION
ORIENTATION: MID
ORIENTATION: MID

## 2018-03-07 ASSESSMENT — PAIN DESCRIPTION - LOCATION
LOCATION: ABDOMEN

## 2018-03-07 ASSESSMENT — PAIN DESCRIPTION - ONSET: ONSET: ON-GOING

## 2018-03-07 NOTE — PROGRESS NOTES
x1  Short term goal 4: Pt will participate in 15-20 minutes bilateral UE therapeutic exercises to increase bilateral UE strength, endurance, & shoulder reaching for selfcare activities    OT Individual Minutes  Time In: 1052  Time Out: 1114  Minutes: 22    Electronically signed by Katie Alejandra on 3/7/18 at 11:42 AM    [id number] (24 minutes)  OT returned in PM to complete ADL per pt request.  Pt seated in bedside chair and completed UB bathing with set-up A and LB bathing with Mod A. Pt stood to use urinal and attempted to place it himself. Pt spilled urine on clothing and floor. Pt required Max A to don/doff pants and Total A to change socks. Pt changed gown with Min A for snaps around IV and to tie in back. Pt declined to brush his teeth but did use mouthwash with set-up A only. Pt left in bedside chair with call light in reach. Pt verbalized understanding of need to use call light and accurately located \"nurse\" button.     Electronically signed by Katie Alejandra on 3/7/2018 at 1:45 PM

## 2018-03-07 NOTE — CARE COORDINATION
ONGOING DISCHARGE PLAN:    Plan remains for patient to be discharged to [facility], awaiting pre-cert. Remains on IV Zosyn. Will continue to follow for additional discharge needs.     Electronically signed by Leah Mata RN on 3/7/2018 at 2:26 PM

## 2018-03-07 NOTE — PROGRESS NOTES
HGB 15.0 03/07/2018    HCT 44.4 03/07/2018     03/07/2018    MCV 89.8 03/07/2018    MCH 30.2 03/07/2018    MCHC 33.7 03/07/2018    RDW 14.9 03/07/2018    LYMPHOPCT 4 02/27/2018    MONOPCT 5 02/27/2018    BASOPCT 0 02/27/2018    MONOSABS 0.50 02/27/2018    LYMPHSABS 0.40 02/27/2018    EOSABS 0.10 02/27/2018    BASOSABS 0.00 02/27/2018    DIFFTYPE NOT REPORTED 02/27/2018     Hemoglobin/Hematocrit:  Lab Results   Component Value Date    HGB 15.0 03/07/2018    HCT 44.4 03/07/2018     CMP:  Lab Results   Component Value Date     03/07/2018    K 4.2 03/07/2018    CL 97 03/07/2018    CO2 25 03/07/2018    BUN 16 03/07/2018    CREATININE 1.26 03/07/2018    GFRAA >60 03/07/2018    LABGLOM 54 03/07/2018    GLUCOSE 201 03/07/2018    GLUCOSE 143 04/16/2012    PROT 8.3 03/07/2018    LABALBU 2.4 03/07/2018    CALCIUM 9.0 03/07/2018    BILITOT 1.14 03/07/2018    ALKPHOS 160 03/07/2018    AST 31 03/07/2018    ALT 49 03/07/2018     BMP:  Lab Results   Component Value Date     03/07/2018    K 4.2 03/07/2018    CL 97 03/07/2018    CO2 25 03/07/2018    BUN 16 03/07/2018    LABALBU 2.4 03/07/2018    CREATININE 1.26 03/07/2018    CALCIUM 9.0 03/07/2018    GFRAA >60 03/07/2018    LABGLOM 54 03/07/2018    GLUCOSE 201 03/07/2018    GLUCOSE 143 04/16/2012     PT/INR:    Lab Results   Component Value Date    PROTIME 12.1 02/28/2018    INR 1.2 02/28/2018     PTT:    Lab Results   Component Value Date    APTT 26.7 12/13/2012   [APTT}    Physical Examination:        General appearance: alert, cooperative and no distress  Mental Status: oriented to person, place and time and anxious affect    Abdomen: soft, mild tender, mild distended, bowel sounds present       Assessment:        Primary Problem  Symptomatic cholelithiasis     Active Hospital Problems    Diagnosis Date Noted    Wound, open, knee, lower leg, or ankle with complication, left, initial encounter [S81.002A, H05.345U, S91.002A] 06/85/9177    Biliary colic

## 2018-03-07 NOTE — PROGRESS NOTES
[facility]: NORMAN ALEXANDRA   Physical Therapy Progress Note    Date: 3/7/18  Patient Name: Vic Nur       Room: 6465/7197-42  MRN: [id number]   Account: [de-identified]   : 1928  (80 y.o.)   Gender: male     Referring Practitioner: DR Marcelo Willis  Diagnosis: 3-2-18  Laparoscopic cholecystectomy. Cholecystitis  Restrictions/Precautions: Fall Risk (WILLIAM drain)  Implants present? : Pacemaker  Other position/activity restrictions: Patient reporting he is probably going to another facility for continued rehab. Per nursing, patient will be going to [facility]. Past Medical History:  has a past medical history of Cancer ([facility] [address] Carotid artery stenosis; Cataract; Cerebrovascular disease; Heart attack; Hypertension; Hypothyroidism; and Type II or unspecified type diabetes mellitus without mention of complication, not stated as uncontrolled. Past Surgical History:   has a past surgical history that includes shoulder surgery; Cardiac surgery; Coronary artery bypass graft; Appendectomy; Total Thyroidectomy; Pacemaker insertion (2015); and pr lap,cholecystectomy (N/A, 3/2/2018). Additional Pertinent Hx: Admitted for R quadrant pain. , liver enzymes elevated, -CT abdomen- hydropic gallbladder with gallstones. Underwent Laparoscopic cholecystectomy. on 3/2/18. Restrictions/Precautions  Restrictions/Precautions: Fall Risk (WILLIAM drain)  Implants present? : Pacemaker  Position Activity Restriction  Other position/activity restrictions: Patient reporting he is probably going to another facility for continued rehab. Per nursing, patient will be going to [facility]. Subjective: Patient requesting to use urinal standing at EOB. Comments: Assistance with urinal provided.     Vital Signs  Patient Currently in Pain: Yes  Pain Assessment: 0-10  Pain Level: 9  Pain Location: Abdomen  Pain Orientation: Mid  Pain Onset: On-going  Clinical Progression: Not changed  Pain Intervention(s): Medication (see eMar) Oxygen Therapy  O2 Device: None (Room air)  Patient Observation  Observations: Decreased safety awareness. Bed Mobility:   Bed Mobility  Rolling: Minimal assistance  Supine to Sit: Minimal assistance  Sit to Supine: Minimal assistance  Scooting: Stand by assistance  Bed mobility  Scooting: Stand by assistance    Transfers:  Sit to Stand: Minimal Assistance  Stand to sit: Minimal Assistance     Stand Pivot Transfers: Minimal Assistance (Rolling Walker)           Ambulation 1  Surface: level tile  Device: Rolling Walker  Other Apparatus:  (IV pole)  Assistance: Minimal assistance  Quality of Gait: Poor posture with head down. Distance: 15 ft  Comments: Patient ambulated around bed to bedside chair. Cues for safety        Stairs/Curb  Stairs?: No                                                           Other exercises?: Yes  Other exercises 1: Seated LE exercise 2/10 reps            Activity Tolerance: Patient limited by pain; Patient limited by fatigue          Assessment  Activity Tolerance: Patient limited by pain; Patient limited by fatigue   Body structures, Functions, Activity limitations: Decreased functional mobility ; Decreased ROM; Decreased strength;Decreased balance  Assessment: Patient required Min A  for bed mobility, transfers and ambulation 15 ft with Rolling Walker. Follows instructions well. Up in chair with call light. Daughter present during session. Prognosis: Fair  Discharge Recommendations: Subacute/Skilled Nursing Facility     Type of devices: Call light within reach; Chair alarm in place;Gait belt;Left in chair     Plan  Times per week: 5 to 7 x/wk  Current Treatment Recommendations: Strengthening, ROM, Balance Training, Functional Mobility Training, Transfer Training, Gait Training, Safety Education & Training, Patient/Caregiver Education & Training    Patient Education  New Education Provided: PT POC/GOAL, posture, sit to stand  Learner:patient  Method: demonstration and explanation       Outcome: needs reinforcement     Goals  Short term goals  Time Frame for Short term goals: 7 visits. Short term goal 1: Bed mobility mod a  Short term goal 2: Transfers modA  Short term goal 3: Gait with RW dist of 20 ft min/mod A  Short term goal 4: Pt ablt to toleraye ROM ex's R knee/foot, fo rbetter positioning for transfers/mobility.     PT Individual Minutes  Time In: 3940  Time Out: 9680  Minutes: 23    Electronically signed by Kathy Alcantara PTA on 3/7/18 at 12:30 PM

## 2018-03-08 LAB
-: ABNORMAL
AMORPHOUS: ABNORMAL
ANION GAP SERPL CALCULATED.3IONS-SCNC: 16 MMOL/L (ref 9–17)
BACTERIA: ABNORMAL
BILIRUBIN URINE: NEGATIVE
BUN BLDV-MCNC: 17 MG/DL (ref 8–23)
BUN/CREAT BLD: ABNORMAL (ref 9–20)
CALCIUM SERPL-MCNC: 8.7 MG/DL (ref 8.6–10.4)
CASTS UA: ABNORMAL /LPF
CHLORIDE BLD-SCNC: 99 MMOL/L (ref 98–107)
CO2: 23 MMOL/L (ref 20–31)
COLOR: YELLOW
COMMENT UA: ABNORMAL
CREAT SERPL-MCNC: 1.25 MG/DL (ref 0.7–1.2)
CRYSTALS, UA: ABNORMAL /HPF
CULTURE: ABNORMAL
CULTURE: ABNORMAL
EPITHELIAL CELLS UA: ABNORMAL /HPF
GFR AFRICAN AMERICAN: >60 ML/MIN
GFR NON-AFRICAN AMERICAN: 54 ML/MIN
GFR SERPL CREATININE-BSD FRML MDRD: ABNORMAL ML/MIN/{1.73_M2}
GFR SERPL CREATININE-BSD FRML MDRD: ABNORMAL ML/MIN/{1.73_M2}
GLUCOSE BLD-MCNC: 183 MG/DL (ref 75–110)
GLUCOSE BLD-MCNC: 187 MG/DL (ref 75–110)
GLUCOSE BLD-MCNC: 201 MG/DL (ref 70–99)
GLUCOSE URINE: ABNORMAL
HCT VFR BLD CALC: 44.2 % (ref 41–53)
HEMOGLOBIN: 14.9 G/DL (ref 13.5–17.5)
KETONES, URINE: NEGATIVE
LEUKOCYTE ESTERASE, URINE: ABNORMAL
Lab: ABNORMAL
MCH RBC QN AUTO: 29.7 PG (ref 26–34)
MCHC RBC AUTO-ENTMCNC: 33.7 G/DL (ref 31–37)
MCV RBC AUTO: 88.2 FL (ref 80–100)
MUCUS: ABNORMAL
NITRITE, URINE: NEGATIVE
NRBC AUTOMATED: ABNORMAL PER 100 WBC
OTHER OBSERVATIONS UA: ABNORMAL
PDW BLD-RTO: 14.7 % (ref 11.5–14.9)
PH UA: 7 (ref 5–8)
PLATELET # BLD: 356 K/UL (ref 150–450)
PMV BLD AUTO: 7.1 FL (ref 6–12)
POTASSIUM SERPL-SCNC: 4.4 MMOL/L (ref 3.7–5.3)
PROTEIN UA: ABNORMAL
RBC # BLD: 5.01 M/UL (ref 4.5–5.9)
RBC UA: ABNORMAL /HPF
RENAL EPITHELIAL, UA: ABNORMAL /HPF
SODIUM BLD-SCNC: 138 MMOL/L (ref 135–144)
SPECIFIC GRAVITY UA: 1.02 (ref 1–1.03)
SPECIMEN DESCRIPTION: ABNORMAL
SPECIMEN DESCRIPTION: ABNORMAL
STATUS: ABNORMAL
TRICHOMONAS: ABNORMAL
TURBIDITY: ABNORMAL
URINE HGB: ABNORMAL
UROBILINOGEN, URINE: NORMAL
WBC # BLD: 11.4 K/UL (ref 3.5–11)
WBC UA: ABNORMAL /HPF
YEAST: ABNORMAL

## 2018-03-08 PROCEDURE — 85027 COMPLETE CBC AUTOMATED: CPT

## 2018-03-08 PROCEDURE — 94799 UNLISTED PULMONARY SVC/PX: CPT

## 2018-03-08 PROCEDURE — 2580000003 HC RX 258: Performed by: NURSE PRACTITIONER

## 2018-03-08 PROCEDURE — 51798 US URINE CAPACITY MEASURE: CPT

## 2018-03-08 PROCEDURE — 6370000000 HC RX 637 (ALT 250 FOR IP): Performed by: SURGERY

## 2018-03-08 PROCEDURE — 99232 SBSQ HOSP IP/OBS MODERATE 35: CPT | Performed by: INTERNAL MEDICINE

## 2018-03-08 PROCEDURE — 82947 ASSAY GLUCOSE BLOOD QUANT: CPT

## 2018-03-08 PROCEDURE — 36415 COLL VENOUS BLD VENIPUNCTURE: CPT

## 2018-03-08 PROCEDURE — 1200000000 HC SEMI PRIVATE

## 2018-03-08 PROCEDURE — 97530 THERAPEUTIC ACTIVITIES: CPT

## 2018-03-08 PROCEDURE — 80048 BASIC METABOLIC PNL TOTAL CA: CPT

## 2018-03-08 PROCEDURE — 6370000000 HC RX 637 (ALT 250 FOR IP): Performed by: NURSE PRACTITIONER

## 2018-03-08 PROCEDURE — 87086 URINE CULTURE/COLONY COUNT: CPT

## 2018-03-08 PROCEDURE — 6370000000 HC RX 637 (ALT 250 FOR IP): Performed by: INTERNAL MEDICINE

## 2018-03-08 PROCEDURE — 6360000002 HC RX W HCPCS: Performed by: SURGERY

## 2018-03-08 PROCEDURE — 51701 INSERT BLADDER CATHETER: CPT

## 2018-03-08 PROCEDURE — 51702 INSERT TEMP BLADDER CATH: CPT

## 2018-03-08 PROCEDURE — 6360000002 HC RX W HCPCS: Performed by: NURSE PRACTITIONER

## 2018-03-08 PROCEDURE — 81001 URINALYSIS AUTO W/SCOPE: CPT

## 2018-03-08 PROCEDURE — 97535 SELF CARE MNGMENT TRAINING: CPT

## 2018-03-08 RX ORDER — FLUCONAZOLE 100 MG/1
100 TABLET ORAL DAILY
Qty: 7 TABLET | Refills: 0 | Status: SHIPPED | OUTPATIENT
Start: 2018-03-08 | End: 2018-03-15

## 2018-03-08 RX ORDER — FLUCONAZOLE 100 MG/1
100 TABLET ORAL DAILY
Status: DISCONTINUED | OUTPATIENT
Start: 2018-03-08 | End: 2018-03-09 | Stop reason: HOSPADM

## 2018-03-08 RX ORDER — HYDROCODONE BITARTRATE AND ACETAMINOPHEN 5; 325 MG/1; MG/1
1 TABLET ORAL EVERY 6 HOURS PRN
Qty: 10 TABLET | Refills: 0 | Status: SHIPPED | OUTPATIENT
Start: 2018-03-08 | End: 2018-03-11

## 2018-03-08 RX ADMIN — FAMOTIDINE 20 MG: 20 TABLET, FILM COATED ORAL at 17:54

## 2018-03-08 RX ADMIN — FLUCONAZOLE 100 MG: 100 TABLET ORAL at 17:54

## 2018-03-08 RX ADMIN — DOCUSATE SODIUM 100 MG: 100 CAPSULE, LIQUID FILLED ORAL at 17:54

## 2018-03-08 RX ADMIN — PIPERACILLIN SODIUM AND TAZOBACTAM SODIUM 3.38 G: 3; .375 INJECTION, POWDER, LYOPHILIZED, FOR SOLUTION INTRAVENOUS at 03:38

## 2018-03-08 RX ADMIN — ASPIRIN 81 MG: 81 TABLET, COATED ORAL at 09:07

## 2018-03-08 RX ADMIN — BISACODYL 10 MG: 10 SUPPOSITORY RECTAL at 14:53

## 2018-03-08 RX ADMIN — POLYETHYLENE GLYCOL 3350 17 G: 17 POWDER, FOR SOLUTION ORAL at 09:07

## 2018-03-08 RX ADMIN — Medication 500 MG: at 09:07

## 2018-03-08 RX ADMIN — ATORVASTATIN CALCIUM 20 MG: 20 TABLET, FILM COATED ORAL at 17:55

## 2018-03-08 RX ADMIN — PIPERACILLIN SODIUM AND TAZOBACTAM SODIUM 3.38 G: 3; .375 INJECTION, POWDER, LYOPHILIZED, FOR SOLUTION INTRAVENOUS at 11:13

## 2018-03-08 RX ADMIN — TAMSULOSIN HYDROCHLORIDE 0.4 MG: 0.4 CAPSULE ORAL at 18:47

## 2018-03-08 RX ADMIN — LEVOTHYROXINE SODIUM 137 MCG: 0.14 TABLET ORAL at 06:11

## 2018-03-08 RX ADMIN — HYDROCODONE BITARTRATE AND ACETAMINOPHEN 1 TABLET: 5; 325 TABLET ORAL at 14:39

## 2018-03-08 RX ADMIN — OXYBUTYNIN CHLORIDE 5 MG: 5 TABLET, FILM COATED, EXTENDED RELEASE ORAL at 17:54

## 2018-03-08 RX ADMIN — ENOXAPARIN SODIUM 30 MG: 30 INJECTION SUBCUTANEOUS at 09:07

## 2018-03-08 RX ADMIN — MAGNESIUM HYDROXIDE 30 ML: 400 SUSPENSION ORAL at 14:53

## 2018-03-08 ASSESSMENT — PAIN SCALES - GENERAL
PAINLEVEL_OUTOF10: 8
PAINLEVEL_OUTOF10: 8
PAINLEVEL_OUTOF10: 9
PAINLEVEL_OUTOF10: 5
PAINLEVEL_OUTOF10: 9
PAINLEVEL_OUTOF10: 0
PAINLEVEL_OUTOF10: 9
PAINLEVEL_OUTOF10: 9
PAINLEVEL_OUTOF10: 0
PAINLEVEL_OUTOF10: 0

## 2018-03-08 ASSESSMENT — PAIN DESCRIPTION - PAIN TYPE
TYPE: SURGICAL PAIN
TYPE: ACUTE PAIN
TYPE: ACUTE PAIN;SURGICAL PAIN

## 2018-03-08 ASSESSMENT — PAIN DESCRIPTION - DESCRIPTORS
DESCRIPTORS: SHARP
DESCRIPTORS: SHARP
DESCRIPTORS: DISCOMFORT

## 2018-03-08 ASSESSMENT — PAIN DESCRIPTION - LOCATION
LOCATION: ABDOMEN

## 2018-03-08 ASSESSMENT — PAIN DESCRIPTION - ORIENTATION
ORIENTATION: RIGHT;LEFT;LOWER;UPPER
ORIENTATION: MID
ORIENTATION: RIGHT;UPPER

## 2018-03-08 ASSESSMENT — PAIN DESCRIPTION - FREQUENCY
FREQUENCY: CONTINUOUS
FREQUENCY: CONTINUOUS

## 2018-03-08 ASSESSMENT — PAIN DESCRIPTION - PROGRESSION: CLINICAL_PROGRESSION: NOT CHANGED

## 2018-03-08 NOTE — PROGRESS NOTES
supply to have pu)  Restraints  Initially in place:  (ull away alarm sent to pts room, \[facility] [place] [facility]\"" RN states that she romaine)  Plan  Times per week: 5  Times per day: Daily  Current Treatment Recommendations: Strengthening, ROM, Balance Training, Functional Mobility Training, Safety Education & Training, Self-Care / ADL, Patient/Caregiver Education & Training  Plan Comment: continue OT      Goals  Short term goals  Time Frame for Short term goals: by discharge  Short term goal 1: pt will demonstrate increase independence completing ADLs - needing moderate assist w toileting, bathing, lower body dressing, and set up w eating, grooming, modified independence w UB dressing using AE as needed. Short term goal 2:  Increase stand tolerance to 2-3 minutes w assist during ADLS, and/or UE therapeutic exercises  Short term goal 3: Pt will safely perform ADL transfers w moderate assist x1  Short term goal 4: Pt will participate in 15-20 minutes bilateral UE therapeutic exercises to increase bilateral UE strength, endurance, & shoulder reaching for selfcare activities    OT Individual Minutes  Time In: 4347  Time Out: 7384  Minutes: 39      Electronically signed by OCTAVIO Nascimento on 3/8/18 at 9:50 AM

## 2018-03-08 NOTE — PROGRESS NOTES
General Surgery Progress Note            PATIENT NAME: Doreen Malik     TODAY'S DATE: 3/8/2018, 8:49 AM    SUBJECTIVE:    Pt  Seen and examined. OBJECTIVE:   VITALS:  BP (!) 131/59   Pulse 60   Temp 97.3 °F (36.3 °C) (Oral)   Resp 18   Ht 5' 5\" (1.651 m)   Wt 156 lb 8.4 oz (71 kg)   SpO2 94%   BMI 26.05 kg/m²      INTAKE/OUTPUT:      Intake/Output Summary (Last 24 hours) at 03/08/18 0849  Last data filed at 03/08/18 6605   Gross per 24 hour   Intake              637 ml   Output             1060 ml   Net             -423 ml                 CONSTITUTIONAL:  awake and alert. no apparent distress  HEART:   Regular   LUNGS:   Clear   ABDOMEN:   Abdomen soft, minimally tender, non-distended. +BS. +BM.  Drain SS  EXTREMITIES:   No edema    Data:  CBC with Differential:    Lab Results   Component Value Date    WBC 11.4 03/08/2018    RBC 5.01 03/08/2018    HGB 14.9 03/08/2018    HCT 44.2 03/08/2018     03/08/2018    MCV 88.2 03/08/2018    MCH 29.7 03/08/2018    MCHC 33.7 03/08/2018    RDW 14.7 03/08/2018    LYMPHOPCT 4 02/27/2018    MONOPCT 5 02/27/2018    BASOPCT 0 02/27/2018    MONOSABS 0.50 02/27/2018    LYMPHSABS 0.40 02/27/2018    EOSABS 0.10 02/27/2018    BASOSABS 0.00 02/27/2018    DIFFTYPE NOT REPORTED 02/27/2018     CMP:    Lab Results   Component Value Date     03/08/2018    K 4.4 03/08/2018    CL 99 03/08/2018    CO2 23 03/08/2018    BUN 17 03/08/2018    CREATININE 1.25 03/08/2018    GFRAA >60 03/08/2018    LABGLOM 54 03/08/2018    GLUCOSE 201 03/08/2018    GLUCOSE 143 04/16/2012    PROT 8.3 03/07/2018    LABALBU 2.4 03/07/2018    CALCIUM 8.7 03/08/2018    BILITOT 1.14 03/07/2018    ALKPHOS 160 03/07/2018    AST 31 03/07/2018    ALT 49 03/07/2018         ASSESSMENT     Principal Problem:    Symptomatic cholelithiasis  Active Problems:    Acquired hypothyroidism    Type 2 diabetes mellitus without complication, without long-term current use of insulin (HCC)    Abdominal pain    UTI

## 2018-03-08 NOTE — PROGRESS NOTES
Case catheter inserted. Patient tolerated procedure well. Urinalysis collected and sent per protocol.

## 2018-03-08 NOTE — PROGRESS NOTES
Spoke with Dr Barrett Oliver regarding esparza, ok to be discharged with esparza and follow up after discharge.

## 2018-03-08 NOTE — PROGRESS NOTES
Back from bathroom. Had large amount of flatus but no BM . Unable to urinate. No impaction obtained with digital exam. Abdomen less distended Bladder scan showed 597 ml.

## 2018-03-09 VITALS
DIASTOLIC BLOOD PRESSURE: 63 MMHG | TEMPERATURE: 96.6 F | SYSTOLIC BLOOD PRESSURE: 129 MMHG | WEIGHT: 156.53 LBS | BODY MASS INDEX: 26.08 KG/M2 | RESPIRATION RATE: 18 BRPM | HEART RATE: 67 BPM | HEIGHT: 65 IN | OXYGEN SATURATION: 97 %

## 2018-03-09 LAB
ANION GAP SERPL CALCULATED.3IONS-SCNC: 12 MMOL/L (ref 9–17)
BUN BLDV-MCNC: 16 MG/DL (ref 8–23)
BUN/CREAT BLD: ABNORMAL (ref 9–20)
CALCIUM SERPL-MCNC: 8.5 MG/DL (ref 8.6–10.4)
CHLORIDE BLD-SCNC: 100 MMOL/L (ref 98–107)
CO2: 24 MMOL/L (ref 20–31)
CREAT SERPL-MCNC: 1.23 MG/DL (ref 0.7–1.2)
CULTURE: NORMAL
CULTURE: NORMAL
GFR AFRICAN AMERICAN: >60 ML/MIN
GFR NON-AFRICAN AMERICAN: 55 ML/MIN
GFR SERPL CREATININE-BSD FRML MDRD: ABNORMAL ML/MIN/{1.73_M2}
GFR SERPL CREATININE-BSD FRML MDRD: ABNORMAL ML/MIN/{1.73_M2}
GLUCOSE BLD-MCNC: 213 MG/DL (ref 70–99)
GLUCOSE BLD-MCNC: 243 MG/DL (ref 75–110)
HCT VFR BLD CALC: 43.9 % (ref 41–53)
HEMOGLOBIN: 14.8 G/DL (ref 13.5–17.5)
Lab: NORMAL
MCH RBC QN AUTO: 29.3 PG (ref 26–34)
MCHC RBC AUTO-ENTMCNC: 33.6 G/DL (ref 31–37)
MCV RBC AUTO: 87.1 FL (ref 80–100)
NRBC AUTOMATED: ABNORMAL PER 100 WBC
PDW BLD-RTO: 14.6 % (ref 11.5–14.9)
PLATELET # BLD: 350 K/UL (ref 150–450)
PMV BLD AUTO: 6.9 FL (ref 6–12)
POTASSIUM SERPL-SCNC: 4.1 MMOL/L (ref 3.7–5.3)
RBC # BLD: 5.04 M/UL (ref 4.5–5.9)
SODIUM BLD-SCNC: 136 MMOL/L (ref 135–144)
SPECIMEN DESCRIPTION: NORMAL
SPECIMEN DESCRIPTION: NORMAL
STATUS: NORMAL
WBC # BLD: 12 K/UL (ref 3.5–11)

## 2018-03-09 PROCEDURE — 99239 HOSP IP/OBS DSCHRG MGMT >30: CPT | Performed by: INTERNAL MEDICINE

## 2018-03-09 PROCEDURE — 80048 BASIC METABOLIC PNL TOTAL CA: CPT

## 2018-03-09 PROCEDURE — 6370000000 HC RX 637 (ALT 250 FOR IP): Performed by: NURSE PRACTITIONER

## 2018-03-09 PROCEDURE — 97116 GAIT TRAINING THERAPY: CPT

## 2018-03-09 PROCEDURE — 6360000002 HC RX W HCPCS: Performed by: SURGERY

## 2018-03-09 PROCEDURE — 97110 THERAPEUTIC EXERCISES: CPT

## 2018-03-09 PROCEDURE — 36415 COLL VENOUS BLD VENIPUNCTURE: CPT

## 2018-03-09 PROCEDURE — 97530 THERAPEUTIC ACTIVITIES: CPT

## 2018-03-09 PROCEDURE — 6370000000 HC RX 637 (ALT 250 FOR IP): Performed by: INTERNAL MEDICINE

## 2018-03-09 PROCEDURE — 85027 COMPLETE CBC AUTOMATED: CPT

## 2018-03-09 PROCEDURE — 99232 SBSQ HOSP IP/OBS MODERATE 35: CPT | Performed by: INTERNAL MEDICINE

## 2018-03-09 PROCEDURE — 6370000000 HC RX 637 (ALT 250 FOR IP): Performed by: SURGERY

## 2018-03-09 RX ADMIN — Medication 500 MG: at 09:30

## 2018-03-09 RX ADMIN — METOPROLOL SUCCINATE 25 MG: 25 TABLET, EXTENDED RELEASE ORAL at 09:30

## 2018-03-09 RX ADMIN — POLYETHYLENE GLYCOL 3350 17 G: 17 POWDER, FOR SOLUTION ORAL at 09:30

## 2018-03-09 RX ADMIN — ASPIRIN 81 MG: 81 TABLET, COATED ORAL at 09:30

## 2018-03-09 RX ADMIN — LEVOTHYROXINE SODIUM 137 MCG: 0.14 TABLET ORAL at 05:58

## 2018-03-09 RX ADMIN — ENOXAPARIN SODIUM 30 MG: 30 INJECTION SUBCUTANEOUS at 09:30

## 2018-03-09 RX ADMIN — FLUCONAZOLE 100 MG: 100 TABLET ORAL at 09:30

## 2018-03-09 ASSESSMENT — PAIN DESCRIPTION - LOCATION: LOCATION: ABDOMEN;GROIN

## 2018-03-09 ASSESSMENT — PAIN DESCRIPTION - PAIN TYPE: TYPE: ACUTE PAIN

## 2018-03-09 ASSESSMENT — PAIN SCALES - WONG BAKER: WONGBAKER_NUMERICALRESPONSE: 4

## 2018-03-09 ASSESSMENT — PAIN DESCRIPTION - ONSET: ONSET: ON-GOING

## 2018-03-09 NOTE — PLAN OF CARE
Problem: Falls - Risk of  Goal: Absence of falls  Outcome: Ongoing  Pt remained absent from falls. Call light within reach. Bed locked and in lowest position. Problem: Risk for Impaired Skin Integrity  Goal: Tissue integrity - skin and mucous membranes  Structural intactness and normal physiological function of skin and  mucous membranes. Outcome: Ongoing  Pt skin integrity remained intact, no new alterations noted. Head to toe completed, see chart assessment. Problem: Pain:  Goal: Pain level will decrease  Pain level will decrease   Outcome: Ongoing  Pain assessed with each interaction. Meds given, see MAR. Will continue to monitor.

## 2018-03-09 NOTE — PROGRESS NOTES
Decreased functional mobility ; Decreased ROM; Decreased strength;Decreased balance  Prognosis: Fair  Discharge Recommendations: Subacute/Skilled Nursing Facility        Type of devices: Call light within reach;Nurse notified; Left in bed;Gait belt (Aide present on exit, to bath pt)       Plan  Times per week: 5 to 7 x/wk  Current Treatment Recommendations: Strengthening, ROM, Balance Training, Functional Mobility Training, Transfer Training, Gait Training, Safety Education & Training, Patient/Caregiver Education & Training      Patient Education  New Education Provided: Exercise technique, transfer safety  Learner:patient  Method: demonstration and explanation       Outcome: needs reinforcement       Goals  Short term goals  Time Frame for Short term goals: 7 visits. Short term goal 1: Bed mobility mod a  Short term goal 2: Transfers modA  Short term goal 3: Gait with RW dist of 20 ft min/mod A  Short term goal 4: Pt ablt to toleraye ROM ex's R knee/foot, fo rbetter positioning for transfers/mobility.       PT Individual Minutes  Time In: 0488  Time Out: 1120  Minutes: 38      Electronically signed by Gina Camarena PTA on 3/9/18 at 12:21 PM

## 2018-03-09 NOTE — CARE COORDINATION
Social work: spoke to son Shreya Lepe and alerted him to possible discharge at 2:15 pm today if RN can be ready. Pt asleep could not notify him yet. [employer] called and will provide ambulance transportation today. Faxed their forms also.   Lyla tsang

## 2018-03-09 NOTE — PROGRESS NOTES
General Surgery Progress Note            PATIENT NAME: Rudy Prince     TODAY'S DATE: 3/9/2018, 11:27 AM    SUBJECTIVE:    Pt  Seen and examined. Overnight events noted. Patient currently has a Case in place. OBJECTIVE:   VITALS:  /62   Pulse 64   Temp 97 °F (36.1 °C) (Oral)   Resp 14   Ht 5' 5\" (1.651 m)   Wt 156 lb 8.4 oz (71 kg)   SpO2 91%   BMI 26.05 kg/m²      INTAKE/OUTPUT:      Intake/Output Summary (Last 24 hours) at 03/09/18 1127  Last data filed at 03/09/18 0601   Gross per 24 hour   Intake              360 ml   Output              560 ml   Net             -200 ml                 CONSTITUTIONAL:  awake and alert. no apparent distress  HEART:   Regular   LUNGS:   Clear   ABDOMEN:   Abdomen soft, minimally tender, non-distended. +BS. +BM. Incisions intact. Drain output scant.  Drain removed  EXTREMITIES:   No edema    Data:  CBC with Differential:    Lab Results   Component Value Date    WBC 12.0 03/09/2018    RBC 5.04 03/09/2018    HGB 14.8 03/09/2018    HCT 43.9 03/09/2018     03/09/2018    MCV 87.1 03/09/2018    MCH 29.3 03/09/2018    MCHC 33.6 03/09/2018    RDW 14.6 03/09/2018    LYMPHOPCT 4 02/27/2018    MONOPCT 5 02/27/2018    BASOPCT 0 02/27/2018    MONOSABS 0.50 02/27/2018    LYMPHSABS 0.40 02/27/2018    EOSABS 0.10 02/27/2018    BASOSABS 0.00 02/27/2018    DIFFTYPE NOT REPORTED 02/27/2018     CMP:    Lab Results   Component Value Date     03/09/2018    K 4.1 03/09/2018     03/09/2018    CO2 24 03/09/2018    BUN 16 03/09/2018    CREATININE 1.23 03/09/2018    GFRAA >60 03/09/2018    LABGLOM 55 03/09/2018    GLUCOSE 213 03/09/2018    GLUCOSE 143 04/16/2012    PROT 8.3 03/07/2018    LABALBU 2.4 03/07/2018    CALCIUM 8.5 03/09/2018    BILITOT 1.14 03/07/2018    ALKPHOS 160 03/07/2018    AST 31 03/07/2018    ALT 49 03/07/2018         ASSESSMENT     Principal Problem:    Symptomatic cholelithiasis  Active Problems:    Acquired hypothyroidism    Type 2 diabetes

## 2018-03-10 ENCOUNTER — CARE COORDINATION (OUTPATIENT)
Dept: CASE MANAGEMENT | Age: 83
End: 2018-03-10

## 2018-03-12 ENCOUNTER — CARE COORDINATION (OUTPATIENT)
Dept: CASE MANAGEMENT | Age: 83
End: 2018-03-12

## 2018-03-15 ENCOUNTER — HOSPITAL ENCOUNTER (OUTPATIENT)
Age: 83
Setting detail: SPECIMEN
Discharge: HOME | End: 2018-03-15
Payer: MEDICARE

## 2018-03-15 LAB
ANION GAP SERPL CALCULATED.3IONS-SCNC: 15 MMOL/L (ref 9–17)
BUN BLDV-MCNC: 17 MG/DL (ref 8–23)
BUN/CREAT BLD: 13 (ref 9–20)
CALCIUM SERPL-MCNC: 8.8 MG/DL (ref 8.6–10.4)
CHLORIDE BLD-SCNC: 95 MMOL/L (ref 98–107)
CO2: 22 MMOL/L (ref 20–31)
CREAT SERPL-MCNC: 1.29 MG/DL (ref 0.7–1.2)
GFR AFRICAN AMERICAN: >60 ML/MIN
GFR NON-AFRICAN AMERICAN: 52 ML/MIN
GFR SERPL CREATININE-BSD FRML MDRD: ABNORMAL ML/MIN/{1.73_M2}
GFR SERPL CREATININE-BSD FRML MDRD: ABNORMAL ML/MIN/{1.73_M2}
GLUCOSE BLD-MCNC: 206 MG/DL (ref 70–99)
HCT VFR BLD CALC: 49 % (ref 41–53)
HEMOGLOBIN: 16.1 G/DL (ref 13.5–17.5)
MCH RBC QN AUTO: 29.2 PG (ref 26–34)
MCHC RBC AUTO-ENTMCNC: 32.8 G/DL (ref 31–37)
MCV RBC AUTO: 88.9 FL (ref 80–100)
NRBC AUTOMATED: NORMAL PER 100 WBC
PDW BLD-RTO: 14.4 % (ref 11.5–14.5)
PLATELET # BLD: 375 K/UL (ref 130–400)
PMV BLD AUTO: 7.3 FL (ref 6–12)
POTASSIUM SERPL-SCNC: 4.5 MMOL/L (ref 3.7–5.3)
RBC # BLD: 5.51 M/UL (ref 4.5–5.9)
SODIUM BLD-SCNC: 132 MMOL/L (ref 135–144)
WBC # BLD: 10.1 K/UL (ref 3.5–11)

## 2018-03-15 PROCEDURE — 80048 BASIC METABOLIC PNL TOTAL CA: CPT

## 2018-03-15 PROCEDURE — P9603 ONE-WAY ALLOW PRORATED MILES: HCPCS

## 2018-03-15 PROCEDURE — 36415 COLL VENOUS BLD VENIPUNCTURE: CPT

## 2018-03-15 PROCEDURE — 85027 COMPLETE CBC AUTOMATED: CPT

## 2018-03-16 ENCOUNTER — OFFICE VISIT (OUTPATIENT)
Dept: FAMILY MEDICINE CLINIC | Age: 83
End: 2018-03-16
Payer: MEDICARE

## 2018-03-16 VITALS
DIASTOLIC BLOOD PRESSURE: 80 MMHG | BODY MASS INDEX: 28.59 KG/M2 | TEMPERATURE: 97.6 F | HEART RATE: 100 BPM | WEIGHT: 171.8 LBS | OXYGEN SATURATION: 97 % | SYSTOLIC BLOOD PRESSURE: 128 MMHG

## 2018-03-16 DIAGNOSIS — E11.9 TYPE 2 DIABETES MELLITUS WITHOUT COMPLICATION, WITHOUT LONG-TERM CURRENT USE OF INSULIN (HCC): ICD-10-CM

## 2018-03-16 DIAGNOSIS — S81.802D WOUND OF LEFT LOWER EXTREMITY, SUBSEQUENT ENCOUNTER: Primary | ICD-10-CM

## 2018-03-16 PROCEDURE — 99213 OFFICE O/P EST LOW 20 MIN: CPT | Performed by: FAMILY MEDICINE

## 2018-03-16 ASSESSMENT — ENCOUNTER SYMPTOMS
VOMITING: 0
CONSTIPATION: 0
NAUSEA: 0
ABDOMINAL PAIN: 0
SHORTNESS OF BREATH: 0
COUGH: 0
DIARRHEA: 0
SORE THROAT: 0

## 2018-03-16 NOTE — PROGRESS NOTES
Subjective:      Patient ID: Navi Alba is a 80 y.o. male. Wound check    Pt did not arrive at the appointment medication list or a packet from the nursing facility. Unable to confirm medications. Advised family to bring in document medication list from the facility. Visit Information    Have you changed or started any medications since your last visit including any over-the-counter medicines, vitamins, or herbal medicines? Unable to confirm medication list   Have you stopped taking any of your medications? Is so, why? -  No able to verify medications  Are you having any side effects from any of your medications? -no    Have you seen any other physician or provider since your last visit? yes - Nursing facility    Have you had any other diagnostic tests since your last visit?  no   Have you been seen in the emergency room and/or had an admission in a hospital since we last saw you?  no   Have you had your routine dental cleaning in the past 6 months?  no     Do you have an active MyChart account? If no, what is the barrier? Yes    Patient Care Team:  Vannessa Escalante MD as PCP - General  Vannessa Escalante MD as PCP - [facility] Attributed Provider  Paloma Ruiz MD as Consulting Physician (Gastroenterology)  Stoney Bess RN as Care Transition    Medical History Review  Past Medical, Family, and Social History reviewed and does contribute to the patient presenting condition    Health Maintenance   Topic Date Due    Shingles Vaccine (1 of 2 - 2 Dose Series) 03/23/2010    Pneumococcal low/med risk (2 of 2 - PCV13) 05/09/2018    TSH testing  09/18/2018    DTaP/Tdap/Td vaccine (2 - Td) 01/28/2028    Flu vaccine  Completed             HPI  This 80-year-old male is seen in the Office.  today from a nursing home  he has a wound on his left leg the daughter feels that it is getting bigger in size there is no drainage there is no redness present and she is concerned since he is diabetic so I recommended they go to the wound clinic at [address] [place] [address] He is a diabetic blood sugars are running good he is on Amaryl no chest pain no shortness of breath he recently had cholecystectomy  Review of Systems   Constitutional: Negative for appetite change and fever. HENT: Negative for ear pain, postnasal drip and sore throat. Eyes: Negative for visual disturbance. Respiratory: Negative for cough and shortness of breath. Cardiovascular: Negative for chest pain and leg swelling. Gastrointestinal: Negative for abdominal pain, constipation, diarrhea, nausea and vomiting. Endocrine: Negative for polydipsia and polyuria. Genitourinary: Negative for frequency and urgency. Musculoskeletal: Negative for arthralgias. Skin: Positive for wound. Neurological: Negative for dizziness, syncope and headaches. Objective:   Physical Exam   Constitutional: He is oriented to person, place, and time. He appears well-developed and well-nourished. /80 (Site: Left Arm, Position: Sitting, Cuff Size: Small Adult)   Pulse 100   Temp 97.6 °F (36.4 °C) (Oral)   Wt 171 lb 12.8 oz (77.9 kg)   SpO2 97%   BMI 28.59 kg/m²    HENT:   Head: Normocephalic. Mouth/Throat: Oropharynx is clear and moist.   Cardiovascular: Normal rate and regular rhythm. Pulmonary/Chest: Breath sounds normal. He has no rales. Abdominal: Soft. There is no tenderness. Musculoskeletal: He exhibits no edema. Lymphadenopathy:     He has no cervical adenopathy. Neurological: He is alert and oriented to person, place, and time. Skin:   Wound of  the left leg present no redness no drainage present   Nursing note and vitals reviewed. Assessment:      1. Wound of left lower extremity, subsequent encounter  Referred to wound clinic    2. Type 2 diabetes mellitus without complication, without long-term current use of insulin ([address]  Controlled            Plan:        No orders of the defined types were placed in this encounter.     No orders of the

## 2018-03-20 ENCOUNTER — OFFICE VISIT (OUTPATIENT)
Dept: UROLOGY | Age: 83
End: 2018-03-20
Payer: MEDICARE

## 2018-03-20 ENCOUNTER — HOSPITAL ENCOUNTER (OUTPATIENT)
Age: 83
Setting detail: SPECIMEN
Discharge: HOME | End: 2018-03-20
Payer: MEDICARE

## 2018-03-20 VITALS
HEART RATE: 74 BPM | DIASTOLIC BLOOD PRESSURE: 64 MMHG | SYSTOLIC BLOOD PRESSURE: 121 MMHG | HEIGHT: 65 IN | BODY MASS INDEX: 28.65 KG/M2 | TEMPERATURE: 97.6 F | WEIGHT: 171.96 LBS

## 2018-03-20 DIAGNOSIS — N13.8 BPH WITH OBSTRUCTION/LOWER URINARY TRACT SYMPTOMS: Primary | ICD-10-CM

## 2018-03-20 DIAGNOSIS — R33.9 URINARY RETENTION: ICD-10-CM

## 2018-03-20 DIAGNOSIS — N40.1 BPH WITH OBSTRUCTION/LOWER URINARY TRACT SYMPTOMS: Primary | ICD-10-CM

## 2018-03-20 LAB
ANION GAP SERPL CALCULATED.3IONS-SCNC: 15 MMOL/L (ref 9–17)
BUN BLDV-MCNC: 14 MG/DL (ref 8–23)
BUN/CREAT BLD: 13 (ref 9–20)
CALCIUM SERPL-MCNC: 8.6 MG/DL (ref 8.6–10.4)
CHLORIDE BLD-SCNC: 96 MMOL/L (ref 98–107)
CO2: 24 MMOL/L (ref 20–31)
CREAT SERPL-MCNC: 1.08 MG/DL (ref 0.7–1.2)
GFR AFRICAN AMERICAN: >60 ML/MIN
GFR NON-AFRICAN AMERICAN: >60 ML/MIN
GFR SERPL CREATININE-BSD FRML MDRD: ABNORMAL ML/MIN/{1.73_M2}
GFR SERPL CREATININE-BSD FRML MDRD: ABNORMAL ML/MIN/{1.73_M2}
GLUCOSE BLD-MCNC: 161 MG/DL (ref 70–99)
POTASSIUM SERPL-SCNC: 4.5 MMOL/L (ref 3.7–5.3)
SODIUM BLD-SCNC: 135 MMOL/L (ref 135–144)

## 2018-03-20 PROCEDURE — 80048 BASIC METABOLIC PNL TOTAL CA: CPT

## 2018-03-20 PROCEDURE — P9603 ONE-WAY ALLOW PRORATED MILES: HCPCS

## 2018-03-20 PROCEDURE — 99214 OFFICE O/P EST MOD 30 MIN: CPT | Performed by: UROLOGY

## 2018-03-20 PROCEDURE — 36415 COLL VENOUS BLD VENIPUNCTURE: CPT

## 2018-03-20 RX ORDER — DUTASTERIDE 0.5 MG/1
0.5 CAPSULE, LIQUID FILLED ORAL DAILY
COMMUNITY
End: 2018-03-27 | Stop reason: SDUPTHER

## 2018-03-20 ASSESSMENT — ENCOUNTER SYMPTOMS
RESPIRATORY NEGATIVE: 1
GASTROINTESTINAL NEGATIVE: 1

## 2018-03-20 NOTE — PROGRESS NOTES
[facility]  [facility] - [place]  [address]  [address] [facility]  [address] [facility] [address]  Dept: [address] [facility] U[facility] Office Note - Established    Patient:  Mariluz Lerner  YOB: 1928  Date: 3/20/2018    The patient is a 80 y.o. male who presents today for evaluation of the following problems:   Chief Complaint   Patient presents with    Urinary Retention     failed void trial at nursing home       HPI  Here for follow up hospitalization- He had a cath placed post- op geraldo 3/2/18 and went into retention and esparza was placed for 1300. Before surgery he had urgency and BPH prior to hospitalization started by DR Dana Zhao. He is now up and around with assistanace in rehab, he continues Dustasteride, Oxybutynin, and Flomax daily     Summary of old records: N/A    Additional History: N/A    Procedures Today: N/A    Urinalysis today:  No results found for this visit on 03/20/18. Last several PSA's:  Lab Results   Component Value Date    PSA 3.35 01/13/2014     Last total testosterone:  No results found for: TESTOSTERONE    AUA Symptom Score (3/20/2018):                                Last BUN and creatinine:  Lab Results   Component Value Date    BUN Pending 03/20/2018     Lab Results   Component Value Date    CREATININE Pending 03/20/2018       Additional Lab/Culture results: none    Imaging Reviewed during this Office Visit: none  (results were independently reviewed by physician and radiology report verified)    PAST MEDICAL, FAMILY AND SOCIAL HISTORY UPDATE:  Past Medical History:   Diagnosis Date    Cancer ([address]     Carotid artery stenosis     Cataract     Cerebrovascular disease     Heart attack     Hypertension     Hypothyroidism     Type II or unspecified type diabetes mellitus without mention of complication, not stated as uncontrolled      Past Surgical History:   Procedure Laterality Date    APPENDECTOMY      CARDIAC SURGERY      CORONARY ARTERY BYPASS

## 2018-03-22 ENCOUNTER — HOSPITAL ENCOUNTER (OUTPATIENT)
Dept: WOUND CARE | Age: 83
Discharge: HOME | End: 2018-03-22
Payer: MEDICARE

## 2018-03-22 VITALS
SYSTOLIC BLOOD PRESSURE: 141 MMHG | TEMPERATURE: 98.3 F | RESPIRATION RATE: 18 BRPM | HEART RATE: 67 BPM | BODY MASS INDEX: 23.06 KG/M2 | HEIGHT: 65 IN | DIASTOLIC BLOOD PRESSURE: 55 MMHG | WEIGHT: 138.38 LBS

## 2018-03-22 DIAGNOSIS — L97.922 LEG ULCER, LEFT, WITH FAT LAYER EXPOSED (HCC): ICD-10-CM

## 2018-03-22 PROCEDURE — 11042 DBRDMT SUBQ TIS 1ST 20SQCM/<: CPT | Performed by: NURSE PRACTITIONER

## 2018-03-22 PROCEDURE — 6370000000 HC RX 637 (ALT 250 FOR IP): Performed by: NURSE PRACTITIONER

## 2018-03-22 PROCEDURE — 99213 OFFICE O/P EST LOW 20 MIN: CPT

## 2018-03-22 PROCEDURE — 11042 DBRDMT SUBQ TIS 1ST 20SQCM/<: CPT

## 2018-03-22 RX ORDER — FINASTERIDE 5 MG/1
5 TABLET, FILM COATED ORAL DAILY
COMMUNITY
End: 2018-03-27 | Stop reason: CLARIF

## 2018-03-22 RX ORDER — LIDOCAINE HYDROCHLORIDE 40 MG/ML
SOLUTION TOPICAL ONCE
Status: COMPLETED | OUTPATIENT
Start: 2018-03-22 | End: 2018-03-22

## 2018-03-22 RX ADMIN — LIDOCAINE HYDROCHLORIDE: 40 SOLUTION TOPICAL at 13:56

## 2018-03-22 ASSESSMENT — PAIN SCALES - GENERAL: PAINLEVEL_OUTOF10: 0

## 2018-03-22 NOTE — PROGRESS NOTES
Type Wound 3/22/2018  1:53 PM   Wound Traumatic 3/22/2018  1:53 PM   Dressing Status Old drainage 3/22/2018  1:53 PM   Dressing Changed Changed/New 3/22/2018  1:53 PM   Wound Cleansed Rinsed/Irrigated with saline 3/22/2018  1:53 PM   Wound Length (cm) 0.6 cm 3/22/2018  2:19 PM   Wound Width (cm) 0.2 cm 3/22/2018  2:19 PM   Wound Depth (cm)  0.1 3/22/2018  2:19 PM   Calculated Wound Size (cm^2) (l*w) 0.12 cm^2 3/22/2018  2:19 PM   Change in Wound Size % (l*w) 85 3/22/2018  2:19 PM   Wound Assessment Clean;Pink;Yellow 3/22/2018  1:53 PM   Drainage Amount Moderate 3/22/2018  1:53 PM   Drainage Description Yellow 3/22/2018  1:53 PM   Odor None 3/22/2018  1:53 PM   Margins Defined edges 3/22/2018  1:53 PM   Denice-wound Assessment Dry;Pink 3/22/2018  1:53 PM   [place]%Wound Bed 40 3/22/2018  1:53 PM   Yellow%Wound Bed 60 3/22/2018  1:53 PM   Time out Yes 3/22/2018  2:19 PM   Number of days: 0       Incision 05/05/15 Chest Left (Active)   Number of days: 1052       Percent of Wound(s)/Ulcer(s) Debrided: 100%    Total Surface Area Debrided:  0.12 sq cm       Diabetic/Pressure/Non Pressure Ulcers only:  Ulcer: Non-Pressure ulcer, fat layer exposed      Estimated Blood Loss:  Minimal    Hemostasis Achieved:  by pressure    Procedural Pain:  4  / 10     Post Procedural Pain:  4 / 10     Response to treatment:  Well tolerated by patient. Plan:     Treatment Note please see attached Discharge Instructions    Written patient dismissal instructions given to patient and signed by patient or POA.          Discharge Instructions                 [address] [facility]      Visit  Discharge Instructions / Physician Orders  DATE: 3/22/18    Home Care:  [facility]    SUPPLIES ORDERED THRU:  See above    Wound Location:  Left knee    Cleanse with:  Liquid antibacterial soap and water,rinse well    Dressing Orders: bautista, dry gauze, mefix tape    Frequency:  daily                    Additional Orders: Increase protein to diet (meat, cheese, eggs, fish, peanut butter, nuts and beans)  Multivitamin daily    HYPERBARIC TREATMENT-                TREATMENT #    Your next appointment with [address] [place] [address] is in 1 week. (Please note your next appointment above and if you are unable to keep, kindly give a 24 hour notice. Thank you.)    If you experience any of the following, please call the [address] [place] [address] during business hours:  [phone]    * Increase in Pain  * Temperature over 101  * Increase in drainage from your wound  * Drainage with a foul odor  * Bleeding  * Increase in swelling  * Need for compression bandage changes due to slippage, breakthrough drainage. If you need medical attention outside of the business hours of the [facility] please contact your PCP or go to the nearest emergency room.     Patient Signature:_____________________________________________Date:_______  Electronically signed by Elias Weldon RN on 3/22/2018 at 2:19 PM    Electronically signed by BIRAN Espinosa, CNP on 3/22/2018 at 2:26 PM                    Electronically signed by BRIAN Espinosa CNP on 3/22/2018 at 2:46 PM

## 2018-03-26 ENCOUNTER — CARE COORDINATION (OUTPATIENT)
Dept: CASE MANAGEMENT | Age: 83
End: 2018-03-26

## 2018-03-26 ENCOUNTER — TELEPHONE (OUTPATIENT)
Dept: FAMILY MEDICINE CLINIC | Age: 83
End: 2018-03-26

## 2018-03-26 DIAGNOSIS — L97.922 LEG ULCER, LEFT, WITH FAT LAYER EXPOSED (HCC): Primary | ICD-10-CM

## 2018-03-26 PROCEDURE — 1111F DSCHRG MED/CURRENT MED MERGE: CPT | Performed by: FAMILY MEDICINE

## 2018-03-26 NOTE — CARE COORDINATION
[address] Transitions Initial Follow Up Call    Call within 2 business days of discharge: Yes    Patient: Magdi Lewis Patient : 1928   MRN: [id number]  Reason for Admission: There are no discharge diagnoses documented for the most recent discharge. Discharge Date: 3/9/18 RARS: [facility] Risk Score: 9.5     Spoke with: DaughterOleg Few: Discharged from [facility] 3/24/18    Non-face-to-face services provided:  Communication with home health agencies or other community services the patient is currently using-ALBERT Hinkle @ [facility] @ [address] Transitions 24 Hour Call    Do you have any ongoing symptoms?:  Yes  Patient-reported symptoms:  Weight Loss, Other (Comment: wound)  Interventions for patient-reported symptoms:  Notified Home Care  Do you have a copy of your discharge instructions?:  No  Why don't you have your discharge instructions?:  \"nurse didn't know he was discharging\". Do you have all of your prescriptions and are they filled?:  No  Have you been contacted by a [address]?:  No  Have you scheduled your follow up appointment?:  Yes  How are you going to get to your appointment?:  Car - family or friend to transport  Were you discharged with any Home Care or Post Acute Services:  Yes  Post Acute Services:  Home Health (Comment: [address])  Do you feel like you have everything you need to keep you well at home?:  No  Care Transitions Interventions     Following with wound care, urology. Appointments scheduled. Medications reviewed. Spoke with [facility], Lowell Salas @  and Geo Christie @ [address] to report patient unable to get ordered Metoprolol as prescription was sent to a mail order pharmacy, requested assistance with obtaining ASAP. Requested daughter receive a call to assist with scheduling a visit and arranging needed medications.   Teresa Oseguera to follow up to see if they can provide patient with ordered medications from the supply he had in house prior to discharge. She will call back. Contact information provided.     Follow Up  Future Appointments  Date Time Provider Olga uMiri   3/29/2018 12:45 PM Rajesh Levi MD [facility]   4/17/2018 10:50 AM Yuni Rubin MD [address] [place] [address]   6/15/2018 1:45 PM José Wyatt MD [address],[facility], RN

## 2018-03-27 RX ORDER — DUTASTERIDE 0.5 MG/1
0.5 CAPSULE, LIQUID FILLED ORAL DAILY
Qty: 30 CAPSULE | Refills: 0 | Status: ON HOLD | OUTPATIENT
Start: 2018-03-27 | End: 2018-04-06 | Stop reason: HOSPADM

## 2018-03-27 NOTE — TELEPHONE ENCOUNTER
Pt's daughter stated Pt needed refill of Avodart. Per last office note 3/20/18 \"continue flomax and avodart\". Next appt 4/17/18. One month script sent to [facility] [address].

## 2018-03-29 ENCOUNTER — HOSPITAL ENCOUNTER (EMERGENCY)
Age: 83
Discharge: HOME | End: 2018-03-29
Attending: EMERGENCY MEDICINE
Payer: MEDICARE

## 2018-03-29 ENCOUNTER — HOSPITAL ENCOUNTER (OUTPATIENT)
Dept: WOUND CARE | Age: 83
Discharge: HOME | End: 2018-03-29
Payer: MEDICARE

## 2018-03-29 VITALS
DIASTOLIC BLOOD PRESSURE: 50 MMHG | WEIGHT: 141 LBS | OXYGEN SATURATION: 98 % | TEMPERATURE: 98 F | BODY MASS INDEX: 23.46 KG/M2 | HEART RATE: 62 BPM | RESPIRATION RATE: 18 BRPM | SYSTOLIC BLOOD PRESSURE: 122 MMHG

## 2018-03-29 VITALS
SYSTOLIC BLOOD PRESSURE: 133 MMHG | HEART RATE: 63 BPM | TEMPERATURE: 97.9 F | DIASTOLIC BLOOD PRESSURE: 62 MMHG | WEIGHT: 141 LBS | RESPIRATION RATE: 18 BRPM | HEIGHT: 65 IN | BODY MASS INDEX: 23.49 KG/M2

## 2018-03-29 DIAGNOSIS — L97.422 DIABETIC ULCER OF LEFT MIDFOOT ASSOCIATED WITH TYPE 2 DIABETES MELLITUS, WITH FAT LAYER EXPOSED (HCC): ICD-10-CM

## 2018-03-29 DIAGNOSIS — E11.621 DIABETIC ULCER OF LEFT MIDFOOT ASSOCIATED WITH TYPE 2 DIABETES MELLITUS, WITH FAT LAYER EXPOSED (HCC): ICD-10-CM

## 2018-03-29 DIAGNOSIS — N39.0 URINARY TRACT INFECTION IN MALE: ICD-10-CM

## 2018-03-29 DIAGNOSIS — R33.9 URINARY RETENTION: Primary | ICD-10-CM

## 2018-03-29 PROBLEM — L97.509 DIABETIC FOOT ULCER ASSOCIATED WITH TYPE 2 DIABETES MELLITUS (HCC): Status: ACTIVE | Noted: 2018-03-29

## 2018-03-29 LAB
-: ABNORMAL
ABSOLUTE EOS #: 0.3 K/UL (ref 0–0.4)
ABSOLUTE IMMATURE GRANULOCYTE: ABNORMAL K/UL (ref 0–0.3)
ABSOLUTE LYMPH #: 1.8 K/UL (ref 1–4.8)
ABSOLUTE MONO #: 0.7 K/UL (ref 0.1–1.3)
AMORPHOUS: ABNORMAL
ANION GAP SERPL CALCULATED.3IONS-SCNC: 12 MMOL/L (ref 9–17)
BACTERIA: ABNORMAL
BASOPHILS # BLD: 1 % (ref 0–2)
BASOPHILS ABSOLUTE: 0 K/UL (ref 0–0.2)
BILIRUBIN URINE: NEGATIVE
BUN BLDV-MCNC: 10 MG/DL (ref 8–23)
BUN/CREAT BLD: ABNORMAL (ref 9–20)
CALCIUM SERPL-MCNC: 8.8 MG/DL (ref 8.6–10.4)
CASTS UA: ABNORMAL /LPF
CHLORIDE BLD-SCNC: 99 MMOL/L (ref 98–107)
CO2: 27 MMOL/L (ref 20–31)
COLOR: YELLOW
COMMENT UA: ABNORMAL
CREAT SERPL-MCNC: 1.19 MG/DL (ref 0.7–1.2)
CRYSTALS, UA: ABNORMAL /HPF
DIFFERENTIAL TYPE: ABNORMAL
EOSINOPHILS RELATIVE PERCENT: 5 % (ref 0–4)
EPITHELIAL CELLS UA: ABNORMAL /HPF
GFR AFRICAN AMERICAN: >60 ML/MIN
GFR NON-AFRICAN AMERICAN: 57 ML/MIN
GFR SERPL CREATININE-BSD FRML MDRD: ABNORMAL ML/MIN/{1.73_M2}
GFR SERPL CREATININE-BSD FRML MDRD: ABNORMAL ML/MIN/{1.73_M2}
GLUCOSE BLD-MCNC: 153 MG/DL (ref 70–99)
GLUCOSE URINE: NEGATIVE
HCT VFR BLD CALC: 43.2 % (ref 41–53)
HEMOGLOBIN: 14.1 G/DL (ref 13.5–17.5)
IMMATURE GRANULOCYTES: ABNORMAL %
KETONES, URINE: NEGATIVE
LEUKOCYTE ESTERASE, URINE: ABNORMAL
LYMPHOCYTES # BLD: 27 % (ref 24–44)
MCH RBC QN AUTO: 28.3 PG (ref 26–34)
MCHC RBC AUTO-ENTMCNC: 32.7 G/DL (ref 31–37)
MCV RBC AUTO: 86.7 FL (ref 80–100)
MONOCYTES # BLD: 11 % (ref 1–7)
MUCUS: ABNORMAL
NITRITE, URINE: POSITIVE
NRBC AUTOMATED: ABNORMAL PER 100 WBC
OTHER OBSERVATIONS UA: ABNORMAL
PDW BLD-RTO: 14.6 % (ref 11.5–14.9)
PH UA: 5.5 (ref 5–8)
PLATELET # BLD: 185 K/UL (ref 150–450)
PLATELET ESTIMATE: ABNORMAL
PMV BLD AUTO: 7.5 FL (ref 6–12)
POTASSIUM SERPL-SCNC: 4.6 MMOL/L (ref 3.7–5.3)
PROTEIN UA: NEGATIVE
RBC # BLD: 4.98 M/UL (ref 4.5–5.9)
RBC # BLD: ABNORMAL 10*6/UL
RBC UA: ABNORMAL /HPF
RENAL EPITHELIAL, UA: ABNORMAL /HPF
SEG NEUTROPHILS: 56 % (ref 36–66)
SEGMENTED NEUTROPHILS ABSOLUTE COUNT: 3.7 K/UL (ref 1.3–9.1)
SODIUM BLD-SCNC: 138 MMOL/L (ref 135–144)
SPECIFIC GRAVITY UA: 1.02 (ref 1–1.03)
TRICHOMONAS: ABNORMAL
TURBIDITY: ABNORMAL
URINE HGB: NEGATIVE
UROBILINOGEN, URINE: NORMAL
WBC # BLD: 6.6 K/UL (ref 3.5–11)
WBC # BLD: ABNORMAL 10*3/UL
WBC UA: ABNORMAL /HPF
YEAST: ABNORMAL

## 2018-03-29 PROCEDURE — 87086 URINE CULTURE/COLONY COUNT: CPT

## 2018-03-29 PROCEDURE — 80048 BASIC METABOLIC PNL TOTAL CA: CPT

## 2018-03-29 PROCEDURE — 11042 DBRDMT SUBQ TIS 1ST 20SQCM/<: CPT

## 2018-03-29 PROCEDURE — 51702 INSERT TEMP BLADDER CATH: CPT

## 2018-03-29 PROCEDURE — 99283 EMERGENCY DEPT VISIT LOW MDM: CPT

## 2018-03-29 PROCEDURE — 87077 CULTURE AEROBIC IDENTIFY: CPT

## 2018-03-29 PROCEDURE — 81001 URINALYSIS AUTO W/SCOPE: CPT

## 2018-03-29 PROCEDURE — 11042 DBRDMT SUBQ TIS 1ST 20SQCM/<: CPT | Performed by: NURSE PRACTITIONER

## 2018-03-29 PROCEDURE — 6370000000 HC RX 637 (ALT 250 FOR IP): Performed by: NURSE PRACTITIONER

## 2018-03-29 PROCEDURE — 36415 COLL VENOUS BLD VENIPUNCTURE: CPT

## 2018-03-29 PROCEDURE — 86403 PARTICLE AGGLUT ANTBDY SCRN: CPT

## 2018-03-29 PROCEDURE — 6370000000 HC RX 637 (ALT 250 FOR IP): Performed by: EMERGENCY MEDICINE

## 2018-03-29 PROCEDURE — 87184 SC STD DISK METHOD PER PLATE: CPT

## 2018-03-29 PROCEDURE — 85025 COMPLETE CBC W/AUTO DIFF WBC: CPT

## 2018-03-29 PROCEDURE — 51798 US URINE CAPACITY MEASURE: CPT

## 2018-03-29 PROCEDURE — 87186 SC STD MICRODIL/AGAR DIL: CPT

## 2018-03-29 RX ORDER — DOXYCYCLINE 100 MG/1
100 TABLET ORAL 2 TIMES DAILY
Qty: 20 TABLET | Refills: 0 | Status: ON HOLD | OUTPATIENT
Start: 2018-03-29 | End: 2018-04-06 | Stop reason: HOSPADM

## 2018-03-29 RX ORDER — CEPHALEXIN 500 MG/1
500 CAPSULE ORAL 4 TIMES DAILY
Qty: 28 CAPSULE | Refills: 0 | Status: SHIPPED | OUTPATIENT
Start: 2018-03-29 | End: 2018-03-29

## 2018-03-29 RX ORDER — DOXYCYCLINE 100 MG/1
100 CAPSULE ORAL ONCE
Status: COMPLETED | OUTPATIENT
Start: 2018-03-29 | End: 2018-03-29

## 2018-03-29 RX ORDER — LIDOCAINE HYDROCHLORIDE 40 MG/ML
SOLUTION TOPICAL ONCE
Status: COMPLETED | OUTPATIENT
Start: 2018-03-29 | End: 2018-03-29

## 2018-03-29 RX ORDER — CEPHALEXIN 250 MG/1
500 CAPSULE ORAL ONCE
Status: DISCONTINUED | OUTPATIENT
Start: 2018-03-29 | End: 2018-03-29

## 2018-03-29 RX ADMIN — DOXYCYCLINE 100 MG: 100 CAPSULE ORAL at 16:21

## 2018-03-29 RX ADMIN — LIDOCAINE HYDROCHLORIDE 2 ML: 40 SOLUTION TOPICAL at 13:03

## 2018-03-29 ASSESSMENT — ENCOUNTER SYMPTOMS
WHEEZING: 0
BLURRED VISION: 0
SHORTNESS OF BREATH: 0
VOMITING: 0
SORE THROAT: 0
CONSTIPATION: 0
EYE DISCHARGE: 0
COUGH: 0
DIARRHEA: 0
NAUSEA: 0
ABDOMINAL PAIN: 0
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
## 2018-03-30 ENCOUNTER — CARE COORDINATION (OUTPATIENT)
Dept: CARE COORDINATION | Age: 83
End: 2018-03-30

## 2018-04-01 LAB
CULTURE: ABNORMAL
Lab: ABNORMAL
ORGANISM: ABNORMAL
SPECIMEN DESCRIPTION: ABNORMAL
SPECIMEN DESCRIPTION: ABNORMAL
STATUS: ABNORMAL

## 2018-04-02 ENCOUNTER — TELEPHONE (OUTPATIENT)
Dept: FAMILY MEDICINE CLINIC | Age: 83
End: 2018-04-02

## 2018-04-02 ENCOUNTER — HOSPITAL ENCOUNTER (INPATIENT)
Age: 83
LOS: 7 days | Discharge: HOME | DRG: 690 | End: 2018-04-09
Attending: EMERGENCY MEDICINE | Admitting: INTERNAL MEDICINE
Payer: MEDICARE

## 2018-04-02 ENCOUNTER — TELEPHONE (OUTPATIENT)
Dept: UROLOGY | Age: 83
End: 2018-04-02

## 2018-04-02 DIAGNOSIS — N39.0 COMPLICATED URINARY TRACT INFECTION: Primary | ICD-10-CM

## 2018-04-02 LAB
-: ABNORMAL
ABSOLUTE EOS #: 0.3 K/UL (ref 0–0.4)
ABSOLUTE IMMATURE GRANULOCYTE: ABNORMAL K/UL (ref 0–0.3)
ABSOLUTE LYMPH #: 1.8 K/UL (ref 1–4.8)
ABSOLUTE MONO #: 0.7 K/UL (ref 0.1–1.3)
ALBUMIN SERPL-MCNC: 3 G/DL (ref 3.5–5.2)
ALBUMIN/GLOBULIN RATIO: ABNORMAL (ref 1–2.5)
ALP BLD-CCNC: 126 U/L (ref 40–129)
ALT SERPL-CCNC: 20 U/L (ref 5–41)
AMORPHOUS: ABNORMAL
ANION GAP SERPL CALCULATED.3IONS-SCNC: 10 MMOL/L (ref 9–17)
AST SERPL-CCNC: 19 U/L
BACTERIA: ABNORMAL
BASOPHILS # BLD: 1 % (ref 0–2)
BASOPHILS ABSOLUTE: 0 K/UL (ref 0–0.2)
BILIRUB SERPL-MCNC: 0.72 MG/DL (ref 0.3–1.2)
BILIRUBIN DIRECT: 0.19 MG/DL
BILIRUBIN URINE: NEGATIVE
BILIRUBIN, INDIRECT: 0.53 MG/DL (ref 0–1)
BUN BLDV-MCNC: 16 MG/DL (ref 8–23)
BUN/CREAT BLD: ABNORMAL (ref 9–20)
CALCIUM SERPL-MCNC: 9 MG/DL (ref 8.6–10.4)
CASTS UA: ABNORMAL /LPF
CHLORIDE BLD-SCNC: 100 MMOL/L (ref 98–107)
CO2: 30 MMOL/L (ref 20–31)
COLOR: YELLOW
COMMENT UA: ABNORMAL
CREAT SERPL-MCNC: 1.19 MG/DL (ref 0.7–1.2)
CRYSTALS, UA: ABNORMAL /HPF
DIFFERENTIAL TYPE: ABNORMAL
EOSINOPHILS RELATIVE PERCENT: 3 % (ref 0–4)
EPITHELIAL CELLS UA: ABNORMAL /HPF
GFR AFRICAN AMERICAN: >60 ML/MIN
GFR NON-AFRICAN AMERICAN: 57 ML/MIN
GFR SERPL CREATININE-BSD FRML MDRD: ABNORMAL ML/MIN/{1.73_M2}
GFR SERPL CREATININE-BSD FRML MDRD: ABNORMAL ML/MIN/{1.73_M2}
GLOBULIN: ABNORMAL G/DL (ref 1.5–3.8)
GLUCOSE BLD-MCNC: 172 MG/DL (ref 70–99)
GLUCOSE URINE: NEGATIVE
HCT VFR BLD CALC: 43.3 % (ref 41–53)
HEMOGLOBIN: 14.9 G/DL (ref 13.5–17.5)
IMMATURE GRANULOCYTES: ABNORMAL %
KETONES, URINE: NEGATIVE
LEUKOCYTE ESTERASE, URINE: ABNORMAL
LYMPHOCYTES # BLD: 22 % (ref 24–44)
MCH RBC QN AUTO: 29.6 PG (ref 26–34)
MCHC RBC AUTO-ENTMCNC: 34.3 G/DL (ref 31–37)
MCV RBC AUTO: 86.4 FL (ref 80–100)
MONOCYTES # BLD: 9 % (ref 1–7)
MUCUS: ABNORMAL
NITRITE, URINE: NEGATIVE
NRBC AUTOMATED: ABNORMAL PER 100 WBC
OTHER OBSERVATIONS UA: ABNORMAL
PDW BLD-RTO: 14.5 % (ref 11.5–14.9)
PH UA: 7 (ref 5–8)
PLATELET # BLD: 188 K/UL (ref 150–450)
PLATELET ESTIMATE: ABNORMAL
PMV BLD AUTO: 7.6 FL (ref 6–12)
POTASSIUM SERPL-SCNC: 4.6 MMOL/L (ref 3.7–5.3)
PROTEIN UA: ABNORMAL
RBC # BLD: 5.02 M/UL (ref 4.5–5.9)
RBC # BLD: ABNORMAL 10*6/UL
RBC UA: ABNORMAL /HPF
RENAL EPITHELIAL, UA: ABNORMAL /HPF
SEG NEUTROPHILS: 65 % (ref 36–66)
SEGMENTED NEUTROPHILS ABSOLUTE COUNT: 5.5 K/UL (ref 1.3–9.1)
SODIUM BLD-SCNC: 140 MMOL/L (ref 135–144)
SPECIFIC GRAVITY UA: 1.01 (ref 1–1.03)
TOTAL PROTEIN: 8.5 G/DL (ref 6.4–8.3)
TRICHOMONAS: ABNORMAL
TURBIDITY: CLEAR
URINE HGB: NEGATIVE
UROBILINOGEN, URINE: NORMAL
WBC # BLD: 8.4 K/UL (ref 3.5–11)
WBC # BLD: ABNORMAL 10*3/UL
WBC UA: ABNORMAL /HPF
YEAST: ABNORMAL

## 2018-04-02 PROCEDURE — 87040 BLOOD CULTURE FOR BACTERIA: CPT

## 2018-04-02 PROCEDURE — 94664 DEMO&/EVAL PT USE INHALER: CPT

## 2018-04-02 PROCEDURE — 1200000000 HC SEMI PRIVATE

## 2018-04-02 PROCEDURE — 94761 N-INVAS EAR/PLS OXIMETRY MLT: CPT

## 2018-04-02 PROCEDURE — 96368 THER/DIAG CONCURRENT INF: CPT

## 2018-04-02 PROCEDURE — 6360000002 HC RX W HCPCS: Performed by: FAMILY MEDICINE

## 2018-04-02 PROCEDURE — 2580000003 HC RX 258: Performed by: EMERGENCY MEDICINE

## 2018-04-02 PROCEDURE — 87086 URINE CULTURE/COLONY COUNT: CPT

## 2018-04-02 PROCEDURE — 99223 1ST HOSP IP/OBS HIGH 75: CPT | Performed by: INTERNAL MEDICINE

## 2018-04-02 PROCEDURE — 85025 COMPLETE CBC W/AUTO DIFF WBC: CPT

## 2018-04-02 PROCEDURE — 81001 URINALYSIS AUTO W/SCOPE: CPT

## 2018-04-02 PROCEDURE — 6360000002 HC RX W HCPCS: Performed by: EMERGENCY MEDICINE

## 2018-04-02 PROCEDURE — 6370000000 HC RX 637 (ALT 250 FOR IP): Performed by: STUDENT IN AN ORGANIZED HEALTH CARE EDUCATION/TRAINING PROGRAM

## 2018-04-02 PROCEDURE — 6370000000 HC RX 637 (ALT 250 FOR IP): Performed by: FAMILY MEDICINE

## 2018-04-02 PROCEDURE — 36415 COLL VENOUS BLD VENIPUNCTURE: CPT

## 2018-04-02 PROCEDURE — 80048 BASIC METABOLIC PNL TOTAL CA: CPT

## 2018-04-02 PROCEDURE — 99284 EMERGENCY DEPT VISIT MOD MDM: CPT

## 2018-04-02 PROCEDURE — 96365 THER/PROPH/DIAG IV INF INIT: CPT

## 2018-04-02 PROCEDURE — 80076 HEPATIC FUNCTION PANEL: CPT

## 2018-04-02 RX ORDER — DEXTROSE MONOHYDRATE 25 G/50ML
12.5 INJECTION, SOLUTION INTRAVENOUS PRN
Status: DISCONTINUED | OUTPATIENT
Start: 2018-04-02 | End: 2018-04-09 | Stop reason: HOSPADM

## 2018-04-02 RX ORDER — INSULIN GLARGINE 100 [IU]/ML
10 INJECTION, SOLUTION SUBCUTANEOUS NIGHTLY
Status: DISCONTINUED | OUTPATIENT
Start: 2018-04-02 | End: 2018-04-09 | Stop reason: HOSPADM

## 2018-04-02 RX ORDER — ERGOCALCIFEROL 1.25 MG/1
50000 CAPSULE ORAL WEEKLY
COMMUNITY

## 2018-04-02 RX ORDER — DEXTROSE MONOHYDRATE 50 MG/ML
100 INJECTION, SOLUTION INTRAVENOUS PRN
Status: DISCONTINUED | OUTPATIENT
Start: 2018-04-02 | End: 2018-04-09 | Stop reason: HOSPADM

## 2018-04-02 RX ORDER — METOPROLOL SUCCINATE 25 MG/1
25 TABLET, EXTENDED RELEASE ORAL DAILY
Status: DISCONTINUED | OUTPATIENT
Start: 2018-04-02 | End: 2018-04-09 | Stop reason: HOSPADM

## 2018-04-02 RX ORDER — SODIUM CHLORIDE 0.9 % (FLUSH) 0.9 %
10 SYRINGE (ML) INJECTION PRN
Status: DISCONTINUED | OUTPATIENT
Start: 2018-04-02 | End: 2018-04-09 | Stop reason: HOSPADM

## 2018-04-02 RX ORDER — SODIUM CHLORIDE 0.9 % (FLUSH) 0.9 %
10 SYRINGE (ML) INJECTION EVERY 12 HOURS SCHEDULED
Status: DISCONTINUED | OUTPATIENT
Start: 2018-04-02 | End: 2018-04-09 | Stop reason: HOSPADM

## 2018-04-02 RX ORDER — M-VIT,TX,IRON,MINS/CALC/FOLIC 27MG-0.4MG
1 TABLET ORAL DAILY
COMMUNITY

## 2018-04-02 RX ORDER — LEVOTHYROXINE SODIUM 137 UG/1
137 TABLET ORAL DAILY
Status: DISCONTINUED | OUTPATIENT
Start: 2018-04-02 | End: 2018-04-09 | Stop reason: HOSPADM

## 2018-04-02 RX ORDER — LINEZOLID 2 MG/ML
600 INJECTION, SOLUTION INTRAVENOUS EVERY 12 HOURS
Status: DISCONTINUED | OUTPATIENT
Start: 2018-04-02 | End: 2018-04-06 | Stop reason: ALTCHOICE

## 2018-04-02 RX ORDER — ATORVASTATIN CALCIUM 20 MG/1
20 TABLET, FILM COATED ORAL NIGHTLY
Status: DISCONTINUED | OUTPATIENT
Start: 2018-04-02 | End: 2018-04-09 | Stop reason: HOSPADM

## 2018-04-02 RX ORDER — HEPARIN SODIUM 5000 [USP'U]/ML
5000 INJECTION, SOLUTION INTRAVENOUS; SUBCUTANEOUS EVERY 8 HOURS SCHEDULED
Status: DISCONTINUED | OUTPATIENT
Start: 2018-04-02 | End: 2018-04-09 | Stop reason: HOSPADM

## 2018-04-02 RX ORDER — ASPIRIN 81 MG/1
81 TABLET ORAL DAILY
Status: DISCONTINUED | OUTPATIENT
Start: 2018-04-02 | End: 2018-04-09 | Stop reason: HOSPADM

## 2018-04-02 RX ORDER — FAMOTIDINE 20 MG/1
20 TABLET, FILM COATED ORAL 2 TIMES DAILY
Status: DISCONTINUED | OUTPATIENT
Start: 2018-04-02 | End: 2018-04-05

## 2018-04-02 RX ORDER — NICOTINE POLACRILEX 4 MG
15 LOZENGE BUCCAL PRN
Status: DISCONTINUED | OUTPATIENT
Start: 2018-04-02 | End: 2018-04-09 | Stop reason: HOSPADM

## 2018-04-02 RX ORDER — NITROGLYCERIN 0.4 MG/1
0.4 TABLET SUBLINGUAL EVERY 5 MIN PRN
Status: DISCONTINUED | OUTPATIENT
Start: 2018-04-02 | End: 2018-04-09 | Stop reason: HOSPADM

## 2018-04-02 RX ADMIN — ATORVASTATIN CALCIUM 20 MG: 20 TABLET, FILM COATED ORAL at 21:15

## 2018-04-02 RX ADMIN — LINEZOLID 600 MG: 600 INJECTION, SOLUTION INTRAVENOUS at 15:10

## 2018-04-02 RX ADMIN — HEPARIN SODIUM 5000 UNITS: 5000 INJECTION, SOLUTION INTRAVENOUS; SUBCUTANEOUS at 21:17

## 2018-04-02 RX ADMIN — PIPERACILLIN SODIUM,TAZOBACTAM SODIUM 3.38 G: 3; .375 INJECTION, POWDER, FOR SOLUTION INTRAVENOUS at 15:40

## 2018-04-02 RX ADMIN — FAMOTIDINE 20 MG: 20 TABLET, FILM COATED ORAL at 21:15

## 2018-04-02 ASSESSMENT — ENCOUNTER SYMPTOMS
VOMITING: 0
PHOTOPHOBIA: 0
HEMOPTYSIS: 0
BLURRED VISION: 0
DOUBLE VISION: 0
SHORTNESS OF BREATH: 0
BLOOD IN STOOL: 0
CONSTIPATION: 0
COUGH: 0
ORTHOPNEA: 0
NAUSEA: 0
ABDOMINAL PAIN: 0
HEARTBURN: 0
DIARRHEA: 0

## 2018-04-02 ASSESSMENT — PAIN SCALES - GENERAL: PAINLEVEL_OUTOF10: 0

## 2018-04-03 ENCOUNTER — HOSPITAL ENCOUNTER (OUTPATIENT)
Dept: WOUND CARE | Age: 83
Discharge: HOME | End: 2018-04-03
Payer: MEDICARE

## 2018-04-03 ENCOUNTER — APPOINTMENT (OUTPATIENT)
Dept: GENERAL RADIOLOGY | Age: 83
DRG: 690 | End: 2018-04-03
Payer: MEDICARE

## 2018-04-03 LAB
CULTURE: NO GROWTH
CULTURE: NORMAL
Lab: NORMAL
SPECIMEN DESCRIPTION: NORMAL
SPECIMEN DESCRIPTION: NORMAL
STATUS: NORMAL
URIC ACID: 4.3 MG/DL (ref 3.4–7)

## 2018-04-03 PROCEDURE — 73560 X-RAY EXAM OF KNEE 1 OR 2: CPT

## 2018-04-03 PROCEDURE — 36415 COLL VENOUS BLD VENIPUNCTURE: CPT

## 2018-04-03 PROCEDURE — 6370000000 HC RX 637 (ALT 250 FOR IP): Performed by: STUDENT IN AN ORGANIZED HEALTH CARE EDUCATION/TRAINING PROGRAM

## 2018-04-03 PROCEDURE — 6370000000 HC RX 637 (ALT 250 FOR IP): Performed by: FAMILY MEDICINE

## 2018-04-03 PROCEDURE — 99233 SBSQ HOSP IP/OBS HIGH 50: CPT | Performed by: INTERNAL MEDICINE

## 2018-04-03 PROCEDURE — 6360000002 HC RX W HCPCS: Performed by: EMERGENCY MEDICINE

## 2018-04-03 PROCEDURE — 6360000002 HC RX W HCPCS: Performed by: FAMILY MEDICINE

## 2018-04-03 PROCEDURE — G0103 PSA SCREENING: HCPCS

## 2018-04-03 PROCEDURE — 6370000000 HC RX 637 (ALT 250 FOR IP): Performed by: INTERNAL MEDICINE

## 2018-04-03 PROCEDURE — 2580000003 HC RX 258: Performed by: FAMILY MEDICINE

## 2018-04-03 PROCEDURE — G8978 MOBILITY CURRENT STATUS: HCPCS

## 2018-04-03 PROCEDURE — G8979 MOBILITY GOAL STATUS: HCPCS

## 2018-04-03 PROCEDURE — 97161 PT EVAL LOW COMPLEX 20 MIN: CPT

## 2018-04-03 PROCEDURE — 84550 ASSAY OF BLOOD/URIC ACID: CPT

## 2018-04-03 PROCEDURE — 1200000000 HC SEMI PRIVATE

## 2018-04-03 PROCEDURE — 6360000002 HC RX W HCPCS: Performed by: INTERNAL MEDICINE

## 2018-04-03 PROCEDURE — 99222 1ST HOSP IP/OBS MODERATE 55: CPT | Performed by: INTERNAL MEDICINE

## 2018-04-03 PROCEDURE — 2580000003 HC RX 258: Performed by: INTERNAL MEDICINE

## 2018-04-03 PROCEDURE — 97116 GAIT TRAINING THERAPY: CPT

## 2018-04-03 RX ORDER — CIPROFLOXACIN 500 MG/1
500 TABLET, FILM COATED ORAL EVERY 12 HOURS SCHEDULED
Status: DISCONTINUED | OUTPATIENT
Start: 2018-04-03 | End: 2018-04-03

## 2018-04-03 RX ORDER — HYDROCODONE BITARTRATE AND ACETAMINOPHEN 5; 325 MG/1; MG/1
1 TABLET ORAL EVERY 6 HOURS PRN
Status: DISCONTINUED | OUTPATIENT
Start: 2018-04-03 | End: 2018-04-09 | Stop reason: HOSPADM

## 2018-04-03 RX ORDER — ACETAMINOPHEN 325 MG/1
650 TABLET ORAL EVERY 6 HOURS PRN
Status: DISCONTINUED | OUTPATIENT
Start: 2018-04-03 | End: 2018-04-09 | Stop reason: HOSPADM

## 2018-04-03 RX ADMIN — Medication 10 ML: at 22:26

## 2018-04-03 RX ADMIN — LINEZOLID 600 MG: 600 INJECTION, SOLUTION INTRAVENOUS at 03:46

## 2018-04-03 RX ADMIN — LEVOTHYROXINE SODIUM 137 MCG: 137 TABLET ORAL at 06:21

## 2018-04-03 RX ADMIN — HEPARIN SODIUM 5000 UNITS: 5000 INJECTION, SOLUTION INTRAVENOUS; SUBCUTANEOUS at 06:21

## 2018-04-03 RX ADMIN — Medication 10 ML: at 14:34

## 2018-04-03 RX ADMIN — INSULIN GLARGINE 10 UNITS: 100 INJECTION, SOLUTION SUBCUTANEOUS at 22:00

## 2018-04-03 RX ADMIN — FAMOTIDINE 20 MG: 20 TABLET, FILM COATED ORAL at 22:00

## 2018-04-03 RX ADMIN — PIPERACILLIN SODIUM,TAZOBACTAM SODIUM 4.5 G: 4; .5 INJECTION, POWDER, FOR SOLUTION INTRAVENOUS at 16:01

## 2018-04-03 RX ADMIN — ATORVASTATIN CALCIUM 20 MG: 20 TABLET, FILM COATED ORAL at 22:00

## 2018-04-03 RX ADMIN — Medication 10 ML: at 08:15

## 2018-04-03 RX ADMIN — HEPARIN SODIUM 5000 UNITS: 5000 INJECTION, SOLUTION INTRAVENOUS; SUBCUTANEOUS at 22:00

## 2018-04-03 RX ADMIN — ASPIRIN 81 MG: 81 TABLET, COATED ORAL at 08:14

## 2018-04-03 RX ADMIN — CIPROFLOXACIN HYDROCHLORIDE 500 MG: 500 TABLET, FILM COATED ORAL at 12:14

## 2018-04-03 RX ADMIN — FAMOTIDINE 20 MG: 20 TABLET, FILM COATED ORAL at 08:14

## 2018-04-03 RX ADMIN — HYDROCODONE BITARTRATE AND ACETAMINOPHEN 1 TABLET: 5; 325 TABLET ORAL at 17:06

## 2018-04-03 RX ADMIN — LINEZOLID 600 MG: 600 INJECTION, SOLUTION INTRAVENOUS at 14:32

## 2018-04-03 RX ADMIN — HYDROCODONE BITARTRATE AND ACETAMINOPHEN 1 TABLET: 5; 325 TABLET ORAL at 11:17

## 2018-04-03 RX ADMIN — PIPERACILLIN SODIUM,TAZOBACTAM SODIUM 4.5 G: 4; .5 INJECTION, POWDER, FOR SOLUTION INTRAVENOUS at 22:19

## 2018-04-03 ASSESSMENT — PAIN SCALES - GENERAL
PAINLEVEL_OUTOF10: 0
PAINLEVEL_OUTOF10: 9
PAINLEVEL_OUTOF10: 7
PAINLEVEL_OUTOF10: 6
PAINLEVEL_OUTOF10: 10
PAINLEVEL_OUTOF10: 0
PAINLEVEL_OUTOF10: 10
PAINLEVEL_OUTOF10: 10
PAINLEVEL_OUTOF10: 0
PAINLEVEL_OUTOF10: 10

## 2018-04-03 ASSESSMENT — PAIN DESCRIPTION - LOCATION
LOCATION: KNEE
LOCATION: LEG;KNEE
LOCATION: KNEE
LOCATION: KNEE

## 2018-04-03 ASSESSMENT — PAIN DESCRIPTION - PROGRESSION
CLINICAL_PROGRESSION: NOT CHANGED
CLINICAL_PROGRESSION: NOT CHANGED

## 2018-04-03 ASSESSMENT — PAIN DESCRIPTION - ORIENTATION
ORIENTATION: RIGHT

## 2018-04-03 ASSESSMENT — PAIN DESCRIPTION - FREQUENCY
FREQUENCY: CONTINUOUS
FREQUENCY: CONTINUOUS

## 2018-04-03 ASSESSMENT — PAIN DESCRIPTION - ONSET
ONSET: ON-GOING
ONSET: ON-GOING

## 2018-04-03 ASSESSMENT — PAIN DESCRIPTION - DESCRIPTORS: DESCRIPTORS: ACHING;DISCOMFORT

## 2018-04-04 ENCOUNTER — APPOINTMENT (OUTPATIENT)
Dept: ULTRASOUND IMAGING | Age: 83
DRG: 690 | End: 2018-04-04
Payer: MEDICARE

## 2018-04-04 ENCOUNTER — TELEPHONE (OUTPATIENT)
Dept: UROLOGY | Age: 83
End: 2018-04-04

## 2018-04-04 LAB
ANION GAP SERPL CALCULATED.3IONS-SCNC: 10 MMOL/L (ref 9–17)
BUN BLDV-MCNC: 12 MG/DL (ref 8–23)
BUN/CREAT BLD: ABNORMAL (ref 9–20)
CALCIUM SERPL-MCNC: 8.3 MG/DL (ref 8.6–10.4)
CHLORIDE BLD-SCNC: 102 MMOL/L (ref 98–107)
CO2: 26 MMOL/L (ref 20–31)
CREAT SERPL-MCNC: 1.19 MG/DL (ref 0.7–1.2)
GFR AFRICAN AMERICAN: >60 ML/MIN
GFR NON-AFRICAN AMERICAN: 57 ML/MIN
GFR SERPL CREATININE-BSD FRML MDRD: ABNORMAL ML/MIN/{1.73_M2}
GFR SERPL CREATININE-BSD FRML MDRD: ABNORMAL ML/MIN/{1.73_M2}
GLUCOSE BLD-MCNC: 113 MG/DL (ref 75–110)
GLUCOSE BLD-MCNC: 114 MG/DL (ref 75–110)
GLUCOSE BLD-MCNC: 122 MG/DL (ref 70–99)
GLUCOSE BLD-MCNC: 139 MG/DL (ref 75–110)
GLUCOSE BLD-MCNC: 141 MG/DL (ref 75–110)
HCT VFR BLD CALC: 42.5 % (ref 41–53)
HEMOGLOBIN: 14.5 G/DL (ref 13.5–17.5)
MCH RBC QN AUTO: 29.5 PG (ref 26–34)
MCHC RBC AUTO-ENTMCNC: 34.1 G/DL (ref 31–37)
MCV RBC AUTO: 86.4 FL (ref 80–100)
NRBC AUTOMATED: NORMAL PER 100 WBC
PDW BLD-RTO: 14.8 % (ref 11.5–14.9)
PLATELET # BLD: 178 K/UL (ref 150–450)
PMV BLD AUTO: 7.3 FL (ref 6–12)
POTASSIUM SERPL-SCNC: 4 MMOL/L (ref 3.7–5.3)
PROSTATE SPECIFIC ANTIGEN: 6.18 UG/L
RBC # BLD: 4.92 M/UL (ref 4.5–5.9)
SODIUM BLD-SCNC: 138 MMOL/L (ref 135–144)
WBC # BLD: 8 K/UL (ref 3.5–11)

## 2018-04-04 PROCEDURE — 99233 SBSQ HOSP IP/OBS HIGH 50: CPT | Performed by: INTERNAL MEDICINE

## 2018-04-04 PROCEDURE — 6370000000 HC RX 637 (ALT 250 FOR IP): Performed by: INTERNAL MEDICINE

## 2018-04-04 PROCEDURE — 6360000002 HC RX W HCPCS: Performed by: INTERNAL MEDICINE

## 2018-04-04 PROCEDURE — 6370000000 HC RX 637 (ALT 250 FOR IP): Performed by: FAMILY MEDICINE

## 2018-04-04 PROCEDURE — 6360000002 HC RX W HCPCS: Performed by: FAMILY MEDICINE

## 2018-04-04 PROCEDURE — 36415 COLL VENOUS BLD VENIPUNCTURE: CPT

## 2018-04-04 PROCEDURE — 2580000003 HC RX 258: Performed by: INTERNAL MEDICINE

## 2018-04-04 PROCEDURE — 82947 ASSAY GLUCOSE BLOOD QUANT: CPT

## 2018-04-04 PROCEDURE — 85027 COMPLETE CBC AUTOMATED: CPT

## 2018-04-04 PROCEDURE — 1200000000 HC SEMI PRIVATE

## 2018-04-04 PROCEDURE — 6360000002 HC RX W HCPCS: Performed by: EMERGENCY MEDICINE

## 2018-04-04 PROCEDURE — 80048 BASIC METABOLIC PNL TOTAL CA: CPT

## 2018-04-04 PROCEDURE — 6370000000 HC RX 637 (ALT 250 FOR IP): Performed by: STUDENT IN AN ORGANIZED HEALTH CARE EDUCATION/TRAINING PROGRAM

## 2018-04-04 PROCEDURE — 76770 US EXAM ABDO BACK WALL COMP: CPT

## 2018-04-04 PROCEDURE — 99232 SBSQ HOSP IP/OBS MODERATE 35: CPT | Performed by: INTERNAL MEDICINE

## 2018-04-04 RX ADMIN — LINEZOLID 600 MG: 600 INJECTION, SOLUTION INTRAVENOUS at 15:30

## 2018-04-04 RX ADMIN — HYDROCODONE BITARTRATE AND ACETAMINOPHEN 1 TABLET: 5; 325 TABLET ORAL at 11:44

## 2018-04-04 RX ADMIN — FAMOTIDINE 20 MG: 20 TABLET, FILM COATED ORAL at 11:43

## 2018-04-04 RX ADMIN — LINEZOLID 600 MG: 600 INJECTION, SOLUTION INTRAVENOUS at 02:54

## 2018-04-04 RX ADMIN — ASPIRIN 81 MG: 81 TABLET, COATED ORAL at 11:43

## 2018-04-04 RX ADMIN — PIPERACILLIN SODIUM,TAZOBACTAM SODIUM 4.5 G: 4; .5 INJECTION, POWDER, FOR SOLUTION INTRAVENOUS at 12:34

## 2018-04-04 RX ADMIN — ATORVASTATIN CALCIUM 20 MG: 20 TABLET, FILM COATED ORAL at 21:23

## 2018-04-04 RX ADMIN — PIPERACILLIN SODIUM,TAZOBACTAM SODIUM 4.5 G: 4; .5 INJECTION, POWDER, FOR SOLUTION INTRAVENOUS at 07:40

## 2018-04-04 RX ADMIN — FAMOTIDINE 20 MG: 20 TABLET, FILM COATED ORAL at 21:23

## 2018-04-04 RX ADMIN — HYDROCODONE BITARTRATE AND ACETAMINOPHEN 1 TABLET: 5; 325 TABLET ORAL at 21:23

## 2018-04-04 RX ADMIN — PIPERACILLIN SODIUM,TAZOBACTAM SODIUM 4.5 G: 4; .5 INJECTION, POWDER, FOR SOLUTION INTRAVENOUS at 21:23

## 2018-04-04 RX ADMIN — INSULIN GLARGINE 10 UNITS: 100 INJECTION, SOLUTION SUBCUTANEOUS at 21:25

## 2018-04-04 RX ADMIN — LEVOTHYROXINE SODIUM 137 MCG: 137 TABLET ORAL at 05:50

## 2018-04-04 RX ADMIN — HEPARIN SODIUM 5000 UNITS: 5000 INJECTION, SOLUTION INTRAVENOUS; SUBCUTANEOUS at 21:23

## 2018-04-04 RX ADMIN — HEPARIN SODIUM 5000 UNITS: 5000 INJECTION, SOLUTION INTRAVENOUS; SUBCUTANEOUS at 05:50

## 2018-04-04 RX ADMIN — HEPARIN SODIUM 5000 UNITS: 5000 INJECTION, SOLUTION INTRAVENOUS; SUBCUTANEOUS at 15:31

## 2018-04-04 RX ADMIN — DEXTROSE MONOHYDRATE: 50 INJECTION, SOLUTION INTRAVENOUS at 21:23

## 2018-04-04 ASSESSMENT — PAIN SCALES - GENERAL
PAINLEVEL_OUTOF10: 5
PAINLEVEL_OUTOF10: 2
PAINLEVEL_OUTOF10: 0
PAINLEVEL_OUTOF10: 3
PAINLEVEL_OUTOF10: 0

## 2018-04-04 ASSESSMENT — PAIN DESCRIPTION - LOCATION: LOCATION: ELBOW

## 2018-04-04 ASSESSMENT — PAIN DESCRIPTION - PAIN TYPE: TYPE: CHRONIC PAIN

## 2018-04-04 ASSESSMENT — PAIN DESCRIPTION - ORIENTATION: ORIENTATION: LEFT

## 2018-04-05 ENCOUNTER — APPOINTMENT (OUTPATIENT)
Dept: INTERVENTIONAL RADIOLOGY/VASCULAR | Age: 83
DRG: 690 | End: 2018-04-05
Payer: MEDICARE

## 2018-04-05 LAB
CREAT SERPL-MCNC: 1.28 MG/DL (ref 0.7–1.2)
GFR AFRICAN AMERICAN: >60 ML/MIN
GFR NON-AFRICAN AMERICAN: 53 ML/MIN
GFR SERPL CREATININE-BSD FRML MDRD: ABNORMAL ML/MIN/{1.73_M2}
GFR SERPL CREATININE-BSD FRML MDRD: ABNORMAL ML/MIN/{1.73_M2}
GLUCOSE BLD-MCNC: 134 MG/DL (ref 75–110)
GLUCOSE BLD-MCNC: 143 MG/DL (ref 75–110)
GLUCOSE BLD-MCNC: 143 MG/DL (ref 75–110)
GLUCOSE BLD-MCNC: 166 MG/DL (ref 75–110)

## 2018-04-05 PROCEDURE — C1751 CATH, INF, PER/CENT/MIDLINE: HCPCS

## 2018-04-05 PROCEDURE — 6370000000 HC RX 637 (ALT 250 FOR IP): Performed by: UROLOGY

## 2018-04-05 PROCEDURE — 99232 SBSQ HOSP IP/OBS MODERATE 35: CPT | Performed by: INTERNAL MEDICINE

## 2018-04-05 PROCEDURE — 36415 COLL VENOUS BLD VENIPUNCTURE: CPT

## 2018-04-05 PROCEDURE — 6370000000 HC RX 637 (ALT 250 FOR IP): Performed by: STUDENT IN AN ORGANIZED HEALTH CARE EDUCATION/TRAINING PROGRAM

## 2018-04-05 PROCEDURE — 2580000003 HC RX 258: Performed by: INTERNAL MEDICINE

## 2018-04-05 PROCEDURE — 82565 ASSAY OF CREATININE: CPT

## 2018-04-05 PROCEDURE — 97530 THERAPEUTIC ACTIVITIES: CPT

## 2018-04-05 PROCEDURE — 2580000003 HC RX 258: Performed by: FAMILY MEDICINE

## 2018-04-05 PROCEDURE — 1200000000 HC SEMI PRIVATE

## 2018-04-05 PROCEDURE — G8988 SELF CARE GOAL STATUS: HCPCS

## 2018-04-05 PROCEDURE — 6360000002 HC RX W HCPCS: Performed by: FAMILY MEDICINE

## 2018-04-05 PROCEDURE — G8987 SELF CARE CURRENT STATUS: HCPCS

## 2018-04-05 PROCEDURE — 05H333Z INSERTION OF INFUSION DEVICE INTO RIGHT INNOMINATE VEIN, PERCUTANEOUS APPROACH: ICD-10-PCS | Performed by: RADIOLOGY

## 2018-04-05 PROCEDURE — 97116 GAIT TRAINING THERAPY: CPT

## 2018-04-05 PROCEDURE — 76937 US GUIDE VASCULAR ACCESS: CPT | Performed by: RADIOLOGY

## 2018-04-05 PROCEDURE — 6360000002 HC RX W HCPCS: Performed by: EMERGENCY MEDICINE

## 2018-04-05 PROCEDURE — 82947 ASSAY GLUCOSE BLOOD QUANT: CPT

## 2018-04-05 PROCEDURE — 97165 OT EVAL LOW COMPLEX 30 MIN: CPT

## 2018-04-05 PROCEDURE — 99233 SBSQ HOSP IP/OBS HIGH 50: CPT | Performed by: INTERNAL MEDICINE

## 2018-04-05 PROCEDURE — 6370000000 HC RX 637 (ALT 250 FOR IP): Performed by: FAMILY MEDICINE

## 2018-04-05 PROCEDURE — 36569 INSJ PICC 5 YR+ W/O IMAGING: CPT | Performed by: RADIOLOGY

## 2018-04-05 PROCEDURE — 6360000002 HC RX W HCPCS: Performed by: INTERNAL MEDICINE

## 2018-04-05 RX ORDER — LINEZOLID 600 MG/1
600 TABLET, FILM COATED ORAL 2 TIMES DAILY
Qty: 28 TABLET | Refills: 0 | Status: SHIPPED | OUTPATIENT
Start: 2018-04-05 | End: 2018-04-09

## 2018-04-05 RX ORDER — TAMSULOSIN HYDROCHLORIDE 0.4 MG/1
0.4 CAPSULE ORAL DAILY
Status: DISCONTINUED | OUTPATIENT
Start: 2018-04-05 | End: 2018-04-09 | Stop reason: HOSPADM

## 2018-04-05 RX ORDER — FAMOTIDINE 20 MG/1
20 TABLET, FILM COATED ORAL DAILY
Status: DISCONTINUED | OUTPATIENT
Start: 2018-04-06 | End: 2018-04-09 | Stop reason: HOSPADM

## 2018-04-05 RX ADMIN — HEPARIN SODIUM 5000 UNITS: 5000 INJECTION, SOLUTION INTRAVENOUS; SUBCUTANEOUS at 05:52

## 2018-04-05 RX ADMIN — METOPROLOL SUCCINATE 25 MG: 25 TABLET, EXTENDED RELEASE ORAL at 08:23

## 2018-04-05 RX ADMIN — HEPARIN SODIUM 5000 UNITS: 5000 INJECTION, SOLUTION INTRAVENOUS; SUBCUTANEOUS at 22:09

## 2018-04-05 RX ADMIN — LINEZOLID 600 MG: 600 INJECTION, SOLUTION INTRAVENOUS at 01:34

## 2018-04-05 RX ADMIN — LEVOTHYROXINE SODIUM 137 MCG: 137 TABLET ORAL at 05:52

## 2018-04-05 RX ADMIN — DEXTROSE MONOHYDRATE: 50 INJECTION, SOLUTION INTRAVENOUS at 05:52

## 2018-04-05 RX ADMIN — DEXTROSE MONOHYDRATE: 50 INJECTION, SOLUTION INTRAVENOUS at 14:27

## 2018-04-05 RX ADMIN — PIPERACILLIN SODIUM,TAZOBACTAM SODIUM 3.38 G: 3; .375 INJECTION, POWDER, FOR SOLUTION INTRAVENOUS at 22:15

## 2018-04-05 RX ADMIN — FAMOTIDINE 20 MG: 20 TABLET, FILM COATED ORAL at 08:23

## 2018-04-05 RX ADMIN — TAMSULOSIN HYDROCHLORIDE 0.4 MG: 0.4 CAPSULE ORAL at 10:05

## 2018-04-05 RX ADMIN — LINEZOLID 600 MG: 600 INJECTION, SOLUTION INTRAVENOUS at 13:14

## 2018-04-05 RX ADMIN — INSULIN GLARGINE 10 UNITS: 100 INJECTION, SOLUTION SUBCUTANEOUS at 22:07

## 2018-04-05 RX ADMIN — Medication 10 ML: at 22:06

## 2018-04-05 RX ADMIN — ATORVASTATIN CALCIUM 20 MG: 20 TABLET, FILM COATED ORAL at 22:06

## 2018-04-05 RX ADMIN — HEPARIN SODIUM 5000 UNITS: 5000 INJECTION, SOLUTION INTRAVENOUS; SUBCUTANEOUS at 13:29

## 2018-04-05 RX ADMIN — ASPIRIN 81 MG: 81 TABLET, COATED ORAL at 08:23

## 2018-04-06 LAB
BILIRUBIN URINE: NEGATIVE
COLOR: YELLOW
COMMENT UA: NORMAL
CREAT SERPL-MCNC: 1.22 MG/DL (ref 0.7–1.2)
GFR AFRICAN AMERICAN: >60 ML/MIN
GFR NON-AFRICAN AMERICAN: 56 ML/MIN
GFR SERPL CREATININE-BSD FRML MDRD: ABNORMAL ML/MIN/{1.73_M2}
GFR SERPL CREATININE-BSD FRML MDRD: ABNORMAL ML/MIN/{1.73_M2}
GLUCOSE BLD-MCNC: 121 MG/DL (ref 75–110)
GLUCOSE BLD-MCNC: 132 MG/DL (ref 75–110)
GLUCOSE BLD-MCNC: 152 MG/DL (ref 75–110)
GLUCOSE BLD-MCNC: 155 MG/DL (ref 75–110)
GLUCOSE URINE: NEGATIVE
KETONES, URINE: NEGATIVE
LEUKOCYTE ESTERASE, URINE: NEGATIVE
NITRITE, URINE: NEGATIVE
PH UA: 7 (ref 5–8)
PROTEIN UA: NEGATIVE
SPECIFIC GRAVITY UA: 1.01 (ref 1–1.03)
TURBIDITY: CLEAR
URINE HGB: NEGATIVE
UROBILINOGEN, URINE: NORMAL

## 2018-04-06 PROCEDURE — 2580000003 HC RX 258: Performed by: INTERNAL MEDICINE

## 2018-04-06 PROCEDURE — 6360000002 HC RX W HCPCS: Performed by: INTERNAL MEDICINE

## 2018-04-06 PROCEDURE — 82947 ASSAY GLUCOSE BLOOD QUANT: CPT

## 2018-04-06 PROCEDURE — 1200000000 HC SEMI PRIVATE

## 2018-04-06 PROCEDURE — 36415 COLL VENOUS BLD VENIPUNCTURE: CPT

## 2018-04-06 PROCEDURE — 6370000000 HC RX 637 (ALT 250 FOR IP): Performed by: UROLOGY

## 2018-04-06 PROCEDURE — 51798 US URINE CAPACITY MEASURE: CPT

## 2018-04-06 PROCEDURE — 6370000000 HC RX 637 (ALT 250 FOR IP): Performed by: STUDENT IN AN ORGANIZED HEALTH CARE EDUCATION/TRAINING PROGRAM

## 2018-04-06 PROCEDURE — 97110 THERAPEUTIC EXERCISES: CPT

## 2018-04-06 PROCEDURE — 99232 SBSQ HOSP IP/OBS MODERATE 35: CPT | Performed by: INTERNAL MEDICINE

## 2018-04-06 PROCEDURE — 97530 THERAPEUTIC ACTIVITIES: CPT

## 2018-04-06 PROCEDURE — 6360000002 HC RX W HCPCS: Performed by: FAMILY MEDICINE

## 2018-04-06 PROCEDURE — 6370000000 HC RX 637 (ALT 250 FOR IP): Performed by: INTERNAL MEDICINE

## 2018-04-06 PROCEDURE — 97116 GAIT TRAINING THERAPY: CPT

## 2018-04-06 PROCEDURE — 99233 SBSQ HOSP IP/OBS HIGH 50: CPT | Performed by: INTERNAL MEDICINE

## 2018-04-06 PROCEDURE — 6360000002 HC RX W HCPCS: Performed by: EMERGENCY MEDICINE

## 2018-04-06 PROCEDURE — 97535 SELF CARE MNGMENT TRAINING: CPT

## 2018-04-06 PROCEDURE — 2580000003 HC RX 258: Performed by: FAMILY MEDICINE

## 2018-04-06 PROCEDURE — 81003 URINALYSIS AUTO W/O SCOPE: CPT

## 2018-04-06 PROCEDURE — 82565 ASSAY OF CREATININE: CPT

## 2018-04-06 PROCEDURE — 51702 INSERT TEMP BLADDER CATH: CPT

## 2018-04-06 RX ORDER — LINEZOLID 600 MG/1
600 TABLET, FILM COATED ORAL EVERY 12 HOURS SCHEDULED
Status: DISCONTINUED | OUTPATIENT
Start: 2018-04-06 | End: 2018-04-09 | Stop reason: HOSPADM

## 2018-04-06 RX ADMIN — HEPARIN SODIUM 5000 UNITS: 5000 INJECTION, SOLUTION INTRAVENOUS; SUBCUTANEOUS at 05:39

## 2018-04-06 RX ADMIN — LEVOTHYROXINE SODIUM 137 MCG: 137 TABLET ORAL at 05:39

## 2018-04-06 RX ADMIN — PIPERACILLIN SODIUM,TAZOBACTAM SODIUM 3.38 G: 3; .375 INJECTION, POWDER, FOR SOLUTION INTRAVENOUS at 22:55

## 2018-04-06 RX ADMIN — TAMSULOSIN HYDROCHLORIDE 0.4 MG: 0.4 CAPSULE ORAL at 08:39

## 2018-04-06 RX ADMIN — PIPERACILLIN SODIUM,TAZOBACTAM SODIUM 3.38 G: 3; .375 INJECTION, POWDER, FOR SOLUTION INTRAVENOUS at 17:18

## 2018-04-06 RX ADMIN — ATORVASTATIN CALCIUM 20 MG: 20 TABLET, FILM COATED ORAL at 20:23

## 2018-04-06 RX ADMIN — Medication 10 ML: at 20:24

## 2018-04-06 RX ADMIN — PIPERACILLIN SODIUM,TAZOBACTAM SODIUM 3.38 G: 3; .375 INJECTION, POWDER, FOR SOLUTION INTRAVENOUS at 11:22

## 2018-04-06 RX ADMIN — INSULIN GLARGINE 10 UNITS: 100 INJECTION, SOLUTION SUBCUTANEOUS at 20:23

## 2018-04-06 RX ADMIN — FAMOTIDINE 20 MG: 20 TABLET, FILM COATED ORAL at 08:39

## 2018-04-06 RX ADMIN — HEPARIN SODIUM 5000 UNITS: 5000 INJECTION, SOLUTION INTRAVENOUS; SUBCUTANEOUS at 13:34

## 2018-04-06 RX ADMIN — PIPERACILLIN SODIUM,TAZOBACTAM SODIUM 3.38 G: 3; .375 INJECTION, POWDER, FOR SOLUTION INTRAVENOUS at 05:39

## 2018-04-06 RX ADMIN — HEPARIN SODIUM 5000 UNITS: 5000 INJECTION, SOLUTION INTRAVENOUS; SUBCUTANEOUS at 20:22

## 2018-04-06 RX ADMIN — LINEZOLID 600 MG: 600 TABLET, FILM COATED ORAL at 11:22

## 2018-04-06 RX ADMIN — ASPIRIN 81 MG: 81 TABLET, COATED ORAL at 08:39

## 2018-04-06 RX ADMIN — Medication 10 ML: at 08:39

## 2018-04-06 RX ADMIN — LINEZOLID 600 MG: 600 INJECTION, SOLUTION INTRAVENOUS at 01:42

## 2018-04-06 RX ADMIN — LINEZOLID 600 MG: 600 TABLET, FILM COATED ORAL at 20:23

## 2018-04-06 ASSESSMENT — PAIN DESCRIPTION - PROGRESSION: CLINICAL_PROGRESSION: NOT CHANGED

## 2018-04-06 ASSESSMENT — PAIN SCALES - GENERAL: PAINLEVEL_OUTOF10: 0

## 2018-04-07 LAB
CREAT SERPL-MCNC: 1.28 MG/DL (ref 0.7–1.2)
GFR AFRICAN AMERICAN: >60 ML/MIN
GFR NON-AFRICAN AMERICAN: 53 ML/MIN
GFR SERPL CREATININE-BSD FRML MDRD: ABNORMAL ML/MIN/{1.73_M2}
GFR SERPL CREATININE-BSD FRML MDRD: ABNORMAL ML/MIN/{1.73_M2}
GLUCOSE BLD-MCNC: 129 MG/DL (ref 75–110)
GLUCOSE BLD-MCNC: 140 MG/DL (ref 75–110)
GLUCOSE BLD-MCNC: 175 MG/DL (ref 75–110)
GLUCOSE BLD-MCNC: 226 MG/DL (ref 75–110)

## 2018-04-07 PROCEDURE — 82565 ASSAY OF CREATININE: CPT

## 2018-04-07 PROCEDURE — 99233 SBSQ HOSP IP/OBS HIGH 50: CPT | Performed by: INTERNAL MEDICINE

## 2018-04-07 PROCEDURE — 6370000000 HC RX 637 (ALT 250 FOR IP): Performed by: INTERNAL MEDICINE

## 2018-04-07 PROCEDURE — 6370000000 HC RX 637 (ALT 250 FOR IP): Performed by: STUDENT IN AN ORGANIZED HEALTH CARE EDUCATION/TRAINING PROGRAM

## 2018-04-07 PROCEDURE — 6360000002 HC RX W HCPCS: Performed by: INTERNAL MEDICINE

## 2018-04-07 PROCEDURE — 36415 COLL VENOUS BLD VENIPUNCTURE: CPT

## 2018-04-07 PROCEDURE — 97116 GAIT TRAINING THERAPY: CPT

## 2018-04-07 PROCEDURE — 82947 ASSAY GLUCOSE BLOOD QUANT: CPT

## 2018-04-07 PROCEDURE — 6370000000 HC RX 637 (ALT 250 FOR IP): Performed by: UROLOGY

## 2018-04-07 PROCEDURE — 99232 SBSQ HOSP IP/OBS MODERATE 35: CPT | Performed by: INTERNAL MEDICINE

## 2018-04-07 PROCEDURE — 6360000002 HC RX W HCPCS: Performed by: FAMILY MEDICINE

## 2018-04-07 PROCEDURE — 2580000003 HC RX 258: Performed by: INTERNAL MEDICINE

## 2018-04-07 PROCEDURE — 1200000000 HC SEMI PRIVATE

## 2018-04-07 RX ADMIN — LINEZOLID 600 MG: 600 TABLET, FILM COATED ORAL at 08:11

## 2018-04-07 RX ADMIN — PIPERACILLIN SODIUM,TAZOBACTAM SODIUM 3.38 G: 3; .375 INJECTION, POWDER, FOR SOLUTION INTRAVENOUS at 10:47

## 2018-04-07 RX ADMIN — LEVOTHYROXINE SODIUM 137 MCG: 137 TABLET ORAL at 05:22

## 2018-04-07 RX ADMIN — HEPARIN SODIUM 5000 UNITS: 5000 INJECTION, SOLUTION INTRAVENOUS; SUBCUTANEOUS at 14:26

## 2018-04-07 RX ADMIN — ATORVASTATIN CALCIUM 20 MG: 20 TABLET, FILM COATED ORAL at 20:25

## 2018-04-07 RX ADMIN — ASPIRIN 81 MG: 81 TABLET, COATED ORAL at 08:11

## 2018-04-07 RX ADMIN — FAMOTIDINE 20 MG: 20 TABLET, FILM COATED ORAL at 08:11

## 2018-04-07 RX ADMIN — PIPERACILLIN SODIUM,TAZOBACTAM SODIUM 3.38 G: 3; .375 INJECTION, POWDER, FOR SOLUTION INTRAVENOUS at 23:04

## 2018-04-07 RX ADMIN — LINEZOLID 600 MG: 600 TABLET, FILM COATED ORAL at 20:25

## 2018-04-07 RX ADMIN — TAMSULOSIN HYDROCHLORIDE 0.4 MG: 0.4 CAPSULE ORAL at 08:11

## 2018-04-07 RX ADMIN — HEPARIN SODIUM 5000 UNITS: 5000 INJECTION, SOLUTION INTRAVENOUS; SUBCUTANEOUS at 05:22

## 2018-04-07 RX ADMIN — METOPROLOL SUCCINATE 25 MG: 25 TABLET, EXTENDED RELEASE ORAL at 08:11

## 2018-04-07 RX ADMIN — PIPERACILLIN SODIUM,TAZOBACTAM SODIUM 3.38 G: 3; .375 INJECTION, POWDER, FOR SOLUTION INTRAVENOUS at 17:51

## 2018-04-07 RX ADMIN — INSULIN GLARGINE 10 UNITS: 100 INJECTION, SOLUTION SUBCUTANEOUS at 20:25

## 2018-04-07 RX ADMIN — PIPERACILLIN SODIUM,TAZOBACTAM SODIUM 3.38 G: 3; .375 INJECTION, POWDER, FOR SOLUTION INTRAVENOUS at 05:22

## 2018-04-08 LAB
CREAT SERPL-MCNC: 1.24 MG/DL (ref 0.7–1.2)
CULTURE: NORMAL
GFR AFRICAN AMERICAN: >60 ML/MIN
GFR NON-AFRICAN AMERICAN: 55 ML/MIN
GFR SERPL CREATININE-BSD FRML MDRD: ABNORMAL ML/MIN/{1.73_M2}
GFR SERPL CREATININE-BSD FRML MDRD: ABNORMAL ML/MIN/{1.73_M2}
GLUCOSE BLD-MCNC: 111 MG/DL (ref 75–110)
GLUCOSE BLD-MCNC: 139 MG/DL (ref 75–110)
GLUCOSE BLD-MCNC: 194 MG/DL (ref 75–110)
GLUCOSE BLD-MCNC: 247 MG/DL (ref 75–110)
Lab: NORMAL
Lab: NORMAL
SPECIMEN DESCRIPTION: NORMAL
STATUS: NORMAL
STATUS: NORMAL

## 2018-04-08 PROCEDURE — 99232 SBSQ HOSP IP/OBS MODERATE 35: CPT | Performed by: INTERNAL MEDICINE

## 2018-04-08 PROCEDURE — 82947 ASSAY GLUCOSE BLOOD QUANT: CPT

## 2018-04-08 PROCEDURE — 6370000000 HC RX 637 (ALT 250 FOR IP): Performed by: INTERNAL MEDICINE

## 2018-04-08 PROCEDURE — 1200000000 HC SEMI PRIVATE

## 2018-04-08 PROCEDURE — 36415 COLL VENOUS BLD VENIPUNCTURE: CPT

## 2018-04-08 PROCEDURE — 2580000003 HC RX 258: Performed by: INTERNAL MEDICINE

## 2018-04-08 PROCEDURE — 97116 GAIT TRAINING THERAPY: CPT

## 2018-04-08 PROCEDURE — 97110 THERAPEUTIC EXERCISES: CPT

## 2018-04-08 PROCEDURE — 82565 ASSAY OF CREATININE: CPT

## 2018-04-08 PROCEDURE — 6370000000 HC RX 637 (ALT 250 FOR IP): Performed by: STUDENT IN AN ORGANIZED HEALTH CARE EDUCATION/TRAINING PROGRAM

## 2018-04-08 PROCEDURE — 6360000002 HC RX W HCPCS: Performed by: INTERNAL MEDICINE

## 2018-04-08 PROCEDURE — 6370000000 HC RX 637 (ALT 250 FOR IP): Performed by: UROLOGY

## 2018-04-08 PROCEDURE — 99239 HOSP IP/OBS DSCHRG MGMT >30: CPT | Performed by: INTERNAL MEDICINE

## 2018-04-08 PROCEDURE — 6360000002 HC RX W HCPCS: Performed by: FAMILY MEDICINE

## 2018-04-08 RX ADMIN — PIPERACILLIN SODIUM,TAZOBACTAM SODIUM 3.38 G: 3; .375 INJECTION, POWDER, FOR SOLUTION INTRAVENOUS at 10:17

## 2018-04-08 RX ADMIN — INSULIN GLARGINE 10 UNITS: 100 INJECTION, SOLUTION SUBCUTANEOUS at 20:56

## 2018-04-08 RX ADMIN — PIPERACILLIN SODIUM,TAZOBACTAM SODIUM 3.38 G: 3; .375 INJECTION, POWDER, FOR SOLUTION INTRAVENOUS at 17:07

## 2018-04-08 RX ADMIN — PIPERACILLIN SODIUM,TAZOBACTAM SODIUM 3.38 G: 3; .375 INJECTION, POWDER, FOR SOLUTION INTRAVENOUS at 05:48

## 2018-04-08 RX ADMIN — HEPARIN SODIUM 5000 UNITS: 5000 INJECTION, SOLUTION INTRAVENOUS; SUBCUTANEOUS at 20:56

## 2018-04-08 RX ADMIN — ASPIRIN 81 MG: 81 TABLET, COATED ORAL at 08:20

## 2018-04-08 RX ADMIN — HEPARIN SODIUM 5000 UNITS: 5000 INJECTION, SOLUTION INTRAVENOUS; SUBCUTANEOUS at 05:55

## 2018-04-08 RX ADMIN — LINEZOLID 600 MG: 600 TABLET, FILM COATED ORAL at 08:20

## 2018-04-08 RX ADMIN — LEVOTHYROXINE SODIUM 137 MCG: 137 TABLET ORAL at 05:55

## 2018-04-08 RX ADMIN — METOPROLOL SUCCINATE 25 MG: 25 TABLET, EXTENDED RELEASE ORAL at 08:20

## 2018-04-08 RX ADMIN — LINEZOLID 600 MG: 600 TABLET, FILM COATED ORAL at 20:56

## 2018-04-08 RX ADMIN — PIPERACILLIN SODIUM,TAZOBACTAM SODIUM 3.38 G: 3; .375 INJECTION, POWDER, FOR SOLUTION INTRAVENOUS at 23:35

## 2018-04-08 RX ADMIN — TAMSULOSIN HYDROCHLORIDE 0.4 MG: 0.4 CAPSULE ORAL at 08:20

## 2018-04-08 RX ADMIN — FAMOTIDINE 20 MG: 20 TABLET, FILM COATED ORAL at 08:20

## 2018-04-08 RX ADMIN — HEPARIN SODIUM 5000 UNITS: 5000 INJECTION, SOLUTION INTRAVENOUS; SUBCUTANEOUS at 13:12

## 2018-04-08 RX ADMIN — ATORVASTATIN CALCIUM 20 MG: 20 TABLET, FILM COATED ORAL at 20:56

## 2018-04-09 VITALS
HEIGHT: 65 IN | OXYGEN SATURATION: 99 % | HEART RATE: 70 BPM | DIASTOLIC BLOOD PRESSURE: 58 MMHG | RESPIRATION RATE: 17 BRPM | WEIGHT: 141.09 LBS | TEMPERATURE: 97.2 F | SYSTOLIC BLOOD PRESSURE: 123 MMHG | BODY MASS INDEX: 23.51 KG/M2

## 2018-04-09 LAB
CREAT SERPL-MCNC: 1.25 MG/DL (ref 0.7–1.2)
GFR AFRICAN AMERICAN: >60 ML/MIN
GFR NON-AFRICAN AMERICAN: 54 ML/MIN
GFR SERPL CREATININE-BSD FRML MDRD: ABNORMAL ML/MIN/{1.73_M2}
GFR SERPL CREATININE-BSD FRML MDRD: ABNORMAL ML/MIN/{1.73_M2}
GLUCOSE BLD-MCNC: 170 MG/DL (ref 75–110)
GLUCOSE BLD-MCNC: 174 MG/DL (ref 75–110)
GLUCOSE BLD-MCNC: 86 MG/DL (ref 75–110)

## 2018-04-09 PROCEDURE — 99232 SBSQ HOSP IP/OBS MODERATE 35: CPT | Performed by: INTERNAL MEDICINE

## 2018-04-09 PROCEDURE — 6360000002 HC RX W HCPCS: Performed by: INTERNAL MEDICINE

## 2018-04-09 PROCEDURE — 6370000000 HC RX 637 (ALT 250 FOR IP): Performed by: UROLOGY

## 2018-04-09 PROCEDURE — 6370000000 HC RX 637 (ALT 250 FOR IP): Performed by: INTERNAL MEDICINE

## 2018-04-09 PROCEDURE — 82565 ASSAY OF CREATININE: CPT

## 2018-04-09 PROCEDURE — 97110 THERAPEUTIC EXERCISES: CPT

## 2018-04-09 PROCEDURE — 97530 THERAPEUTIC ACTIVITIES: CPT

## 2018-04-09 PROCEDURE — 36415 COLL VENOUS BLD VENIPUNCTURE: CPT

## 2018-04-09 PROCEDURE — 2580000003 HC RX 258: Performed by: INTERNAL MEDICINE

## 2018-04-09 PROCEDURE — 99239 HOSP IP/OBS DSCHRG MGMT >30: CPT | Performed by: INTERNAL MEDICINE

## 2018-04-09 PROCEDURE — 6370000000 HC RX 637 (ALT 250 FOR IP): Performed by: STUDENT IN AN ORGANIZED HEALTH CARE EDUCATION/TRAINING PROGRAM

## 2018-04-09 PROCEDURE — 82947 ASSAY GLUCOSE BLOOD QUANT: CPT

## 2018-04-09 PROCEDURE — 97116 GAIT TRAINING THERAPY: CPT

## 2018-04-09 PROCEDURE — 6360000002 HC RX W HCPCS: Performed by: FAMILY MEDICINE

## 2018-04-09 RX ORDER — LINEZOLID 600 MG/1
600 TABLET, FILM COATED ORAL 2 TIMES DAILY
Qty: 4 TABLET | Refills: 0 | Status: SHIPPED | OUTPATIENT
Start: 2018-04-09 | End: 2018-04-11

## 2018-04-09 RX ADMIN — LEVOTHYROXINE SODIUM 137 MCG: 137 TABLET ORAL at 05:25

## 2018-04-09 RX ADMIN — ASPIRIN 81 MG: 81 TABLET, COATED ORAL at 09:01

## 2018-04-09 RX ADMIN — LINEZOLID 600 MG: 600 TABLET, FILM COATED ORAL at 09:01

## 2018-04-09 RX ADMIN — HEPARIN SODIUM 5000 UNITS: 5000 INJECTION, SOLUTION INTRAVENOUS; SUBCUTANEOUS at 15:02

## 2018-04-09 RX ADMIN — FAMOTIDINE 20 MG: 20 TABLET, FILM COATED ORAL at 09:01

## 2018-04-09 RX ADMIN — TAMSULOSIN HYDROCHLORIDE 0.4 MG: 0.4 CAPSULE ORAL at 09:01

## 2018-04-09 RX ADMIN — PIPERACILLIN SODIUM,TAZOBACTAM SODIUM 3.38 G: 3; .375 INJECTION, POWDER, FOR SOLUTION INTRAVENOUS at 11:46

## 2018-04-09 RX ADMIN — PIPERACILLIN SODIUM,TAZOBACTAM SODIUM 3.38 G: 3; .375 INJECTION, POWDER, FOR SOLUTION INTRAVENOUS at 05:25

## 2018-04-09 RX ADMIN — METOPROLOL SUCCINATE 25 MG: 25 TABLET, EXTENDED RELEASE ORAL at 09:01

## 2018-04-09 RX ADMIN — HEPARIN SODIUM 5000 UNITS: 5000 INJECTION, SOLUTION INTRAVENOUS; SUBCUTANEOUS at 05:25

## 2018-04-09 ASSESSMENT — PAIN SCALES - GENERAL: PAINLEVEL_OUTOF10: 0

## 2018-04-10 ENCOUNTER — CARE COORDINATION (OUTPATIENT)
Dept: CASE MANAGEMENT | Age: 83
End: 2018-04-10

## 2018-04-10 ENCOUNTER — HOSPITAL ENCOUNTER (OUTPATIENT)
Age: 83
Setting detail: SPECIMEN
Discharge: HOME | End: 2018-04-10
Payer: MEDICARE

## 2018-04-10 ENCOUNTER — TELEPHONE (OUTPATIENT)
Dept: UROLOGY | Age: 83
End: 2018-04-10

## 2018-04-10 DIAGNOSIS — K80.50 BILIARY COLIC: Primary | ICD-10-CM

## 2018-04-10 LAB
BUN BLDV-MCNC: 20 MG/DL (ref 8–23)
CREAT SERPL-MCNC: 1.12 MG/DL (ref 0.7–1.2)
GFR AFRICAN AMERICAN: >60 ML/MIN
GFR NON-AFRICAN AMERICAN: >60 ML/MIN
GFR SERPL CREATININE-BSD FRML MDRD: NORMAL ML/MIN/{1.73_M2}
GFR SERPL CREATININE-BSD FRML MDRD: NORMAL ML/MIN/{1.73_M2}

## 2018-04-10 PROCEDURE — 82565 ASSAY OF CREATININE: CPT

## 2018-04-10 PROCEDURE — 84520 ASSAY OF UREA NITROGEN: CPT

## 2018-04-10 PROCEDURE — 1111F DSCHRG MED/CURRENT MED MERGE: CPT | Performed by: FAMILY MEDICINE

## 2018-04-12 ENCOUNTER — HOSPITAL ENCOUNTER (OUTPATIENT)
Age: 83
Setting detail: SPECIMEN
Discharge: HOME | End: 2018-04-12
Payer: MEDICARE

## 2018-04-12 LAB
BUN BLDV-MCNC: 18 MG/DL (ref 8–23)
CREAT SERPL-MCNC: 1.23 MG/DL (ref 0.7–1.2)
GFR AFRICAN AMERICAN: >60 ML/MIN
GFR NON-AFRICAN AMERICAN: 55 ML/MIN
GFR SERPL CREATININE-BSD FRML MDRD: ABNORMAL ML/MIN/{1.73_M2}
GFR SERPL CREATININE-BSD FRML MDRD: ABNORMAL ML/MIN/{1.73_M2}

## 2018-04-12 PROCEDURE — 84520 ASSAY OF UREA NITROGEN: CPT

## 2018-04-12 PROCEDURE — 82565 ASSAY OF CREATININE: CPT

## 2018-04-14 ENCOUNTER — HOSPITAL ENCOUNTER (EMERGENCY)
Age: 83
Discharge: HOME | End: 2018-04-14
Attending: EMERGENCY MEDICINE
Payer: MEDICARE

## 2018-04-14 VITALS
DIASTOLIC BLOOD PRESSURE: 50 MMHG | RESPIRATION RATE: 16 BRPM | WEIGHT: 141 LBS | BODY MASS INDEX: 23.46 KG/M2 | HEART RATE: 66 BPM | OXYGEN SATURATION: 98 % | TEMPERATURE: 97.9 F | SYSTOLIC BLOOD PRESSURE: 112 MMHG

## 2018-04-14 DIAGNOSIS — R31.0 GROSS HEMATURIA: Primary | ICD-10-CM

## 2018-04-14 DIAGNOSIS — R33.9 URINARY RETENTION: ICD-10-CM

## 2018-04-14 LAB
-: ABNORMAL
ABSOLUTE EOS #: 0.4 K/UL (ref 0–0.4)
ABSOLUTE IMMATURE GRANULOCYTE: ABNORMAL K/UL (ref 0–0.3)
ABSOLUTE LYMPH #: 1.4 K/UL (ref 1–4.8)
ABSOLUTE MONO #: 0.5 K/UL (ref 0.1–1.3)
AMORPHOUS: ABNORMAL
ANION GAP SERPL CALCULATED.3IONS-SCNC: 12 MMOL/L (ref 9–17)
BACTERIA: ABNORMAL
BASOPHILS # BLD: 1 % (ref 0–2)
BASOPHILS ABSOLUTE: 0 K/UL (ref 0–0.2)
BILIRUBIN URINE: NEGATIVE
BUN BLDV-MCNC: 11 MG/DL (ref 8–23)
BUN/CREAT BLD: ABNORMAL (ref 9–20)
CALCIUM SERPL-MCNC: 8.6 MG/DL (ref 8.6–10.4)
CASTS UA: ABNORMAL /LPF
CHLORIDE BLD-SCNC: 102 MMOL/L (ref 98–107)
CO2: 25 MMOL/L (ref 20–31)
COLOR: ABNORMAL
COMMENT UA: ABNORMAL
CREAT SERPL-MCNC: 0.99 MG/DL (ref 0.7–1.2)
CRYSTALS, UA: ABNORMAL /HPF
DIFFERENTIAL TYPE: ABNORMAL
EOSINOPHILS RELATIVE PERCENT: 5 % (ref 0–4)
EPITHELIAL CELLS UA: ABNORMAL /HPF
GFR AFRICAN AMERICAN: >60 ML/MIN
GFR NON-AFRICAN AMERICAN: >60 ML/MIN
GFR SERPL CREATININE-BSD FRML MDRD: ABNORMAL ML/MIN/{1.73_M2}
GFR SERPL CREATININE-BSD FRML MDRD: ABNORMAL ML/MIN/{1.73_M2}
GLUCOSE BLD-MCNC: 112 MG/DL (ref 70–99)
GLUCOSE URINE: NEGATIVE
HCT VFR BLD CALC: 39 % (ref 41–53)
HEMOGLOBIN: 13.1 G/DL (ref 13.5–17.5)
IMMATURE GRANULOCYTES: ABNORMAL %
KETONES, URINE: NEGATIVE
LEUKOCYTE ESTERASE, URINE: ABNORMAL
LYMPHOCYTES # BLD: 21 % (ref 24–44)
MCH RBC QN AUTO: 29 PG (ref 26–34)
MCHC RBC AUTO-ENTMCNC: 33.6 G/DL (ref 31–37)
MCV RBC AUTO: 86.1 FL (ref 80–100)
MONOCYTES # BLD: 8 % (ref 1–7)
MUCUS: ABNORMAL
NITRITE, URINE: NEGATIVE
NRBC AUTOMATED: ABNORMAL PER 100 WBC
OTHER OBSERVATIONS UA: ABNORMAL
PDW BLD-RTO: 14.9 % (ref 11.5–14.9)
PH UA: 5.5 (ref 5–8)
PLATELET # BLD: 140 K/UL (ref 150–450)
PLATELET ESTIMATE: ABNORMAL
PMV BLD AUTO: 7 FL (ref 6–12)
POTASSIUM SERPL-SCNC: 4.1 MMOL/L (ref 3.7–5.3)
PROTEIN UA: ABNORMAL
RBC # BLD: 4.52 M/UL (ref 4.5–5.9)
RBC # BLD: ABNORMAL 10*6/UL
RBC UA: ABNORMAL /HPF
RENAL EPITHELIAL, UA: ABNORMAL /HPF
SEG NEUTROPHILS: 65 % (ref 36–66)
SEGMENTED NEUTROPHILS ABSOLUTE COUNT: 4.6 K/UL (ref 1.3–9.1)
SODIUM BLD-SCNC: 139 MMOL/L (ref 135–144)
SPECIFIC GRAVITY UA: 1.02 (ref 1–1.03)
TRICHOMONAS: ABNORMAL
TURBIDITY: ABNORMAL
URINE HGB: ABNORMAL
UROBILINOGEN, URINE: NORMAL
WBC # BLD: 6.9 K/UL (ref 3.5–11)
WBC # BLD: ABNORMAL 10*3/UL
WBC UA: ABNORMAL /HPF
YEAST: ABNORMAL

## 2018-04-14 PROCEDURE — 6370000000 HC RX 637 (ALT 250 FOR IP)

## 2018-04-14 PROCEDURE — 85025 COMPLETE CBC W/AUTO DIFF WBC: CPT

## 2018-04-14 PROCEDURE — 87086 URINE CULTURE/COLONY COUNT: CPT

## 2018-04-14 PROCEDURE — 81001 URINALYSIS AUTO W/SCOPE: CPT

## 2018-04-14 PROCEDURE — 80048 BASIC METABOLIC PNL TOTAL CA: CPT

## 2018-04-14 PROCEDURE — 99284 EMERGENCY DEPT VISIT MOD MDM: CPT

## 2018-04-14 PROCEDURE — 36415 COLL VENOUS BLD VENIPUNCTURE: CPT

## 2018-04-14 PROCEDURE — 51702 INSERT TEMP BLADDER CATH: CPT

## 2018-04-14 RX ADMIN — LIDOCAINE HYDROCHLORIDE 1 APPLICATOR: 20 JELLY TOPICAL at 14:00

## 2018-04-14 ASSESSMENT — ENCOUNTER SYMPTOMS
ABDOMINAL PAIN: 0
DIARRHEA: 0
VOMITING: 0
NAUSEA: 0
COUGH: 0

## 2018-04-15 LAB
CULTURE: ABNORMAL
CULTURE: ABNORMAL
Lab: ABNORMAL
SPECIMEN DESCRIPTION: ABNORMAL
SPECIMEN DESCRIPTION: ABNORMAL
STATUS: ABNORMAL

## 2018-04-16 ENCOUNTER — CARE COORDINATION (OUTPATIENT)
Dept: CARE COORDINATION | Age: 83
End: 2018-04-16

## 2018-04-17 ENCOUNTER — OFFICE VISIT (OUTPATIENT)
Dept: UROLOGY | Age: 83
End: 2018-04-17
Payer: MEDICARE

## 2018-04-17 VITALS
HEIGHT: 65 IN | BODY MASS INDEX: 24.89 KG/M2 | DIASTOLIC BLOOD PRESSURE: 65 MMHG | HEART RATE: 71 BPM | SYSTOLIC BLOOD PRESSURE: 123 MMHG | WEIGHT: 149.41 LBS | TEMPERATURE: 98 F

## 2018-04-17 DIAGNOSIS — N13.8 BPH WITH OBSTRUCTION/LOWER URINARY TRACT SYMPTOMS: Primary | ICD-10-CM

## 2018-04-17 DIAGNOSIS — N40.1 BPH WITH OBSTRUCTION/LOWER URINARY TRACT SYMPTOMS: Primary | ICD-10-CM

## 2018-04-17 DIAGNOSIS — R97.20 ELEVATED PSA: ICD-10-CM

## 2018-04-17 DIAGNOSIS — R33.9 URINARY RETENTION: ICD-10-CM

## 2018-04-17 PROCEDURE — 99214 OFFICE O/P EST MOD 30 MIN: CPT | Performed by: UROLOGY

## 2018-04-17 RX ORDER — FINASTERIDE 5 MG/1
5 TABLET, FILM COATED ORAL DAILY
Qty: 30 TABLET | Refills: 11 | Status: SHIPPED | OUTPATIENT
Start: 2018-04-17 | End: 2018-10-16 | Stop reason: SDUPTHER

## 2018-04-17 ASSESSMENT — ENCOUNTER SYMPTOMS
VOMITING: 0
BACK PAIN: 0
EYE PAIN: 0
SHORTNESS OF BREATH: 0
COLOR CHANGE: 0
WHEEZING: 0
EYE REDNESS: 0
COUGH: 0
NAUSEA: 0
ABDOMINAL PAIN: 0

## 2018-04-18 ENCOUNTER — OFFICE VISIT (OUTPATIENT)
Dept: INFECTIOUS DISEASES | Age: 83
End: 2018-04-18
Payer: MEDICARE

## 2018-04-18 VITALS
DIASTOLIC BLOOD PRESSURE: 54 MMHG | BODY MASS INDEX: 24.9 KG/M2 | HEIGHT: 65 IN | RESPIRATION RATE: 16 BRPM | HEART RATE: 70 BPM | WEIGHT: 149.47 LBS | SYSTOLIC BLOOD PRESSURE: 117 MMHG | TEMPERATURE: 97.9 F

## 2018-04-18 DIAGNOSIS — N39.0 URINARY TRACT INFECTION WITH HEMATURIA, SITE UNSPECIFIED: Primary | ICD-10-CM

## 2018-04-18 DIAGNOSIS — R31.9 URINARY TRACT INFECTION WITH HEMATURIA, SITE UNSPECIFIED: Primary | ICD-10-CM

## 2018-04-18 PROCEDURE — 99214 OFFICE O/P EST MOD 30 MIN: CPT | Performed by: INTERNAL MEDICINE

## 2018-04-20 ENCOUNTER — TELEPHONE (OUTPATIENT)
Dept: INFECTIOUS DISEASES | Age: 83
End: 2018-04-20

## 2018-04-27 ENCOUNTER — HOSPITAL ENCOUNTER (EMERGENCY)
Age: 83
Discharge: HOME | End: 2018-04-27
Attending: EMERGENCY MEDICINE
Payer: MEDICARE

## 2018-04-27 VITALS
SYSTOLIC BLOOD PRESSURE: 120 MMHG | OXYGEN SATURATION: 95 % | BODY MASS INDEX: 23.9 KG/M2 | DIASTOLIC BLOOD PRESSURE: 54 MMHG | WEIGHT: 140 LBS | TEMPERATURE: 98.2 F | HEIGHT: 64 IN | RESPIRATION RATE: 16 BRPM | HEART RATE: 61 BPM

## 2018-04-27 DIAGNOSIS — T83.511A URINARY TRACT INFECTION ASSOCIATED WITH INDWELLING URETHRAL CATHETER, INITIAL ENCOUNTER (HCC): ICD-10-CM

## 2018-04-27 DIAGNOSIS — N39.0 URINARY TRACT INFECTION ASSOCIATED WITH INDWELLING URETHRAL CATHETER, INITIAL ENCOUNTER (HCC): ICD-10-CM

## 2018-04-27 DIAGNOSIS — T83.091A OBSTRUCTION OF FOLEY CATHETER, INITIAL ENCOUNTER (HCC): Primary | ICD-10-CM

## 2018-04-27 LAB
-: ABNORMAL
ABSOLUTE EOS #: 0.4 K/UL (ref 0–0.4)
ABSOLUTE IMMATURE GRANULOCYTE: ABNORMAL K/UL (ref 0–0.3)
ABSOLUTE LYMPH #: 1.8 K/UL (ref 1–4.8)
ABSOLUTE MONO #: 0.7 K/UL (ref 0.1–1.3)
ALBUMIN SERPL-MCNC: 3.4 G/DL (ref 3.5–5.2)
ALBUMIN/GLOBULIN RATIO: ABNORMAL (ref 1–2.5)
ALP BLD-CCNC: 116 U/L (ref 40–129)
ALT SERPL-CCNC: 38 U/L (ref 5–41)
AMORPHOUS: ABNORMAL
ANION GAP SERPL CALCULATED.3IONS-SCNC: 12 MMOL/L
AST SERPL-CCNC: 32 U/L
BACTERIA: ABNORMAL
BASOPHILS # BLD: 0 % (ref 0–2)
BASOPHILS ABSOLUTE: 0 K/UL (ref 0–0.2)
BILIRUB SERPL-MCNC: 0.64 MG/DL (ref 0.3–1.2)
BILIRUBIN URINE: NEGATIVE
BUN BLDV-MCNC: 21 MG/DL (ref 8–23)
BUN/CREAT BLD: ABNORMAL (ref 9–20)
CALCIUM SERPL-MCNC: 8.9 MG/DL (ref 8.6–10.4)
CASTS UA: ABNORMAL /LPF
CHLORIDE BLD-SCNC: 103 MMOL/L (ref 98–107)
CO2: 27 MMOL/L (ref 20–31)
COLOR: YELLOW
COMMENT UA: ABNORMAL
CREAT SERPL-MCNC: 1.06 MG/DL (ref 0.7–1.2)
CRYSTALS, UA: ABNORMAL /HPF
DIFFERENTIAL TYPE: ABNORMAL
EOSINOPHILS RELATIVE PERCENT: 5 % (ref 0–4)
EPITHELIAL CELLS UA: ABNORMAL /HPF
GFR AFRICAN AMERICAN: >60 ML/MIN
GFR NON-AFRICAN AMERICAN: >60 ML/MIN
GFR SERPL CREATININE-BSD FRML MDRD: ABNORMAL ML/MIN/{1.73_M2}
GFR SERPL CREATININE-BSD FRML MDRD: ABNORMAL ML/MIN/{1.73_M2}
GLUCOSE BLD-MCNC: 111 MG/DL (ref 70–99)
GLUCOSE URINE: NEGATIVE
HCT VFR BLD CALC: 40.2 % (ref 41–53)
HEMOGLOBIN: 13.2 G/DL (ref 13.5–17.5)
IMMATURE GRANULOCYTES: ABNORMAL %
KETONES, URINE: NEGATIVE
LEUKOCYTE ESTERASE, URINE: ABNORMAL
LYMPHOCYTES # BLD: 22 % (ref 24–44)
MCH RBC QN AUTO: 28.4 PG (ref 26–34)
MCHC RBC AUTO-ENTMCNC: 32.8 G/DL (ref 31–37)
MCV RBC AUTO: 86.6 FL (ref 80–100)
MONOCYTES # BLD: 9 % (ref 1–7)
MUCUS: ABNORMAL
NITRITE, URINE: POSITIVE
NRBC AUTOMATED: ABNORMAL PER 100 WBC
OTHER OBSERVATIONS UA: ABNORMAL
PDW BLD-RTO: 15.4 % (ref 11.5–14.9)
PH UA: 8.5 (ref 5–8)
PLATELET # BLD: 322 K/UL (ref 150–450)
PLATELET ESTIMATE: ABNORMAL
PMV BLD AUTO: 7.4 FL (ref 6–12)
POTASSIUM SERPL-SCNC: 4.4 MMOL/L (ref 3.7–5.3)
PROTEIN UA: ABNORMAL
RBC # BLD: 4.65 M/UL (ref 4.5–5.9)
RBC # BLD: ABNORMAL 10*6/UL
RBC UA: ABNORMAL /HPF
RENAL EPITHELIAL, UA: ABNORMAL /HPF
SEG NEUTROPHILS: 64 % (ref 36–66)
SEGMENTED NEUTROPHILS ABSOLUTE COUNT: 5.5 K/UL (ref 1.3–9.1)
SODIUM BLD-SCNC: 142 MMOL/L (ref 135–144)
SPECIFIC GRAVITY UA: 1.02 (ref 1–1.03)
TOTAL PROTEIN: 8 G/DL (ref 6.4–8.3)
TRICHOMONAS: ABNORMAL
TURBIDITY: ABNORMAL
URINE HGB: ABNORMAL
UROBILINOGEN, URINE: NORMAL
WBC # BLD: 8.5 K/UL (ref 3.5–11)
WBC # BLD: ABNORMAL 10*3/UL
WBC UA: ABNORMAL /HPF
YEAST: ABNORMAL

## 2018-04-27 PROCEDURE — 99283 EMERGENCY DEPT VISIT LOW MDM: CPT

## 2018-04-27 PROCEDURE — 87186 SC STD MICRODIL/AGAR DIL: CPT

## 2018-04-27 PROCEDURE — 87077 CULTURE AEROBIC IDENTIFY: CPT

## 2018-04-27 PROCEDURE — 80053 COMPREHEN METABOLIC PANEL: CPT

## 2018-04-27 PROCEDURE — 6370000000 HC RX 637 (ALT 250 FOR IP): Performed by: EMERGENCY MEDICINE

## 2018-04-27 PROCEDURE — 81001 URINALYSIS AUTO W/SCOPE: CPT

## 2018-04-27 PROCEDURE — 85025 COMPLETE CBC W/AUTO DIFF WBC: CPT

## 2018-04-27 PROCEDURE — 36415 COLL VENOUS BLD VENIPUNCTURE: CPT

## 2018-04-27 PROCEDURE — 87086 URINE CULTURE/COLONY COUNT: CPT

## 2018-04-27 RX ORDER — LEVOFLOXACIN 500 MG/1
500 TABLET, FILM COATED ORAL DAILY
Qty: 5 TABLET | Refills: 0 | Status: SHIPPED | OUTPATIENT
Start: 2018-04-27 | End: 2018-05-02

## 2018-04-27 RX ORDER — LEVOFLOXACIN 500 MG/1
500 TABLET, FILM COATED ORAL ONCE
Status: COMPLETED | OUTPATIENT
Start: 2018-04-27 | End: 2018-04-27

## 2018-04-27 RX ADMIN — LEVOFLOXACIN 500 MG: 500 TABLET, FILM COATED ORAL at 21:31

## 2018-04-27 ASSESSMENT — ENCOUNTER SYMPTOMS
WHEEZING: 0
COUGH: 0
DIARRHEA: 0
SHORTNESS OF BREATH: 0
VOMITING: 0
CONSTIPATION: 0
ABDOMINAL PAIN: 1
NAUSEA: 0

## 2018-04-27 ASSESSMENT — PAIN SCALES - GENERAL: PAINLEVEL_OUTOF10: 5

## 2018-04-27 ASSESSMENT — PAIN DESCRIPTION - LOCATION: LOCATION: PENIS

## 2018-04-27 ASSESSMENT — PAIN DESCRIPTION - PAIN TYPE: TYPE: ACUTE PAIN

## 2018-04-29 LAB
CULTURE: ABNORMAL
CULTURE: ABNORMAL
Lab: ABNORMAL
ORGANISM: ABNORMAL
SPECIMEN DESCRIPTION: ABNORMAL
SPECIMEN DESCRIPTION: ABNORMAL
STATUS: ABNORMAL

## 2018-04-30 ENCOUNTER — CARE COORDINATION (OUTPATIENT)
Dept: CARE COORDINATION | Age: 83
End: 2018-04-30

## 2018-05-04 ENCOUNTER — OFFICE VISIT (OUTPATIENT)
Dept: FAMILY MEDICINE CLINIC | Age: 83
End: 2018-05-04
Payer: MEDICARE

## 2018-05-04 VITALS
BODY MASS INDEX: 25.23 KG/M2 | TEMPERATURE: 98 F | OXYGEN SATURATION: 98 % | DIASTOLIC BLOOD PRESSURE: 60 MMHG | HEART RATE: 60 BPM | SYSTOLIC BLOOD PRESSURE: 120 MMHG | WEIGHT: 147 LBS

## 2018-05-04 DIAGNOSIS — E78.5 HYPERLIPIDEMIA, UNSPECIFIED HYPERLIPIDEMIA TYPE: ICD-10-CM

## 2018-05-04 DIAGNOSIS — E11.9 TYPE 2 DIABETES MELLITUS WITHOUT COMPLICATION, WITHOUT LONG-TERM CURRENT USE OF INSULIN (HCC): Primary | ICD-10-CM

## 2018-05-04 DIAGNOSIS — I25.810 CORONARY ARTERY DISEASE INVOLVING CORONARY BYPASS GRAFT OF NATIVE HEART WITHOUT ANGINA PECTORIS: ICD-10-CM

## 2018-05-04 PROBLEM — L97.509 DIABETIC FOOT ULCER ASSOCIATED WITH TYPE 2 DIABETES MELLITUS (HCC): Status: RESOLVED | Noted: 2018-03-29 | Resolved: 2018-05-04

## 2018-05-04 PROBLEM — N39.0 COMPLICATED URINARY TRACT INFECTION: Status: RESOLVED | Noted: 2018-02-28 | Resolved: 2018-05-04

## 2018-05-04 PROBLEM — K80.20 SYMPTOMATIC CHOLELITHIASIS: Status: RESOLVED | Noted: 2018-02-28 | Resolved: 2018-05-04

## 2018-05-04 PROBLEM — R10.9 ABDOMINAL PAIN: Status: RESOLVED | Noted: 2018-02-28 | Resolved: 2018-05-04

## 2018-05-04 PROBLEM — E11.621 DIABETIC FOOT ULCER ASSOCIATED WITH TYPE 2 DIABETES MELLITUS (HCC): Status: RESOLVED | Noted: 2018-03-29 | Resolved: 2018-05-04

## 2018-05-04 PROCEDURE — 99213 OFFICE O/P EST LOW 20 MIN: CPT | Performed by: FAMILY MEDICINE

## 2018-05-04 ASSESSMENT — ENCOUNTER SYMPTOMS
SHORTNESS OF BREATH: 0
ABDOMINAL PAIN: 0
CONSTIPATION: 0
NAUSEA: 0
COUGH: 0
SORE THROAT: 0
DIARRHEA: 0

## 2018-05-14 ENCOUNTER — HOSPITAL ENCOUNTER (OUTPATIENT)
Age: 83
Setting detail: SPECIMEN
Discharge: HOME | End: 2018-05-14
Payer: MEDICARE

## 2018-05-14 LAB
-: ABNORMAL
AMORPHOUS: ABNORMAL
BACTERIA: ABNORMAL
BILIRUBIN URINE: NEGATIVE
CASTS UA: ABNORMAL /LPF
COLOR: YELLOW
COMMENT UA: ABNORMAL
CRYSTALS, UA: ABNORMAL /HPF
EPITHELIAL CELLS UA: ABNORMAL /HPF
GLUCOSE URINE: NEGATIVE
KETONES, URINE: NEGATIVE
LEUKOCYTE ESTERASE, URINE: ABNORMAL
MUCUS: ABNORMAL
NITRITE, URINE: POSITIVE
OTHER OBSERVATIONS UA: ABNORMAL
PH UA: 8.5 (ref 5–8)
PROTEIN UA: ABNORMAL
RBC UA: ABNORMAL /HPF
RENAL EPITHELIAL, UA: ABNORMAL /HPF
SPECIFIC GRAVITY UA: 1.02 (ref 1–1.03)
TRICHOMONAS: ABNORMAL
TURBIDITY: ABNORMAL
URINE HGB: ABNORMAL
UROBILINOGEN, URINE: NORMAL
WBC UA: ABNORMAL /HPF
YEAST: ABNORMAL

## 2018-05-14 PROCEDURE — 81001 URINALYSIS AUTO W/SCOPE: CPT

## 2018-05-16 ENCOUNTER — TELEPHONE (OUTPATIENT)
Dept: UROLOGY | Age: 83
End: 2018-05-16

## 2018-05-19 ENCOUNTER — HOSPITAL ENCOUNTER (EMERGENCY)
Age: 83
Discharge: HOME | End: 2018-05-19
Attending: EMERGENCY MEDICINE
Payer: MEDICARE

## 2018-05-19 VITALS
TEMPERATURE: 98.5 F | WEIGHT: 145 LBS | OXYGEN SATURATION: 95 % | RESPIRATION RATE: 16 BRPM | SYSTOLIC BLOOD PRESSURE: 132 MMHG | HEART RATE: 79 BPM | DIASTOLIC BLOOD PRESSURE: 57 MMHG | BODY MASS INDEX: 24.75 KG/M2 | HEIGHT: 64 IN

## 2018-05-19 DIAGNOSIS — T83.9XXA PROBLEM WITH FOLEY CATHETER, INITIAL ENCOUNTER (HCC): Primary | ICD-10-CM

## 2018-05-19 DIAGNOSIS — Z46.6 ENCOUNTER FOR FOLEY CATHETER REPLACEMENT: ICD-10-CM

## 2018-05-19 PROCEDURE — 99284 EMERGENCY DEPT VISIT MOD MDM: CPT

## 2018-05-19 PROCEDURE — 87088 URINE BACTERIA CULTURE: CPT

## 2018-05-19 PROCEDURE — 87086 URINE CULTURE/COLONY COUNT: CPT

## 2018-05-19 PROCEDURE — 87186 SC STD MICRODIL/AGAR DIL: CPT

## 2018-05-19 PROCEDURE — 51702 INSERT TEMP BLADDER CATH: CPT

## 2018-05-19 ASSESSMENT — ENCOUNTER SYMPTOMS
ABDOMINAL PAIN: 0
SORE THROAT: 0
VOMITING: 0
EYE PAIN: 0
SHORTNESS OF BREATH: 0
COUGH: 0
NAUSEA: 0
BACK PAIN: 0
DIARRHEA: 0

## 2018-05-21 ENCOUNTER — TELEPHONE (OUTPATIENT)
Dept: UROLOGY | Age: 83
End: 2018-05-21

## 2018-05-21 ENCOUNTER — CARE COORDINATION (OUTPATIENT)
Dept: CARE COORDINATION | Age: 83
End: 2018-05-21

## 2018-05-21 DIAGNOSIS — N39.0 URINARY TRACT INFECTION WITHOUT HEMATURIA, SITE UNSPECIFIED: Primary | ICD-10-CM

## 2018-05-21 RX ORDER — AMPICILLIN 500 MG/1
500 CAPSULE ORAL 4 TIMES DAILY
Qty: 28 CAPSULE | Refills: 0 | Status: SHIPPED | OUTPATIENT
Start: 2018-05-21 | End: 2018-05-28

## 2018-05-23 ENCOUNTER — PROCEDURE VISIT (OUTPATIENT)
Dept: UROLOGY | Age: 83
End: 2018-05-23
Payer: MEDICARE

## 2018-05-23 VITALS
DIASTOLIC BLOOD PRESSURE: 62 MMHG | TEMPERATURE: 97.8 F | BODY MASS INDEX: 24.77 KG/M2 | HEART RATE: 58 BPM | SYSTOLIC BLOOD PRESSURE: 127 MMHG | WEIGHT: 145.06 LBS | HEIGHT: 64 IN

## 2018-05-23 DIAGNOSIS — R33.9 URINE RETENTION: Primary | ICD-10-CM

## 2018-05-23 PROCEDURE — 51702 INSERT TEMP BLADDER CATH: CPT | Performed by: NURSE PRACTITIONER

## 2018-05-23 PROCEDURE — 99999 PR OFFICE/OUTPT VISIT,PROCEDURE ONLY: CPT | Performed by: NURSE PRACTITIONER

## 2018-06-13 ENCOUNTER — APPOINTMENT (OUTPATIENT)
Dept: GENERAL RADIOLOGY | Age: 83
End: 2018-06-13
Payer: MEDICARE

## 2018-06-13 ENCOUNTER — HOSPITAL ENCOUNTER (EMERGENCY)
Age: 83
Discharge: HOME | End: 2018-06-14
Payer: MEDICARE

## 2018-06-13 DIAGNOSIS — R05.9 COUGH: Primary | ICD-10-CM

## 2018-06-13 DIAGNOSIS — I50.23 ACUTE ON CHRONIC SYSTOLIC CONGESTIVE HEART FAILURE (HCC): ICD-10-CM

## 2018-06-13 LAB
ABSOLUTE EOS #: 0.5 K/UL (ref 0–0.4)
ABSOLUTE IMMATURE GRANULOCYTE: ABNORMAL K/UL (ref 0–0.3)
ABSOLUTE LYMPH #: 1.6 K/UL (ref 1–4.8)
ABSOLUTE MONO #: 1.1 K/UL (ref 0.2–0.8)
ANION GAP SERPL CALCULATED.3IONS-SCNC: 11 MMOL/L (ref 9–17)
BASOPHILS # BLD: 1 % (ref 0–2)
BASOPHILS ABSOLUTE: 0 K/UL (ref 0–0.2)
BNP INTERPRETATION: NORMAL
BUN BLDV-MCNC: 20 MG/DL (ref 8–23)
BUN/CREAT BLD: 18 (ref 9–20)
CALCIUM SERPL-MCNC: 8.5 MG/DL (ref 8.6–10.4)
CHLORIDE BLD-SCNC: 97 MMOL/L (ref 98–107)
CO2: 28 MMOL/L (ref 20–31)
CREAT SERPL-MCNC: 1.12 MG/DL (ref 0.7–1.2)
DIFFERENTIAL TYPE: ABNORMAL
EOSINOPHILS RELATIVE PERCENT: 6 % (ref 1–4)
GFR AFRICAN AMERICAN: >60 ML/MIN
GFR NON-AFRICAN AMERICAN: >60 ML/MIN
GFR SERPL CREATININE-BSD FRML MDRD: ABNORMAL ML/MIN/{1.73_M2}
GFR SERPL CREATININE-BSD FRML MDRD: ABNORMAL ML/MIN/{1.73_M2}
GLUCOSE BLD-MCNC: 80 MG/DL (ref 70–99)
HCT VFR BLD CALC: 43.1 % (ref 41–53)
HEMOGLOBIN: 14.7 G/DL (ref 13.5–17.5)
IMMATURE GRANULOCYTES: ABNORMAL %
LYMPHOCYTES # BLD: 20 % (ref 24–44)
MCH RBC QN AUTO: 29.3 PG (ref 26–34)
MCHC RBC AUTO-ENTMCNC: 34.2 G/DL (ref 31–37)
MCV RBC AUTO: 85.9 FL (ref 80–100)
MONOCYTES # BLD: 14 % (ref 1–7)
NRBC AUTOMATED: ABNORMAL PER 100 WBC
PDW BLD-RTO: 14.5 % (ref 11.5–14.5)
PLATELET # BLD: 279 K/UL (ref 130–400)
PLATELET ESTIMATE: ABNORMAL
PMV BLD AUTO: ABNORMAL FL (ref 6–12)
POTASSIUM SERPL-SCNC: 4.1 MMOL/L (ref 3.7–5.3)
PRO-BNP: 286 PG/ML
RBC # BLD: 5.03 M/UL (ref 4.5–5.9)
RBC # BLD: ABNORMAL 10*6/UL
SEG NEUTROPHILS: 59 % (ref 36–66)
SEGMENTED NEUTROPHILS ABSOLUTE COUNT: 4.9 K/UL (ref 1.8–7.7)
SODIUM BLD-SCNC: 136 MMOL/L (ref 135–144)
WBC # BLD: 8.1 K/UL (ref 3.5–11)
WBC # BLD: ABNORMAL 10*3/UL

## 2018-06-13 PROCEDURE — 85025 COMPLETE CBC W/AUTO DIFF WBC: CPT

## 2018-06-13 PROCEDURE — 80048 BASIC METABOLIC PNL TOTAL CA: CPT

## 2018-06-13 PROCEDURE — 36415 COLL VENOUS BLD VENIPUNCTURE: CPT

## 2018-06-13 PROCEDURE — 99283 EMERGENCY DEPT VISIT LOW MDM: CPT

## 2018-06-13 PROCEDURE — 83880 ASSAY OF NATRIURETIC PEPTIDE: CPT

## 2018-06-13 PROCEDURE — 71046 X-RAY EXAM CHEST 2 VIEWS: CPT

## 2018-06-14 VITALS
WEIGHT: 145 LBS | DIASTOLIC BLOOD PRESSURE: 59 MMHG | SYSTOLIC BLOOD PRESSURE: 137 MMHG | TEMPERATURE: 97 F | HEART RATE: 78 BPM | RESPIRATION RATE: 16 BRPM | OXYGEN SATURATION: 95 % | HEIGHT: 64 IN | BODY MASS INDEX: 24.75 KG/M2

## 2018-06-14 PROCEDURE — 94760 N-INVAS EAR/PLS OXIMETRY 1: CPT

## 2018-06-14 PROCEDURE — 96374 THER/PROPH/DIAG INJ IV PUSH: CPT

## 2018-06-14 PROCEDURE — 94664 DEMO&/EVAL PT USE INHALER: CPT

## 2018-06-14 PROCEDURE — 6360000002 HC RX W HCPCS

## 2018-06-14 PROCEDURE — 94150 VITAL CAPACITY TEST: CPT

## 2018-06-14 PROCEDURE — 2500000003 HC RX 250 WO HCPCS

## 2018-06-14 PROCEDURE — 94640 AIRWAY INHALATION TREATMENT: CPT

## 2018-06-14 RX ORDER — ALBUTEROL SULFATE 90 UG/1
2 AEROSOL, METERED RESPIRATORY (INHALATION)
Status: DISCONTINUED | OUTPATIENT
Start: 2018-06-14 | End: 2018-06-14

## 2018-06-14 RX ORDER — FUROSEMIDE 20 MG/1
20 TABLET ORAL DAILY
Qty: 30 TABLET | Refills: 1 | Status: SHIPPED | OUTPATIENT
Start: 2018-06-14 | End: 2018-11-30

## 2018-06-14 RX ORDER — BUMETANIDE 0.25 MG/ML
1 INJECTION, SOLUTION INTRAMUSCULAR; INTRAVENOUS ONCE
Status: COMPLETED | OUTPATIENT
Start: 2018-06-14 | End: 2018-06-14

## 2018-06-14 RX ORDER — IPRATROPIUM BROMIDE AND ALBUTEROL SULFATE 2.5; .5 MG/3ML; MG/3ML
1 SOLUTION RESPIRATORY (INHALATION)
Status: DISCONTINUED | OUTPATIENT
Start: 2018-06-14 | End: 2018-06-14

## 2018-06-14 RX ORDER — ALBUTEROL SULFATE 2.5 MG/3ML
5 SOLUTION RESPIRATORY (INHALATION)
Status: DISCONTINUED | OUTPATIENT
Start: 2018-06-14 | End: 2018-06-14

## 2018-06-14 RX ADMIN — BUMETANIDE 1 MG: 0.25 INJECTION INTRAMUSCULAR; INTRAVENOUS at 01:11

## 2018-06-14 RX ADMIN — ALBUTEROL SULFATE 2.5 MG: 5 SOLUTION RESPIRATORY (INHALATION) at 01:11

## 2018-06-15 ASSESSMENT — ENCOUNTER SYMPTOMS
DIARRHEA: 0
NAUSEA: 0
ABDOMINAL PAIN: 0
VOMITING: 0
CONSTIPATION: 0
SHORTNESS OF BREATH: 0
COUGH: 1
COLOR CHANGE: 0
WHEEZING: 0
SINUS PRESSURE: 0
SORE THROAT: 0
RHINORRHEA: 0

## 2018-06-20 ENCOUNTER — PROCEDURE VISIT (OUTPATIENT)
Dept: UROLOGY | Age: 83
End: 2018-06-20
Payer: MEDICARE

## 2018-06-20 VITALS
BODY MASS INDEX: 24.77 KG/M2 | HEART RATE: 70 BPM | DIASTOLIC BLOOD PRESSURE: 61 MMHG | HEIGHT: 64 IN | SYSTOLIC BLOOD PRESSURE: 127 MMHG | TEMPERATURE: 97.7 F | WEIGHT: 145.06 LBS

## 2018-06-20 DIAGNOSIS — R33.9 URINARY RETENTION: Primary | ICD-10-CM

## 2018-06-20 DIAGNOSIS — N39.0 RECURRENT UTI: ICD-10-CM

## 2018-06-20 PROCEDURE — 99213 OFFICE O/P EST LOW 20 MIN: CPT | Performed by: NURSE PRACTITIONER

## 2018-06-20 PROCEDURE — 51702 INSERT TEMP BLADDER CATH: CPT | Performed by: NURSE PRACTITIONER

## 2018-06-20 RX ORDER — BENZONATATE 200 MG/1
200 CAPSULE ORAL
COMMUNITY
Start: 2018-06-11 | End: 2019-01-04

## 2018-06-20 RX ORDER — DOXYCYCLINE 100 MG/1
100 CAPSULE ORAL
COMMUNITY
Start: 2018-06-11 | End: 2018-06-21

## 2018-06-20 ASSESSMENT — ENCOUNTER SYMPTOMS
BACK PAIN: 0
NAUSEA: 0
WHEEZING: 0
VOMITING: 0
ABDOMINAL PAIN: 0
COUGH: 1
SHORTNESS OF BREATH: 0
EYE REDNESS: 0
COLOR CHANGE: 0
EYE PAIN: 0

## 2018-07-11 ENCOUNTER — PROCEDURE VISIT (OUTPATIENT)
Dept: UROLOGY | Age: 83
End: 2018-07-11
Payer: MEDICARE

## 2018-07-11 VITALS — SYSTOLIC BLOOD PRESSURE: 129 MMHG | TEMPERATURE: 98 F | HEART RATE: 64 BPM | DIASTOLIC BLOOD PRESSURE: 78 MMHG

## 2018-07-11 DIAGNOSIS — R33.9 URINARY RETENTION: Primary | ICD-10-CM

## 2018-07-11 PROCEDURE — 51700 IRRIGATION OF BLADDER: CPT | Performed by: NURSE PRACTITIONER

## 2018-07-11 NOTE — PROGRESS NOTES
Fill and pull procedure  Risks and Benefits discussed with patient prior to procedure. Procedure Date: 7/11/18    Verbal consent obtained from patient prior to procedure. Patient arrived with old 16F urethral catheter in place for fill and pull. Bladder installation of 250 ml of sterile water per gravity until patient felt the sensation of bladder capacity fullness. Esparza ballon deflated and esparza removed without complication. Attempted to void with return of ~175mL clear fluid. Encouraged to stay well hydrated. Call the office if unable to urinate, only dribbling urine, abdominal pain/distension. Verbalized understanding.

## 2018-07-30 ENCOUNTER — TELEPHONE (OUTPATIENT)
Dept: UROLOGY | Age: 83
End: 2018-07-30

## 2018-07-30 NOTE — TELEPHONE ENCOUNTER
Rena Palumbo, patient's daughter, called stated that they are currently in [address] right now, and was sent to Urgent Care for [facility] urine\". No other symptoms were reported. He was diagnosed with a UTI and is currently with Keflex 500 mg x 7 days. She was advised to call the office when the patient returns to town if he is still symptomatic. She verbalized understanding.

## 2018-08-16 ENCOUNTER — PROCEDURE VISIT (OUTPATIENT)
Dept: UROLOGY | Age: 83
End: 2018-08-16
Payer: MEDICARE

## 2018-08-16 VITALS — TEMPERATURE: 97.4 F | DIASTOLIC BLOOD PRESSURE: 58 MMHG | SYSTOLIC BLOOD PRESSURE: 109 MMHG | HEART RATE: 66 BPM

## 2018-08-16 DIAGNOSIS — N39.0 URINARY TRACT INFECTION WITHOUT HEMATURIA, SITE UNSPECIFIED: ICD-10-CM

## 2018-08-16 DIAGNOSIS — N40.1 BPH WITH OBSTRUCTION/LOWER URINARY TRACT SYMPTOMS: ICD-10-CM

## 2018-08-16 DIAGNOSIS — R33.9 INCOMPLETE BLADDER EMPTYING: Primary | ICD-10-CM

## 2018-08-16 DIAGNOSIS — N13.8 BPH WITH OBSTRUCTION/LOWER URINARY TRACT SYMPTOMS: ICD-10-CM

## 2018-08-16 PROCEDURE — 99213 OFFICE O/P EST LOW 20 MIN: CPT | Performed by: NURSE PRACTITIONER

## 2018-08-16 PROCEDURE — 51798 US URINE CAPACITY MEASURE: CPT | Performed by: NURSE PRACTITIONER

## 2018-08-16 ASSESSMENT — ENCOUNTER SYMPTOMS
BACK PAIN: 0
EYE REDNESS: 0
WHEEZING: 0
SHORTNESS OF BREATH: 0
NAUSEA: 0
EYE PAIN: 0
ABDOMINAL PAIN: 0
VOMITING: 0
COUGH: 0
COLOR CHANGE: 0

## 2018-08-16 NOTE — PATIENT INSTRUCTIONS
Continue Finasteride and Tamsulosin    Urinate every 2 hours during the day at least    Drink water during the day to stay hydrated and flush    Call with any questions

## 2018-08-16 NOTE — PROGRESS NOTES
Satisfied    Last BUN and creatinine:  Lab Results   Component Value Date    BUN 20 06/13/2018     Lab Results   Component Value Date    CREATININE 1.12 06/13/2018       Additional Lab/Culture results:     Imaging Reviewed during this Office Visit:   (results were independently reviewed by physician and radiology report verified)    PAST MEDICAL, FAMILY AND SOCIAL HISTORY UPDATE:  Past Medical History:   Diagnosis Date    Cancer ([facility] [address] 2013    thyroid    Carotid artery stenosis     Cataract     Cerebrovascular disease     Heart attack ([facility] [address]     Hypertension     Hypothyroidism     Type II or unspecified type diabetes mellitus without mention of complication, not stated as uncontrolled      Past Surgical History:   Procedure Laterality Date    APPENDECTOMY      CARDIAC SURGERY      CHOLECYSTECTOMY      CORONARY ARTERY BYPASS GRAFT      ENDOSCOPIC ULTRASOUND (LOWER)      PACEMAKER INSERTION  03/2015    NH LAP,CHOLECYSTECTOMY N/A 3/2/2018    CHOLECYSTECTOMY LAPAROSCOPIC performed by Roxy Li DO at [facility]      TOTAL THYROIDECTOMY       Family History   Problem Relation Age of Onset    Cancer Mother         stomach    Other Father         pneumonia     Outpatient Prescriptions Marked as Taking for the 8/16/18 encounter (Procedure visit) with BRIAN Gould CNP   Medication Sig Dispense Refill    benzonatate (TESSALON) 200 MG capsule Take 200 mg by mouth      furosemide (LASIX) 20 MG tablet Take 1 tablet by mouth daily 30 tablet 1    finasteride (PROSCAR) 5 MG tablet Take 1 tablet by mouth daily 30 tablet 11    vitamin D (ERGOCALCIFEROL) 45607 units CAPS capsule Take 50,000 Units by mouth once a week      Multiple Vitamins-Minerals (THERAPEUTIC MULTIVITAMIN-MINERALS) tablet Take 1 tablet by mouth daily      calcium carbonate (TUMS) 500 MG chewable tablet Take 1 tablet by mouth 2 times daily as needed for Heartburn 30 tablet 0    tamsulosin (FLOMAX) 0.4 MG capsule flush    Called for urine culture in [place]    Call with any questions       No Follow-up on file. Prescriptions Ordered:  No orders of the defined types were placed in this encounter.      Orders Placed:  Orders Placed This Encounter   Procedures    MD MEASUREMENT,POST-VOID RESIDUAL VOLUME BY US,NON-IMAGING          Rufus Zamora, APRN - CNP

## 2018-08-16 NOTE — PROGRESS NOTES
Review of Systems   Constitutional: Negative for activity change, chills and fever. Eyes: Negative for pain, redness and visual disturbance. Respiratory: Negative for cough, shortness of breath and wheezing. Cardiovascular: Negative for chest pain and leg swelling. Gastrointestinal: Negative for abdominal pain, nausea and vomiting. Genitourinary: Positive for frequency and urgency. Negative for difficulty urinating, discharge, dysuria, flank pain, hematuria, scrotal swelling and testicular pain. Musculoskeletal: Negative for back pain, joint swelling and myalgias. Skin: Negative for color change and rash. Neurological: Negative for dizziness, tremors and numbness. Hematological: Negative for adenopathy. Does not bruise/bleed easily.

## 2018-08-31 ENCOUNTER — OFFICE VISIT (OUTPATIENT)
Dept: FAMILY MEDICINE CLINIC | Age: 83
End: 2018-08-31
Payer: MEDICARE

## 2018-08-31 VITALS
WEIGHT: 149 LBS | HEART RATE: 60 BPM | BODY MASS INDEX: 25.56 KG/M2 | TEMPERATURE: 97.8 F | RESPIRATION RATE: 13 BRPM | DIASTOLIC BLOOD PRESSURE: 60 MMHG | SYSTOLIC BLOOD PRESSURE: 100 MMHG

## 2018-08-31 DIAGNOSIS — E11.9 TYPE 2 DIABETES MELLITUS WITHOUT COMPLICATION, WITHOUT LONG-TERM CURRENT USE OF INSULIN (HCC): Primary | ICD-10-CM

## 2018-08-31 DIAGNOSIS — E78.5 HYPERLIPIDEMIA, UNSPECIFIED HYPERLIPIDEMIA TYPE: ICD-10-CM

## 2018-08-31 DIAGNOSIS — L60.8 TOENAIL DEFORMITY: ICD-10-CM

## 2018-08-31 PROCEDURE — 99214 OFFICE O/P EST MOD 30 MIN: CPT | Performed by: FAMILY MEDICINE

## 2018-08-31 ASSESSMENT — ENCOUNTER SYMPTOMS
ABDOMINAL PAIN: 0
SHORTNESS OF BREATH: 0
CHEST TIGHTNESS: 0
BLOOD IN STOOL: 0

## 2018-08-31 ASSESSMENT — PATIENT HEALTH QUESTIONNAIRE - PHQ9
2. FEELING DOWN, DEPRESSED OR HOPELESS: 0
SUM OF ALL RESPONSES TO PHQ9 QUESTIONS 1 & 2: 0
1. LITTLE INTEREST OR PLEASURE IN DOING THINGS: 0
SUM OF ALL RESPONSES TO PHQ QUESTIONS 1-9: 0
SUM OF ALL RESPONSES TO PHQ QUESTIONS 1-9: 0

## 2018-08-31 NOTE — PROGRESS NOTES
no distension. There is no tenderness. Musculoskeletal:        Right foot: There is deformity (long and thick toenails). Left foot: There is deformity (Long and thick toenails). Neurological: He is alert and oriented to person, place, and time. Skin: Skin is warm and dry. No rash noted. Psychiatric: He has a normal mood and affect. His behavior is normal.   Nursing note and vitals reviewed. Assessment:       Diagnosis Orders   1. Type 2 diabetes mellitus without complication, without long-term current use of insulin ([facility] [address]     2. Hyperlipidemia, unspecified hyperlipidemia type     3. Toenail deformity  Ambulatory referral to Podiatry           Plan:       Noreen Leo received counseling on the following healthy behaviors: nutrition and medication adherence  Reviewed prior labs and health maintenance  Continue current medications, diet and exercise. Discussed use, benefit, and side effects of prescribed medications. Barriers to medication compliance addressed. Patient given educational materials - see patient instructions  Was a self-tracking handout given in paper form or via [employer]? No:     Requested Prescriptions      No prescriptions requested or ordered in this encounter       All patient questions answered. Patient voiced understanding. Quality Measures    Body mass index is 25.56 kg/m². Elevated. Weight control planned discussed Healthy diet and regular exercise. BP: 100/60. Blood pressure is normal. Treatment plan consists of No treatment change needed. Fall Risk 8/31/2018 5/9/2017 2/7/2017 5/22/2015 9/19/2014   2 or more falls in past year? no no no no no   Fall with injury in past year? yes no no no no     The patient has a history of falls. I did , complete a risk assessment for falls.  A plan of care for falls home safety tips provided    Lab Results   Component Value Date    LDLCHOLESTEROL 72 06/05/2017    (goal LDL reduction with dx if diabetes is 50% LDL reduction)    PHQ Scores 8/31/2018 5/9/2017 2/7/2017 10/4/2016 5/22/2015 9/19/2014   PHQ2 Score 0 0 0 0 0 0   PHQ9 Score 0 0 0 0 0 0     Interpretation of Total Score Depression Severity: 1-4 = Minimal depression, 5-9 = Mild depression, 10-14 = Moderate depression, 15-19 = Moderately severe depression, 20-27 = Severe depression    Orders Placed This Encounter   Procedures    Ambulatory referral to Podiatry     Referral Priority:   Urgent     Referral Type:   Consult for Advice and Opinion     Referral Reason:   Specialty Services Required     Referred to Provider:   Kyara Randle DPM     Requested Specialty:   Podiatry     Number of Visits Requested:   1         Continue routine medications  Follow-up in 3-4 months

## 2018-09-18 ENCOUNTER — OFFICE VISIT (OUTPATIENT)
Dept: PODIATRY | Age: 83
End: 2018-09-18
Payer: MEDICARE

## 2018-09-18 VITALS
WEIGHT: 149 LBS | BODY MASS INDEX: 25.44 KG/M2 | DIASTOLIC BLOOD PRESSURE: 61 MMHG | SYSTOLIC BLOOD PRESSURE: 136 MMHG | HEIGHT: 64 IN | HEART RATE: 60 BPM

## 2018-09-18 DIAGNOSIS — M79.675 PAIN IN TOES OF BOTH FEET: ICD-10-CM

## 2018-09-18 DIAGNOSIS — B35.1 ONYCHOMYCOSIS OF TOENAIL: Primary | ICD-10-CM

## 2018-09-18 DIAGNOSIS — M79.674 PAIN IN TOES OF BOTH FEET: ICD-10-CM

## 2018-09-18 DIAGNOSIS — E11.51 TYPE 2 DIABETES MELLITUS WITH PERIPHERAL VASCULAR DISEASE (HCC): ICD-10-CM

## 2018-09-18 DIAGNOSIS — M21.962 FOOT DEFORMITY, BILATERAL: ICD-10-CM

## 2018-09-18 DIAGNOSIS — M21.961 FOOT DEFORMITY, BILATERAL: ICD-10-CM

## 2018-09-18 DIAGNOSIS — I73.9 PVD (PERIPHERAL VASCULAR DISEASE) (HCC): ICD-10-CM

## 2018-09-18 PROCEDURE — 99203 OFFICE O/P NEW LOW 30 MIN: CPT | Performed by: PODIATRIST

## 2018-09-18 PROCEDURE — 11721 DEBRIDE NAIL 6 OR MORE: CPT | Performed by: PODIATRIST

## 2018-09-18 ASSESSMENT — ENCOUNTER SYMPTOMS
DIARRHEA: 0
NAUSEA: 0
SHORTNESS OF BREATH: 0
COLOR CHANGE: 0
BACK PAIN: 0

## 2018-10-16 ENCOUNTER — TELEPHONE (OUTPATIENT)
Dept: UROLOGY | Age: 83
End: 2018-10-16

## 2018-10-16 DIAGNOSIS — N40.1 BPH WITH OBSTRUCTION/LOWER URINARY TRACT SYMPTOMS: ICD-10-CM

## 2018-10-16 DIAGNOSIS — R35.0 FREQUENCY OF URINATION: ICD-10-CM

## 2018-10-16 DIAGNOSIS — N13.8 BPH WITH OBSTRUCTION/LOWER URINARY TRACT SYMPTOMS: ICD-10-CM

## 2018-10-16 RX ORDER — TAMSULOSIN HYDROCHLORIDE 0.4 MG/1
0.4 CAPSULE ORAL DAILY
Qty: 10 CAPSULE | Refills: 0 | Status: SHIPPED | OUTPATIENT
Start: 2018-10-16 | End: 2018-10-16 | Stop reason: SDUPTHER

## 2018-10-22 RX ORDER — FINASTERIDE 5 MG/1
5 TABLET, FILM COATED ORAL DAILY
Qty: 90 TABLET | Refills: 0 | Status: SHIPPED | OUTPATIENT
Start: 2018-10-22 | End: 2019-01-27 | Stop reason: SDUPTHER

## 2018-10-22 RX ORDER — TAMSULOSIN HYDROCHLORIDE 0.4 MG/1
0.4 CAPSULE ORAL DAILY
Qty: 90 CAPSULE | Refills: 0 | Status: SHIPPED | OUTPATIENT
Start: 2018-10-22 | End: 2019-01-29 | Stop reason: SDUPTHER

## 2018-11-30 ENCOUNTER — APPOINTMENT (OUTPATIENT)
Dept: GENERAL RADIOLOGY | Age: 83
End: 2018-11-30
Payer: MEDICARE

## 2018-11-30 ENCOUNTER — HOSPITAL ENCOUNTER (EMERGENCY)
Age: 83
Discharge: HOME | End: 2018-11-30
Attending: EMERGENCY MEDICINE
Payer: MEDICARE

## 2018-11-30 VITALS
RESPIRATION RATE: 20 BRPM | OXYGEN SATURATION: 98 % | TEMPERATURE: 97.5 F | WEIGHT: 150 LBS | DIASTOLIC BLOOD PRESSURE: 54 MMHG | BODY MASS INDEX: 25.75 KG/M2 | SYSTOLIC BLOOD PRESSURE: 128 MMHG | HEART RATE: 65 BPM

## 2018-11-30 DIAGNOSIS — I50.9 ACUTE ON CHRONIC CONGESTIVE HEART FAILURE, UNSPECIFIED HEART FAILURE TYPE (HCC): ICD-10-CM

## 2018-11-30 DIAGNOSIS — J81.0 ACUTE PULMONARY EDEMA (HCC): Primary | ICD-10-CM

## 2018-11-30 LAB
ANION GAP SERPL CALCULATED.3IONS-SCNC: 12 MMOL/L (ref 9–17)
BNP INTERPRETATION: ABNORMAL
BUN BLDV-MCNC: 16 MG/DL (ref 8–23)
BUN/CREAT BLD: ABNORMAL (ref 9–20)
CALCIUM SERPL-MCNC: 8.9 MG/DL (ref 8.6–10.4)
CHLORIDE BLD-SCNC: 97 MMOL/L (ref 98–107)
CO2: 26 MMOL/L (ref 20–31)
CREAT SERPL-MCNC: 1.05 MG/DL (ref 0.7–1.2)
EKG ATRIAL RATE: 60 BPM
EKG P AXIS: 51 DEGREES
EKG P-R INTERVAL: 206 MS
EKG Q-T INTERVAL: 440 MS
EKG QRS DURATION: 162 MS
EKG QTC CALCULATION (BAZETT): 440 MS
EKG R AXIS: -42 DEGREES
EKG T AXIS: -31 DEGREES
EKG VENTRICULAR RATE: 60 BPM
GFR AFRICAN AMERICAN: >60 ML/MIN
GFR NON-AFRICAN AMERICAN: >60 ML/MIN
GFR SERPL CREATININE-BSD FRML MDRD: ABNORMAL ML/MIN/{1.73_M2}
GFR SERPL CREATININE-BSD FRML MDRD: ABNORMAL ML/MIN/{1.73_M2}
GLUCOSE BLD-MCNC: 97 MG/DL (ref 70–99)
HCT VFR BLD CALC: 41.4 % (ref 41–53)
HEMOGLOBIN: 13.7 G/DL (ref 13.5–17.5)
MCH RBC QN AUTO: 27.6 PG (ref 26–34)
MCHC RBC AUTO-ENTMCNC: 33.2 G/DL (ref 31–37)
MCV RBC AUTO: 83.3 FL (ref 80–100)
NRBC AUTOMATED: ABNORMAL PER 100 WBC
PDW BLD-RTO: 15.9 % (ref 11.5–14.9)
PLATELET # BLD: 232 K/UL (ref 150–450)
PMV BLD AUTO: 7.4 FL (ref 6–12)
POTASSIUM SERPL-SCNC: 3.9 MMOL/L (ref 3.7–5.3)
PRO-BNP: 335 PG/ML
RBC # BLD: 4.98 M/UL (ref 4.5–5.9)
SODIUM BLD-SCNC: 135 MMOL/L (ref 135–144)
TROPONIN INTERP: NORMAL
TROPONIN T: <0.03 NG/ML
WBC # BLD: 10.4 K/UL (ref 3.5–11)

## 2018-11-30 PROCEDURE — 83880 ASSAY OF NATRIURETIC PEPTIDE: CPT

## 2018-11-30 PROCEDURE — 84484 ASSAY OF TROPONIN QUANT: CPT

## 2018-11-30 PROCEDURE — 99285 EMERGENCY DEPT VISIT HI MDM: CPT

## 2018-11-30 PROCEDURE — 36415 COLL VENOUS BLD VENIPUNCTURE: CPT

## 2018-11-30 PROCEDURE — 80048 BASIC METABOLIC PNL TOTAL CA: CPT

## 2018-11-30 PROCEDURE — 85027 COMPLETE CBC AUTOMATED: CPT

## 2018-11-30 PROCEDURE — 71046 X-RAY EXAM CHEST 2 VIEWS: CPT

## 2018-11-30 PROCEDURE — 93005 ELECTROCARDIOGRAM TRACING: CPT

## 2018-11-30 RX ORDER — FUROSEMIDE 20 MG/1
20 TABLET ORAL DAILY
Qty: 30 TABLET | Refills: 1 | Status: SHIPPED | OUTPATIENT
Start: 2018-11-30

## 2018-11-30 ASSESSMENT — PAIN SCALES - WONG BAKER: WONGBAKER_NUMERICALRESPONSE: 6

## 2018-11-30 ASSESSMENT — PAIN DESCRIPTION - LOCATION: LOCATION: BACK

## 2018-12-01 NOTE — ED PROVIDER NOTES
[address] ED  eMERGENCY dEPARTMENT eNCOUnter    Pt Name: Glenard Libman  MRN: [id number]  [place] 2/13/1928  Date of evaluation: 11/30/18  CHIEF COMPLAINT       Chief Complaint   Patient presents with    Back Pain    Cough     HISTORY OF PRESENT ILLNESS   HPI   80 y.o. M With history of lung cancer not on treatment, CHF, CAD, presents with shortness of breath and left upper back pain. Patient states he's had these symptoms for about a month, associated with a productive cough. The pain is described as sharp, intermittent, very short lasting, not very intense, sometimes triggered by coughing, sometimes moving, located in his left upper back. He denies any chest pain, vomiting, fevers, abdominal pain. Patient doesn't necessarily endorse worsening of the symptoms over the last month but as they are not getting better, he decided to get checked out. He is concerned that it may be his cancer getting worse or a pneumonia. Michelle Sawant REVIEW OF SYSTEMS     Review of Systems   Constitutional: Negative for fever. HENT: Negative for congestion. Respiratory: Positive for shortness of breath. Cardiovascular: Negative for chest pain. Gastrointestinal: Negative for abdominal pain, nausea and vomiting. Genitourinary: Negative. Musculoskeletal: Positive for back pain. Skin: Negative for rash. Neurological: Negative for light-headedness. Psychiatric/Behavioral: Negative for agitation.      PASTMEDICAL HISTORY     Past Medical History:   Diagnosis Date    Cancer [facility]) 2013    thyroid    Carotid artery stenosis     Cataract     Cerebrovascular disease     CH[facility]congestive heart failure) ([facility])     Heart attack ([facility] [address]     Hypertension     Hypothyroidism     Type II or unspecified type diabetes mellitus without mention of complication, not stated as uncontrolled      SURGICAL HISTORY       Past Surgical History:   Procedure Laterality Date    APPENDECTOMY      CARDIAC SURGERY      CHOLECYSTECTOMY     

## 2018-12-03 ENCOUNTER — CARE COORDINATION (OUTPATIENT)
Dept: CARE COORDINATION | Age: 83
End: 2018-12-03

## 2018-12-13 ENCOUNTER — OFFICE VISIT (OUTPATIENT)
Dept: PODIATRY | Age: 83
End: 2018-12-13
Payer: MEDICARE

## 2018-12-13 VITALS — HEIGHT: 64 IN | BODY MASS INDEX: 25.61 KG/M2 | WEIGHT: 150 LBS

## 2018-12-13 DIAGNOSIS — B35.1 ONYCHOMYCOSIS OF TOENAIL: Primary | ICD-10-CM

## 2018-12-13 DIAGNOSIS — M79.674 PAIN IN TOES OF BOTH FEET: ICD-10-CM

## 2018-12-13 DIAGNOSIS — M79.675 PAIN IN TOES OF BOTH FEET: ICD-10-CM

## 2018-12-13 DIAGNOSIS — E11.51 TYPE 2 DIABETES MELLITUS WITH PERIPHERAL VASCULAR DISEASE (HCC): ICD-10-CM

## 2018-12-13 PROCEDURE — 99999 PR OFFICE/OUTPT VISIT,PROCEDURE ONLY: CPT | Performed by: PODIATRIST

## 2018-12-13 PROCEDURE — 11721 DEBRIDE NAIL 6 OR MORE: CPT | Performed by: PODIATRIST

## 2018-12-13 ASSESSMENT — ENCOUNTER SYMPTOMS
SHORTNESS OF BREATH: 0
COLOR CHANGE: 0
NAUSEA: 0
DIARRHEA: 0
BACK PAIN: 0

## 2018-12-13 NOTE — PROGRESS NOTES
Patient states that they do not know their current blood sugar level. ASSESSMENT:    Diagnosis Orders   1. Onychomycosis of toenail  IL DEBRIDEMENT OF NAILS, 6 OR MORE    HM DIABETES FOOT EXAM   2. Pain in toes of both feet  IL DEBRIDEMENT OF NAILS, 6 OR MORE    HM DIABETES FOOT EXAM   3. Type 2 diabetes mellitus with peripheral vascular disease (HCC)  IL DEBRIDEMENT OF NAILS, 6 OR MORE    HM DIABETES FOOT EXAM     PLAN: Toenails 1,2,3,4,5 of the right foot and 1,2,3,4,5 of the left foot were debrided in length and thickness using a nail nipper and a . Return in about 9 weeks (around 2/14/2019) for At risk diabetic foot care.    12/13/2018      Sharon Flanagan DPM

## 2018-12-22 ASSESSMENT — ENCOUNTER SYMPTOMS
BACK PAIN: 1
VOMITING: 0
SHORTNESS OF BREATH: 1
ABDOMINAL PAIN: 0
NAUSEA: 0

## 2018-12-28 ENCOUNTER — HOSPITAL ENCOUNTER (EMERGENCY)
Age: 83
Discharge: HOME | End: 2018-12-28
Attending: EMERGENCY MEDICINE
Payer: MEDICARE

## 2018-12-28 ENCOUNTER — APPOINTMENT (OUTPATIENT)
Dept: GENERAL RADIOLOGY | Age: 83
End: 2018-12-28
Payer: MEDICARE

## 2018-12-28 VITALS
SYSTOLIC BLOOD PRESSURE: 128 MMHG | HEART RATE: 64 BPM | WEIGHT: 150 LBS | HEIGHT: 64 IN | BODY MASS INDEX: 25.61 KG/M2 | DIASTOLIC BLOOD PRESSURE: 46 MMHG | TEMPERATURE: 97.3 F | RESPIRATION RATE: 18 BRPM | OXYGEN SATURATION: 97 %

## 2018-12-28 DIAGNOSIS — R05.9 COUGH: Primary | ICD-10-CM

## 2018-12-28 LAB
ABSOLUTE EOS #: 0.4 K/UL (ref 0–0.4)
ABSOLUTE IMMATURE GRANULOCYTE: ABNORMAL K/UL (ref 0–0.3)
ABSOLUTE LYMPH #: 1.7 K/UL (ref 1–4.8)
ABSOLUTE MONO #: 0.9 K/UL (ref 0.1–1.3)
ANION GAP SERPL CALCULATED.3IONS-SCNC: 9 MMOL/L (ref 9–17)
BASOPHILS # BLD: 1 % (ref 0–2)
BASOPHILS ABSOLUTE: 0.1 K/UL (ref 0–0.2)
BNP INTERPRETATION: NORMAL
BUN BLDV-MCNC: 19 MG/DL (ref 8–23)
BUN/CREAT BLD: ABNORMAL (ref 9–20)
CALCIUM SERPL-MCNC: 8.4 MG/DL (ref 8.6–10.4)
CHLORIDE BLD-SCNC: 99 MMOL/L (ref 98–107)
CO2: 28 MMOL/L (ref 20–31)
CREAT SERPL-MCNC: 1.01 MG/DL (ref 0.7–1.2)
DIFFERENTIAL TYPE: ABNORMAL
EKG ATRIAL RATE: 60 BPM
EKG P AXIS: 62 DEGREES
EKG P-R INTERVAL: 232 MS
EKG Q-T INTERVAL: 492 MS
EKG QRS DURATION: 190 MS
EKG QTC CALCULATION (BAZETT): 492 MS
EKG R AXIS: -31 DEGREES
EKG T AXIS: -24 DEGREES
EKG VENTRICULAR RATE: 60 BPM
EOSINOPHILS RELATIVE PERCENT: 5 % (ref 0–4)
GFR AFRICAN AMERICAN: >60 ML/MIN
GFR NON-AFRICAN AMERICAN: >60 ML/MIN
GFR SERPL CREATININE-BSD FRML MDRD: ABNORMAL ML/MIN/{1.73_M2}
GFR SERPL CREATININE-BSD FRML MDRD: ABNORMAL ML/MIN/{1.73_M2}
GLUCOSE BLD-MCNC: 137 MG/DL (ref 70–99)
HCT VFR BLD CALC: 42.7 % (ref 41–53)
HEMOGLOBIN: 13.9 G/DL (ref 13.5–17.5)
IMMATURE GRANULOCYTES: ABNORMAL %
LYMPHOCYTES # BLD: 21 % (ref 24–44)
MCH RBC QN AUTO: 27.8 PG (ref 26–34)
MCHC RBC AUTO-ENTMCNC: 32.6 G/DL (ref 31–37)
MCV RBC AUTO: 85.4 FL (ref 80–100)
MONOCYTES # BLD: 11 % (ref 1–7)
NRBC AUTOMATED: ABNORMAL PER 100 WBC
PDW BLD-RTO: 16 % (ref 11.5–14.9)
PLATELET # BLD: 231 K/UL (ref 150–450)
PLATELET ESTIMATE: ABNORMAL
PMV BLD AUTO: 7.2 FL (ref 6–12)
POTASSIUM SERPL-SCNC: 4.4 MMOL/L (ref 3.7–5.3)
PRO-BNP: 138 PG/ML
RBC # BLD: 5 M/UL (ref 4.5–5.9)
RBC # BLD: ABNORMAL 10*6/UL
SEG NEUTROPHILS: 62 % (ref 36–66)
SEGMENTED NEUTROPHILS ABSOLUTE COUNT: 5.2 K/UL (ref 1.3–9.1)
SODIUM BLD-SCNC: 136 MMOL/L (ref 135–144)
WBC # BLD: 8.2 K/UL (ref 3.5–11)
WBC # BLD: ABNORMAL 10*3/UL

## 2018-12-28 PROCEDURE — 36415 COLL VENOUS BLD VENIPUNCTURE: CPT

## 2018-12-28 PROCEDURE — 71046 X-RAY EXAM CHEST 2 VIEWS: CPT

## 2018-12-28 PROCEDURE — 93005 ELECTROCARDIOGRAM TRACING: CPT

## 2018-12-28 PROCEDURE — 99285 EMERGENCY DEPT VISIT HI MDM: CPT

## 2018-12-28 PROCEDURE — 83880 ASSAY OF NATRIURETIC PEPTIDE: CPT

## 2018-12-28 PROCEDURE — 80048 BASIC METABOLIC PNL TOTAL CA: CPT

## 2018-12-28 PROCEDURE — 85025 COMPLETE CBC W/AUTO DIFF WBC: CPT

## 2018-12-28 RX ORDER — BENZONATATE 100 MG/1
100 CAPSULE ORAL 3 TIMES DAILY PRN
Qty: 30 CAPSULE | Refills: 0 | Status: SHIPPED | OUTPATIENT
Start: 2018-12-28 | End: 2019-01-04 | Stop reason: SDUPTHER

## 2018-12-28 ASSESSMENT — ENCOUNTER SYMPTOMS
SHORTNESS OF BREATH: 1
CHEST TIGHTNESS: 0
COLOR CHANGE: 0
RHINORRHEA: 1
CONSTIPATION: 0
FACIAL SWELLING: 0
SORE THROAT: 0
EYE DISCHARGE: 0
EYE PAIN: 0
TROUBLE SWALLOWING: 0
WHEEZING: 0
NAUSEA: 0
BLOOD IN STOOL: 0
EYE REDNESS: 0
ABDOMINAL PAIN: 0
COUGH: 1
VOMITING: 0
DIARRHEA: 0
SINUS PRESSURE: 0
BACK PAIN: 0

## 2018-12-31 ENCOUNTER — CARE COORDINATION (OUTPATIENT)
Dept: CARE COORDINATION | Age: 83
End: 2018-12-31

## 2019-01-01 ENCOUNTER — APPOINTMENT (OUTPATIENT)
Dept: GENERAL RADIOLOGY | Age: 84
End: 2019-01-01
Payer: MEDICARE

## 2019-01-01 ENCOUNTER — HOSPITAL ENCOUNTER (EMERGENCY)
Age: 84
Discharge: HOME | End: 2019-12-29
Attending: EMERGENCY MEDICINE
Payer: MEDICARE

## 2019-01-01 VITALS
RESPIRATION RATE: 16 BRPM | TEMPERATURE: 98.4 F | DIASTOLIC BLOOD PRESSURE: 52 MMHG | OXYGEN SATURATION: 96 % | SYSTOLIC BLOOD PRESSURE: 113 MMHG | HEART RATE: 64 BPM

## 2019-01-01 LAB
ABSOLUTE EOS #: 0.4 K/UL (ref 0–0.4)
ABSOLUTE IMMATURE GRANULOCYTE: ABNORMAL K/UL (ref 0–0.3)
ABSOLUTE LYMPH #: 1.2 K/UL (ref 1–4.8)
ABSOLUTE MONO #: 0.7 K/UL (ref 0.1–1.3)
ALBUMIN SERPL-MCNC: 3.1 G/DL (ref 3.5–5.2)
ALBUMIN/GLOBULIN RATIO: ABNORMAL (ref 1–2.5)
ALP BLD-CCNC: 99 U/L (ref 40–129)
ALT SERPL-CCNC: 20 U/L (ref 5–41)
ANION GAP SERPL CALCULATED.3IONS-SCNC: 12 MMOL/L (ref 9–17)
AST SERPL-CCNC: 19 U/L
BASOPHILS # BLD: 1 % (ref 0–2)
BASOPHILS ABSOLUTE: 0.1 K/UL (ref 0–0.2)
BILIRUB SERPL-MCNC: 0.47 MG/DL (ref 0.3–1.2)
BNP INTERPRETATION: ABNORMAL
BUN BLDV-MCNC: 17 MG/DL (ref 8–23)
BUN/CREAT BLD: ABNORMAL (ref 9–20)
CALCIUM SERPL-MCNC: 8.7 MG/DL (ref 8.6–10.4)
CHLORIDE BLD-SCNC: 99 MMOL/L (ref 98–107)
CO2: 25 MMOL/L (ref 20–31)
CREAT SERPL-MCNC: 1.08 MG/DL (ref 0.7–1.2)
DIFFERENTIAL TYPE: ABNORMAL
DIRECT EXAM: NORMAL
EKG ATRIAL RATE: 62 BPM
EKG P AXIS: 40 DEGREES
EKG P-R INTERVAL: 220 MS
EKG Q-T INTERVAL: 438 MS
EKG QRS DURATION: 162 MS
EKG QTC CALCULATION (BAZETT): 444 MS
EKG R AXIS: -42 DEGREES
EKG T AXIS: -28 DEGREES
EKG VENTRICULAR RATE: 62 BPM
EOSINOPHILS RELATIVE PERCENT: 6 % (ref 0–4)
GFR AFRICAN AMERICAN: >60 ML/MIN
GFR NON-AFRICAN AMERICAN: >60 ML/MIN
GFR SERPL CREATININE-BSD FRML MDRD: ABNORMAL ML/MIN/{1.73_M2}
GFR SERPL CREATININE-BSD FRML MDRD: ABNORMAL ML/MIN/{1.73_M2}
GLUCOSE BLD-MCNC: 249 MG/DL (ref 70–99)
HCT VFR BLD CALC: 40.7 % (ref 41–53)
HEMOGLOBIN: 14.1 G/DL (ref 13.5–17.5)
IMMATURE GRANULOCYTES: ABNORMAL %
LYMPHOCYTES # BLD: 17 % (ref 24–44)
Lab: NORMAL
MCH RBC QN AUTO: 29 PG (ref 26–34)
MCHC RBC AUTO-ENTMCNC: 34.7 G/DL (ref 31–37)
MCV RBC AUTO: 83.6 FL (ref 80–100)
MONOCYTES # BLD: 9 % (ref 1–7)
NRBC AUTOMATED: ABNORMAL PER 100 WBC
PDW BLD-RTO: 14.4 % (ref 11.5–14.9)
PLATELET # BLD: 258 K/UL (ref 150–450)
PLATELET ESTIMATE: ABNORMAL
PMV BLD AUTO: 7 FL (ref 6–12)
POTASSIUM SERPL-SCNC: 3.9 MMOL/L (ref 3.7–5.3)
PRO-BNP: 350 PG/ML
RBC # BLD: 4.87 M/UL (ref 4.5–5.9)
RBC # BLD: ABNORMAL 10*6/UL
SEG NEUTROPHILS: 67 % (ref 36–66)
SEGMENTED NEUTROPHILS ABSOLUTE COUNT: 4.7 K/UL (ref 1.3–9.1)
SODIUM BLD-SCNC: 136 MMOL/L (ref 135–144)
SPECIMEN DESCRIPTION: NORMAL
TOTAL PROTEIN: 8.6 G/DL (ref 6.4–8.3)
TROPONIN INTERP: ABNORMAL
TROPONIN INTERP: ABNORMAL
TROPONIN T: ABNORMAL NG/ML
TROPONIN T: ABNORMAL NG/ML
TROPONIN, HIGH SENSITIVITY: 23 NG/L (ref 0–22)
TROPONIN, HIGH SENSITIVITY: 25 NG/L (ref 0–22)
WBC # BLD: 7 K/UL (ref 3.5–11)
WBC # BLD: ABNORMAL 10*3/UL

## 2019-01-01 PROCEDURE — 93005 ELECTROCARDIOGRAM TRACING: CPT | Performed by: EMERGENCY MEDICINE

## 2019-01-01 PROCEDURE — 80053 COMPREHEN METABOLIC PANEL: CPT

## 2019-01-01 PROCEDURE — 99284 EMERGENCY DEPT VISIT MOD MDM: CPT

## 2019-01-01 PROCEDURE — 71045 X-RAY EXAM CHEST 1 VIEW: CPT

## 2019-01-01 PROCEDURE — 83880 ASSAY OF NATRIURETIC PEPTIDE: CPT

## 2019-01-01 PROCEDURE — 84484 ASSAY OF TROPONIN QUANT: CPT

## 2019-01-01 PROCEDURE — 36415 COLL VENOUS BLD VENIPUNCTURE: CPT

## 2019-01-01 PROCEDURE — 93010 ELECTROCARDIOGRAM REPORT: CPT | Performed by: INTERNAL MEDICINE

## 2019-01-01 PROCEDURE — 94640 AIRWAY INHALATION TREATMENT: CPT

## 2019-01-01 PROCEDURE — 85025 COMPLETE CBC W/AUTO DIFF WBC: CPT

## 2019-01-01 PROCEDURE — 93005 ELECTROCARDIOGRAM TRACING: CPT

## 2019-01-01 PROCEDURE — 87804 INFLUENZA ASSAY W/OPTIC: CPT

## 2019-01-01 PROCEDURE — 6370000000 HC RX 637 (ALT 250 FOR IP): Performed by: EMERGENCY MEDICINE

## 2019-01-01 RX ORDER — IPRATROPIUM BROMIDE AND ALBUTEROL SULFATE 2.5; .5 MG/3ML; MG/3ML
1 SOLUTION RESPIRATORY (INHALATION) ONCE
Status: COMPLETED | OUTPATIENT
Start: 2019-01-01 | End: 2019-01-01

## 2019-01-01 RX ORDER — BENZONATATE 100 MG/1
100 CAPSULE ORAL 2 TIMES DAILY PRN
Qty: 20 CAPSULE | Refills: 0 | Status: SHIPPED | OUTPATIENT
Start: 2019-01-01 | End: 2020-01-01

## 2019-01-01 RX ORDER — DOXYCYCLINE HYCLATE 100 MG
100 TABLET ORAL 2 TIMES DAILY
Qty: 20 TABLET | Refills: 0 | Status: SHIPPED | OUTPATIENT
Start: 2019-01-01 | End: 2020-01-01

## 2019-01-01 RX ORDER — MORPHINE SULFATE 2 MG/ML
2 INJECTION, SOLUTION INTRAMUSCULAR; INTRAVENOUS ONCE
Status: DISCONTINUED | OUTPATIENT
Start: 2019-01-01 | End: 2019-01-01 | Stop reason: HOSPADM

## 2019-01-01 RX ADMIN — IPRATROPIUM BROMIDE AND ALBUTEROL SULFATE 1 AMPULE: .5; 3 SOLUTION RESPIRATORY (INHALATION) at 03:02

## 2019-01-04 ENCOUNTER — OFFICE VISIT (OUTPATIENT)
Dept: FAMILY MEDICINE CLINIC | Age: 84
End: 2019-01-04
Payer: MEDICARE

## 2019-01-04 VITALS
HEART RATE: 60 BPM | SYSTOLIC BLOOD PRESSURE: 120 MMHG | RESPIRATION RATE: 14 BRPM | DIASTOLIC BLOOD PRESSURE: 60 MMHG | BODY MASS INDEX: 27.29 KG/M2 | TEMPERATURE: 97.7 F | WEIGHT: 159 LBS

## 2019-01-04 DIAGNOSIS — E11.9 TYPE 2 DIABETES MELLITUS WITHOUT COMPLICATION, WITHOUT LONG-TERM CURRENT USE OF INSULIN (HCC): Primary | ICD-10-CM

## 2019-01-04 DIAGNOSIS — E78.5 HYPERLIPIDEMIA, UNSPECIFIED HYPERLIPIDEMIA TYPE: ICD-10-CM

## 2019-01-04 DIAGNOSIS — R05.9 COUGH: ICD-10-CM

## 2019-01-04 PROCEDURE — 99214 OFFICE O/P EST MOD 30 MIN: CPT | Performed by: FAMILY MEDICINE

## 2019-01-04 RX ORDER — BENZONATATE 100 MG/1
100 CAPSULE ORAL 3 TIMES DAILY PRN
Qty: 30 CAPSULE | Refills: 0 | Status: SHIPPED | OUTPATIENT
Start: 2019-01-04 | End: 2019-01-11

## 2019-01-04 ASSESSMENT — ENCOUNTER SYMPTOMS
COUGH: 1
BLOOD IN STOOL: 0
SHORTNESS OF BREATH: 0
CHEST TIGHTNESS: 0
ABDOMINAL PAIN: 0

## 2019-01-05 ENCOUNTER — HOSPITAL ENCOUNTER (OUTPATIENT)
Age: 84
Discharge: HOME | End: 2019-01-05
Payer: MEDICARE

## 2019-01-05 DIAGNOSIS — E78.5 HYPERLIPIDEMIA, UNSPECIFIED HYPERLIPIDEMIA TYPE: ICD-10-CM

## 2019-01-05 DIAGNOSIS — E11.9 TYPE 2 DIABETES MELLITUS WITHOUT COMPLICATION, WITHOUT LONG-TERM CURRENT USE OF INSULIN (HCC): ICD-10-CM

## 2019-01-05 LAB
ALT SERPL-CCNC: 20 U/L (ref 5–41)
AST SERPL-CCNC: 19 U/L
CHOLESTEROL/HDL RATIO: 4.3
CHOLESTEROL: 113 MG/DL
HDLC SERPL-MCNC: 26 MG/DL
LDL CHOLESTEROL: 58 MG/DL (ref 0–130)
MAGNESIUM: 2 MG/DL (ref 1.6–2.6)
TRIGL SERPL-MCNC: 146 MG/DL
VLDLC SERPL CALC-MCNC: ABNORMAL MG/DL (ref 1–30)

## 2019-01-05 PROCEDURE — 80061 LIPID PANEL: CPT

## 2019-01-05 PROCEDURE — 83735 ASSAY OF MAGNESIUM: CPT

## 2019-01-05 PROCEDURE — 36415 COLL VENOUS BLD VENIPUNCTURE: CPT

## 2019-01-05 PROCEDURE — 84460 ALANINE AMINO (ALT) (SGPT): CPT

## 2019-01-05 PROCEDURE — 84450 TRANSFERASE (AST) (SGOT): CPT

## 2019-01-05 PROCEDURE — 83036 HEMOGLOBIN GLYCOSYLATED A1C: CPT

## 2019-01-06 LAB
ESTIMATED AVERAGE GLUCOSE: 183 MG/DL
HBA1C MFR BLD: 8 % (ref 4–6)

## 2019-01-29 DIAGNOSIS — N40.1 BPH WITH OBSTRUCTION/LOWER URINARY TRACT SYMPTOMS: ICD-10-CM

## 2019-01-29 DIAGNOSIS — R35.0 FREQUENCY OF URINATION: ICD-10-CM

## 2019-01-29 DIAGNOSIS — N13.8 BPH WITH OBSTRUCTION/LOWER URINARY TRACT SYMPTOMS: ICD-10-CM

## 2019-02-01 RX ORDER — TAMSULOSIN HYDROCHLORIDE 0.4 MG/1
CAPSULE ORAL
Qty: 90 CAPSULE | Refills: 0 | Status: SHIPPED | OUTPATIENT
Start: 2019-02-01 | End: 2019-04-29 | Stop reason: SDUPTHER

## 2019-03-11 ENCOUNTER — CARE COORDINATION (OUTPATIENT)
Dept: CARE COORDINATION | Age: 84
End: 2019-03-11

## 2019-04-19 ENCOUNTER — CARE COORDINATION (OUTPATIENT)
Dept: CARE COORDINATION | Age: 84
End: 2019-04-19

## 2019-04-19 NOTE — CARE COORDINATION
Call placed to patient. Patient was not home. Spoke with patient's daughter Raymond Kumar. Explained purpose of the call. Daughter states I would need to speak with either her brother Mari Boone, or her sister Quinton Coleman. Will follow up next week.

## 2019-04-29 DIAGNOSIS — N13.8 BPH WITH OBSTRUCTION/LOWER URINARY TRACT SYMPTOMS: ICD-10-CM

## 2019-04-29 DIAGNOSIS — R35.0 FREQUENCY OF URINATION: ICD-10-CM

## 2019-04-29 DIAGNOSIS — N40.1 BPH WITH OBSTRUCTION/LOWER URINARY TRACT SYMPTOMS: ICD-10-CM

## 2019-04-30 ENCOUNTER — CARE COORDINATION (OUTPATIENT)
Dept: CARE COORDINATION | Age: 84
End: 2019-04-30

## 2019-04-30 ENCOUNTER — OFFICE VISIT (OUTPATIENT)
Dept: FAMILY MEDICINE CLINIC | Age: 84
End: 2019-04-30
Payer: MEDICARE

## 2019-04-30 VITALS
SYSTOLIC BLOOD PRESSURE: 100 MMHG | DIASTOLIC BLOOD PRESSURE: 60 MMHG | WEIGHT: 152.2 LBS | HEART RATE: 60 BPM | RESPIRATION RATE: 13 BRPM | BODY MASS INDEX: 26.13 KG/M2

## 2019-04-30 DIAGNOSIS — Z91.81 AT HIGH RISK FOR FALLS: ICD-10-CM

## 2019-04-30 DIAGNOSIS — Z23 NEED FOR PNEUMOCOCCAL VACCINATION: ICD-10-CM

## 2019-04-30 DIAGNOSIS — L98.9 SKIN LESION: Primary | ICD-10-CM

## 2019-04-30 DIAGNOSIS — E78.5 HYPERLIPIDEMIA, UNSPECIFIED HYPERLIPIDEMIA TYPE: ICD-10-CM

## 2019-04-30 PROCEDURE — G8510 SCR DEP NEG, NO PLAN REQD: HCPCS | Performed by: FAMILY MEDICINE

## 2019-04-30 PROCEDURE — G0009 ADMIN PNEUMOCOCCAL VACCINE: HCPCS | Performed by: FAMILY MEDICINE

## 2019-04-30 PROCEDURE — 99213 OFFICE O/P EST LOW 20 MIN: CPT | Performed by: FAMILY MEDICINE

## 2019-04-30 PROCEDURE — 90670 PCV13 VACCINE IM: CPT | Performed by: FAMILY MEDICINE

## 2019-04-30 PROCEDURE — 3288F FALL RISK ASSESSMENT DOCD: CPT | Performed by: FAMILY MEDICINE

## 2019-04-30 RX ORDER — TAMSULOSIN HYDROCHLORIDE 0.4 MG/1
CAPSULE ORAL
Qty: 90 CAPSULE | Refills: 0 | Status: SHIPPED | OUTPATIENT
Start: 2019-04-30 | End: 2019-05-03 | Stop reason: SDUPTHER

## 2019-04-30 ASSESSMENT — PATIENT HEALTH QUESTIONNAIRE - PHQ9
SUM OF ALL RESPONSES TO PHQ9 QUESTIONS 1 & 2: 0
2. FEELING DOWN, DEPRESSED OR HOPELESS: 0
1. LITTLE INTEREST OR PLEASURE IN DOING THINGS: 0
SUM OF ALL RESPONSES TO PHQ QUESTIONS 1-9: 0
SUM OF ALL RESPONSES TO PHQ QUESTIONS 1-9: 0

## 2019-04-30 ASSESSMENT — ENCOUNTER SYMPTOMS
SHORTNESS OF BREATH: 0
ABDOMINAL PAIN: 0
CHEST TIGHTNESS: 0

## 2019-04-30 NOTE — PROGRESS NOTES
Subjective:      Patient ID: Scooter Abraham is a 80 y.o. male. Visit Information    Have you changed or started any medications since your last visit including any over-the-counter medicines, vitamins, or herbal medicines? no   Are you having any side effects from any of your medications? -  no  Have you stopped taking any of your medications? Is so, why? -  no    Have you seen any other physician or provider since your last visit? No  Have you had any other diagnostic tests since your last visit? No  Have you been seen in the emergency room and/or had an admission to a hospital since we last saw you? No  Have you had your routine dental cleaning in the past 6 months? no    Have you activated your [employer] account? If not, what are your barriers? Yes     Patient Care Team:  Skylar Juan MD as PCP - General (Family Medicine)  Skylar Juan MD as PCP - S Attributed Provider  Dex Real MD as Consulting Physician (Gastroenterology)  Smiley Montgomery MD as Consulting Physician (Infectious Diseases)  Dex Real RN as Care Coordinator    Medical History Review  Past Medical, Family, and Social History reviewed and does contribute to the patient presenting condition    Health Maintenance   Topic Date Due    Shingles Vaccine (1 of 2) 02/13/1978    Pneumococcal 65+ years Vaccine (2 of 2 - PCV13) 05/09/2018    TSH testing  09/18/2018    Flu vaccine (Season Ended) 09/01/2019    Potassium monitoring  12/28/2019    Creatinine monitoring  12/28/2019    DTaP/Tdap/Td vaccine (2 - Td) 01/28/2028     HPI  Patient is a 51-year-old white male brought to the office by his daughter for hyperlipidemia and for skin lesion on his forehead which has been present there for over 6 months. He is taking and tolerating his routine medications. He denies any chest pain, shortness of breath, fever or chills. He is also due for pneumonia vaccine. Review of Systems   Constitutional: Negative for chills and fever. Respiratory: Negative for chest tightness and shortness of breath. Cardiovascular: Negative for chest pain. Gastrointestinal: Negative for abdominal pain. Skin: Negative for rash. Neurological: Negative for dizziness. Objective:   Physical Exam   Constitutional: He is oriented to person, place, and time. He appears well-developed and well-nourished. No distress. HENT:   Head: Normocephalic and atraumatic. Right Ear: Tympanic membrane, external ear and ear canal normal.   Left Ear: Tympanic membrane, external ear and ear canal normal.   Nose: Nose normal.   Mouth/Throat: Oropharynx is clear and moist.   Eyes: Conjunctivae are normal. Right eye exhibits no discharge. Left eye exhibits no discharge. No scleral icterus. Neck: Neck supple. Cardiovascular: Normal rate, regular rhythm and normal heart sounds. Pulmonary/Chest: Effort normal and breath sounds normal. No respiratory distress. He has no wheezes. Abdominal: Soft. He exhibits no distension. There is no tenderness. Musculoskeletal: He exhibits no edema. Neurological: He is alert and oriented to person, place, and time. Skin: Skin is warm and dry. Lesion (brownish crusty skin lesion on patient's forehead) noted. No rash noted. Psychiatric: He has a normal mood and affect. His behavior is normal.   Nursing note and vitals reviewed. Assessment:       Diagnosis Orders   1. Skin lesion     2. Hyperlipidemia, unspecified hyperlipidemia type     3. At high risk for falls     4. Need for pneumococcal vaccination  Pneumococcal conjugate vaccine 13-valent               Plan:          Ashersilvia Heidy received counseling on the following healthy behaviors: nutrition and medication adherence  Reviewed prior labs and health maintenance  Continue current medications, diet and exercise. Discussed use, benefit, and side effects of prescribed medications. Barriers to medication compliance addressed.   Patient given educational materials - see patient instructions  Was a self-tracking handout given in paper form or via [employer]? No:     Requested Prescriptions      No prescriptions requested or ordered in this encounter       All patient questions answered. Patient voiced understanding. Quality Measures    Body mass index is 26.13 kg/m². Elevated. Weight control planned discussed Healthy diet and regular exercise. BP: 100/60. Blood pressure is normal. Treatment plan consists of No treatment change needed. Fall Risk 4/30/2019 8/31/2018 5/9/2017 2/7/2017 5/22/2015 9/19/2014   2 or more falls in past year? no no no no no no   Fall with injury in past year? no yes no no no no     The patient does not have a history of falls. I did , complete a risk assessment for falls.  A plan of care for falls home safety tips provided    Lab Results   Component Value Date    LDLCHOLESTEROL 58 01/05/2019    (goal LDL reduction with dx if diabetes is 50% LDL reduction)    PHQ Scores 4/30/2019 8/31/2018 5/9/2017 2/7/2017 10/4/2016 5/22/2015 9/19/2014   PHQ2 Score 0 0 0 0 0 0 0   PHQ9 Score 0 0 0 0 0 0 0     Interpretation of Total Score Depression Severity: 1-4 = Minimal depression, 5-9 = Mild depression, 10-14 = Moderate depression, 15-19 = Moderately severe depression, 20-27 = Severe depression     Orders Placed This Encounter   Procedures    Pneumococcal conjugate vaccine 13-valent     Patient referred to dermatology for skin lesion on forehead  Continue routine medication  Follow-up in 4-5 months

## 2019-05-01 NOTE — CARE COORDINATION
Ambulatory Care Coordination Note  5/1/2019  CM Risk Score: 10  Carson Mortality Risk Score: 88    ACC: Markus Graham RN    Summary Note:  Enrolled in care coordination   Current Issues:  · Elevated glucose level  · History of CHF    CC Plan:   1)  Daily weight and record  2.) Check glucose reading daily and prn. Record results  3.) Take medications as prescribed  4.) Contact CC with any issues  5.) Follow up one week  6.) Will initiate HF & DM Zone Management    Ambulatory Care Coordination Assessment    Care Coordination Protocol  Program Enrollment:  Complex Care  Referral from Primary Care Provider:  No  Week 1 - Initial Assessment     Do you have all of your prescriptions and are they filled?:  Yes  Barriers to medication adherence:  None  Are you able to afford your medications?:  Yes  How often do you have trouble taking your medications the way you have been told to take them?:  I always take them as prescribed. Do you have Home O2 Therapy?:  No      Ability to seek help/take action for Emergent Urgent situations i.e. fire, crime, inclement weather or health crisis. :  Independent  Ability to ambulate to restroom:  Independent  Ability handle personal hygeine needs (bathing/dressing/grooming):   Independent  Ability to manage Medications:  Needs Assistance  Ability to prepare Food Preparation:  Dependent  Ability to maintain home (clean home, laundry):  Dependent  Ability to drive and/or has transportation:  Dependent  Ability to do shopping:  Dependent  Ability to manage finances:  Dependent  Is patient able to live independently?:  No     Current Housing:  Private Residence        Per the Fall Risk Screening, did the patient have 2 or more falls or 1 fall with injury in the past year?:  No        Are you experiencing loss of meaning?:  No  Are you experiencing loss of hope and peace?:  No     Thinking about your patient's physical health needs, are there any symptoms or problems (risk indicators) you are unsure about that require further investigation?:  No identified areas of uncertainly or problems already being investigated   Are the patients physical health problems impacting on their mental well-being?:  No identified areas of concern   Are there any problems with your patients lifestyle behaviors (alcohol, drugs, diet, exercise) that are impacting on physical or mental well-being?:  No identified areas of concern   Do you have any other concerns about your patients mental well-being? How would you rate their severity and impact on the patient?:  No identified areas of concern   How would you rate their home environment in terms of safety and stability (including domestic violence, insecure housing, neighbor harassment)?:  Consistently safe, supportive, stable, no identified problems   How do daily activities impact on the patient's well-being? (include current or anticipated unemployment, work, caregiving, access to transportation or other):  No identified problems or perceived positive benefits   How would you rate their social network (family, work, friends)?:  Good participation with social networks   How would you rate their financial resources (including ability to afford all required medical care)?:  Financially secure, resources adequate, no identified problems   How wells does the patient now understand their health and well-being (symptoms, signs or risk factors) and what they need to do to manage their health?:  Reasonable to good understanding and already engages in managing health or is willing to undertake better management   How well do you think your patient can engage in healthcare discussions?  (Barriers include language, deafness, aphasia, alcohol or drug problems, learning difficulties, concentration):  Clear and open communication, no identified barriers   Do other services need to be involved to help this patient?:  Other care/services not required at this time   Suggested Interventions and Community Resources   Zone Management Tools:  Completed                  Prior to Admission medications    Medication Sig Start Date End Date Taking? Authorizing Provider   tamsulosin (FLOMAX) 0.4 MG capsule TAKE 1 CAPSULE DAILY 4/30/19   Becky Lafleur MD   finasteride (PROSCAR) 5 MG tablet TAKE 1 TABLET DAILY 1/29/19   BRIAN Lowery CNP   furosemide (LASIX) 20 MG tablet Take 1 tablet by mouth daily 11/30/18   Cande Bishop MD   vitamin D (ERGOCALCIFEROL) 31765 units CAPS capsule Take 50,000 Units by mouth once a week    Historical Provider, MD   Multiple Vitamins-Minerals (THERAPEUTIC MULTIVITAMIN-MINERALS) tablet Take 1 tablet by mouth daily    Historical Provider, MD   calcium carbonate (TUMS) 500 MG chewable tablet Take 1 tablet by mouth 2 times daily as needed for Heartburn 3/7/18   Kalli Ventura MD   glimepiride (AMARYL) 2 MG tablet Take 2 mg by mouth every morning (before breakfast)    Historical Provider, MD   levothyroxine (LEVOXYL) 137 MCG tablet TAKE 1 TABLET DAILY 7/29/16   Historical Provider, MD   nitroGLYCERIN (NITROSTAT) 0.4 MG SL tablet Place 0.4 mg under the tongue. As directed    Historical Provider, MD   docusate sodium (COLACE) 100 MG capsule Take 100 mg by mouth nightly     Historical Provider, MD   atorvastatin (LIPITOR) 20 MG tablet   Take 20 mg by mouth nightly     Historical Provider, MD   aspirin 81 MG tablet Take 81 mg by mouth daily.       Historical Provider, MD       Future Appointments   Date Time Provider Olga Duffy   5/3/2019  9:20 AM Becky Lafleur MD [address] [place] [address]   5/3/2019 12:15 PM Pam Cardona DPM [address]   8/26/2019  1:30 PM Spike Hedrick, [address] [facility]

## 2019-05-03 ENCOUNTER — OFFICE VISIT (OUTPATIENT)
Dept: UROLOGY | Age: 84
End: 2019-05-03
Payer: MEDICARE

## 2019-05-03 ENCOUNTER — OFFICE VISIT (OUTPATIENT)
Dept: PODIATRY | Age: 84
End: 2019-05-03
Payer: MEDICARE

## 2019-05-03 VITALS
HEART RATE: 63 BPM | TEMPERATURE: 97.4 F | DIASTOLIC BLOOD PRESSURE: 53 MMHG | WEIGHT: 153.4 LBS | HEIGHT: 64 IN | BODY MASS INDEX: 26.19 KG/M2 | SYSTOLIC BLOOD PRESSURE: 116 MMHG

## 2019-05-03 VITALS — WEIGHT: 140 LBS | BODY MASS INDEX: 23.9 KG/M2 | HEIGHT: 64 IN

## 2019-05-03 DIAGNOSIS — M79.674 PAIN IN TOES OF BOTH FEET: ICD-10-CM

## 2019-05-03 DIAGNOSIS — N13.8 BPH WITH OBSTRUCTION/LOWER URINARY TRACT SYMPTOMS: ICD-10-CM

## 2019-05-03 DIAGNOSIS — B35.1 ONYCHOMYCOSIS OF TOENAIL: Primary | ICD-10-CM

## 2019-05-03 DIAGNOSIS — M79.675 PAIN IN TOES OF BOTH FEET: ICD-10-CM

## 2019-05-03 DIAGNOSIS — E11.51 TYPE 2 DIABETES MELLITUS WITH PERIPHERAL VASCULAR DISEASE (HCC): ICD-10-CM

## 2019-05-03 DIAGNOSIS — R35.0 FREQUENCY OF URINATION: ICD-10-CM

## 2019-05-03 DIAGNOSIS — N40.1 BPH WITH OBSTRUCTION/LOWER URINARY TRACT SYMPTOMS: ICD-10-CM

## 2019-05-03 PROCEDURE — 99214 OFFICE O/P EST MOD 30 MIN: CPT | Performed by: UROLOGY

## 2019-05-03 PROCEDURE — 99999 PR OFFICE/OUTPT VISIT,PROCEDURE ONLY: CPT | Performed by: PODIATRIST

## 2019-05-03 PROCEDURE — 11721 DEBRIDE NAIL 6 OR MORE: CPT | Performed by: PODIATRIST

## 2019-05-03 RX ORDER — TAMSULOSIN HYDROCHLORIDE 0.4 MG/1
0.4 CAPSULE ORAL DAILY
Qty: 90 CAPSULE | Refills: 3 | Status: SHIPPED | OUTPATIENT
Start: 2019-05-03 | End: 2020-01-01 | Stop reason: SDUPTHER

## 2019-05-03 ASSESSMENT — ENCOUNTER SYMPTOMS
NAUSEA: 0
COLOR CHANGE: 0
COUGH: 0
EYE REDNESS: 0
EYE PAIN: 0
ABDOMINAL PAIN: 0
VOMITING: 0
SHORTNESS OF BREATH: 0
WHEEZING: 0
BACK PAIN: 0

## 2019-05-03 NOTE — PROGRESS NOTES
[facility]  [facility] [place]  [address]  [address]  [address] [facility] [address]  Dept: [address] [facility] Urology Office Note - Established    Patient:  Jose Payer  YOB: 1928  Date: 5/3/2019    The patient is a 80 y.o. male who presents todayfor evaluation of the following problems:   Chief Complaint   Patient presents with    Benign Prostatic Hypertrophy     yearly check with med refills       HPI  He is here in follow up for BPH. He had an episode of retention last year after surgery. Doing well on combo therapy. He feels like he's doing well. No recent hematuria or dysuria. He denies any SP pain. He thinks he's emptying well. He does not drink a lot of coffee or pop. He had an elevated PSA last year, but at 80, does not want to purse anything further. Summary of old records: N/A    Additional History: N/A    Procedures Today: N/A    Urinalysis today:  No results found for this visit on 05/03/19.   Last several PSA's:  Lab Results   Component Value Date    PSA 6.18 (H) 04/04/2018    PSA 3.35 01/13/2014     Last total testosterone:  No results found for: TESTOSTERONE    AUA Symptom Score (5/3/2019):                               Last BUN and creatinine:  Lab Results   Component Value Date    BUN 19 12/28/2018     Lab Results   Component Value Date    CREATININE 1.01 12/28/2018       Additional Lab/Culture results: none    Imaging Reviewed during this Office Visit: none  (results were independently reviewed by physician and radiology report verified)    PAST MEDICAL, FAMILY AND SOCIAL HISTORY UPDATE:  Past Medical History:   Diagnosis Date    Cancer ([address] 2013    thyroid    Carotid artery stenosis     Cataract     Cerebrovascular disease     [facility]     Heart attack ([facility]     Hypertension     Hypothyroidism     Type II or unspecified type diabetes mellitus without mention of complication, not stated as uncontrolled Past Surgical History:   Procedure Laterality Date    APPENDECTOMY      CARDIAC SURGERY      CHOLECYSTECTOMY      CORONARY ARTERY BYPASS GRAFT      ENDOSCOPIC ULTRASOUND (LOWER)      PACEMAKER INSERTION  03/2015    CT LAP,CHOLECYSTECTOMY N/A 3/2/2018    CHOLECYSTECTOMY LAPAROSCOPIC performed by Elisabeth [facility] at [facility]      TOTAL THYROIDECTOMY       Family History   Problem Relation Age of Onset    Cancer Mother         stomach    Other Father         pneumonia     Outpatient Medications Marked as Taking for the 5/3/19 encounter (Office Visit) with Juanna Merlin, MD   Medication Sig Dispense Refill    tamsulosin (FLOMAX) 0.4 MG capsule Take 1 capsule by mouth daily 90 capsule 3    finasteride (PROSCAR) 5 MG tablet TAKE 1 TABLET DAILY 90 tablet 3    furosemide (LASIX) 20 MG tablet Take 1 tablet by mouth daily 30 tablet 1    vitamin D (ERGOCALCIFEROL) 67427 units CAPS capsule Take 50,000 Units by mouth once a week      Multiple Vitamins-Minerals (THERAPEUTIC MULTIVITAMIN-MINERALS) tablet Take 1 tablet by mouth daily      calcium carbonate (TUMS) 500 MG chewable tablet Take 1 tablet by mouth 2 times daily as needed for Heartburn 30 tablet 0    glimepiride (AMARYL) 2 MG tablet Take 2 mg by mouth every morning (before breakfast)      levothyroxine (LEVOXYL) 137 MCG tablet TAKE 1 TABLET DAILY      nitroGLYCERIN (NITROSTAT) 0.4 MG SL tablet Place 0.4 mg under the tongue. As directed      docusate sodium (COLACE) 100 MG capsule Take 100 mg by mouth nightly       atorvastatin (LIPITOR) 20 MG tablet   Take 20 mg by mouth nightly       aspirin 81 MG tablet Take 81 mg by mouth daily.            Seasonal and Keflex [cephalexin]  Social History     Tobacco Use   Smoking Status Never Smoker   Smokeless Tobacco Never Used     (Ifpatient a smoker, smoking cessation counseling offered)    Social History     Substance and Sexual Activity   Alcohol Use No       REVIEW OF SYSTEMS:  Review of Systems    Physical Exam:      Vitals:    05/03/19 0928   BP: (!) 116/53   Pulse: 63   Temp: 97.4 °F (36.3 °C)     Body mass index is 26.32 kg/m². Patient is a 80 y.o. male in no acute distress and alert and oriented to person, place and time. Physical Exam  Constitutional: Patient in no acute distress. Neuro: Alert and oriented to person, place and time. Psych: Mood normal, affect normal  Lungs: Respiratory effort is normal  Cardiovascular: Warm & Pink  Abdomen: Soft, non-tender, non-distended with no CVA,  No flank tenderness,  Or hepatosplenomegaly   Lymphatics: No palpablelymphadenopathy. Bladder non-tender and not distended. Musculoskeletal: Normal gait and station      Assessment and Plan      1. Frequency of urination    2. BPH with obstruction/lower urinary tract symptoms           Plan:          Doing well  Continue Flomax and Proscar. Would not check any more PSAs, given his age. Return in about 1 year (around 5/3/2020). Prescriptions Ordered:  Orders Placed This Encounter   Medications    tamsulosin (FLOMAX) 0.4 MG capsule     Sig: Take 1 capsule by mouth daily     Dispense:  90 capsule     Refill:  3     Orders Placed:  No orders of the defined types were placed in this encounter. Smith Simpson MD    Agree with the ROS entered by the MA.

## 2019-05-07 ENCOUNTER — CARE COORDINATION (OUTPATIENT)
Dept: CARE COORDINATION | Age: 84
End: 2019-05-07

## 2019-05-07 NOTE — CARE COORDINATION
Ambulatory Care Coordination Note  5/7/2019  CM Risk Score: 10  Carson Mortality Risk Score: 88    ACC: La Benson RN    Summary Note:   · Productive cough, clear phlegm  · Mild edema, fatigue    CC Plan:   1)  Daily weight and record  2.) Check glucose reading daily and prn. Record results  3.) Take medications as prescribed  4.) Contact CC with any issues  5.) Follow up two week  6.)  HF & DM Zone Management         YELLOW zone for both    Congestive Heart Failure Assessment    Are you currently restricting fluids?:  No Restriction  Do you understand a low sodium diet?:  Yes  Do you understand how to read food labels?:  Yes  How many restaurant meals do you eat per week?:  0  Do you salt your food before tasting it?:  No         Symptoms:   CHF associated fatigue: Pos, CHF associated leg swelling: Pos      Symptom course:  stable  Patient-reported weight (lb):  140  Weight trend:  fluctuating minimally       Diabetes Assessment    Medic Alert ID:  No  Meal Planning:  Avoidance of concentrated sweets   How often do you test your blood sugar?:  Other (Comment: Morning Fasting)   Do you have barriers with adherence to non-pharmacologic self-management interventions? (Nutrition/Exercise/Self-Monitoring):  No   Have you ever had to go to the ED for symptoms of low blood sugar?:  No       Do you have hyperglycemia symptoms?:  No   Do you have hypoglycemia symptoms?:  No   Last Blood Sugar Value:  170   Blood Sugar Monitoring Regimen:  Morning Fasting   Blood Sugar Trends:  Fluctuating            Care Coordination Interventions    Program Enrollment:  Complex Care  Referral from Primary Care Provider:  No  Suggested Interventions and Community Resources  Zone Management Tools: In Process (Comment: DM, CHF)         Goals Addressed                 This Visit's Progress     Conditions and Symptoms   On track     I will schedule office visits, as directed by my provider.   I will keep my appointment or reschedule if I have to cancel. I will notify my provider of any barriers to my plan of care. I will follow my Zone Management tool to seek urgent or emergent care. I will notify my provider of any symptoms that indicate a worsening of my condition. Barriers: age, language  Plan for overcoming my barriers: Care Coordination  Confidence: 7/10  Anticipated Goal Completion Date: 01 August 2019              Prior to Admission medications    Medication Sig Start Date End Date Taking? Authorizing Provider   tamsulosin (FLOMAX) 0.4 MG capsule Take 1 capsule by mouth daily 5/3/19  Yes Yaniv Griffin MD   finasteride (PROSCAR) 5 MG tablet TAKE 1 TABLET DAILY 1/29/19  Yes Re Quijano APRN - CNP   furosemide (LASIX) 20 MG tablet Take 1 tablet by mouth daily 11/30/18  Yes Charissa Talamantes MD   vitamin D (ERGOCALCIFEROL) 70088 units CAPS capsule Take 50,000 Units by mouth once a week   Yes Historical Provider, MD   Multiple Vitamins-Minerals (THERAPEUTIC MULTIVITAMIN-MINERALS) tablet Take 1 tablet by mouth daily   Yes Historical Provider, MD   calcium carbonate (TUMS) 500 MG chewable tablet Take 1 tablet by mouth 2 times daily as needed for Heartburn 3/7/18  Yes Golden Ortega MD   glimepiride (AMARYL) 2 MG tablet Take 2 mg by mouth every morning (before breakfast)   Yes Historical Provider, MD   levothyroxine (LEVOXYL) 137 MCG tablet TAKE 1 TABLET DAILY 7/29/16  Yes Historical Provider, MD   nitroGLYCERIN (NITROSTAT) 0.4 MG SL tablet Place 0.4 mg under the tongue. As directed   Yes Historical Provider, MD   docusate sodium (COLACE) 100 MG capsule Take 100 mg by mouth nightly    Yes Historical Provider, MD   atorvastatin (LIPITOR) 20 MG tablet   Take 20 mg by mouth nightly    Yes Historical Provider, MD   aspirin 81 MG tablet Take 81 mg by mouth daily.      Yes Historical Provider, MD       Future Appointments   Date Time Provider Olga Duffy   7/12/2019 11:00 AM Gifford Closs, [address]   8/26/2019  1:30 PM Myrna Spatz, [address] [facility]

## 2019-05-09 ASSESSMENT — ENCOUNTER SYMPTOMS
DIARRHEA: 0
COLOR CHANGE: 0
EYE REDNESS: 0
NAUSEA: 0
SHORTNESS OF BREATH: 0
BACK PAIN: 0

## 2019-05-09 NOTE — PROGRESS NOTES
SUBJECTIVE: Jose Benitez is a 80 y.o. male who returns to the office with chief complaint of painful fungal toenails. Patient relates toe nails are thickened/difficult to trim as well as painful with ambulation and with shoe gear. Chief Complaint   Patient presents with    Nail Problem     B/L NAIL TRIM    Other     PCP: LAST VISIT 04/30/2019 DR JULIA RUSS    Diabetes     B/L DIABETIC FOOT CHECK/BLOODSUGAR: 175     Review of Systems   Constitutional: Negative for activity change, appetite change, chills, diaphoresis, fatigue and fever. Eyes: Negative for redness. Respiratory: Negative for shortness of breath. Cardiovascular: Negative for leg swelling. Gastrointestinal: Negative for diarrhea and nausea. Endocrine: Negative for cold intolerance, heat intolerance and polyuria. Musculoskeletal: Positive for arthralgias. Negative for back pain, gait problem, joint swelling and myalgias. Skin: Negative for color change, pallor, rash and wound. Allergic/Immunologic: Negative for environmental allergies and food allergies. Neurological: Negative for dizziness, weakness, light-headedness and numbness. Hematological: Does not bruise/bleed easily. Psychiatric/Behavioral: Negative for behavioral problems, confusion and self-injury. The patient is not nervous/anxious. OBJECTIVE: Clinical evaluation of patient reveals nails 1,2,3,4,5 of the right foot and nails 1,2,3,4,5, of the left foot to present with thickness, elongation, discoloration, brittleness, and subungual debris. There was pain with palpation and debridement of the toenails of the bilateral feet. No open lesions noted to either foot today. The right DP pulse is not palpable. The left DP pulse is not palpable. The right PT pulse is not palpable. The left PT pulse is not palpable. Protective sensation is present to the right plantar foot as noted with a 5.07 [place]-Javi monofilament.    Protective sensation is present to the left plantar foot as noted with a 5.07 [place]-Javi monofilament. Glucose: 175 mg/dl. ASSESSMENT:    Diagnosis Orders   1. Onychomycosis of toenail  GA DEBRIDEMENT OF NAILS, 6 OR MORE    HM DIABETES FOOT EXAM   2. Pain in toes of both feet  GA DEBRIDEMENT OF NAILS, 6 OR MORE    HM DIABETES FOOT EXAM   3. Type 2 diabetes mellitus with peripheral vascular disease (HCC)  GA DEBRIDEMENT OF NAILS, 6 OR MORE    HM DIABETES FOOT EXAM     PLAN: Toenails 1,2,3,4,5 of the right foot and 1,2,3,4,5 of the left foot were debrided in length and thickness using a nail nipper and a . Return in about 2 months (around 7/5/2019) for At risk diabetic foot care.    5/3/2019      Kathie Alvarado DPM

## 2019-08-22 ENCOUNTER — OFFICE VISIT (OUTPATIENT)
Dept: FAMILY MEDICINE CLINIC | Age: 84
End: 2019-08-22
Payer: MEDICARE

## 2019-08-22 ENCOUNTER — HOSPITAL ENCOUNTER (OUTPATIENT)
Age: 84
Setting detail: SPECIMEN
Discharge: HOME | End: 2019-08-22
Payer: MEDICARE

## 2019-08-22 VITALS
WEIGHT: 149 LBS | TEMPERATURE: 97.1 F | SYSTOLIC BLOOD PRESSURE: 112 MMHG | DIASTOLIC BLOOD PRESSURE: 58 MMHG | BODY MASS INDEX: 25.58 KG/M2 | HEART RATE: 68 BPM

## 2019-08-22 DIAGNOSIS — R06.09 EXERTIONAL DYSPNEA: ICD-10-CM

## 2019-08-22 DIAGNOSIS — E55.9 VITAMIN D DEFICIENCY: ICD-10-CM

## 2019-08-22 DIAGNOSIS — E11.9 TYPE 2 DIABETES MELLITUS WITHOUT COMPLICATION, WITHOUT LONG-TERM CURRENT USE OF INSULIN (HCC): Primary | ICD-10-CM

## 2019-08-22 DIAGNOSIS — I10 ESSENTIAL HYPERTENSION: ICD-10-CM

## 2019-08-22 DIAGNOSIS — E78.5 HYPERLIPIDEMIA, UNSPECIFIED HYPERLIPIDEMIA TYPE: ICD-10-CM

## 2019-08-22 LAB — HBA1C MFR BLD: 8 %

## 2019-08-22 PROCEDURE — 99214 OFFICE O/P EST MOD 30 MIN: CPT | Performed by: NURSE PRACTITIONER

## 2019-08-22 PROCEDURE — 83036 HEMOGLOBIN GLYCOSYLATED A1C: CPT | Performed by: NURSE PRACTITIONER

## 2019-08-22 ASSESSMENT — ENCOUNTER SYMPTOMS
SHORTNESS OF BREATH: 1
ABDOMINAL PAIN: 0
NAUSEA: 0
COUGH: 0

## 2019-08-22 NOTE — PROGRESS NOTES
Subjective:      Patient ID: Dianelys Hobbs is a 80 y.o. male. Chronic Disease Visit Information    BP Readings from Last 3 Encounters:   08/22/19 (!) 112/58   05/03/19 (!) 116/53   04/30/19 100/60          Hemoglobin A1C (%)   Date Value   08/22/2019 8.0   01/05/2019 8.0 (H)   09/19/2017 6.8 (H)     Microalb/Crt. Ratio (mcg/mg creat)   Date Value   09/25/2017 22 (H)     LDL Cholesterol (mg/dL)   Date Value   01/05/2019 58     HDL (mg/dL)   Date Value   01/05/2019 26 (L)     BUN (mg/dL)   Date Value   12/28/2018 19     CREATININE (mg/dL)   Date Value   12/28/2018 1.01     Glucose (mg/dL)   Date Value   12/28/2018 137 (H)   04/16/2012 143 (H)            Have you changed or started any medications since your last visit including any over-the-counter medicines, vitamins, or herbal medicines? no   Are you having any side effects from any of your medications? -  no  Have you stopped taking any of your medications? Is so, why? -  no    Have you seen any other physician or provider since your last visit? Yes - Records Obtained  Have you had any other diagnostic tests since your last visit? No  Have you been seen in the emergency room and/or had an admission to a hospital since we last saw you? No  Have you had your annual diabetic retinal (eye) exam? Yes - Records Requested  Have you had your routine dental cleaning in the past 6 months? no    Have you activated your [employer] account? If not, what are your barriers?  Yes     Patient Care Team:  Freedom Ospina MD as PCP - General (Family Medicine)  Freedom Ospina MD as PCP - [facility]  Patrice Schafer MD as Consulting Physician (Gastroenterology)  Bj Asif MD as Consulting Physician (Infectious Diseases)  Patrice Schafer RN as Care Coordinator         Medical History Review  Past Medical, Family, and Social History reviewed and does contribute to the patient presenting condition    Health Maintenance   Topic Date Due    Shingles Vaccine (1 of

## 2019-08-23 ENCOUNTER — HOSPITAL ENCOUNTER (OUTPATIENT)
Dept: GENERAL RADIOLOGY | Age: 84
Discharge: HOME | End: 2019-08-25
Payer: MEDICARE

## 2019-08-23 ENCOUNTER — HOSPITAL ENCOUNTER (OUTPATIENT)
Age: 84
Discharge: HOME | End: 2019-08-25
Payer: MEDICARE

## 2019-08-23 ENCOUNTER — HOSPITAL ENCOUNTER (OUTPATIENT)
Age: 84
Discharge: HOME | End: 2019-08-23
Payer: MEDICARE

## 2019-08-23 DIAGNOSIS — R06.02 SOB (SHORTNESS OF BREATH): ICD-10-CM

## 2019-08-23 DIAGNOSIS — R91.8 PULMONARY NODULES: ICD-10-CM

## 2019-08-23 DIAGNOSIS — E11.9 TYPE 2 DIABETES MELLITUS WITHOUT COMPLICATION, WITHOUT LONG-TERM CURRENT USE OF INSULIN (HCC): ICD-10-CM

## 2019-08-23 DIAGNOSIS — R06.09 EXERTIONAL DYSPNEA: ICD-10-CM

## 2019-08-23 DIAGNOSIS — E78.5 HYPERLIPIDEMIA, UNSPECIFIED HYPERLIPIDEMIA TYPE: ICD-10-CM

## 2019-08-23 DIAGNOSIS — R06.09 EXERTIONAL DYSPNEA: Primary | ICD-10-CM

## 2019-08-23 LAB
ABSOLUTE EOS #: 0.4 K/UL (ref 0–0.4)
ABSOLUTE IMMATURE GRANULOCYTE: ABNORMAL K/UL (ref 0–0.3)
ABSOLUTE LYMPH #: 2 K/UL (ref 1–4.8)
ABSOLUTE MONO #: 0.9 K/UL (ref 0.1–1.3)
ALT SERPL-CCNC: 19 U/L (ref 5–41)
ANION GAP SERPL CALCULATED.3IONS-SCNC: 8 MMOL/L (ref 9–17)
BASOPHILS # BLD: 1 % (ref 0–2)
BASOPHILS ABSOLUTE: 0 K/UL (ref 0–0.2)
BUN BLDV-MCNC: 18 MG/DL (ref 8–23)
BUN/CREAT BLD: ABNORMAL (ref 9–20)
CALCIUM SERPL-MCNC: 9.1 MG/DL (ref 8.6–10.4)
CHLORIDE BLD-SCNC: 104 MMOL/L (ref 98–107)
CHOLESTEROL/HDL RATIO: 4.2
CHOLESTEROL: 123 MG/DL
CO2: 27 MMOL/L (ref 20–31)
CREAT SERPL-MCNC: 1.12 MG/DL (ref 0.7–1.2)
DIFFERENTIAL TYPE: ABNORMAL
EOSINOPHILS RELATIVE PERCENT: 5 % (ref 0–4)
GFR AFRICAN AMERICAN: >60 ML/MIN
GFR NON-AFRICAN AMERICAN: >60 ML/MIN
GFR SERPL CREATININE-BSD FRML MDRD: ABNORMAL ML/MIN/{1.73_M2}
GFR SERPL CREATININE-BSD FRML MDRD: ABNORMAL ML/MIN/{1.73_M2}
GLUCOSE BLD-MCNC: 176 MG/DL (ref 70–99)
HCT VFR BLD CALC: 44 % (ref 41–53)
HDLC SERPL-MCNC: 29 MG/DL
HEMOGLOBIN: 14.9 G/DL (ref 13.5–17.5)
IMMATURE GRANULOCYTES: ABNORMAL %
LDL CHOLESTEROL: 53 MG/DL (ref 0–130)
LYMPHOCYTES # BLD: 24 % (ref 24–44)
MAGNESIUM: 2.2 MG/DL (ref 1.6–2.6)
MCH RBC QN AUTO: 28.8 PG (ref 26–34)
MCHC RBC AUTO-ENTMCNC: 33.9 G/DL (ref 31–37)
MCV RBC AUTO: 85 FL (ref 80–100)
MONOCYTES # BLD: 11 % (ref 1–7)
NRBC AUTOMATED: ABNORMAL PER 100 WBC
PDW BLD-RTO: 15.1 % (ref 11.5–14.9)
PLATELET # BLD: 209 K/UL (ref 150–450)
PLATELET ESTIMATE: ABNORMAL
PMV BLD AUTO: 7.1 FL (ref 6–12)
POTASSIUM SERPL-SCNC: 4.5 MMOL/L (ref 3.7–5.3)
RBC # BLD: 5.18 M/UL (ref 4.5–5.9)
RBC # BLD: ABNORMAL 10*6/UL
SEG NEUTROPHILS: 59 % (ref 36–66)
SEGMENTED NEUTROPHILS ABSOLUTE COUNT: 5 K/UL (ref 1.3–9.1)
SODIUM BLD-SCNC: 139 MMOL/L (ref 135–144)
TRIGL SERPL-MCNC: 207 MG/DL
VLDLC SERPL CALC-MCNC: ABNORMAL MG/DL (ref 1–30)
WBC # BLD: 8.4 K/UL (ref 3.5–11)
WBC # BLD: ABNORMAL 10*3/UL

## 2019-08-23 PROCEDURE — 93005 ELECTROCARDIOGRAM TRACING: CPT

## 2019-08-23 PROCEDURE — 83735 ASSAY OF MAGNESIUM: CPT

## 2019-08-23 PROCEDURE — 71046 X-RAY EXAM CHEST 2 VIEWS: CPT

## 2019-08-23 PROCEDURE — 80048 BASIC METABOLIC PNL TOTAL CA: CPT

## 2019-08-23 PROCEDURE — 85025 COMPLETE CBC W/AUTO DIFF WBC: CPT

## 2019-08-23 PROCEDURE — 36415 COLL VENOUS BLD VENIPUNCTURE: CPT

## 2019-08-23 PROCEDURE — 80061 LIPID PANEL: CPT

## 2019-08-23 PROCEDURE — 84460 ALANINE AMINO (ALT) (SGPT): CPT

## 2019-08-26 LAB
EKG ATRIAL RATE: 63 BPM
EKG P AXIS: -27 DEGREES
EKG P-R INTERVAL: 232 MS
EKG Q-T INTERVAL: 504 MS
EKG QRS DURATION: 200 MS
EKG QTC CALCULATION (BAZETT): 515 MS
EKG R AXIS: -58 DEGREES
EKG T AXIS: -6 DEGREES
EKG VENTRICULAR RATE: 63 BPM

## 2019-08-26 PROCEDURE — 93010 ELECTROCARDIOGRAM REPORT: CPT | Performed by: INTERNAL MEDICINE

## 2019-08-30 PROBLEM — I50.22 CHRONIC SYSTOLIC CHF (CONGESTIVE HEART FAILURE), NYHA CLASS 2 (HCC): Status: ACTIVE | Noted: 2018-06-21

## 2019-08-30 PROBLEM — I25.5 ISCHEMIC CARDIOMYOPATHY: Status: ACTIVE | Noted: 2019-08-30

## 2019-08-30 PROBLEM — I49.5 SICK SINUS SYNDROME (HCC): Status: ACTIVE | Noted: 2019-08-30

## 2019-08-30 PROBLEM — R91.1 PULMONARY NODULE: Status: ACTIVE | Noted: 2018-06-21

## 2019-08-30 PROBLEM — Z95.1 S/P CABG X 3: Status: ACTIVE | Noted: 2019-08-30

## 2019-08-30 PROBLEM — R94.31 ABNORMAL ECG: Status: ACTIVE | Noted: 2019-08-30

## 2019-08-30 PROBLEM — R06.02 SOB (SHORTNESS OF BREATH): Status: ACTIVE | Noted: 2019-08-30

## 2019-08-30 PROBLEM — I44.2 ATRIOVENTRICULAR BLOCK, COMPLETE (HCC): Status: ACTIVE | Noted: 2019-08-30

## 2019-09-04 ENCOUNTER — OFFICE VISIT (OUTPATIENT)
Dept: PULMONOLOGY | Age: 84
End: 2019-09-04
Payer: MEDICARE

## 2019-09-04 VITALS
RESPIRATION RATE: 14 BRPM | HEIGHT: 64 IN | SYSTOLIC BLOOD PRESSURE: 125 MMHG | DIASTOLIC BLOOD PRESSURE: 70 MMHG | WEIGHT: 149 LBS | OXYGEN SATURATION: 95 % | BODY MASS INDEX: 25.44 KG/M2 | HEART RATE: 60 BPM

## 2019-09-04 DIAGNOSIS — Z85.850 H/O MALIGNANT NEOPLASM OF THYROID: ICD-10-CM

## 2019-09-04 DIAGNOSIS — R06.02 SHORTNESS OF BREATH: Primary | ICD-10-CM

## 2019-09-04 DIAGNOSIS — R91.8 MULTIPLE NODULES OF LUNG: ICD-10-CM

## 2019-09-04 DIAGNOSIS — I50.22 CHRONIC SYSTOLIC CHF (CONGESTIVE HEART FAILURE), NYHA CLASS 2 (HCC): ICD-10-CM

## 2019-09-04 PROCEDURE — 94010 BREATHING CAPACITY TEST: CPT | Performed by: INTERNAL MEDICINE

## 2019-09-04 PROCEDURE — 99204 OFFICE O/P NEW MOD 45 MIN: CPT | Performed by: INTERNAL MEDICINE

## 2019-09-04 PROCEDURE — 94729 DIFFUSING CAPACITY: CPT | Performed by: INTERNAL MEDICINE

## 2019-09-04 PROCEDURE — 94726 PLETHYSMOGRAPHY LUNG VOLUMES: CPT | Performed by: INTERNAL MEDICINE

## 2019-09-04 NOTE — PROGRESS NOTES
Good patient effort and understanding. No post testing was needed. Transcutaneous Hemoblobin is 13.2 .

## 2019-09-06 NOTE — PROGRESS NOTES
PULMONARY OP CONSULTATION        REFERRED BY: Gregg Farfan MD    REASON FOR CONSULTATION: Abnormal chest x-ray    HISTORY OF PRESENT ILLNESS:    Sarina José is a 80y.o. year old male here for evaluation of abnormal chest x-ray    Patient was hospitalized for congestive heart failure and treated with improvement in symptomatology  Chest x-ray done at that time showed bilateral pulmonary nodules which prompted this consultation  Patient has history of thyroid cancer and was apparently being followed at [facility] and was informed about the lung involvement by thyroid cancer and given his age it was felt that observation is the best approach  They informed him that apparently he will die in 20 years from the diagnosis and hence nothing was done other than observation for the lung nodules  Patient has a occasional cough with mucoid sputum production  Patient is hard of hearing and most history was helped by the patient's daughter  Has some bilateral pedal edema  No hemoptysis  No orthopnea or PND no  No chest pain or pressure      PAST MEDICAL HISTORY:       Diagnosis Date    Abnormal ECG 8/30/2019    Acquired hypothyroidism 9/19/2014    Acute cholecystitis 2/28/2018    Atherosclerosis of coronary artery bypass graft(s) without angina pectoris 10/4/2016    Atrioventricular block, complete ([address] 1/85/3211    Biliary colic     Block, bundle branch, left 3/30/2010    Calculus of bile duct without cholecystitis and without obstruction     Calculus of gallbladder 9/19/2017    Cancer ([address] 2013    thyroid    Carotid artery stenosis     Cataract     Cerebrovascular disease     Cervical disc herniation 11/13/2014    Cervical radiculopathy, chronic 11/14/2014    Cervical spondylosis 11/13/2014    CHF (congestive heart failure) (HCC)     Chronic systolic CHF (congestive heart failure), NYHA class 2 ([address] 6/21/2018    Coronary arteriosclerosis in native artery 3/30/2010    Overview: function tests were reviewed. Chest x-ray was reviewed. CT scan of the chest was reviewed. After reviewing the patient's smoking history and his age patient does not meet the criteria for lung cancer screening. We'll see the patient back as needed. Thank you for having us involved in the care of your patient. Please call us if you have any questions or concerns.               Verybethel Post MD  9/6/2019 1:02 PM

## 2019-09-14 ENCOUNTER — CARE COORDINATION (OUTPATIENT)
Dept: FAMILY MEDICINE CLINIC | Age: 84
End: 2019-09-14

## 2019-09-16 ENCOUNTER — CARE COORDINATION (OUTPATIENT)
Dept: FAMILY MEDICINE CLINIC | Age: 84
End: 2019-09-16

## 2019-09-23 ENCOUNTER — CARE COORDINATION (OUTPATIENT)
Dept: CARE COORDINATION | Age: 84
End: 2019-09-23

## 2019-09-23 NOTE — CARE COORDINATION
Third attempt at [facility] follow up call. Patient was not available. Unable to leave voicemail message. Mailbox has not been set up. [facility]. Will suspend contact at this time .

## 2019-10-07 ENCOUNTER — HOSPITAL ENCOUNTER (EMERGENCY)
Age: 84
Discharge: HOME | End: 2019-10-07
Attending: EMERGENCY MEDICINE
Payer: MEDICARE

## 2019-10-07 ENCOUNTER — APPOINTMENT (OUTPATIENT)
Dept: CT IMAGING | Age: 84
End: 2019-10-07
Payer: MEDICARE

## 2019-10-07 VITALS
SYSTOLIC BLOOD PRESSURE: 125 MMHG | BODY MASS INDEX: 25.75 KG/M2 | RESPIRATION RATE: 16 BRPM | WEIGHT: 150 LBS | DIASTOLIC BLOOD PRESSURE: 66 MMHG | TEMPERATURE: 97.6 F | OXYGEN SATURATION: 97 % | HEART RATE: 64 BPM

## 2019-10-07 DIAGNOSIS — M54.9 RIGHT-SIDED BACK PAIN, UNSPECIFIED BACK LOCATION, UNSPECIFIED CHRONICITY: Primary | ICD-10-CM

## 2019-10-07 LAB
-: NORMAL
AMORPHOUS: NORMAL
BACTERIA: NORMAL
BILIRUBIN URINE: NEGATIVE
CASTS UA: NORMAL /LPF (ref 0–8)
COLOR: YELLOW
COMMENT UA: ABNORMAL
CRYSTALS, UA: NORMAL /HPF
EPITHELIAL CELLS UA: NORMAL /HPF (ref 0–5)
GLUCOSE URINE: NEGATIVE
KETONES, URINE: NEGATIVE
LEUKOCYTE ESTERASE, URINE: ABNORMAL
MUCUS: NORMAL
NITRITE, URINE: NEGATIVE
OTHER OBSERVATIONS UA: NORMAL
PH UA: 5.5 (ref 5–8)
PROTEIN UA: NEGATIVE
RBC UA: NORMAL /HPF (ref 0–4)
RENAL EPITHELIAL, UA: NORMAL /HPF
SPECIFIC GRAVITY UA: 1.01 (ref 1–1.03)
TRICHOMONAS: NORMAL
TURBIDITY: CLEAR
URINE HGB: NEGATIVE
UROBILINOGEN, URINE: NORMAL
WBC UA: NORMAL /HPF (ref 0–5)
YEAST: NORMAL

## 2019-10-07 PROCEDURE — 6360000002 HC RX W HCPCS: Performed by: NURSE PRACTITIONER

## 2019-10-07 PROCEDURE — 99284 EMERGENCY DEPT VISIT MOD MDM: CPT

## 2019-10-07 PROCEDURE — 81001 URINALYSIS AUTO W/SCOPE: CPT

## 2019-10-07 PROCEDURE — 96372 THER/PROPH/DIAG INJ SC/IM: CPT

## 2019-10-07 PROCEDURE — 74176 CT ABD & PELVIS W/O CONTRAST: CPT

## 2019-10-07 PROCEDURE — 87086 URINE CULTURE/COLONY COUNT: CPT

## 2019-10-07 RX ORDER — OXYCODONE HYDROCHLORIDE 5 MG/1
5 TABLET ORAL EVERY 6 HOURS PRN
Qty: 10 TABLET | Refills: 0 | Status: SHIPPED | OUTPATIENT
Start: 2019-10-07 | End: 2019-10-10

## 2019-10-07 RX ORDER — KETOROLAC TROMETHAMINE 15 MG/ML
15 INJECTION, SOLUTION INTRAMUSCULAR; INTRAVENOUS ONCE
Status: COMPLETED | OUTPATIENT
Start: 2019-10-07 | End: 2019-10-07

## 2019-10-07 RX ORDER — KETOROLAC TROMETHAMINE 15 MG/ML
15 INJECTION, SOLUTION INTRAMUSCULAR; INTRAVENOUS ONCE
Status: DISCONTINUED | OUTPATIENT
Start: 2019-10-07 | End: 2019-10-07

## 2019-10-07 RX ADMIN — KETOROLAC TROMETHAMINE 15 MG: 15 INJECTION, SOLUTION INTRAMUSCULAR; INTRAVENOUS at 18:55

## 2019-10-07 ASSESSMENT — ENCOUNTER SYMPTOMS
TROUBLE SWALLOWING: 0
FACIAL SWELLING: 0
BACK PAIN: 1
EYE PAIN: 0
BLOOD IN STOOL: 0
SHORTNESS OF BREATH: 0
CHEST TIGHTNESS: 0
ABDOMINAL PAIN: 0
VOMITING: 0
RECTAL PAIN: 0
DIARRHEA: 0
CONSTIPATION: 0
VOICE CHANGE: 0
NAUSEA: 0

## 2019-10-07 ASSESSMENT — PAIN DESCRIPTION - FREQUENCY: FREQUENCY: CONTINUOUS

## 2019-10-07 ASSESSMENT — PAIN DESCRIPTION - ONSET: ONSET: ON-GOING

## 2019-10-07 ASSESSMENT — PAIN SCALES - GENERAL
PAINLEVEL_OUTOF10: 4
PAINLEVEL_OUTOF10: 4

## 2019-10-07 ASSESSMENT — PAIN DESCRIPTION - PAIN TYPE: TYPE: ACUTE PAIN

## 2019-10-07 ASSESSMENT — PAIN DESCRIPTION - LOCATION: LOCATION: BACK

## 2019-10-07 ASSESSMENT — PAIN DESCRIPTION - PROGRESSION: CLINICAL_PROGRESSION: NOT CHANGED

## 2019-10-08 ENCOUNTER — CARE COORDINATION (OUTPATIENT)
Dept: CARE COORDINATION | Age: 84
End: 2019-10-08

## 2019-10-08 LAB
CULTURE: NORMAL
Lab: NORMAL
SPECIMEN DESCRIPTION: NORMAL

## 2019-10-09 ENCOUNTER — OFFICE VISIT (OUTPATIENT)
Dept: ONCOLOGY | Age: 84
End: 2019-10-09
Payer: MEDICARE

## 2019-10-09 ENCOUNTER — HOSPITAL ENCOUNTER (OUTPATIENT)
Facility: MEDICAL CENTER | Age: 84
Discharge: HOME | End: 2019-10-09
Payer: MEDICARE

## 2019-10-09 ENCOUNTER — TELEPHONE (OUTPATIENT)
Dept: ONCOLOGY | Age: 84
End: 2019-10-09

## 2019-10-09 VITALS
SYSTOLIC BLOOD PRESSURE: 120 MMHG | DIASTOLIC BLOOD PRESSURE: 55 MMHG | TEMPERATURE: 97.8 F | WEIGHT: 155.2 LBS | HEIGHT: 63 IN | HEART RATE: 63 BPM | BODY MASS INDEX: 27.5 KG/M2

## 2019-10-09 DIAGNOSIS — C73 PAPILLARY THYROID CARCINOMA (HCC): Primary | ICD-10-CM

## 2019-10-09 DIAGNOSIS — C73 PAPILLARY THYROID CARCINOMA (HCC): ICD-10-CM

## 2019-10-09 LAB
ABSOLUTE EOS #: 0.35 K/UL (ref 0–0.44)
ABSOLUTE IMMATURE GRANULOCYTE: 0.02 K/UL (ref 0–0.3)
ABSOLUTE LYMPH #: 1.72 K/UL (ref 1.1–3.7)
ABSOLUTE MONO #: 0.63 K/UL (ref 0.1–1.2)
ALBUMIN SERPL-MCNC: 3.6 G/DL (ref 3.5–5.2)
ALBUMIN/GLOBULIN RATIO: ABNORMAL (ref 1–2.5)
ALP BLD-CCNC: 90 U/L (ref 40–129)
ALT SERPL-CCNC: 18 U/L (ref 5–41)
AMYLASE: 68 U/L (ref 28–100)
ANION GAP SERPL CALCULATED.3IONS-SCNC: 9 MMOL/L (ref 9–17)
AST SERPL-CCNC: 24 U/L
BASOPHILS # BLD: 1 % (ref 0–2)
BASOPHILS ABSOLUTE: 0.04 K/UL (ref 0–0.2)
BILIRUB SERPL-MCNC: 0.47 MG/DL (ref 0.3–1.2)
BUN BLDV-MCNC: 20 MG/DL (ref 8–23)
BUN/CREAT BLD: 20 (ref 9–20)
CALCIUM SERPL-MCNC: 8.3 MG/DL (ref 8.6–10.4)
CHLORIDE BLD-SCNC: 102 MMOL/L (ref 98–107)
CO2: 27 MMOL/L (ref 20–31)
CREAT SERPL-MCNC: 1 MG/DL (ref 0.7–1.2)
DIFFERENTIAL TYPE: ABNORMAL
EOSINOPHILS RELATIVE PERCENT: 5 % (ref 1–4)
GFR AFRICAN AMERICAN: >60 ML/MIN
GFR NON-AFRICAN AMERICAN: >60 ML/MIN
GFR SERPL CREATININE-BSD FRML MDRD: ABNORMAL ML/MIN/{1.73_M2}
GFR SERPL CREATININE-BSD FRML MDRD: ABNORMAL ML/MIN/{1.73_M2}
GLUCOSE BLD-MCNC: 251 MG/DL (ref 70–99)
HCT VFR BLD CALC: 43 % (ref 40.7–50.3)
HEMOGLOBIN: 13.8 G/DL (ref 13–17)
IMMATURE GRANULOCYTES: 0 %
LIPASE: 34 U/L (ref 13–60)
LYMPHOCYTES # BLD: 24 % (ref 24–43)
MCH RBC QN AUTO: 28.1 PG (ref 25.2–33.5)
MCHC RBC AUTO-ENTMCNC: 32.1 G/DL (ref 28.4–34.8)
MCV RBC AUTO: 87.6 FL (ref 82.6–102.9)
MONOCYTES # BLD: 9 % (ref 3–12)
NRBC AUTOMATED: 0 PER 100 WBC
PDW BLD-RTO: 13.9 % (ref 11.8–14.4)
PLATELET # BLD: 193 K/UL (ref 138–453)
PLATELET ESTIMATE: ABNORMAL
PMV BLD AUTO: 9.4 FL (ref 8.1–13.5)
POTASSIUM SERPL-SCNC: 4.2 MMOL/L (ref 3.7–5.3)
RBC # BLD: 4.91 M/UL (ref 4.21–5.77)
RBC # BLD: ABNORMAL 10*6/UL
SEG NEUTROPHILS: 61 % (ref 36–65)
SEGMENTED NEUTROPHILS ABSOLUTE COUNT: 4.31 K/UL (ref 1.5–8.1)
SODIUM BLD-SCNC: 138 MMOL/L (ref 135–144)
TOTAL PROTEIN: 8.3 G/DL (ref 6.4–8.3)
WBC # BLD: 7.1 K/UL (ref 3.5–11.3)
WBC # BLD: ABNORMAL 10*3/UL

## 2019-10-09 PROCEDURE — 80053 COMPREHEN METABOLIC PANEL: CPT

## 2019-10-09 PROCEDURE — 86800 THYROGLOBULIN ANTIBODY: CPT

## 2019-10-09 PROCEDURE — 83690 ASSAY OF LIPASE: CPT

## 2019-10-09 PROCEDURE — 99215 OFFICE O/P EST HI 40 MIN: CPT | Performed by: INTERNAL MEDICINE

## 2019-10-09 PROCEDURE — 36415 COLL VENOUS BLD VENIPUNCTURE: CPT

## 2019-10-09 PROCEDURE — 82150 ASSAY OF AMYLASE: CPT

## 2019-10-09 PROCEDURE — 84432 ASSAY OF THYROGLOBULIN: CPT

## 2019-10-09 PROCEDURE — 99212 OFFICE O/P EST SF 10 MIN: CPT | Performed by: INTERNAL MEDICINE

## 2019-10-09 PROCEDURE — 85025 COMPLETE CBC W/AUTO DIFF WBC: CPT

## 2019-10-10 LAB
THYROGLOBULIN AB: >3000 IU/ML (ref 0–40)
THYROGLOBULIN: <0.2 NG/ML (ref 0.3–49.7)

## 2019-10-11 ENCOUNTER — TELEPHONE (OUTPATIENT)
Dept: ONCOLOGY | Age: 84
End: 2019-10-11

## 2019-10-18 ENCOUNTER — HOSPITAL ENCOUNTER (OUTPATIENT)
Facility: MEDICAL CENTER | Age: 84
End: 2019-10-18
Payer: MEDICARE

## 2019-10-18 ENCOUNTER — HOSPITAL ENCOUNTER (OUTPATIENT)
Dept: NUCLEAR MEDICINE | Age: 84
Discharge: HOME | End: 2019-10-20
Payer: MEDICARE

## 2019-10-18 DIAGNOSIS — C73 PAPILLARY THYROID CARCINOMA (HCC): ICD-10-CM

## 2019-10-18 PROCEDURE — 3430000000 HC RX DIAGNOSTIC RADIOPHARMACEUTICAL: Performed by: INTERNAL MEDICINE

## 2019-10-18 PROCEDURE — A9552 F18 FDG: HCPCS | Performed by: INTERNAL MEDICINE

## 2019-10-18 PROCEDURE — 78815 PET IMAGE W/CT SKULL-THIGH: CPT

## 2019-10-18 RX ORDER — FLUDEOXYGLUCOSE F 18 200 MCI/ML
10 INJECTION, SOLUTION INTRAVENOUS
Status: COMPLETED | OUTPATIENT
Start: 2019-10-18 | End: 2019-10-18

## 2019-10-18 RX ADMIN — FLUDEOXYGLUCOSE F 18 11.66 MILLICURIE: 200 INJECTION, SOLUTION INTRAVENOUS at 08:32

## 2019-11-01 ENCOUNTER — HOSPITAL ENCOUNTER (OUTPATIENT)
Facility: MEDICAL CENTER | Age: 84
End: 2019-11-01
Payer: MEDICARE

## 2019-11-01 ENCOUNTER — OFFICE VISIT (OUTPATIENT)
Dept: FAMILY MEDICINE CLINIC | Age: 84
End: 2019-11-01
Payer: MEDICARE

## 2019-11-01 VITALS
BODY MASS INDEX: 25.9 KG/M2 | HEART RATE: 76 BPM | DIASTOLIC BLOOD PRESSURE: 50 MMHG | RESPIRATION RATE: 20 BRPM | WEIGHT: 146.2 LBS | SYSTOLIC BLOOD PRESSURE: 100 MMHG | TEMPERATURE: 97.9 F

## 2019-11-01 DIAGNOSIS — E78.00 HYPERCHOLESTEREMIA: Primary | ICD-10-CM

## 2019-11-01 DIAGNOSIS — I49.5 SICK SINUS SYNDROME (HCC): ICD-10-CM

## 2019-11-01 DIAGNOSIS — E11.9 TYPE 2 DIABETES MELLITUS WITHOUT COMPLICATION, WITHOUT LONG-TERM CURRENT USE OF INSULIN (HCC): ICD-10-CM

## 2019-11-01 DIAGNOSIS — I44.2 ATRIOVENTRICULAR BLOCK, COMPLETE (HCC): ICD-10-CM

## 2019-11-01 DIAGNOSIS — J40 BRONCHITIS: ICD-10-CM

## 2019-11-01 PROCEDURE — 99214 OFFICE O/P EST MOD 30 MIN: CPT | Performed by: FAMILY MEDICINE

## 2019-11-01 RX ORDER — GUAIFENESIN AND DEXTROMETHORPHAN HYDROBROMIDE 600; 30 MG/1; MG/1
TABLET, EXTENDED RELEASE ORAL
Qty: 28 TABLET | Refills: 1 | Status: SHIPPED | OUTPATIENT
Start: 2019-11-01 | End: 2020-01-01 | Stop reason: ALTCHOICE

## 2019-11-01 RX ORDER — AZITHROMYCIN 250 MG/1
TABLET, FILM COATED ORAL
Qty: 6 TABLET | Refills: 1 | Status: SHIPPED | OUTPATIENT
Start: 2019-11-01 | End: 2020-01-01 | Stop reason: ALTCHOICE

## 2019-11-01 ASSESSMENT — ENCOUNTER SYMPTOMS
BLOOD IN STOOL: 0
ABDOMINAL PAIN: 0
COUGH: 1
SHORTNESS OF BREATH: 0
CHEST TIGHTNESS: 0

## 2019-11-06 ENCOUNTER — TELEPHONE (OUTPATIENT)
Dept: ONCOLOGY | Age: 84
End: 2019-11-06

## 2019-11-06 ENCOUNTER — OFFICE VISIT (OUTPATIENT)
Dept: ONCOLOGY | Age: 84
End: 2019-11-06
Payer: MEDICARE

## 2019-11-06 VITALS
DIASTOLIC BLOOD PRESSURE: 62 MMHG | BODY MASS INDEX: 27.14 KG/M2 | SYSTOLIC BLOOD PRESSURE: 121 MMHG | TEMPERATURE: 98.2 F | HEART RATE: 64 BPM | RESPIRATION RATE: 20 BRPM | WEIGHT: 153.2 LBS

## 2019-11-06 DIAGNOSIS — C73 PAPILLARY THYROID CARCINOMA (HCC): Primary | ICD-10-CM

## 2019-11-06 PROCEDURE — 99215 OFFICE O/P EST HI 40 MIN: CPT | Performed by: INTERNAL MEDICINE

## 2019-11-06 PROCEDURE — 99212 OFFICE O/P EST SF 10 MIN: CPT | Performed by: INTERNAL MEDICINE

## 2019-12-02 ENCOUNTER — CARE COORDINATION (OUTPATIENT)
Dept: CARE COORDINATION | Age: 84
End: 2019-12-02

## 2019-12-29 NOTE — ED PROVIDER NOTES
affect  -----------------------  -----------------------  MEDICAL DECISION MAKING  Differential Diagnosis:  - Consideration is given for   pneumonia, bronchospasm, pulmonary edema, viral upper respiratory tract infection, pneumothorax, PE,  -  #Impression/Plan:  - Clinically patient's presentation is most consistent with bronchitis. Given the fact the patient has previous presentations for this that showed pulmonary edema will get laboratory testing imaging. If all work-up is unremarkable and patient is feeling better will discharge home.  -  ##Reevaluation/Conversations on care:  -EKG reviewed and independently interpreted by myself, no obvious signs of ischemia. Sinus rhythm. Normal intervals. -   -----------------------  -----------------------  Cristiano Woods MD, ELI  Emergency Medicine Attending  Questions? Please contact my cell phone anytime.   [phone]  *This charting supersedes any ED resident or staff charting and was written using speech recognition software    PASTMEDICAL HISTORY     Past Medical History:   Diagnosis Date    Abnormal ECG 8/30/2019    Acquired hypothyroidism 9/19/2014    Acute cholecystitis 2/28/2018    Atherosclerosis of coronary artery bypass graft(s) without angina pectoris 10/4/2016    Atrioventricular block, complete ([address] 0/93/8696    Biliary colic     Block, bundle branch, left 3/30/2010    Calculus of bile duct without cholecystitis and without obstruction     Calculus of gallbladder 9/19/2017    Cancer ([address] 2013    thyroid    Carotid artery stenosis     Cataract     Cerebrovascular disease     Cervical disc herniation 11/13/2014    Cervical radiculopathy, chronic 11/14/2014    Cervical spondylosis 11/13/2014    CHF (congestive heart failure) (HCC)     Chronic systolic CHF (congestive heart failure), NYHA class 2 ([address] 6/21/2018    Coronary arteriosclerosis in native artery 3/30/2010    Overview:  H/O Acute anteriorseptal MI  11/8/99 Severe 3 vessel CAD , LM coronary dissection  S/P CABG  1999 L-LAD, SVG Ramus, SVG OM, SVG L PDA Stress test  10/2007  EF 49% no reversible ischemia Predominantly fixed infeior apical septal defects Low normal to mildly reduced LV sys function with :VEF 49%  Septal wall motion abnormlaity Compared with prior study no new reveresible perfusion defects    Degeneration of cervical intervertebral disc 9/25/2012    Elevated LFTs 9/19/2017    Elevated liver enzymes 2/28/2018    H/O malignant neoplasm of thyroid 12/10/2013    Heart attack ([address]     Heart block AV second degree     Hypercholesteremia 3/30/2010    Hypertension     Hypothyroidism     Ischemic cardiomyopathy 8/30/2019    Metastatic cancer ([address] 9/19/2017    Mild aortic regurgitation 3/30/2010    Overview:  Mild moderate MR and AR  Overview:  Mild moderate MR and AR    Pulmonary nodule 6/21/2018    Pulmonary nodules/lesions, multiple 9/19/2017    S/P CABG x 3 8/30/2019    Sick sinus syndrome ([address] 8/30/2019    SOB (shortness of breath) 8/30/2019    Spinal stenosis in cervical region 11/6/2014    Type 2 diabetes mellitus ([address] 3/30/2010    Overview:  without complications    Type II or unspecified type diabetes mellitus without mention of complication, not stated as uncontrolled      SURGICAL HISTORY       Past Surgical History:   Procedure Laterality Date    APPENDECTOMY      CARDIAC SURGERY      CHOLECYSTECTOMY      CORONARY ARTERY BYPASS GRAFT      ENDOSCOPIC ULTRASOUND (LOWER)      PACEMAKER INSERTION  03/2015    NV LAP,CHOLECYSTECTOMY N/A 3/2/2018    CHOLECYSTECTOMY LAPAROSCOPIC performed by Hay Guzman DO at [address] [place] [address] [address]       Previous Medications    ASPIRIN 81 MG TABLET    Take 81 mg by mouth daily.       ATORVASTATIN (LIPITOR) 20 MG TABLET      Take 20 mg by mouth nightly     AZITHROMYCIN (ZITHROMAX) 250 MG TABLET    Take 2 tablets on day 1 followed by 1 tablet daily thereafter CALCIUM CARBONATE (TUMS) 500 MG CHEWABLE TABLET    Take 1 tablet by mouth 2 times daily as needed for Heartburn    DEXTROMETHORPHAN-GUAIFENESIN (MUCINEX DM)  MG TB12    Take 1-2 tablets every 12 hours as needed for cough    DOCUSATE SODIUM (COLACE) 100 MG CAPSULE    Take 100 mg by mouth nightly     FINASTERIDE (PROSCAR) 5 MG TABLET    TAKE 1 TABLET DAILY    FUROSEMIDE (LASIX) 20 MG TABLET    Take 1 tablet by mouth daily    GLIMEPIRIDE (AMARYL) 2 MG TABLET    Take 2 mg by mouth every morning (before breakfast)    LEVOTHYROXINE (LEVOXYL) 137 MCG TABLET    TAKE 1 TABLET DAILY    MULTIPLE VITAMINS-MINERALS (THERAPEUTIC MULTIVITAMIN-MINERALS) TABLET    Take 1 tablet by mouth daily    NITROGLYCERIN (NITROSTAT) 0.4 MG SL TABLET    Place 0.4 mg under the tongue. As directed    TAMSULOSIN (FLOMAX) 0.4 MG CAPSULE    Take 1 capsule by mouth daily    VITAMIN D (ERGOCALCIFEROL) 04879 UNITS CAPS CAPSULE    Take 50,000 Units by mouth once a week     ALLERGIES     is allergic to seasonal and keflex [cephalexin]. FAMILY HISTORY     He indicated that his mother is . He indicated that his father is . He indicated that his maternal grandmother is . He indicated that his maternal grandfather is . He indicated that his paternal grandmother is . He indicated that his paternal grandfather is .      SOCIAL HISTORY       Social History     Tobacco Use    Smoking status: Never Smoker    Smokeless tobacco: Never Used   Substance Use Topics    Alcohol use: No    Drug use: No     PHYSICAL EXAM     INITIAL VITALS: /73   Pulse 78   Resp 18   SpO2 98%    Physical Exam    MEDICAL DECISION MAKING:            Labs Reviewed   CBC WITH AUTO DIFFERENTIAL - Abnormal; Notable for the following components:       Result Value    Hematocrit 40.7 (*)     Seg Neutrophils 67 (*)     Lymphocytes 17 (*)     Monocytes 9 (*)     Eosinophils % 6 (*)     All other components within normal limits Physician                    Lorie Khanna MD  12/29/19 3643

## 2020-01-01 ENCOUNTER — TELEPHONE (OUTPATIENT)
Dept: GASTROENTEROLOGY | Age: 85
End: 2020-01-01

## 2020-01-01 ENCOUNTER — HOSPITAL ENCOUNTER (EMERGENCY)
Age: 85
Discharge: HOME | End: 2020-05-12
Attending: EMERGENCY MEDICINE
Payer: MEDICARE

## 2020-01-01 ENCOUNTER — HOSPITAL ENCOUNTER (OUTPATIENT)
Dept: INFUSION THERAPY | Facility: MEDICAL CENTER | Age: 85
Discharge: HOME | End: 2020-11-18
Payer: MEDICARE

## 2020-01-01 ENCOUNTER — APPOINTMENT (OUTPATIENT)
Dept: GENERAL RADIOLOGY | Age: 85
End: 2020-01-01
Payer: MEDICARE

## 2020-01-01 ENCOUNTER — CARE COORDINATION (OUTPATIENT)
Dept: CARE COORDINATION | Age: 85
End: 2020-01-01

## 2020-01-01 ENCOUNTER — HOSPITAL ENCOUNTER (OUTPATIENT)
Facility: MEDICAL CENTER | Age: 85
Discharge: HOME | End: 2020-10-21
Payer: MEDICARE

## 2020-01-01 ENCOUNTER — HOSPITAL ENCOUNTER (OUTPATIENT)
Dept: PAIN MANAGEMENT | Age: 85
Discharge: HOME | End: 2020-10-07
Payer: MEDICARE

## 2020-01-01 ENCOUNTER — CARE COORDINATION (OUTPATIENT)
Dept: CASE MANAGEMENT | Age: 85
End: 2020-01-01

## 2020-01-01 ENCOUNTER — TELEPHONE (OUTPATIENT)
Dept: ONCOLOGY | Age: 85
End: 2020-01-01

## 2020-01-01 ENCOUNTER — TELEPHONE (OUTPATIENT)
Dept: NEUROSURGERY | Age: 85
End: 2020-01-01

## 2020-01-01 ENCOUNTER — TELEPHONE (OUTPATIENT)
Dept: FAMILY MEDICINE CLINIC | Age: 85
End: 2020-01-01

## 2020-01-01 ENCOUNTER — OFFICE VISIT (OUTPATIENT)
Dept: FAMILY MEDICINE CLINIC | Age: 85
End: 2020-01-01
Payer: MEDICARE

## 2020-01-01 ENCOUNTER — HOSPITAL ENCOUNTER (OUTPATIENT)
Dept: RADIATION ONCOLOGY | Facility: MEDICAL CENTER | Age: 85
Discharge: HOME | End: 2020-10-16
Attending: RADIOLOGY
Payer: MEDICARE

## 2020-01-01 ENCOUNTER — HOSPITAL ENCOUNTER (EMERGENCY)
Age: 85
Discharge: HOME | End: 2020-12-02
Attending: EMERGENCY MEDICINE
Payer: MEDICARE

## 2020-01-01 ENCOUNTER — HOSPITAL ENCOUNTER (OUTPATIENT)
Dept: CT IMAGING | Age: 85
Discharge: HOME | End: 2020-10-14
Payer: MEDICARE

## 2020-01-01 ENCOUNTER — HOSPITAL ENCOUNTER (OUTPATIENT)
Age: 85
Setting detail: SPECIMEN
Discharge: HOME | End: 2020-08-28
Payer: MEDICARE

## 2020-01-01 ENCOUNTER — APPOINTMENT (OUTPATIENT)
Dept: CT IMAGING | Age: 85
End: 2020-01-01
Payer: MEDICARE

## 2020-01-01 ENCOUNTER — HOSPITAL ENCOUNTER (OUTPATIENT)
Dept: RADIATION ONCOLOGY | Facility: MEDICAL CENTER | Age: 85
Discharge: HOME | End: 2020-10-15
Attending: RADIOLOGY
Payer: MEDICARE

## 2020-01-01 ENCOUNTER — TELEPHONE (OUTPATIENT)
Dept: RADIATION ONCOLOGY | Facility: MEDICAL CENTER | Age: 85
End: 2020-01-01

## 2020-01-01 ENCOUNTER — HOSPITAL ENCOUNTER (OUTPATIENT)
Dept: GENERAL RADIOLOGY | Age: 85
Discharge: HOME | End: 2020-10-11
Payer: MEDICARE

## 2020-01-01 ENCOUNTER — TELEPHONE (OUTPATIENT)
Dept: PAIN MANAGEMENT | Age: 85
End: 2020-01-01

## 2020-01-01 ENCOUNTER — TELEPHONE (OUTPATIENT)
Dept: INFUSION THERAPY | Facility: MEDICAL CENTER | Age: 85
End: 2020-01-01

## 2020-01-01 ENCOUNTER — TELEPHONE (OUTPATIENT)
Dept: OTHER | Facility: CLINIC | Age: 85
End: 2020-01-01

## 2020-01-01 ENCOUNTER — HOSPITAL ENCOUNTER (OUTPATIENT)
Facility: MEDICAL CENTER | Age: 85
End: 2020-01-01
Payer: MEDICARE

## 2020-01-01 ENCOUNTER — OFFICE VISIT (OUTPATIENT)
Dept: UROLOGY | Age: 85
End: 2020-01-01
Payer: MEDICARE

## 2020-01-01 ENCOUNTER — OFFICE VISIT (OUTPATIENT)
Dept: ONCOLOGY | Age: 85
End: 2020-01-01
Payer: MEDICARE

## 2020-01-01 ENCOUNTER — HOSPITAL ENCOUNTER (OUTPATIENT)
Age: 85
Setting detail: SPECIMEN
Discharge: HOME | End: 2020-09-11
Payer: MEDICARE

## 2020-01-01 ENCOUNTER — APPOINTMENT (OUTPATIENT)
Dept: GENERAL RADIOLOGY | Age: 85
DRG: 313 | End: 2020-01-01
Payer: MEDICARE

## 2020-01-01 ENCOUNTER — HOSPITAL ENCOUNTER (EMERGENCY)
Age: 85
Discharge: HOME | End: 2020-09-19
Attending: EMERGENCY MEDICINE
Payer: MEDICARE

## 2020-01-01 ENCOUNTER — HOSPITAL ENCOUNTER (OUTPATIENT)
Dept: RADIATION ONCOLOGY | Facility: MEDICAL CENTER | Age: 85
Discharge: HOME | End: 2020-10-13
Payer: MEDICARE

## 2020-01-01 ENCOUNTER — HOSPITAL ENCOUNTER (INPATIENT)
Age: 85
LOS: 1 days | Discharge: HOME HEALTH | DRG: 313 | End: 2020-08-30
Attending: EMERGENCY MEDICINE | Admitting: INTERNAL MEDICINE
Payer: MEDICARE

## 2020-01-01 ENCOUNTER — HOSPITAL ENCOUNTER (OUTPATIENT)
Facility: MEDICAL CENTER | Age: 85
Discharge: HOME | End: 2020-11-18
Payer: MEDICARE

## 2020-01-01 ENCOUNTER — HOSPITAL ENCOUNTER (OUTPATIENT)
Dept: RADIATION ONCOLOGY | Facility: MEDICAL CENTER | Age: 85
Discharge: HOME | End: 2020-10-12
Attending: RADIOLOGY
Payer: MEDICARE

## 2020-01-01 ENCOUNTER — HOSPITAL ENCOUNTER (OUTPATIENT)
Dept: RADIATION ONCOLOGY | Facility: MEDICAL CENTER | Age: 85
Discharge: HOME | End: 2020-10-14
Attending: RADIOLOGY
Payer: MEDICARE

## 2020-01-01 ENCOUNTER — HOSPITAL ENCOUNTER (OUTPATIENT)
Dept: RADIATION ONCOLOGY | Facility: MEDICAL CENTER | Age: 85
End: 2020-01-01
Attending: RADIOLOGY
Payer: MEDICARE

## 2020-01-01 ENCOUNTER — HOSPITAL ENCOUNTER (OUTPATIENT)
Dept: PAIN MANAGEMENT | Age: 85
Discharge: HOME | End: 2020-09-30
Payer: MEDICARE

## 2020-01-01 ENCOUNTER — HOSPITAL ENCOUNTER (OUTPATIENT)
Dept: RADIATION ONCOLOGY | Facility: MEDICAL CENTER | Age: 85
Discharge: HOME | End: 2020-10-19
Attending: RADIOLOGY
Payer: MEDICARE

## 2020-01-01 ENCOUNTER — HOSPITAL ENCOUNTER (OUTPATIENT)
Dept: RADIATION ONCOLOGY | Facility: MEDICAL CENTER | Age: 85
Discharge: HOME | End: 2020-11-19
Attending: RADIOLOGY
Payer: MEDICARE

## 2020-01-01 ENCOUNTER — OFFICE VISIT (OUTPATIENT)
Dept: GASTROENTEROLOGY | Age: 85
End: 2020-01-01
Payer: MEDICARE

## 2020-01-01 ENCOUNTER — HOSPITAL ENCOUNTER (EMERGENCY)
Age: 85
Discharge: HOME | End: 2020-02-07
Attending: EMERGENCY MEDICINE
Payer: MEDICARE

## 2020-01-01 ENCOUNTER — HOSPITAL ENCOUNTER (OUTPATIENT)
Dept: INFUSION THERAPY | Facility: MEDICAL CENTER | Age: 85
Discharge: HOME | End: 2020-10-21
Payer: MEDICARE

## 2020-01-01 ENCOUNTER — OFFICE VISIT (OUTPATIENT)
Dept: NEUROSURGERY | Age: 85
End: 2020-01-01
Payer: MEDICARE

## 2020-01-01 ENCOUNTER — SOCIAL WORK (OUTPATIENT)
Dept: ONCOLOGY | Age: 85
End: 2020-01-01

## 2020-01-01 ENCOUNTER — HOSPITAL ENCOUNTER (OUTPATIENT)
Dept: RADIATION ONCOLOGY | Facility: MEDICAL CENTER | Age: 85
Discharge: HOME | End: 2020-10-09
Payer: MEDICARE

## 2020-01-01 ENCOUNTER — APPOINTMENT (OUTPATIENT)
Dept: CT IMAGING | Age: 85
DRG: 871 | End: 2020-01-01
Payer: MEDICARE

## 2020-01-01 ENCOUNTER — TELEPHONE (OUTPATIENT)
Dept: PULMONOLOGY | Age: 85
End: 2020-01-01

## 2020-01-01 ENCOUNTER — TELEPHONE (OUTPATIENT)
Dept: PRIMARY CARE CLINIC | Age: 85
End: 2020-01-01

## 2020-01-01 ENCOUNTER — HOSPITAL ENCOUNTER (OUTPATIENT)
Dept: PREADMISSION TESTING | Age: 85
Setting detail: SPECIMEN
Discharge: HOME | End: 2020-10-09
Payer: MEDICARE

## 2020-01-01 ENCOUNTER — HOSPITAL ENCOUNTER (INPATIENT)
Age: 85
LOS: 4 days | Discharge: HOME HEALTH | DRG: 871 | End: 2020-10-27
Attending: EMERGENCY MEDICINE | Admitting: FAMILY MEDICINE
Payer: MEDICARE

## 2020-01-01 ENCOUNTER — HOSPITAL ENCOUNTER (EMERGENCY)
Age: 85
Discharge: HOME | End: 2020-07-20
Attending: EMERGENCY MEDICINE
Payer: MEDICARE

## 2020-01-01 ENCOUNTER — OFFICE VISIT (OUTPATIENT)
Dept: PRIMARY CARE CLINIC | Age: 85
End: 2020-01-01
Payer: MEDICARE

## 2020-01-01 ENCOUNTER — OFFICE VISIT (OUTPATIENT)
Dept: PULMONOLOGY | Age: 85
End: 2020-01-01
Payer: MEDICARE

## 2020-01-01 ENCOUNTER — HOSPITAL ENCOUNTER (OUTPATIENT)
Dept: RADIATION ONCOLOGY | Facility: MEDICAL CENTER | Age: 85
Discharge: HOME | End: 2020-10-13
Attending: RADIOLOGY
Payer: MEDICARE

## 2020-01-01 ENCOUNTER — TELEPHONE (OUTPATIENT)
Dept: INTERVENTIONAL RADIOLOGY/VASCULAR | Age: 85
End: 2020-01-01

## 2020-01-01 ENCOUNTER — APPOINTMENT (OUTPATIENT)
Dept: CT IMAGING | Age: 85
DRG: 313 | End: 2020-01-01
Payer: MEDICARE

## 2020-01-01 ENCOUNTER — HOSPITAL ENCOUNTER (EMERGENCY)
Age: 85
Discharge: HOME | End: 2020-09-17
Attending: EMERGENCY MEDICINE
Payer: MEDICARE

## 2020-01-01 ENCOUNTER — HOSPITAL ENCOUNTER (EMERGENCY)
Age: 85
Discharge: HOME | End: 2020-10-17
Attending: EMERGENCY MEDICINE
Payer: MEDICARE

## 2020-01-01 ENCOUNTER — APPOINTMENT (OUTPATIENT)
Dept: GENERAL RADIOLOGY | Age: 85
DRG: 871 | End: 2020-01-01
Payer: MEDICARE

## 2020-01-01 VITALS
HEART RATE: 58 BPM | WEIGHT: 151.2 LBS | SYSTOLIC BLOOD PRESSURE: 131 MMHG | TEMPERATURE: 97.3 F | RESPIRATION RATE: 16 BRPM | DIASTOLIC BLOOD PRESSURE: 56 MMHG | BODY MASS INDEX: 26.78 KG/M2

## 2020-01-01 VITALS
OXYGEN SATURATION: 96 % | DIASTOLIC BLOOD PRESSURE: 60 MMHG | BODY MASS INDEX: 21.85 KG/M2 | WEIGHT: 128 LBS | SYSTOLIC BLOOD PRESSURE: 112 MMHG | HEIGHT: 64 IN | HEART RATE: 83 BPM | TEMPERATURE: 97.4 F | RESPIRATION RATE: 14 BRPM

## 2020-01-01 VITALS
SYSTOLIC BLOOD PRESSURE: 100 MMHG | BODY MASS INDEX: 20.67 KG/M2 | DIASTOLIC BLOOD PRESSURE: 50 MMHG | TEMPERATURE: 97.2 F | RESPIRATION RATE: 20 BRPM | WEIGHT: 120.4 LBS | HEART RATE: 76 BPM

## 2020-01-01 VITALS
HEART RATE: 88 BPM | TEMPERATURE: 98 F | SYSTOLIC BLOOD PRESSURE: 100 MMHG | DIASTOLIC BLOOD PRESSURE: 60 MMHG | RESPIRATION RATE: 16 BRPM

## 2020-01-01 VITALS — TEMPERATURE: 97.5 F | BODY MASS INDEX: 21.46 KG/M2 | WEIGHT: 125 LBS

## 2020-01-01 VITALS
DIASTOLIC BLOOD PRESSURE: 63 MMHG | TEMPERATURE: 98.7 F | HEART RATE: 88 BPM | SYSTOLIC BLOOD PRESSURE: 109 MMHG | WEIGHT: 120 LBS | OXYGEN SATURATION: 97 % | HEIGHT: 64 IN | RESPIRATION RATE: 31 BRPM | BODY MASS INDEX: 20.49 KG/M2

## 2020-01-01 VITALS
RESPIRATION RATE: 16 BRPM | SYSTOLIC BLOOD PRESSURE: 111 MMHG | DIASTOLIC BLOOD PRESSURE: 56 MMHG | TEMPERATURE: 97.3 F | HEIGHT: 64 IN | RESPIRATION RATE: 16 BRPM | BODY MASS INDEX: 25.61 KG/M2 | TEMPERATURE: 98.5 F | OXYGEN SATURATION: 96 % | WEIGHT: 150 LBS | HEIGHT: 64 IN | HEART RATE: 77 BPM | OXYGEN SATURATION: 92 % | DIASTOLIC BLOOD PRESSURE: 48 MMHG | BODY MASS INDEX: 19.58 KG/M2 | HEART RATE: 85 BPM | WEIGHT: 114.7 LBS | SYSTOLIC BLOOD PRESSURE: 105 MMHG

## 2020-01-01 VITALS
TEMPERATURE: 97.7 F | BODY MASS INDEX: 19.62 KG/M2 | OXYGEN SATURATION: 98 % | RESPIRATION RATE: 16 BRPM | DIASTOLIC BLOOD PRESSURE: 61 MMHG | HEART RATE: 74 BPM | HEIGHT: 67 IN | SYSTOLIC BLOOD PRESSURE: 141 MMHG | WEIGHT: 125 LBS

## 2020-01-01 VITALS
BODY MASS INDEX: 19.27 KG/M2 | TEMPERATURE: 98.2 F | WEIGHT: 112.25 LBS | RESPIRATION RATE: 16 BRPM | HEART RATE: 95 BPM | OXYGEN SATURATION: 95 % | DIASTOLIC BLOOD PRESSURE: 62 MMHG | SYSTOLIC BLOOD PRESSURE: 124 MMHG

## 2020-01-01 VITALS
WEIGHT: 125 LBS | TEMPERATURE: 98.4 F | OXYGEN SATURATION: 98 % | SYSTOLIC BLOOD PRESSURE: 119 MMHG | HEIGHT: 64 IN | HEART RATE: 76 BPM | BODY MASS INDEX: 21.34 KG/M2 | RESPIRATION RATE: 16 BRPM | DIASTOLIC BLOOD PRESSURE: 53 MMHG

## 2020-01-01 VITALS
SYSTOLIC BLOOD PRESSURE: 120 MMHG | HEART RATE: 72 BPM | BODY MASS INDEX: 19.42 KG/M2 | DIASTOLIC BLOOD PRESSURE: 50 MMHG | WEIGHT: 124 LBS | TEMPERATURE: 99 F

## 2020-01-01 VITALS
TEMPERATURE: 100.1 F | SYSTOLIC BLOOD PRESSURE: 138 MMHG | DIASTOLIC BLOOD PRESSURE: 68 MMHG | HEART RATE: 92 BPM | RESPIRATION RATE: 18 BRPM

## 2020-01-01 VITALS
OXYGEN SATURATION: 98 % | HEART RATE: 60 BPM | SYSTOLIC BLOOD PRESSURE: 119 MMHG | BODY MASS INDEX: 27.11 KG/M2 | WEIGHT: 153 LBS | HEIGHT: 63 IN | RESPIRATION RATE: 27 BRPM | DIASTOLIC BLOOD PRESSURE: 59 MMHG | TEMPERATURE: 97.5 F

## 2020-01-01 VITALS
BODY MASS INDEX: 18.7 KG/M2 | WEIGHT: 119.4 LBS | TEMPERATURE: 97.3 F | SYSTOLIC BLOOD PRESSURE: 111 MMHG | DIASTOLIC BLOOD PRESSURE: 65 MMHG | SYSTOLIC BLOOD PRESSURE: 124 MMHG | HEART RATE: 91 BPM | WEIGHT: 124 LBS | HEART RATE: 99 BPM | RESPIRATION RATE: 16 BRPM | OXYGEN SATURATION: 96 % | HEIGHT: 62 IN | TEMPERATURE: 99.6 F | BODY MASS INDEX: 22.82 KG/M2 | DIASTOLIC BLOOD PRESSURE: 59 MMHG

## 2020-01-01 VITALS
RESPIRATION RATE: 16 BRPM | TEMPERATURE: 96.8 F | SYSTOLIC BLOOD PRESSURE: 127 MMHG | HEART RATE: 96 BPM | DIASTOLIC BLOOD PRESSURE: 81 MMHG

## 2020-01-01 VITALS
OXYGEN SATURATION: 96 % | DIASTOLIC BLOOD PRESSURE: 55 MMHG | TEMPERATURE: 98.1 F | HEART RATE: 66 BPM | SYSTOLIC BLOOD PRESSURE: 123 MMHG | RESPIRATION RATE: 16 BRPM | HEIGHT: 64 IN | WEIGHT: 150 LBS | BODY MASS INDEX: 25.61 KG/M2

## 2020-01-01 VITALS
WEIGHT: 116.2 LBS | DIASTOLIC BLOOD PRESSURE: 50 MMHG | BODY MASS INDEX: 19.95 KG/M2 | TEMPERATURE: 98.1 F | HEART RATE: 81 BPM | SYSTOLIC BLOOD PRESSURE: 108 MMHG

## 2020-01-01 VITALS
RESPIRATION RATE: 16 BRPM | BODY MASS INDEX: 21.4 KG/M2 | TEMPERATURE: 97.2 F | HEART RATE: 79 BPM | DIASTOLIC BLOOD PRESSURE: 60 MMHG | SYSTOLIC BLOOD PRESSURE: 130 MMHG | OXYGEN SATURATION: 97 % | WEIGHT: 117 LBS

## 2020-01-01 VITALS
DIASTOLIC BLOOD PRESSURE: 59 MMHG | BODY MASS INDEX: 19.5 KG/M2 | HEART RATE: 106 BPM | WEIGHT: 113.6 LBS | TEMPERATURE: 98.4 F | SYSTOLIC BLOOD PRESSURE: 107 MMHG

## 2020-01-01 VITALS
RESPIRATION RATE: 16 BRPM | BODY MASS INDEX: 20.18 KG/M2 | DIASTOLIC BLOOD PRESSURE: 58 MMHG | OXYGEN SATURATION: 94 % | SYSTOLIC BLOOD PRESSURE: 95 MMHG | TEMPERATURE: 99 F | WEIGHT: 118.2 LBS | HEIGHT: 64 IN | HEART RATE: 77 BPM

## 2020-01-01 VITALS
OXYGEN SATURATION: 97 % | HEIGHT: 65 IN | DIASTOLIC BLOOD PRESSURE: 65 MMHG | WEIGHT: 110 LBS | BODY MASS INDEX: 18.33 KG/M2 | HEART RATE: 93 BPM | RESPIRATION RATE: 25 BRPM | SYSTOLIC BLOOD PRESSURE: 111 MMHG | TEMPERATURE: 97.9 F

## 2020-01-01 VITALS — SYSTOLIC BLOOD PRESSURE: 126 MMHG | TEMPERATURE: 97.3 F | HEART RATE: 83 BPM | DIASTOLIC BLOOD PRESSURE: 66 MMHG

## 2020-01-01 VITALS
OXYGEN SATURATION: 92 % | HEART RATE: 78 BPM | TEMPERATURE: 98 F | SYSTOLIC BLOOD PRESSURE: 116 MMHG | WEIGHT: 116.84 LBS | BODY MASS INDEX: 21.5 KG/M2 | RESPIRATION RATE: 16 BRPM | DIASTOLIC BLOOD PRESSURE: 57 MMHG | HEIGHT: 62 IN

## 2020-01-01 DIAGNOSIS — C73 PAPILLARY THYROID CARCINOMA (HCC): ICD-10-CM

## 2020-01-01 DIAGNOSIS — C79.51 BONE METASTASES (HCC): ICD-10-CM

## 2020-01-01 DIAGNOSIS — C73 PAPILLARY THYROID CARCINOMA (HCC): Primary | ICD-10-CM

## 2020-01-01 LAB
-: ABNORMAL
-: NORMAL
ABO/RH: NORMAL
ABSOLUTE BANDS #: 0.08 K/UL (ref 0–1)
ABSOLUTE EOS #: 0 K/UL (ref 0–0.4)
ABSOLUTE EOS #: 0.15 K/UL (ref 0–0.4)
ABSOLUTE EOS #: 0.15 K/UL (ref 0–0.44)
ABSOLUTE EOS #: 0.2 K/UL (ref 0–0.4)
ABSOLUTE EOS #: 0.23 K/UL (ref 0–0.4)
ABSOLUTE EOS #: 0.25 K/UL (ref 0–0.44)
ABSOLUTE EOS #: 0.27 K/UL (ref 0–0.44)
ABSOLUTE EOS #: 0.3 K/UL (ref 0–0.4)
ABSOLUTE EOS #: 0.33 K/UL (ref 0–0.44)
ABSOLUTE EOS #: 0.4 K/UL (ref 0–0.4)
ABSOLUTE IMMATURE GRANULOCYTE: 0 K/UL (ref 0–0.3)
ABSOLUTE IMMATURE GRANULOCYTE: 0.03 K/UL (ref 0–0.3)
ABSOLUTE IMMATURE GRANULOCYTE: 0.17 K/UL (ref 0–0.3)
ABSOLUTE IMMATURE GRANULOCYTE: <0.03 K/UL (ref 0–0.3)
ABSOLUTE IMMATURE GRANULOCYTE: <0.03 K/UL (ref 0–0.3)
ABSOLUTE IMMATURE GRANULOCYTE: ABNORMAL K/UL (ref 0–0.3)
ABSOLUTE LYMPH #: 0.3 K/UL (ref 1–4.8)
ABSOLUTE LYMPH #: 0.31 K/UL (ref 1–4.8)
ABSOLUTE LYMPH #: 0.91 K/UL (ref 1–4.8)
ABSOLUTE LYMPH #: 1.1 K/UL (ref 1–4.8)
ABSOLUTE LYMPH #: 1.17 K/UL (ref 1.1–3.7)
ABSOLUTE LYMPH #: 1.62 K/UL (ref 1.1–3.7)
ABSOLUTE LYMPH #: 1.74 K/UL (ref 1.1–3.7)
ABSOLUTE LYMPH #: 1.8 K/UL (ref 1.1–3.7)
ABSOLUTE LYMPH #: 2 K/UL (ref 1–4.8)
ABSOLUTE LYMPH #: 2.1 K/UL (ref 1–4.8)
ABSOLUTE MONO #: 0.31 K/UL (ref 0.1–1.3)
ABSOLUTE MONO #: 0.45 K/UL (ref 0.1–1.3)
ABSOLUTE MONO #: 0.59 K/UL (ref 0.1–1.2)
ABSOLUTE MONO #: 0.7 K/UL (ref 0.1–1.3)
ABSOLUTE MONO #: 0.7 K/UL (ref 0.1–1.3)
ABSOLUTE MONO #: 0.75 K/UL (ref 0.1–1.2)
ABSOLUTE MONO #: 0.77 K/UL (ref 0.1–1.2)
ABSOLUTE MONO #: 0.8 K/UL (ref 0.1–1.3)
ABSOLUTE MONO #: 1.48 K/UL (ref 0.1–1.2)
ABSOLUTE MONO #: 2.51 K/UL (ref 0.1–0.8)
ALBUMIN SERPL-MCNC: 1.8 G/DL (ref 3.5–5.2)
ALBUMIN SERPL-MCNC: 2.1 G/DL (ref 3.5–5.2)
ALBUMIN SERPL-MCNC: 2.3 G/DL (ref 3.5–5.2)
ALBUMIN SERPL-MCNC: 2.4 G/DL (ref 3.5–5.2)
ALBUMIN SERPL-MCNC: 2.4 G/DL (ref 3.5–5.2)
ALBUMIN SERPL-MCNC: 2.9 G/DL (ref 3.5–5.2)
ALBUMIN SERPL-MCNC: 2.9 G/DL (ref 3.5–5.2)
ALBUMIN SERPL-MCNC: 3.3 G/DL (ref 3.5–5.2)
ALBUMIN SERPL-MCNC: 3.7 G/DL (ref 3.5–5.2)
ALBUMIN/GLOBULIN RATIO: 0.2 (ref 1–2.5)
ALBUMIN/GLOBULIN RATIO: 0.5 (ref 1–2.5)
ALBUMIN/GLOBULIN RATIO: 0.6 (ref 1–2.5)
ALBUMIN/GLOBULIN RATIO: ABNORMAL (ref 1–2.5)
ALP BLD-CCNC: 107 U/L (ref 40–129)
ALP BLD-CCNC: 114 U/L (ref 40–129)
ALP BLD-CCNC: 119 U/L (ref 40–129)
ALP BLD-CCNC: 120 U/L (ref 40–129)
ALP BLD-CCNC: 216 U/L (ref 40–129)
ALP BLD-CCNC: 270 U/L (ref 40–129)
ALP BLD-CCNC: 88 U/L (ref 40–129)
ALP BLD-CCNC: 93 U/L (ref 40–129)
ALP BLD-CCNC: 99 U/L (ref 40–129)
ALT SERPL-CCNC: 12 U/L (ref 5–41)
ALT SERPL-CCNC: 12 U/L (ref 5–41)
ALT SERPL-CCNC: 14 U/L (ref 5–41)
ALT SERPL-CCNC: 16 U/L (ref 5–41)
ALT SERPL-CCNC: 16 U/L (ref 5–41)
ALT SERPL-CCNC: 17 U/L (ref 5–41)
ALT SERPL-CCNC: 18 U/L (ref 5–41)
ALT SERPL-CCNC: 22 U/L (ref 5–41)
ALT SERPL-CCNC: 24 U/L (ref 5–41)
AMORPHOUS: ABNORMAL
ANION GAP SERPL CALCULATED.3IONS-SCNC: 10 MMOL/L (ref 9–17)
ANION GAP SERPL CALCULATED.3IONS-SCNC: 10 MMOL/L (ref 9–17)
ANION GAP SERPL CALCULATED.3IONS-SCNC: 11 MMOL/L (ref 9–17)
ANION GAP SERPL CALCULATED.3IONS-SCNC: 12 MMOL/L (ref 9–17)
ANION GAP SERPL CALCULATED.3IONS-SCNC: 13 MMOL/L (ref 9–17)
ANION GAP SERPL CALCULATED.3IONS-SCNC: 8 MMOL/L (ref 9–17)
ANION GAP SERPL CALCULATED.3IONS-SCNC: 8 MMOL/L (ref 9–17)
ANION GAP SERPL CALCULATED.3IONS-SCNC: 9 MMOL/L (ref 9–17)
ANTIBODY SCREEN: NEGATIVE
ARM BAND NUMBER: NORMAL
AST SERPL-CCNC: 15 U/L
AST SERPL-CCNC: 18 U/L
AST SERPL-CCNC: 18 U/L
AST SERPL-CCNC: 19 U/L
AST SERPL-CCNC: 21 U/L
AST SERPL-CCNC: 22 U/L
AST SERPL-CCNC: 24 U/L
AST SERPL-CCNC: 35 U/L
AST SERPL-CCNC: 52 U/L
BACTERIA: ABNORMAL
BANDS: 1 % (ref 0–10)
BASOPHILS # BLD: 0 % (ref 0–2)
BASOPHILS # BLD: 1 % (ref 0–2)
BASOPHILS ABSOLUTE: 0 K/UL (ref 0–0.2)
BASOPHILS ABSOLUTE: 0.05 K/UL (ref 0–0.2)
BASOPHILS ABSOLUTE: 0.06 K/UL (ref 0–0.2)
BASOPHILS ABSOLUTE: 0.1 K/UL (ref 0–0.2)
BILIRUB SERPL-MCNC: 0.27 MG/DL (ref 0.3–1.2)
BILIRUB SERPL-MCNC: 0.32 MG/DL (ref 0.3–1.2)
BILIRUB SERPL-MCNC: 0.36 MG/DL (ref 0.3–1.2)
BILIRUB SERPL-MCNC: 0.4 MG/DL (ref 0.3–1.2)
BILIRUB SERPL-MCNC: 0.48 MG/DL (ref 0.3–1.2)
BILIRUB SERPL-MCNC: 0.52 MG/DL (ref 0.3–1.2)
BILIRUB SERPL-MCNC: 0.57 MG/DL (ref 0.3–1.2)
BILIRUB SERPL-MCNC: 0.61 MG/DL (ref 0.3–1.2)
BILIRUB SERPL-MCNC: 0.73 MG/DL (ref 0.3–1.2)
BILIRUBIN URINE: NEGATIVE
BLOOD BANK SPECIMEN: NORMAL
BNP INTERPRETATION: ABNORMAL
BNP INTERPRETATION: NORMAL
BUN BLDV-MCNC: 11 MG/DL (ref 8–23)
BUN BLDV-MCNC: 12 MG/DL (ref 8–23)
BUN BLDV-MCNC: 13 MG/DL (ref 8–23)
BUN BLDV-MCNC: 17 MG/DL (ref 8–23)
BUN BLDV-MCNC: 18 MG/DL (ref 8–23)
BUN BLDV-MCNC: 20 MG/DL (ref 8–23)
BUN BLDV-MCNC: 22 MG/DL (ref 8–23)
BUN BLDV-MCNC: 24 MG/DL (ref 8–23)
BUN BLDV-MCNC: 24 MG/DL (ref 8–23)
BUN BLDV-MCNC: 26 MG/DL (ref 8–23)
BUN BLDV-MCNC: 8 MG/DL (ref 8–23)
BUN BLDV-MCNC: 8 MG/DL (ref 8–23)
BUN BLDV-MCNC: 9 MG/DL (ref 8–23)
BUN/CREAT BLD: 13 (ref 9–20)
BUN/CREAT BLD: 27 (ref 9–20)
BUN/CREAT BLD: ABNORMAL (ref 9–20)
C-REACTIVE PROTEIN: 184.8 MG/L (ref 0–5)
C-REACTIVE PROTEIN: 25.5 MG/L (ref 0–5)
C-REACTIVE PROTEIN: 28 MG/L (ref 0–5)
CALCIUM SERPL-MCNC: 7.6 MG/DL (ref 8.6–10.4)
CALCIUM SERPL-MCNC: 8.7 MG/DL (ref 8.6–10.4)
CALCIUM SERPL-MCNC: 8.8 MG/DL (ref 8.6–10.4)
CALCIUM SERPL-MCNC: 8.9 MG/DL (ref 8.6–10.4)
CALCIUM SERPL-MCNC: 8.9 MG/DL (ref 8.6–10.4)
CALCIUM SERPL-MCNC: 9 MG/DL (ref 8.6–10.4)
CALCIUM SERPL-MCNC: 9.1 MG/DL (ref 8.6–10.4)
CALCIUM SERPL-MCNC: 9.2 MG/DL (ref 8.6–10.4)
CALCIUM SERPL-MCNC: 9.3 MG/DL (ref 8.6–10.4)
CALCIUM SERPL-MCNC: 9.9 MG/DL (ref 8.6–10.4)
CASTS UA: ABNORMAL /LPF
CHLORIDE BLD-SCNC: 102 MMOL/L (ref 98–107)
CHLORIDE BLD-SCNC: 103 MMOL/L (ref 98–107)
CHLORIDE BLD-SCNC: 105 MMOL/L (ref 98–107)
CHLORIDE BLD-SCNC: 90 MMOL/L (ref 98–107)
CHLORIDE BLD-SCNC: 94 MMOL/L (ref 98–107)
CHLORIDE BLD-SCNC: 94 MMOL/L (ref 98–107)
CHLORIDE BLD-SCNC: 96 MMOL/L (ref 98–107)
CHLORIDE BLD-SCNC: 97 MMOL/L (ref 98–107)
CHLORIDE BLD-SCNC: 98 MMOL/L (ref 98–107)
CHLORIDE BLD-SCNC: 99 MMOL/L (ref 98–107)
CO2: 21 MMOL/L (ref 20–31)
CO2: 23 MMOL/L (ref 20–31)
CO2: 24 MMOL/L (ref 20–31)
CO2: 26 MMOL/L (ref 20–31)
CO2: 26 MMOL/L (ref 20–31)
CO2: 27 MMOL/L (ref 20–31)
CO2: 27 MMOL/L (ref 20–31)
CO2: 28 MMOL/L (ref 20–31)
CO2: 28 MMOL/L (ref 20–31)
CO2: 29 MMOL/L (ref 20–31)
COLOR: YELLOW
COMMENT UA: ABNORMAL
CREAT SERPL-MCNC: 0.59 MG/DL (ref 0.7–1.2)
CREAT SERPL-MCNC: 0.7 MG/DL (ref 0.7–1.2)
CREAT SERPL-MCNC: 0.71 MG/DL (ref 0.7–1.2)
CREAT SERPL-MCNC: 0.72 MG/DL (ref 0.7–1.2)
CREAT SERPL-MCNC: 0.73 MG/DL (ref 0.7–1.2)
CREAT SERPL-MCNC: 0.77 MG/DL (ref 0.7–1.2)
CREAT SERPL-MCNC: 0.78 MG/DL (ref 0.7–1.2)
CREAT SERPL-MCNC: 0.8 MG/DL (ref 0.7–1.2)
CREAT SERPL-MCNC: 0.82 MG/DL (ref 0.7–1.2)
CREAT SERPL-MCNC: 0.83 MG/DL (ref 0.7–1.2)
CREAT SERPL-MCNC: 0.86 MG/DL (ref 0.7–1.2)
CREAT SERPL-MCNC: 0.89 MG/DL (ref 0.7–1.2)
CREAT SERPL-MCNC: 0.98 MG/DL (ref 0.7–1.2)
CREAT SERPL-MCNC: 0.99 MG/DL (ref 0.7–1.2)
CREAT SERPL-MCNC: 1.06 MG/DL (ref 0.7–1.2)
CREAT SERPL-MCNC: 1.12 MG/DL (ref 0.7–1.2)
CREAT SERPL-MCNC: 1.38 MG/DL (ref 0.7–1.2)
CRYSTALS, UA: ABNORMAL /HPF
CULTURE: NORMAL
D-DIMER QUANTITATIVE: 0.81 MG/L FEU (ref 0–0.59)
D-DIMER QUANTITATIVE: 1.05 MG/L FEU
DIFFERENTIAL TYPE: ABNORMAL
EKG ATRIAL RATE: 101 BPM
EKG ATRIAL RATE: 60 BPM
EKG ATRIAL RATE: 63 BPM
EKG ATRIAL RATE: 65 BPM
EKG ATRIAL RATE: 71 BPM
EKG ATRIAL RATE: 95 BPM
EKG P AXIS: 62 DEGREES
EKG P AXIS: 72 DEGREES
EKG P AXIS: 75 DEGREES
EKG P-R INTERVAL: 190 MS
EKG P-R INTERVAL: 190 MS
EKG P-R INTERVAL: 218 MS
EKG P-R INTERVAL: 220 MS
EKG P-R INTERVAL: 238 MS
EKG Q-T INTERVAL: 382 MS
EKG Q-T INTERVAL: 424 MS
EKG Q-T INTERVAL: 450 MS
EKG Q-T INTERVAL: 462 MS
EKG Q-T INTERVAL: 478 MS
EKG Q-T INTERVAL: 520 MS
EKG QRS DURATION: 142 MS
EKG QRS DURATION: 154 MS
EKG QRS DURATION: 156 MS
EKG QRS DURATION: 160 MS
EKG QRS DURATION: 162 MS
EKG QRS DURATION: 194 MS
EKG QTC CALCULATION (BAZETT): 462 MS
EKG QTC CALCULATION (BAZETT): 489 MS
EKG QTC CALCULATION (BAZETT): 489 MS
EKG QTC CALCULATION (BAZETT): 495 MS
EKG QTC CALCULATION (BAZETT): 532 MS
EKG QTC CALCULATION (BAZETT): 540 MS
EKG R AXIS: -31 DEGREES
EKG R AXIS: -37 DEGREES
EKG R AXIS: -43 DEGREES
EKG R AXIS: -46 DEGREES
EKG R AXIS: -53 DEGREES
EKG R AXIS: -68 DEGREES
EKG T AXIS: 104 DEGREES
EKG T AXIS: 111 DEGREES
EKG T AXIS: 120 DEGREES
EKG T AXIS: 163 DEGREES
EKG T AXIS: 32 DEGREES
EKG T AXIS: 98 DEGREES
EKG VENTRICULAR RATE: 101 BPM
EKG VENTRICULAR RATE: 60 BPM
EKG VENTRICULAR RATE: 63 BPM
EKG VENTRICULAR RATE: 65 BPM
EKG VENTRICULAR RATE: 71 BPM
EKG VENTRICULAR RATE: 95 BPM
EOSINOPHILS RELATIVE PERCENT: 0 % (ref 0–4)
EOSINOPHILS RELATIVE PERCENT: 1 % (ref 1–4)
EOSINOPHILS RELATIVE PERCENT: 1 % (ref 1–4)
EOSINOPHILS RELATIVE PERCENT: 2 % (ref 0–4)
EOSINOPHILS RELATIVE PERCENT: 3 % (ref 0–4)
EOSINOPHILS RELATIVE PERCENT: 3 % (ref 1–4)
EOSINOPHILS RELATIVE PERCENT: 3 % (ref 1–4)
EOSINOPHILS RELATIVE PERCENT: 4 % (ref 0–4)
EOSINOPHILS RELATIVE PERCENT: 4 % (ref 0–4)
EOSINOPHILS RELATIVE PERCENT: 4 % (ref 1–4)
EPITHELIAL CELLS UA: ABNORMAL /HPF
ESTIMATED AVERAGE GLUCOSE: 200 MG/DL
ESTIMATED AVERAGE GLUCOSE: 203 MG/DL
ESTIMATED AVERAGE GLUCOSE: 275 MG/DL
EXPIRATION DATE: NORMAL
FERRITIN: 227 UG/L (ref 30–400)
FERRITIN: 320 UG/L (ref 30–400)
FIBRINOGEN: 675 MG/DL (ref 140–420)
FIBRINOGEN: 736 MG/DL (ref 210–530)
GFR AFRICAN AMERICAN: 58 ML/MIN
GFR AFRICAN AMERICAN: >60 ML/MIN
GFR NON-AFRICAN AMERICAN: 48 ML/MIN
GFR NON-AFRICAN AMERICAN: >60 ML/MIN
GFR SERPL CREATININE-BSD FRML MDRD: ABNORMAL ML/MIN/{1.73_M2}
GFR SERPL CREATININE-BSD FRML MDRD: NORMAL ML/MIN/{1.73_M2}
GLUCOSE BLD-MCNC: 134 MG/DL (ref 70–99)
GLUCOSE BLD-MCNC: 135 MG/DL (ref 75–110)
GLUCOSE BLD-MCNC: 137 MG/DL (ref 75–110)
GLUCOSE BLD-MCNC: 147 MG/DL (ref 75–110)
GLUCOSE BLD-MCNC: 154 MG/DL (ref 70–99)
GLUCOSE BLD-MCNC: 159 MG/DL (ref 75–110)
GLUCOSE BLD-MCNC: 163 MG/DL (ref 75–110)
GLUCOSE BLD-MCNC: 173 MG/DL (ref 75–110)
GLUCOSE BLD-MCNC: 176 MG/DL (ref 75–110)
GLUCOSE BLD-MCNC: 181 MG/DL (ref 75–110)
GLUCOSE BLD-MCNC: 185 MG/DL (ref 70–99)
GLUCOSE BLD-MCNC: 192 MG/DL (ref 75–110)
GLUCOSE BLD-MCNC: 199 MG/DL (ref 75–110)
GLUCOSE BLD-MCNC: 220 MG/DL (ref 75–110)
GLUCOSE BLD-MCNC: 221 MG/DL (ref 75–110)
GLUCOSE BLD-MCNC: 221 MG/DL (ref 75–110)
GLUCOSE BLD-MCNC: 222 MG/DL (ref 70–99)
GLUCOSE BLD-MCNC: 224 MG/DL (ref 75–110)
GLUCOSE BLD-MCNC: 230 MG/DL (ref 70–99)
GLUCOSE BLD-MCNC: 231 MG/DL (ref 70–99)
GLUCOSE BLD-MCNC: 232 MG/DL (ref 75–110)
GLUCOSE BLD-MCNC: 235 MG/DL (ref 75–110)
GLUCOSE BLD-MCNC: 240 MG/DL (ref 75–110)
GLUCOSE BLD-MCNC: 243 MG/DL (ref 70–99)
GLUCOSE BLD-MCNC: 245 MG/DL (ref 75–110)
GLUCOSE BLD-MCNC: 251 MG/DL (ref 75–110)
GLUCOSE BLD-MCNC: 256 MG/DL (ref 70–99)
GLUCOSE BLD-MCNC: 261 MG/DL (ref 75–110)
GLUCOSE BLD-MCNC: 265 MG/DL (ref 70–99)
GLUCOSE BLD-MCNC: 304 MG/DL (ref 75–110)
GLUCOSE BLD-MCNC: 308 MG/DL (ref 70–99)
GLUCOSE BLD-MCNC: 366 MG/DL (ref 75–110)
GLUCOSE BLD-MCNC: 428 MG/DL (ref 70–99)
GLUCOSE BLD-MCNC: 87 MG/DL (ref 70–99)
GLUCOSE URINE: ABNORMAL
HBA1C MFR BLD: 10.4 %
HBA1C MFR BLD: 11.2 % (ref 4–6)
HBA1C MFR BLD: 8.6 % (ref 4–6)
HBA1C MFR BLD: 8.7 % (ref 4–6)
HCT VFR BLD CALC: 34 % (ref 41–53)
HCT VFR BLD CALC: 38.7 % (ref 41–53)
HCT VFR BLD CALC: 39.5 % (ref 41–53)
HCT VFR BLD CALC: 39.8 % (ref 41–53)
HCT VFR BLD CALC: 41.9 % (ref 40.7–50.3)
HCT VFR BLD CALC: 42.6 % (ref 40.7–50.3)
HCT VFR BLD CALC: 42.6 % (ref 40.7–50.3)
HCT VFR BLD CALC: 43 % (ref 41–53)
HCT VFR BLD CALC: 44.5 % (ref 40.7–50.3)
HCT VFR BLD CALC: 44.9 % (ref 41–53)
HCT VFR BLD CALC: 46.1 % (ref 40.7–50.3)
HEMOGLOBIN: 11 G/DL (ref 13.5–17.5)
HEMOGLOBIN: 12.3 G/DL (ref 13–17)
HEMOGLOBIN: 12.7 G/DL (ref 13–17)
HEMOGLOBIN: 12.9 G/DL (ref 13.5–17.5)
HEMOGLOBIN: 13.2 G/DL (ref 13.5–17.5)
HEMOGLOBIN: 13.6 G/DL (ref 13–17)
HEMOGLOBIN: 13.7 G/DL (ref 13.5–17.5)
HEMOGLOBIN: 13.7 G/DL (ref 13–17)
HEMOGLOBIN: 13.8 G/DL (ref 13–17)
HEMOGLOBIN: 14 G/DL (ref 13.5–17.5)
HEMOGLOBIN: 15.1 G/DL (ref 13.5–17.5)
IMMATURE GRANULOCYTES: 0 %
IMMATURE GRANULOCYTES: 1 %
IMMATURE GRANULOCYTES: ABNORMAL %
INR BLD: 1.1
INR BLD: 1.2
KETONES, URINE: NEGATIVE
LACTATE DEHYDROGENASE: 185 U/L (ref 135–225)
LACTATE DEHYDROGENASE: 186 U/L (ref 135–225)
LACTIC ACID, SEPSIS WHOLE BLOOD: ABNORMAL MMOL/L (ref 0.5–1.9)
LACTIC ACID, SEPSIS WHOLE BLOOD: ABNORMAL MMOL/L (ref 0.5–1.9)
LACTIC ACID, SEPSIS: 2.5 MMOL/L (ref 0.5–1.9)
LACTIC ACID, SEPSIS: 3.1 MMOL/L (ref 0.5–1.9)
LACTIC ACID, WHOLE BLOOD: NORMAL MMOL/L (ref 0.7–2.1)
LACTIC ACID: 1.3 MMOL/L (ref 0.5–2.2)
LACTIC ACID: 2.1 MMOL/L (ref 0.5–2.2)
LEGIONELLA PNEUMOPHILIA AG, URINE: NEGATIVE
LEUKOCYTE ESTERASE, URINE: ABNORMAL
LIPASE: 46 U/L (ref 13–60)
LYMPHOCYTES # BLD: 13 % (ref 24–44)
LYMPHOCYTES # BLD: 19 % (ref 24–43)
LYMPHOCYTES # BLD: 21 % (ref 24–43)
LYMPHOCYTES # BLD: 22 % (ref 24–43)
LYMPHOCYTES # BLD: 24 % (ref 24–44)
LYMPHOCYTES # BLD: 27 % (ref 24–44)
LYMPHOCYTES # BLD: 3 % (ref 24–44)
LYMPHOCYTES # BLD: 4 % (ref 24–44)
LYMPHOCYTES # BLD: 4 % (ref 24–44)
LYMPHOCYTES # BLD: 8 % (ref 24–43)
Lab: NORMAL
MCH RBC QN AUTO: 23.5 PG (ref 25.2–33.5)
MCH RBC QN AUTO: 24.8 PG (ref 25.2–33.5)
MCH RBC QN AUTO: 26.7 PG (ref 26–34)
MCH RBC QN AUTO: 27 PG (ref 25.2–33.5)
MCH RBC QN AUTO: 27 PG (ref 25.2–33.5)
MCH RBC QN AUTO: 27 PG (ref 26–34)
MCH RBC QN AUTO: 27.2 PG (ref 25.2–33.5)
MCH RBC QN AUTO: 27.4 PG (ref 26–34)
MCH RBC QN AUTO: 28 PG (ref 26–34)
MCH RBC QN AUTO: 28.2 PG (ref 26–34)
MCH RBC QN AUTO: 29.1 PG (ref 26–34)
MCHC RBC AUTO-ENTMCNC: 29.4 G/DL (ref 28.4–34.8)
MCHC RBC AUTO-ENTMCNC: 29.8 G/DL (ref 28.4–34.8)
MCHC RBC AUTO-ENTMCNC: 29.9 G/DL (ref 28.4–34.8)
MCHC RBC AUTO-ENTMCNC: 30.8 G/DL (ref 28.4–34.8)
MCHC RBC AUTO-ENTMCNC: 31.9 G/DL (ref 28.4–34.8)
MCHC RBC AUTO-ENTMCNC: 32.5 G/DL (ref 31–37)
MCHC RBC AUTO-ENTMCNC: 32.6 G/DL (ref 31–37)
MCHC RBC AUTO-ENTMCNC: 33.3 G/DL (ref 31–37)
MCHC RBC AUTO-ENTMCNC: 33.4 G/DL (ref 31–37)
MCHC RBC AUTO-ENTMCNC: 33.6 G/DL (ref 31–37)
MCHC RBC AUTO-ENTMCNC: 34.3 G/DL (ref 31–37)
MCV RBC AUTO: 80.1 FL (ref 82.6–102.9)
MCV RBC AUTO: 82 FL (ref 80–100)
MCV RBC AUTO: 82.2 FL (ref 80–100)
MCV RBC AUTO: 82.7 FL (ref 80–100)
MCV RBC AUTO: 83.2 FL (ref 82.6–102.9)
MCV RBC AUTO: 83.7 FL (ref 80–100)
MCV RBC AUTO: 83.8 FL (ref 80–100)
MCV RBC AUTO: 84.8 FL (ref 80–100)
MCV RBC AUTO: 85.2 FL (ref 82.6–102.9)
MCV RBC AUTO: 87.8 FL (ref 82.6–102.9)
MCV RBC AUTO: 90.2 FL (ref 82.6–102.9)
MONOCYTES # BLD: 10 % (ref 1–7)
MONOCYTES # BLD: 10 % (ref 3–12)
MONOCYTES # BLD: 11 % (ref 1–7)
MONOCYTES # BLD: 3 % (ref 1–7)
MONOCYTES # BLD: 6 % (ref 1–7)
MONOCYTES # BLD: 7 % (ref 3–12)
MONOCYTES # BLD: 8 % (ref 1–7)
MONOCYTES # BLD: 9 % (ref 1–7)
MONOCYTES # BLD: 9 % (ref 3–12)
MONOCYTES # BLD: 9 % (ref 3–12)
MORPHOLOGY: ABNORMAL
MUCUS: ABNORMAL
NITRITE, URINE: NEGATIVE
NRBC AUTOMATED: 0 PER 100 WBC
NRBC AUTOMATED: ABNORMAL PER 100 WBC
OTHER OBSERVATIONS UA: ABNORMAL
PARTIAL THROMBOPLASTIN TIME: 23.9 SEC (ref 20.5–30.5)
PARTIAL THROMBOPLASTIN TIME: 27.8 SEC (ref 24–36)
PARTIAL THROMBOPLASTIN TIME: 29.5 SEC (ref 20.5–30.5)
PARTIAL THROMBOPLASTIN TIME: 33.7 SEC (ref 24–36)
PDW BLD-RTO: 14.3 % (ref 11.5–14.9)
PDW BLD-RTO: 15.4 % (ref 11.8–14.4)
PDW BLD-RTO: 15.5 % (ref 11.5–14.9)
PDW BLD-RTO: 15.8 % (ref 11.8–14.4)
PDW BLD-RTO: 15.8 % (ref 11.8–14.4)
PDW BLD-RTO: 15.9 % (ref 11.8–14.4)
PDW BLD-RTO: 16.4 % (ref 11.8–14.4)
PDW BLD-RTO: 16.7 % (ref 11.5–14.9)
PDW BLD-RTO: 16.7 % (ref 11.5–14.9)
PDW BLD-RTO: 17.3 % (ref 11.5–14.9)
PDW BLD-RTO: 17.7 % (ref 11.5–14.9)
PH UA: 8 (ref 5–8)
PLATELET # BLD: 215 K/UL (ref 138–453)
PLATELET # BLD: 217 K/UL (ref 150–450)
PLATELET # BLD: 224 K/UL (ref 138–453)
PLATELET # BLD: 227 K/UL (ref 150–450)
PLATELET # BLD: 266 K/UL (ref 150–450)
PLATELET # BLD: 295 K/UL (ref 138–453)
PLATELET # BLD: 305 K/UL (ref 150–450)
PLATELET # BLD: 314 K/UL (ref 150–450)
PLATELET # BLD: 347 K/UL (ref 150–450)
PLATELET # BLD: 352 K/UL (ref 138–453)
PLATELET # BLD: ABNORMAL K/UL (ref 138–453)
PLATELET ESTIMATE: ABNORMAL
PLATELET, FLUORESCENCE: NORMAL K/UL (ref 138–453)
PLATELET, IMMATURE FRACTION: NORMAL % (ref 1.1–10.3)
PMV BLD AUTO: 6.8 FL (ref 6–12)
PMV BLD AUTO: 7.1 FL (ref 6–12)
PMV BLD AUTO: 7.3 FL (ref 6–12)
PMV BLD AUTO: 7.4 FL (ref 6–12)
PMV BLD AUTO: 8.7 FL (ref 8.1–13.5)
PMV BLD AUTO: 8.9 FL (ref 8.1–13.5)
PMV BLD AUTO: 9 FL (ref 8.1–13.5)
PMV BLD AUTO: 9.8 FL (ref 8.1–13.5)
PMV BLD AUTO: ABNORMAL FL (ref 8.1–13.5)
POTASSIUM SERPL-SCNC: 3.9 MMOL/L (ref 3.7–5.3)
POTASSIUM SERPL-SCNC: 4 MMOL/L (ref 3.7–5.3)
POTASSIUM SERPL-SCNC: 4 MMOL/L (ref 3.7–5.3)
POTASSIUM SERPL-SCNC: 4.1 MMOL/L (ref 3.7–5.3)
POTASSIUM SERPL-SCNC: 4.1 MMOL/L (ref 3.7–5.3)
POTASSIUM SERPL-SCNC: 4.2 MMOL/L (ref 3.7–5.3)
POTASSIUM SERPL-SCNC: 4.2 MMOL/L (ref 3.7–5.3)
POTASSIUM SERPL-SCNC: 4.4 MMOL/L (ref 3.7–5.3)
POTASSIUM SERPL-SCNC: 4.4 MMOL/L (ref 3.7–5.3)
POTASSIUM SERPL-SCNC: 4.5 MMOL/L (ref 3.7–5.3)
POTASSIUM SERPL-SCNC: 4.5 MMOL/L (ref 3.7–5.3)
POTASSIUM SERPL-SCNC: 4.6 MMOL/L (ref 3.7–5.3)
PRO-BNP: 1161 PG/ML
PRO-BNP: 256 PG/ML
PROCALCITONIN: 0.1 NG/ML
PROCALCITONIN: 0.11 NG/ML
PROCALCITONIN: 0.25 NG/ML
PROTEIN UA: NEGATIVE
PROTHROMBIN TIME: 13.9 SEC (ref 11.8–14.6)
PROTHROMBIN TIME: 15 SEC (ref 11.8–14.6)
RBC # BLD: 4.13 M/UL (ref 4.5–5.9)
RBC # BLD: 4.62 M/UL (ref 4.5–5.9)
RBC # BLD: 4.7 M/UL (ref 4.5–5.9)
RBC # BLD: 4.82 M/UL (ref 4.5–5.9)
RBC # BLD: 5 M/UL (ref 4.21–5.77)
RBC # BLD: 5.07 M/UL (ref 4.21–5.77)
RBC # BLD: 5.11 M/UL (ref 4.21–5.77)
RBC # BLD: 5.12 M/UL (ref 4.21–5.77)
RBC # BLD: 5.2 M/UL (ref 4.5–5.9)
RBC # BLD: 5.23 M/UL (ref 4.21–5.77)
RBC # BLD: 5.36 M/UL (ref 4.5–5.9)
RBC # BLD: ABNORMAL 10*6/UL
RBC UA: ABNORMAL /HPF
REASON FOR REJECTION: NORMAL
RENAL EPITHELIAL, UA: ABNORMAL /HPF
SARS-COV-2, NAA: NOT DETECTED
SARS-COV-2, NAA: NOT DETECTED
SARS-COV-2, PCR: ABNORMAL
SARS-COV-2, RAPID: DETECTED
SARS-COV-2: ABNORMAL
SEG NEUTROPHILS: 58 % (ref 36–66)
SEG NEUTROPHILS: 63 % (ref 36–66)
SEG NEUTROPHILS: 65 % (ref 36–65)
SEG NEUTROPHILS: 67 % (ref 36–65)
SEG NEUTROPHILS: 68 % (ref 36–65)
SEG NEUTROPHILS: 74 % (ref 36–66)
SEG NEUTROPHILS: 80 % (ref 36–65)
SEG NEUTROPHILS: 84 % (ref 36–66)
SEG NEUTROPHILS: 87 % (ref 36–66)
SEG NEUTROPHILS: 94 % (ref 36–66)
SEGMENTED NEUTROPHILS ABSOLUTE COUNT: 11.42 K/UL (ref 1.5–8.1)
SEGMENTED NEUTROPHILS ABSOLUTE COUNT: 19.15 K/UL (ref 1.8–7.7)
SEGMENTED NEUTROPHILS ABSOLUTE COUNT: 4.4 K/UL (ref 1.3–9.1)
SEGMENTED NEUTROPHILS ABSOLUTE COUNT: 5.3 K/UL (ref 1.5–8.1)
SEGMENTED NEUTROPHILS ABSOLUTE COUNT: 5.4 K/UL (ref 1.3–9.1)
SEGMENTED NEUTROPHILS ABSOLUTE COUNT: 5.52 K/UL (ref 1.5–8.1)
SEGMENTED NEUTROPHILS ABSOLUTE COUNT: 5.64 K/UL (ref 1.5–8.1)
SEGMENTED NEUTROPHILS ABSOLUTE COUNT: 6 K/UL (ref 1.3–9.1)
SEGMENTED NEUTROPHILS ABSOLUTE COUNT: 6.52 K/UL (ref 1.3–9.1)
SEGMENTED NEUTROPHILS ABSOLUTE COUNT: 9.78 K/UL (ref 1.3–9.1)
SODIUM BLD-SCNC: 127 MMOL/L (ref 135–144)
SODIUM BLD-SCNC: 130 MMOL/L (ref 135–144)
SODIUM BLD-SCNC: 131 MMOL/L (ref 135–144)
SODIUM BLD-SCNC: 133 MMOL/L (ref 135–144)
SODIUM BLD-SCNC: 133 MMOL/L (ref 135–144)
SODIUM BLD-SCNC: 134 MMOL/L (ref 135–144)
SODIUM BLD-SCNC: 135 MMOL/L (ref 135–144)
SODIUM BLD-SCNC: 137 MMOL/L (ref 135–144)
SODIUM BLD-SCNC: 138 MMOL/L (ref 135–144)
SODIUM BLD-SCNC: 140 MMOL/L (ref 135–144)
SOURCE: ABNORMAL
SOURCE: NORMAL
SPECIFIC GRAVITY UA: 1.04 (ref 1–1.03)
SPECIMEN DESCRIPTION: NORMAL
STREP PNEUMONIAE ANTIGEN: NEGATIVE
TOTAL PROTEIN: 7.4 G/DL (ref 6.4–8.3)
TOTAL PROTEIN: 7.7 G/DL (ref 6.4–8.3)
TOTAL PROTEIN: 7.9 G/DL (ref 6.4–8.3)
TOTAL PROTEIN: 8 G/DL (ref 6.4–8.3)
TOTAL PROTEIN: 8.4 G/DL (ref 6.4–8.3)
TOTAL PROTEIN: 8.5 G/DL (ref 6.4–8.3)
TOTAL PROTEIN: 9.1 G/DL (ref 6.4–8.3)
TOTAL PROTEIN: 9.2 G/DL (ref 6.4–8.3)
TOTAL PROTEIN: 9.6 G/DL (ref 6.4–8.3)
TRICHOMONAS: ABNORMAL
TROPONIN INTERP: ABNORMAL
TROPONIN T: ABNORMAL NG/ML
TROPONIN, HIGH SENSITIVITY: 24 NG/L (ref 0–22)
TROPONIN, HIGH SENSITIVITY: 25 NG/L (ref 0–22)
TROPONIN, HIGH SENSITIVITY: 27 NG/L (ref 0–22)
TROPONIN, HIGH SENSITIVITY: 27 NG/L (ref 0–22)
TROPONIN, HIGH SENSITIVITY: 30 NG/L (ref 0–22)
TROPONIN, HIGH SENSITIVITY: 32 NG/L (ref 0–22)
TROPONIN, HIGH SENSITIVITY: 33 NG/L (ref 0–22)
TURBIDITY: CLEAR
URINE HGB: NEGATIVE
UROBILINOGEN, URINE: NORMAL
WBC # BLD: 10.4 K/UL (ref 3.5–11)
WBC # BLD: 14.5 K/UL (ref 3.5–11.3)
WBC # BLD: 22.8 K/UL (ref 3.5–11.3)
WBC # BLD: 7.5 K/UL (ref 3.5–11)
WBC # BLD: 7.7 K/UL (ref 3.5–11)
WBC # BLD: 8 K/UL (ref 3.5–11)
WBC # BLD: 8.2 K/UL (ref 3.5–11.3)
WBC # BLD: 8.2 K/UL (ref 3.5–11.3)
WBC # BLD: 8.4 K/UL (ref 3.5–11.3)
WBC # BLD: 8.5 K/UL (ref 3.5–11)
WBC # BLD: 9.2 K/UL (ref 3.5–11)
WBC # BLD: ABNORMAL 10*3/UL
WBC UA: ABNORMAL /HPF
YEAST: ABNORMAL
ZZ NTE CLEAN UP: ORDERED TEST: NORMAL
ZZ NTE WITH NAME CLEAN UP: SPECIMEN SOURCE: NORMAL

## 2020-01-01 PROCEDURE — 85025 COMPLETE CBC W/AUTO DIFF WBC: CPT

## 2020-01-01 PROCEDURE — 71045 X-RAY EXAM CHEST 1 VIEW: CPT

## 2020-01-01 PROCEDURE — G0378 HOSPITAL OBSERVATION PER HR: HCPCS

## 2020-01-01 PROCEDURE — 93005 ELECTROCARDIOGRAM TRACING: CPT | Performed by: EMERGENCY MEDICINE

## 2020-01-01 PROCEDURE — 99232 SBSQ HOSP IP/OBS MODERATE 35: CPT | Performed by: INTERNAL MEDICINE

## 2020-01-01 PROCEDURE — 86140 C-REACTIVE PROTEIN: CPT

## 2020-01-01 PROCEDURE — 6360000002 HC RX W HCPCS: Performed by: FAMILY MEDICINE

## 2020-01-01 PROCEDURE — 99215 OFFICE O/P EST HI 40 MIN: CPT | Performed by: INTERNAL MEDICINE

## 2020-01-01 PROCEDURE — 86850 RBC ANTIBODY SCREEN: CPT

## 2020-01-01 PROCEDURE — 74018 RADEX ABDOMEN 1 VIEW: CPT

## 2020-01-01 PROCEDURE — 4500000002 HC ER NO CHARGE

## 2020-01-01 PROCEDURE — 77295 3-D RADIOTHERAPY PLAN: CPT

## 2020-01-01 PROCEDURE — 96375 TX/PRO/DX INJ NEW DRUG ADDON: CPT

## 2020-01-01 PROCEDURE — 36415 COLL VENOUS BLD VENIPUNCTURE: CPT

## 2020-01-01 PROCEDURE — 83605 ASSAY OF LACTIC ACID: CPT

## 2020-01-01 PROCEDURE — 85384 FIBRINOGEN ACTIVITY: CPT

## 2020-01-01 PROCEDURE — 96372 THER/PROPH/DIAG INJ SC/IM: CPT

## 2020-01-01 PROCEDURE — 2580000003 HC RX 258: Performed by: EMERGENCY MEDICINE

## 2020-01-01 PROCEDURE — 72130 CT CHEST SPINE W/O & W/DYE: CPT

## 2020-01-01 PROCEDURE — 99211 OFF/OP EST MAY X REQ PHY/QHP: CPT | Performed by: INTERNAL MEDICINE

## 2020-01-01 PROCEDURE — 99285 EMERGENCY DEPT VISIT HI MDM: CPT

## 2020-01-01 PROCEDURE — 3052F HG A1C>EQUAL 8.0%<EQUAL 9.0%: CPT | Performed by: FAMILY MEDICINE

## 2020-01-01 PROCEDURE — 96376 TX/PRO/DX INJ SAME DRUG ADON: CPT

## 2020-01-01 PROCEDURE — 6360000002 HC RX W HCPCS: Performed by: INTERNAL MEDICINE

## 2020-01-01 PROCEDURE — 97116 GAIT TRAINING THERAPY: CPT

## 2020-01-01 PROCEDURE — 96361 HYDRATE IV INFUSION ADD-ON: CPT

## 2020-01-01 PROCEDURE — 77280 THER RAD SIMULAJ FIELD SMPL: CPT

## 2020-01-01 PROCEDURE — 96365 THER/PROPH/DIAG IV INF INIT: CPT

## 2020-01-01 PROCEDURE — 2060000000 HC ICU INTERMEDIATE R&B

## 2020-01-01 PROCEDURE — 77387 GUIDANCE FOR RADJ TX DLVR: CPT

## 2020-01-01 PROCEDURE — 86901 BLOOD TYPING SEROLOGIC RH(D): CPT

## 2020-01-01 PROCEDURE — 97110 THERAPEUTIC EXERCISES: CPT

## 2020-01-01 PROCEDURE — G6002 STEREOSCOPIC X-RAY GUIDANCE: HCPCS | Performed by: RADIOLOGY

## 2020-01-01 PROCEDURE — 77412 RADIATION TX DELIVERY LVL 3: CPT

## 2020-01-01 PROCEDURE — 84484 ASSAY OF TROPONIN QUANT: CPT

## 2020-01-01 PROCEDURE — 2580000003 HC RX 258: Performed by: FAMILY MEDICINE

## 2020-01-01 PROCEDURE — 77280 THER RAD SIMULAJ FIELD SMPL: CPT | Performed by: RADIOLOGY

## 2020-01-01 PROCEDURE — 83036 HEMOGLOBIN GLYCOSYLATED A1C: CPT

## 2020-01-01 PROCEDURE — 85610 PROTHROMBIN TIME: CPT

## 2020-01-01 PROCEDURE — 99214 OFFICE O/P EST MOD 30 MIN: CPT | Performed by: NURSE PRACTITIONER

## 2020-01-01 PROCEDURE — 96374 THER/PROPH/DIAG INJ IV PUSH: CPT

## 2020-01-01 PROCEDURE — 96366 THER/PROPH/DIAG IV INF ADDON: CPT

## 2020-01-01 PROCEDURE — 99204 OFFICE O/P NEW MOD 45 MIN: CPT | Performed by: RADIOLOGY

## 2020-01-01 PROCEDURE — 77263 THER RADIOLOGY TX PLNG CPLX: CPT | Performed by: RADIOLOGY

## 2020-01-01 PROCEDURE — U0002 COVID-19 LAB TEST NON-CDC: HCPCS

## 2020-01-01 PROCEDURE — 6370000000 HC RX 637 (ALT 250 FOR IP): Performed by: INTERNAL MEDICINE

## 2020-01-01 PROCEDURE — 1111F DSCHRG MED/CURRENT MED MERGE: CPT | Performed by: FAMILY MEDICINE

## 2020-01-01 PROCEDURE — 71260 CT THORAX DX C+: CPT

## 2020-01-01 PROCEDURE — 85027 COMPLETE CBC AUTOMATED: CPT

## 2020-01-01 PROCEDURE — 99223 1ST HOSP IP/OBS HIGH 75: CPT | Performed by: INTERNAL MEDICINE

## 2020-01-01 PROCEDURE — 6360000004 HC RX CONTRAST MEDICATION: Performed by: EMERGENCY MEDICINE

## 2020-01-01 PROCEDURE — 6370000000 HC RX 637 (ALT 250 FOR IP): Performed by: FAMILY MEDICINE

## 2020-01-01 PROCEDURE — 99214 OFFICE O/P EST MOD 30 MIN: CPT | Performed by: INTERNAL MEDICINE

## 2020-01-01 PROCEDURE — 99212 OFFICE O/P EST SF 10 MIN: CPT | Performed by: INTERNAL MEDICINE

## 2020-01-01 PROCEDURE — 84520 ASSAY OF UREA NITROGEN: CPT

## 2020-01-01 PROCEDURE — 77300 RADIATION THERAPY DOSE PLAN: CPT | Performed by: RADIOLOGY

## 2020-01-01 PROCEDURE — 99284 EMERGENCY DEPT VISIT MOD MDM: CPT

## 2020-01-01 PROCEDURE — 97535 SELF CARE MNGMENT TRAINING: CPT

## 2020-01-01 PROCEDURE — 85379 FIBRIN DEGRADATION QUANT: CPT

## 2020-01-01 PROCEDURE — 2580000003 HC RX 258: Performed by: INTERNAL MEDICINE

## 2020-01-01 PROCEDURE — G0008 ADMIN INFLUENZA VIRUS VAC: HCPCS | Performed by: FAMILY MEDICINE

## 2020-01-01 PROCEDURE — 77331 SPECIAL RADIATION DOSIMETRY: CPT

## 2020-01-01 PROCEDURE — 83615 LACTATE (LD) (LDH) ENZYME: CPT

## 2020-01-01 PROCEDURE — 82947 ASSAY GLUCOSE BLOOD QUANT: CPT

## 2020-01-01 PROCEDURE — 80053 COMPREHEN METABOLIC PANEL: CPT

## 2020-01-01 PROCEDURE — 82565 ASSAY OF CREATININE: CPT

## 2020-01-01 PROCEDURE — U0003 INFECTIOUS AGENT DETECTION BY NUCLEIC ACID (DNA OR RNA); SEVERE ACUTE RESPIRATORY SYNDROME CORONAVIRUS 2 (SARS-COV-2) (CORONAVIRUS DISEASE [COVID-19]), AMPLIFIED PROBE TECHNIQUE, MAKING USE OF HIGH THROUGHPUT TECHNOLOGIES AS DESCRIBED BY CMS-2020-01-R: HCPCS

## 2020-01-01 PROCEDURE — 87086 URINE CULTURE/COLONY COUNT: CPT

## 2020-01-01 PROCEDURE — 74230 X-RAY XM SWLNG FUNCJ C+: CPT

## 2020-01-01 PROCEDURE — 93005 ELECTROCARDIOGRAM TRACING: CPT | Performed by: INTERNAL MEDICINE

## 2020-01-01 PROCEDURE — 99239 HOSP IP/OBS DSCHRG MGMT >30: CPT | Performed by: INTERNAL MEDICINE

## 2020-01-01 PROCEDURE — 82728 ASSAY OF FERRITIN: CPT

## 2020-01-01 PROCEDURE — 83880 ASSAY OF NATRIURETIC PEPTIDE: CPT

## 2020-01-01 PROCEDURE — 99283 EMERGENCY DEPT VISIT LOW MDM: CPT

## 2020-01-01 PROCEDURE — 77334 RADIATION TREATMENT AID(S): CPT | Performed by: RADIOLOGY

## 2020-01-01 PROCEDURE — 99212 OFFICE O/P EST SF 10 MIN: CPT

## 2020-01-01 PROCEDURE — 90694 VACC AIIV4 NO PRSRV 0.5ML IM: CPT | Performed by: FAMILY MEDICINE

## 2020-01-01 PROCEDURE — 84145 PROCALCITONIN (PCT): CPT

## 2020-01-01 PROCEDURE — 99999 PR OFFICE/OUTPT VISIT,PROCEDURE ONLY: CPT | Performed by: RADIOLOGY

## 2020-01-01 PROCEDURE — 85730 THROMBOPLASTIN TIME PARTIAL: CPT

## 2020-01-01 PROCEDURE — 77300 RADIATION THERAPY DOSE PLAN: CPT

## 2020-01-01 PROCEDURE — 80048 BASIC METABOLIC PNL TOTAL CA: CPT

## 2020-01-01 PROCEDURE — 99282 EMERGENCY DEPT VISIT SF MDM: CPT

## 2020-01-01 PROCEDURE — 99213 OFFICE O/P EST LOW 20 MIN: CPT

## 2020-01-01 PROCEDURE — 77336 RADIATION PHYSICS CONSULT: CPT

## 2020-01-01 PROCEDURE — 93010 ELECTROCARDIOGRAM REPORT: CPT | Performed by: INTERNAL MEDICINE

## 2020-01-01 PROCEDURE — 86900 BLOOD TYPING SEROLOGIC ABO: CPT

## 2020-01-01 PROCEDURE — 99213 OFFICE O/P EST LOW 20 MIN: CPT | Performed by: INTERNAL MEDICINE

## 2020-01-01 PROCEDURE — 99214 OFFICE O/P EST MOD 30 MIN: CPT | Performed by: FAMILY MEDICINE

## 2020-01-01 PROCEDURE — 6360000002 HC RX W HCPCS: Performed by: EMERGENCY MEDICINE

## 2020-01-01 PROCEDURE — 77290 THER RAD SIMULAJ FIELD CPLX: CPT

## 2020-01-01 PROCEDURE — 99213 OFFICE O/P EST LOW 20 MIN: CPT | Performed by: RADIOLOGY

## 2020-01-01 PROCEDURE — 99204 OFFICE O/P NEW MOD 45 MIN: CPT | Performed by: NEUROLOGICAL SURGERY

## 2020-01-01 PROCEDURE — 99215 OFFICE O/P EST HI 40 MIN: CPT | Performed by: NURSE PRACTITIONER

## 2020-01-01 PROCEDURE — 77334 RADIATION TREATMENT AID(S): CPT

## 2020-01-01 PROCEDURE — 81001 URINALYSIS AUTO W/SCOPE: CPT

## 2020-01-01 PROCEDURE — 96360 HYDRATION IV INFUSION INIT: CPT

## 2020-01-01 PROCEDURE — 77295 3-D RADIOTHERAPY PLAN: CPT | Performed by: RADIOLOGY

## 2020-01-01 PROCEDURE — 6360000004 HC RX CONTRAST MEDICATION: Performed by: INTERNAL MEDICINE

## 2020-01-01 PROCEDURE — 92611 MOTION FLUOROSCOPY/SWALLOW: CPT

## 2020-01-01 PROCEDURE — 87449 NOS EACH ORGANISM AG IA: CPT

## 2020-01-01 PROCEDURE — 6370000000 HC RX 637 (ALT 250 FOR IP): Performed by: EMERGENCY MEDICINE

## 2020-01-01 PROCEDURE — 97161 PT EVAL LOW COMPLEX 20 MIN: CPT

## 2020-01-01 PROCEDURE — 99222 1ST HOSP IP/OBS MODERATE 55: CPT | Performed by: INTERNAL MEDICINE

## 2020-01-01 PROCEDURE — 87040 BLOOD CULTURE FOR BACTERIA: CPT

## 2020-01-01 PROCEDURE — 74177 CT ABD & PELVIS W/CONTRAST: CPT

## 2020-01-01 PROCEDURE — 94761 N-INVAS EAR/PLS OXIMETRY MLT: CPT

## 2020-01-01 PROCEDURE — 51798 US URINE CAPACITY MEASURE: CPT | Performed by: NURSE PRACTITIONER

## 2020-01-01 PROCEDURE — P9612 CATHETERIZE FOR URINE SPEC: HCPCS

## 2020-01-01 PROCEDURE — 97165 OT EVAL LOW COMPLEX 30 MIN: CPT

## 2020-01-01 PROCEDURE — 99203 OFFICE O/P NEW LOW 30 MIN: CPT

## 2020-01-01 PROCEDURE — 6360000002 HC RX W HCPCS: Performed by: STUDENT IN AN ORGANIZED HEALTH CARE EDUCATION/TRAINING PROGRAM

## 2020-01-01 PROCEDURE — 77427 RADIATION TX MANAGEMENT X5: CPT | Performed by: RADIOLOGY

## 2020-01-01 PROCEDURE — 77370 RADIATION PHYSICS CONSULT: CPT

## 2020-01-01 PROCEDURE — 3288F FALL RISK ASSESSMENT DOCD: CPT | Performed by: FAMILY MEDICINE

## 2020-01-01 PROCEDURE — 83690 ASSAY OF LIPASE: CPT

## 2020-01-01 PROCEDURE — 96367 TX/PROPH/DG ADDL SEQ IV INF: CPT

## 2020-01-01 PROCEDURE — 93005 ELECTROCARDIOGRAM TRACING: CPT | Performed by: STUDENT IN AN ORGANIZED HEALTH CARE EDUCATION/TRAINING PROGRAM

## 2020-01-01 PROCEDURE — 87899 AGENT NOS ASSAY W/OPTIC: CPT

## 2020-01-01 PROCEDURE — 2580000003 HC RX 258: Performed by: STUDENT IN AN ORGANIZED HEALTH CARE EDUCATION/TRAINING PROGRAM

## 2020-01-01 PROCEDURE — 85055 RETICULATED PLATELET ASSAY: CPT

## 2020-01-01 RX ORDER — DIAZEPAM 5 MG/1
5 TABLET ORAL ONCE
Qty: 1 TABLET | Refills: 0 | Status: SHIPPED | OUTPATIENT
Start: 2020-01-01 | End: 2020-01-01

## 2020-01-01 RX ORDER — DEXTROSE MONOHYDRATE 25 G/50ML
12.5 INJECTION, SOLUTION INTRAVENOUS PRN
Status: DISCONTINUED | OUTPATIENT
Start: 2020-01-01 | End: 2020-01-01 | Stop reason: HOSPADM

## 2020-01-01 RX ORDER — FUROSEMIDE 20 MG/1
20 TABLET ORAL DAILY
Status: DISCONTINUED | OUTPATIENT
Start: 2020-01-01 | End: 2020-01-01 | Stop reason: HOSPADM

## 2020-01-01 RX ORDER — SODIUM CHLORIDE 9 MG/ML
INJECTION, SOLUTION INTRAVENOUS CONTINUOUS
Status: CANCELLED | OUTPATIENT
Start: 2020-01-01

## 2020-01-01 RX ORDER — SODIUM PHOSPHATE, DIBASIC AND SODIUM PHOSPHATE, MONOBASIC 7; 19 G/133ML; G/133ML
1 ENEMA RECTAL ONCE
Status: COMPLETED | OUTPATIENT
Start: 2020-01-01 | End: 2020-01-01

## 2020-01-01 RX ORDER — FINASTERIDE 5 MG/1
5 TABLET, FILM COATED ORAL DAILY
Status: DISCONTINUED | OUTPATIENT
Start: 2020-01-01 | End: 2020-01-01 | Stop reason: HOSPADM

## 2020-01-01 RX ORDER — ONDANSETRON 2 MG/ML
4 INJECTION INTRAMUSCULAR; INTRAVENOUS ONCE
Status: DISCONTINUED | OUTPATIENT
Start: 2020-01-01 | End: 2020-01-01 | Stop reason: HOSPADM

## 2020-01-01 RX ORDER — DOCUSATE SODIUM 100 MG/1
100 CAPSULE, LIQUID FILLED ORAL NIGHTLY
Status: DISCONTINUED | OUTPATIENT
Start: 2020-01-01 | End: 2020-01-01 | Stop reason: HOSPADM

## 2020-01-01 RX ORDER — METOPROLOL SUCCINATE 25 MG/1
12.5 TABLET, EXTENDED RELEASE ORAL DAILY
Status: DISCONTINUED | OUTPATIENT
Start: 2020-01-01 | End: 2020-01-01 | Stop reason: HOSPADM

## 2020-01-01 RX ORDER — GLIMEPIRIDE 2 MG/1
1 TABLET ORAL
Qty: 30 TABLET | Refills: 3 | Status: SHIPPED | OUTPATIENT
Start: 2020-01-01

## 2020-01-01 RX ORDER — SODIUM CHLORIDE 9 MG/ML
INJECTION, SOLUTION INTRAVENOUS CONTINUOUS
Status: ACTIVE | OUTPATIENT
Start: 2020-01-01 | End: 2020-01-01

## 2020-01-01 RX ORDER — ACETAMINOPHEN 325 MG/1
650 TABLET ORAL EVERY 6 HOURS PRN
Status: DISCONTINUED | OUTPATIENT
Start: 2020-01-01 | End: 2020-01-01 | Stop reason: HOSPADM

## 2020-01-01 RX ORDER — SODIUM CHLORIDE 0.9 % (FLUSH) 0.9 %
10 SYRINGE (ML) INJECTION PRN
Status: CANCELLED | OUTPATIENT
Start: 2020-01-01

## 2020-01-01 RX ORDER — ATORVASTATIN CALCIUM 20 MG/1
20 TABLET, FILM COATED ORAL NIGHTLY
Status: DISCONTINUED | OUTPATIENT
Start: 2020-01-01 | End: 2020-01-01

## 2020-01-01 RX ORDER — ACETAMINOPHEN 650 MG/1
650 SUPPOSITORY RECTAL EVERY 6 HOURS PRN
Status: DISCONTINUED | OUTPATIENT
Start: 2020-01-01 | End: 2020-01-01 | Stop reason: HOSPADM

## 2020-01-01 RX ORDER — ASPIRIN 81 MG/1
81 TABLET ORAL DAILY
Status: DISCONTINUED | OUTPATIENT
Start: 2020-01-01 | End: 2020-01-01 | Stop reason: HOSPADM

## 2020-01-01 RX ORDER — SODIUM CHLORIDE 9 MG/ML
20 INJECTION, SOLUTION INTRAVENOUS ONCE
Status: CANCELLED | OUTPATIENT
Start: 2020-01-01

## 2020-01-01 RX ORDER — MORPHINE SULFATE 4 MG/ML
2 INJECTION, SOLUTION INTRAMUSCULAR; INTRAVENOUS ONCE
Status: COMPLETED | OUTPATIENT
Start: 2020-01-01 | End: 2020-01-01

## 2020-01-01 RX ORDER — SODIUM CHLORIDE 0.9 % (FLUSH) 0.9 %
10 SYRINGE (ML) INJECTION EVERY 12 HOURS SCHEDULED
Status: DISCONTINUED | OUTPATIENT
Start: 2020-01-01 | End: 2020-01-01 | Stop reason: HOSPADM

## 2020-01-01 RX ORDER — HYDROCODONE BITARTRATE AND ACETAMINOPHEN 5; 325 MG/1; MG/1
1 TABLET ORAL EVERY 6 HOURS PRN
Qty: 12 TABLET | Refills: 0 | Status: SHIPPED | OUTPATIENT
Start: 2020-01-01 | End: 2020-01-01

## 2020-01-01 RX ORDER — METHYLPREDNISOLONE SODIUM SUCCINATE 125 MG/2ML
125 INJECTION, POWDER, LYOPHILIZED, FOR SOLUTION INTRAMUSCULAR; INTRAVENOUS ONCE
Status: CANCELLED | OUTPATIENT
Start: 2020-01-01

## 2020-01-01 RX ORDER — DEXAMETHASONE 4 MG/1
8 TABLET ORAL ONCE
COMMUNITY
End: 2020-01-01

## 2020-01-01 RX ORDER — LEVOFLOXACIN 750 MG/1
750 TABLET ORAL
Status: DISCONTINUED | OUTPATIENT
Start: 2020-01-01 | End: 2020-01-01 | Stop reason: HOSPADM

## 2020-01-01 RX ORDER — ATORVASTATIN CALCIUM 20 MG/1
20 TABLET, FILM COATED ORAL NIGHTLY
Status: DISCONTINUED | OUTPATIENT
Start: 2020-01-01 | End: 2020-01-01 | Stop reason: HOSPADM

## 2020-01-01 RX ORDER — POLYETHYLENE GLYCOL 3350 17 G/17G
17 POWDER, FOR SOLUTION ORAL DAILY PRN
Status: DISCONTINUED | OUTPATIENT
Start: 2020-01-01 | End: 2020-01-01 | Stop reason: HOSPADM

## 2020-01-01 RX ORDER — EPINEPHRINE 1 MG/ML
0.3 INJECTION, SOLUTION, CONCENTRATE INTRAVENOUS PRN
Status: CANCELLED | OUTPATIENT
Start: 2020-01-01

## 2020-01-01 RX ORDER — FINASTERIDE 5 MG/1
5 TABLET, FILM COATED ORAL DAILY
Qty: 30 TABLET | Refills: 0 | Status: SHIPPED
Start: 2020-01-01 | End: 2020-01-01 | Stop reason: SDUPTHER

## 2020-01-01 RX ORDER — FINASTERIDE 5 MG/1
TABLET, FILM COATED ORAL
Qty: 90 TABLET | Refills: 3 | Status: SHIPPED | OUTPATIENT
Start: 2020-01-01

## 2020-01-01 RX ORDER — DEXTROSE MONOHYDRATE 50 MG/ML
100 INJECTION, SOLUTION INTRAVENOUS PRN
Status: DISCONTINUED | OUTPATIENT
Start: 2020-01-01 | End: 2020-01-01 | Stop reason: HOSPADM

## 2020-01-01 RX ORDER — 0.9 % SODIUM CHLORIDE 0.9 %
80 INTRAVENOUS SOLUTION INTRAVENOUS ONCE
Status: COMPLETED | OUTPATIENT
Start: 2020-01-01 | End: 2020-01-01

## 2020-01-01 RX ORDER — NICOTINE POLACRILEX 4 MG
15 LOZENGE BUCCAL PRN
Status: DISCONTINUED | OUTPATIENT
Start: 2020-01-01 | End: 2020-01-01 | Stop reason: HOSPADM

## 2020-01-01 RX ORDER — METOPROLOL SUCCINATE 25 MG/1
12.5 TABLET, EXTENDED RELEASE ORAL DAILY
Qty: 30 TABLET | Refills: 3 | Status: SHIPPED | OUTPATIENT
Start: 2020-01-01 | End: 2020-01-01 | Stop reason: ALTCHOICE

## 2020-01-01 RX ORDER — LIDOCAINE 50 MG/G
1 PATCH TOPICAL DAILY
Qty: 30 PATCH | Refills: 0 | Status: SHIPPED | OUTPATIENT
Start: 2020-01-01 | End: 2020-01-01

## 2020-01-01 RX ORDER — SODIUM CHLORIDE 0.9 % (FLUSH) 0.9 %
10 SYRINGE (ML) INJECTION 2 TIMES DAILY
Status: DISCONTINUED | OUTPATIENT
Start: 2020-01-01 | End: 2020-01-01 | Stop reason: HOSPADM

## 2020-01-01 RX ORDER — HYDROCODONE BITARTRATE AND ACETAMINOPHEN 5; 325 MG/1; MG/1
1 TABLET ORAL EVERY 6 HOURS PRN
COMMUNITY
End: 2020-01-01 | Stop reason: SDUPTHER

## 2020-01-01 RX ORDER — GLIMEPIRIDE 2 MG/1
2 TABLET ORAL
Status: DISCONTINUED | OUTPATIENT
Start: 2020-01-01 | End: 2020-01-01

## 2020-01-01 RX ORDER — 0.9 % SODIUM CHLORIDE 0.9 %
1500 INTRAVENOUS SOLUTION INTRAVENOUS ONCE
Status: DISCONTINUED | OUTPATIENT
Start: 2020-01-01 | End: 2020-01-01

## 2020-01-01 RX ORDER — DOXYCYCLINE HYCLATE 100 MG
100 TABLET ORAL EVERY 12 HOURS SCHEDULED
Status: DISCONTINUED | OUTPATIENT
Start: 2020-01-01 | End: 2020-01-01 | Stop reason: HOSPADM

## 2020-01-01 RX ORDER — LEVOTHYROXINE SODIUM 137 UG/1
137 TABLET ORAL DAILY
Status: DISCONTINUED | OUTPATIENT
Start: 2020-01-01 | End: 2020-01-01 | Stop reason: HOSPADM

## 2020-01-01 RX ORDER — MIRABEGRON 50 MG/1
50 TABLET, FILM COATED, EXTENDED RELEASE ORAL DAILY
Qty: 30 TABLET | Refills: 11 | Status: SHIPPED | OUTPATIENT
Start: 2020-01-01 | End: 2020-01-01

## 2020-01-01 RX ORDER — HEPARIN SODIUM (PORCINE) LOCK FLUSH IV SOLN 100 UNIT/ML 100 UNIT/ML
500 SOLUTION INTRAVENOUS PRN
Status: CANCELLED | OUTPATIENT
Start: 2020-01-01

## 2020-01-01 RX ORDER — SODIUM CHLORIDE 9 MG/ML
20 INJECTION, SOLUTION INTRAVENOUS ONCE
Status: COMPLETED | OUTPATIENT
Start: 2020-01-01 | End: 2020-01-01

## 2020-01-01 RX ORDER — TAMSULOSIN HYDROCHLORIDE 0.4 MG/1
0.4 CAPSULE ORAL DAILY
Status: DISCONTINUED | OUTPATIENT
Start: 2020-01-01 | End: 2020-01-01 | Stop reason: HOSPADM

## 2020-01-01 RX ORDER — TRIAMCINOLONE ACETONIDE 1 MG/G
CREAM TOPICAL
Qty: 1 TUBE | Refills: 0 | Status: SHIPPED | OUTPATIENT
Start: 2020-01-01 | End: 2020-01-01 | Stop reason: ALTCHOICE

## 2020-01-01 RX ORDER — 0.9 % SODIUM CHLORIDE 0.9 %
500 INTRAVENOUS SOLUTION INTRAVENOUS ONCE
Status: COMPLETED | OUTPATIENT
Start: 2020-01-01 | End: 2020-01-01

## 2020-01-01 RX ORDER — SODIUM CHLORIDE 0.9 % (FLUSH) 0.9 %
10 SYRINGE (ML) INJECTION PRN
Status: DISCONTINUED | OUTPATIENT
Start: 2020-01-01 | End: 2020-01-01 | Stop reason: HOSPADM

## 2020-01-01 RX ORDER — PROMETHAZINE HYDROCHLORIDE 25 MG/1
12.5 TABLET ORAL EVERY 6 HOURS PRN
Status: DISCONTINUED | OUTPATIENT
Start: 2020-01-01 | End: 2020-01-01 | Stop reason: HOSPADM

## 2020-01-01 RX ORDER — ERGOCALCIFEROL 1.25 MG/1
50000 CAPSULE ORAL WEEKLY
Status: DISCONTINUED | OUTPATIENT
Start: 2020-01-01 | End: 2020-01-01 | Stop reason: HOSPADM

## 2020-01-01 RX ORDER — SODIUM CHLORIDE 0.9 % (FLUSH) 0.9 %
5 SYRINGE (ML) INJECTION PRN
Status: CANCELLED | OUTPATIENT
Start: 2020-01-01

## 2020-01-01 RX ORDER — CLOTRIMAZOLE 1 %
CREAM (GRAM) TOPICAL 2 TIMES DAILY
Status: DISCONTINUED | OUTPATIENT
Start: 2020-01-01 | End: 2020-01-01 | Stop reason: HOSPADM

## 2020-01-01 RX ORDER — BISACODYL 10 MG
10 SUPPOSITORY, RECTAL RECTAL DAILY
Qty: 30 SUPPOSITORY | Refills: 0 | Status: SHIPPED | OUTPATIENT
Start: 2020-01-01 | End: 2021-01-01

## 2020-01-01 RX ORDER — DIPHENHYDRAMINE HCL 25 MG
25 CAPSULE ORAL EVERY 6 HOURS PRN
Qty: 20 CAPSULE | Refills: 0 | Status: SHIPPED | OUTPATIENT
Start: 2020-01-01 | End: 2020-01-01 | Stop reason: SDUPTHER

## 2020-01-01 RX ORDER — MORPHINE SULFATE 100 MG/5ML
5 SOLUTION ORAL EVERY 4 HOURS PRN
Qty: 1 BOTTLE | Refills: 0 | Status: SHIPPED | OUTPATIENT
Start: 2020-01-01 | End: 2020-01-01

## 2020-01-01 RX ORDER — DOXYCYCLINE HYCLATE 100 MG
100 TABLET ORAL EVERY 12 HOURS SCHEDULED
Qty: 20 TABLET | Refills: 0 | Status: SHIPPED | OUTPATIENT
Start: 2020-01-01 | End: 2020-01-01

## 2020-01-01 RX ORDER — TAMSULOSIN HYDROCHLORIDE 0.4 MG/1
0.4 CAPSULE ORAL DAILY
Qty: 90 CAPSULE | Refills: 3 | Status: SHIPPED | OUTPATIENT
Start: 2020-01-01

## 2020-01-01 RX ORDER — 0.9 % SODIUM CHLORIDE 0.9 %
1000 INTRAVENOUS SOLUTION INTRAVENOUS ONCE
Status: COMPLETED | OUTPATIENT
Start: 2020-01-01 | End: 2020-01-01

## 2020-01-01 RX ORDER — ASPIRIN 81 MG/1
324 TABLET, CHEWABLE ORAL ONCE
Status: COMPLETED | OUTPATIENT
Start: 2020-01-01 | End: 2020-01-01

## 2020-01-01 RX ORDER — HYDROCODONE BITARTRATE AND ACETAMINOPHEN 5; 325 MG/1; MG/1
1 TABLET ORAL EVERY 6 HOURS PRN
Status: DISCONTINUED | OUTPATIENT
Start: 2020-01-01 | End: 2020-01-01 | Stop reason: HOSPADM

## 2020-01-01 RX ORDER — ALOGLIPTIN 12.5 MG/1
12.5 TABLET, FILM COATED ORAL DAILY
Status: DISCONTINUED | OUTPATIENT
Start: 2020-01-01 | End: 2020-01-01 | Stop reason: HOSPADM

## 2020-01-01 RX ORDER — POLYETHYLENE GLYCOL 3350 17 G/17G
17 POWDER, FOR SOLUTION ORAL DAILY PRN
Qty: 527 G | Refills: 1 | Status: SHIPPED | OUTPATIENT
Start: 2020-01-01

## 2020-01-01 RX ORDER — DIPHENHYDRAMINE HYDROCHLORIDE 50 MG/ML
50 INJECTION INTRAMUSCULAR; INTRAVENOUS ONCE
Status: CANCELLED | OUTPATIENT
Start: 2020-01-01

## 2020-01-01 RX ORDER — ONDANSETRON 2 MG/ML
4 INJECTION INTRAMUSCULAR; INTRAVENOUS EVERY 6 HOURS PRN
Status: DISCONTINUED | OUTPATIENT
Start: 2020-01-01 | End: 2020-01-01 | Stop reason: HOSPADM

## 2020-01-01 RX ORDER — HYDROCODONE BITARTRATE AND ACETAMINOPHEN 5; 325 MG/1; MG/1
1 TABLET ORAL ONCE
Status: DISCONTINUED | OUTPATIENT
Start: 2020-01-01 | End: 2020-01-01 | Stop reason: HOSPADM

## 2020-01-01 RX ORDER — NITROGLYCERIN 0.4 MG/1
0.4 TABLET SUBLINGUAL
Qty: 25 TABLET | Status: CANCELLED | OUTPATIENT
Start: 2020-01-01

## 2020-01-01 RX ORDER — GLIMEPIRIDE 2 MG/1
1 TABLET ORAL
Status: DISCONTINUED | OUTPATIENT
Start: 2020-01-01 | End: 2020-01-01 | Stop reason: HOSPADM

## 2020-01-01 RX ORDER — ATORVASTATIN CALCIUM 40 MG/1
40 TABLET, FILM COATED ORAL NIGHTLY
Status: DISCONTINUED | OUTPATIENT
Start: 2020-01-01 | End: 2020-01-01 | Stop reason: HOSPADM

## 2020-01-01 RX ORDER — LEVOFLOXACIN 750 MG/1
750 TABLET ORAL
Qty: 3 TABLET | Refills: 0 | Status: SHIPPED | OUTPATIENT
Start: 2020-01-01 | End: 2020-01-01

## 2020-01-01 RX ORDER — HYDROCODONE BITARTRATE AND ACETAMINOPHEN 5; 325 MG/1; MG/1
1 TABLET ORAL 2 TIMES DAILY PRN
Qty: 60 TABLET | Refills: 0 | Status: SHIPPED | OUTPATIENT
Start: 2020-01-01 | End: 2020-12-30

## 2020-01-01 RX ORDER — DIPHENHYDRAMINE HCL 25 MG
25 CAPSULE ORAL EVERY 6 HOURS PRN
Qty: 30 CAPSULE | Refills: 0 | Status: SHIPPED | OUTPATIENT
Start: 2020-01-01

## 2020-01-01 RX ORDER — NITROGLYCERIN 0.4 MG/1
0.4 TABLET SUBLINGUAL EVERY 5 MIN PRN
Status: DISCONTINUED | OUTPATIENT
Start: 2020-01-01 | End: 2020-01-01 | Stop reason: HOSPADM

## 2020-01-01 RX ORDER — AZITHROMYCIN 250 MG/1
250 TABLET, FILM COATED ORAL DAILY
Qty: 10 TABLET | Refills: 0 | Status: SHIPPED | OUTPATIENT
Start: 2020-01-01 | End: 2020-12-12

## 2020-01-01 RX ORDER — M-VIT,TX,IRON,MINS/CALC/FOLIC 27MG-0.4MG
1 TABLET ORAL DAILY
Status: DISCONTINUED | OUTPATIENT
Start: 2020-01-01 | End: 2020-01-01 | Stop reason: HOSPADM

## 2020-01-01 RX ORDER — MORPHINE SULFATE 4 MG/ML
4 INJECTION, SOLUTION INTRAMUSCULAR; INTRAVENOUS ONCE
Status: COMPLETED | OUTPATIENT
Start: 2020-01-01 | End: 2020-01-01

## 2020-01-01 RX ORDER — SODIUM CHLORIDE 0.9 % (FLUSH) 0.9 %
10 SYRINGE (ML) INJECTION ONCE
Status: COMPLETED | OUTPATIENT
Start: 2020-01-01 | End: 2020-01-01

## 2020-01-01 RX ORDER — 0.9 % SODIUM CHLORIDE 0.9 %
1500 INTRAVENOUS SOLUTION INTRAVENOUS ONCE
Status: COMPLETED | OUTPATIENT
Start: 2020-01-01 | End: 2020-01-01

## 2020-01-01 RX ORDER — MORPHINE SULFATE 4 MG/ML
4 INJECTION, SOLUTION INTRAMUSCULAR; INTRAVENOUS ONCE
Status: DISCONTINUED | OUTPATIENT
Start: 2020-01-01 | End: 2020-01-01 | Stop reason: HOSPADM

## 2020-01-01 RX ORDER — HYDROCODONE BITARTRATE AND ACETAMINOPHEN 5; 325 MG/1; MG/1
1 TABLET ORAL EVERY 6 HOURS PRN
Qty: 60 TABLET | Refills: 0 | Status: SHIPPED | OUTPATIENT
Start: 2020-01-01 | End: 2020-01-01

## 2020-01-01 RX ORDER — DIPHENHYDRAMINE HCL 25 MG
25 TABLET ORAL EVERY 6 HOURS PRN
Status: DISCONTINUED | OUTPATIENT
Start: 2020-01-01 | End: 2020-01-01 | Stop reason: HOSPADM

## 2020-01-01 RX ORDER — HYDROCODONE BITARTRATE AND ACETAMINOPHEN 5; 325 MG/1; MG/1
1 TABLET ORAL EVERY 6 HOURS PRN
Qty: 10 TABLET | Refills: 0 | Status: SHIPPED | OUTPATIENT
Start: 2020-01-01 | End: 2020-01-01 | Stop reason: SDUPTHER

## 2020-01-01 RX ADMIN — ASPIRIN 324 MG: 81 TABLET, CHEWABLE ORAL at 08:28

## 2020-01-01 RX ADMIN — SODIUM PHOSPHATE 1 ENEMA: 7; 19 ENEMA RECTAL at 18:07

## 2020-01-01 RX ADMIN — ATORVASTATIN CALCIUM 20 MG: 20 TABLET, FILM COATED ORAL at 21:54

## 2020-01-01 RX ADMIN — SODIUM CHLORIDE 1000 ML: 9 INJECTION, SOLUTION INTRAVENOUS at 10:34

## 2020-01-01 RX ADMIN — INSULIN LISPRO 3 UNITS: 100 INJECTION, SOLUTION INTRAVENOUS; SUBCUTANEOUS at 21:49

## 2020-01-01 RX ADMIN — INSULIN LISPRO 2 UNITS: 100 INJECTION, SOLUTION INTRAVENOUS; SUBCUTANEOUS at 18:02

## 2020-01-01 RX ADMIN — Medication 10 ML: at 21:46

## 2020-01-01 RX ADMIN — SODIUM CHLORIDE 80 ML: 9 INJECTION, SOLUTION INTRAVENOUS at 21:46

## 2020-01-01 RX ADMIN — INSULIN LISPRO 2 UNITS: 100 INJECTION, SOLUTION INTRAVENOUS; SUBCUTANEOUS at 08:26

## 2020-01-01 RX ADMIN — FINASTERIDE 5 MG: 5 TABLET, FILM COATED ORAL at 10:06

## 2020-01-01 RX ADMIN — DOCUSATE SODIUM 100 MG: 100 CAPSULE, LIQUID FILLED ORAL at 23:05

## 2020-01-01 RX ADMIN — IOPAMIDOL 75 ML: 755 INJECTION, SOLUTION INTRAVENOUS at 14:40

## 2020-01-01 RX ADMIN — TAMSULOSIN HYDROCHLORIDE 0.4 MG: 0.4 CAPSULE ORAL at 07:45

## 2020-01-01 RX ADMIN — SODIUM CHLORIDE 20 ML/HR: 9 INJECTION, SOLUTION INTRAVENOUS at 14:11

## 2020-01-01 RX ADMIN — SODIUM CHLORIDE 500 ML: 0.9 INJECTION, SOLUTION INTRAVENOUS at 20:19

## 2020-01-01 RX ADMIN — LEVOTHYROXINE SODIUM 137 MCG: 137 TABLET ORAL at 09:26

## 2020-01-01 RX ADMIN — GLIMEPIRIDE 1 MG: 2 TABLET ORAL at 05:41

## 2020-01-01 RX ADMIN — MAGNESIUM CITRATE 296 ML: 1.75 LIQUID ORAL at 18:07

## 2020-01-01 RX ADMIN — DOCUSATE SODIUM 100 MG: 100 CAPSULE ORAL at 21:00

## 2020-01-01 RX ADMIN — CLOTRIMAZOLE: 10 CREAM TOPICAL at 15:13

## 2020-01-01 RX ADMIN — INSULIN LISPRO 4 UNITS: 100 INJECTION, SOLUTION INTRAVENOUS; SUBCUTANEOUS at 09:09

## 2020-01-01 RX ADMIN — SODIUM CHLORIDE 20 ML/HR: 9 INJECTION, SOLUTION INTRAVENOUS at 14:23

## 2020-01-01 RX ADMIN — INSULIN LISPRO 2 UNITS: 100 INJECTION, SOLUTION INTRAVENOUS; SUBCUTANEOUS at 07:45

## 2020-01-01 RX ADMIN — FUROSEMIDE 20 MG: 20 TABLET ORAL at 10:08

## 2020-01-01 RX ADMIN — LEVOTHYROXINE SODIUM 137 MCG: 0.14 TABLET ORAL at 06:35

## 2020-01-01 RX ADMIN — LEVOTHYROXINE SODIUM 137 MCG: 0.14 TABLET ORAL at 06:20

## 2020-01-01 RX ADMIN — CLOTRIMAZOLE: 10 CREAM TOPICAL at 20:48

## 2020-01-01 RX ADMIN — ALOGLIPTIN 12.5 MG: 12.5 TABLET, FILM COATED ORAL at 21:12

## 2020-01-01 RX ADMIN — INSULIN LISPRO 2 UNITS: 100 INJECTION, SOLUTION INTRAVENOUS; SUBCUTANEOUS at 12:37

## 2020-01-01 RX ADMIN — SODIUM CHLORIDE 80 ML: 9 INJECTION, SOLUTION INTRAVENOUS at 14:39

## 2020-01-01 RX ADMIN — Medication 81 MG: at 09:26

## 2020-01-01 RX ADMIN — METOPROLOL SUCCINATE 12.5 MG: 25 TABLET, EXTENDED RELEASE ORAL at 10:12

## 2020-01-01 RX ADMIN — VANCOMYCIN HYDROCHLORIDE 1000 MG: 1 INJECTION, POWDER, LYOPHILIZED, FOR SOLUTION INTRAVENOUS at 23:49

## 2020-01-01 RX ADMIN — CLOTRIMAZOLE: 10 CREAM TOPICAL at 10:09

## 2020-01-01 RX ADMIN — INSULIN LISPRO 4 UNITS: 100 INJECTION, SOLUTION INTRAVENOUS; SUBCUTANEOUS at 10:09

## 2020-01-01 RX ADMIN — IOPAMIDOL 75 ML: 755 INJECTION, SOLUTION INTRAVENOUS at 15:51

## 2020-01-01 RX ADMIN — INSULIN LISPRO 4 UNITS: 100 INJECTION, SOLUTION INTRAVENOUS; SUBCUTANEOUS at 13:42

## 2020-01-01 RX ADMIN — FUROSEMIDE 20 MG: 20 TABLET ORAL at 09:09

## 2020-01-01 RX ADMIN — FUROSEMIDE 20 MG: 20 TABLET ORAL at 07:45

## 2020-01-01 RX ADMIN — IOPAMIDOL 75 ML: 755 INJECTION, SOLUTION INTRAVENOUS at 21:46

## 2020-01-01 RX ADMIN — INSULIN LISPRO 4 UNITS: 100 INJECTION, SOLUTION INTRAVENOUS; SUBCUTANEOUS at 09:38

## 2020-01-01 RX ADMIN — FINASTERIDE 5 MG: 5 TABLET, FILM COATED ORAL at 09:27

## 2020-01-01 RX ADMIN — MORPHINE SULFATE 2 MG: 4 INJECTION INTRAVENOUS at 11:23

## 2020-01-01 RX ADMIN — SODIUM CHLORIDE 500 ML: 9 INJECTION, SOLUTION INTRAVENOUS at 13:56

## 2020-01-01 RX ADMIN — TAMSULOSIN HYDROCHLORIDE 0.4 MG: 0.4 CAPSULE ORAL at 08:24

## 2020-01-01 RX ADMIN — SODIUM CHLORIDE: 9 INJECTION, SOLUTION INTRAVENOUS at 03:46

## 2020-01-01 RX ADMIN — CEFEPIME HYDROCHLORIDE 1 G: 1 INJECTION, POWDER, FOR SOLUTION INTRAMUSCULAR; INTRAVENOUS at 07:44

## 2020-01-01 RX ADMIN — SODIUM CHLORIDE 80 ML: 9 INJECTION, SOLUTION INTRAVENOUS at 15:19

## 2020-01-01 RX ADMIN — Medication 10 ML: at 09:26

## 2020-01-01 RX ADMIN — ASPIRIN 81 MG: 81 TABLET, COATED ORAL at 10:09

## 2020-01-01 RX ADMIN — TAMSULOSIN HYDROCHLORIDE 0.4 MG: 0.4 CAPSULE ORAL at 10:06

## 2020-01-01 RX ADMIN — MORPHINE SULFATE 4 MG: 4 INJECTION, SOLUTION INTRAMUSCULAR; INTRAVENOUS at 18:07

## 2020-01-01 RX ADMIN — Medication 10 ML: at 14:40

## 2020-01-01 RX ADMIN — MULTIPLE VITAMINS W/ MINERALS TAB 1 TABLET: TAB at 09:26

## 2020-01-01 RX ADMIN — TAMSULOSIN HYDROCHLORIDE 0.4 MG: 0.4 CAPSULE ORAL at 10:12

## 2020-01-01 RX ADMIN — METOPROLOL SUCCINATE 12.5 MG: 25 TABLET, EXTENDED RELEASE ORAL at 09:09

## 2020-01-01 RX ADMIN — VANCOMYCIN HYDROCHLORIDE 1250 MG: 1.25 INJECTION, POWDER, LYOPHILIZED, FOR SOLUTION INTRAVENOUS at 20:53

## 2020-01-01 RX ADMIN — CEFEPIME HYDROCHLORIDE 1 G: 1 INJECTION, POWDER, FOR SOLUTION INTRAMUSCULAR; INTRAVENOUS at 10:06

## 2020-01-01 RX ADMIN — ASPIRIN 81 MG: 81 TABLET, COATED ORAL at 07:45

## 2020-01-01 RX ADMIN — ALOGLIPTIN 12.5 MG: 12.5 TABLET, FILM COATED ORAL at 08:24

## 2020-01-01 RX ADMIN — INSULIN LISPRO 2 UNITS: 100 INJECTION, SOLUTION INTRAVENOUS; SUBCUTANEOUS at 21:54

## 2020-01-01 RX ADMIN — DOCUSATE SODIUM 100 MG: 100 CAPSULE ORAL at 21:49

## 2020-01-01 RX ADMIN — ASPIRIN 81 MG: 81 TABLET, COATED ORAL at 08:24

## 2020-01-01 RX ADMIN — INSULIN LISPRO 2 UNITS: 100 INJECTION, SOLUTION INTRAVENOUS; SUBCUTANEOUS at 17:36

## 2020-01-01 RX ADMIN — IOHEXOL 75 ML: 350 INJECTION, SOLUTION INTRAVENOUS at 18:23

## 2020-01-01 RX ADMIN — METOPROLOL SUCCINATE 12.5 MG: 25 TABLET, EXTENDED RELEASE ORAL at 08:31

## 2020-01-01 RX ADMIN — Medication 500 MG: at 10:34

## 2020-01-01 RX ADMIN — GLIMEPIRIDE 1 MG: 2 TABLET ORAL at 06:20

## 2020-01-01 RX ADMIN — LEVOTHYROXINE SODIUM 137 MCG: 0.14 TABLET ORAL at 06:24

## 2020-01-01 RX ADMIN — INSULIN LISPRO 1 UNITS: 100 INJECTION, SOLUTION INTRAVENOUS; SUBCUTANEOUS at 23:07

## 2020-01-01 RX ADMIN — ATORVASTATIN CALCIUM 20 MG: 20 TABLET, FILM COATED ORAL at 21:00

## 2020-01-01 RX ADMIN — TAMSULOSIN HYDROCHLORIDE 0.4 MG: 0.4 CAPSULE ORAL at 09:26

## 2020-01-01 RX ADMIN — INSULIN LISPRO 4 UNITS: 100 INJECTION, SOLUTION INTRAVENOUS; SUBCUTANEOUS at 11:53

## 2020-01-01 RX ADMIN — IOVERSOL 75 ML: 741 INJECTION INTRA-ARTERIAL; INTRAVENOUS at 15:19

## 2020-01-01 RX ADMIN — ZOLEDRONIC ACID 3 MG: 0.8 INJECTION, SOLUTION, CONCENTRATE INTRAVENOUS at 14:32

## 2020-01-01 RX ADMIN — CLOTRIMAZOLE: 10 CREAM TOPICAL at 00:20

## 2020-01-01 RX ADMIN — HYDROCODONE BITARTRATE AND ACETAMINOPHEN 1 TABLET: 5; 325 TABLET ORAL at 06:20

## 2020-01-01 RX ADMIN — DOCUSATE SODIUM 100 MG: 100 CAPSULE ORAL at 21:12

## 2020-01-01 RX ADMIN — CEFEPIME HYDROCHLORIDE 1 G: 1 INJECTION, POWDER, FOR SOLUTION INTRAMUSCULAR; INTRAVENOUS at 21:12

## 2020-01-01 RX ADMIN — DOCUSATE SODIUM 100 MG: 100 CAPSULE ORAL at 21:54

## 2020-01-01 RX ADMIN — CLOTRIMAZOLE: 10 CREAM TOPICAL at 08:25

## 2020-01-01 RX ADMIN — LEVOTHYROXINE SODIUM 137 MCG: 0.14 TABLET ORAL at 05:42

## 2020-01-01 RX ADMIN — ENOXAPARIN SODIUM 30 MG: 30 INJECTION SUBCUTANEOUS at 23:05

## 2020-01-01 RX ADMIN — HYDROCODONE BITARTRATE AND ACETAMINOPHEN 1 TABLET: 5; 325 TABLET ORAL at 22:12

## 2020-01-01 RX ADMIN — ENOXAPARIN SODIUM 40 MG: 40 INJECTION SUBCUTANEOUS at 10:12

## 2020-01-01 RX ADMIN — FINASTERIDE 5 MG: 5 TABLET, FILM COATED ORAL at 07:45

## 2020-01-01 RX ADMIN — Medication 10 ML: at 15:51

## 2020-01-01 RX ADMIN — INSULIN LISPRO 10 UNITS: 100 INJECTION, SOLUTION INTRAVENOUS; SUBCUTANEOUS at 19:43

## 2020-01-01 RX ADMIN — ALOGLIPTIN 12.5 MG: 12.5 TABLET, FILM COATED ORAL at 10:09

## 2020-01-01 RX ADMIN — LEVOFLOXACIN 750 MG: 750 TABLET, FILM COATED ORAL at 12:05

## 2020-01-01 RX ADMIN — ATORVASTATIN CALCIUM 20 MG: 20 TABLET, FILM COATED ORAL at 21:48

## 2020-01-01 RX ADMIN — CEFEPIME HYDROCHLORIDE 1 G: 1 INJECTION, POWDER, FOR SOLUTION INTRAMUSCULAR; INTRAVENOUS at 08:25

## 2020-01-01 RX ADMIN — DOXYCYCLINE HYCLATE 100 MG: 100 TABLET, COATED ORAL at 23:05

## 2020-01-01 RX ADMIN — CLOTRIMAZOLE: 10 CREAM TOPICAL at 22:52

## 2020-01-01 RX ADMIN — FINASTERIDE 5 MG: 5 TABLET, FILM COATED ORAL at 08:24

## 2020-01-01 RX ADMIN — VANCOMYCIN HYDROCHLORIDE 1000 MG: 1 INJECTION, POWDER, LYOPHILIZED, FOR SOLUTION INTRAVENOUS at 00:17

## 2020-01-01 RX ADMIN — CEFEPIME HYDROCHLORIDE 2 G: 2 INJECTION, POWDER, FOR SOLUTION INTRAVENOUS at 19:54

## 2020-01-01 RX ADMIN — SODIUM CHLORIDE 80 ML: 9 INJECTION, SOLUTION INTRAVENOUS at 15:51

## 2020-01-01 RX ADMIN — ALOGLIPTIN 12.5 MG: 12.5 TABLET, FILM COATED ORAL at 09:09

## 2020-01-01 RX ADMIN — ATORVASTATIN CALCIUM 20 MG: 20 TABLET, FILM COATED ORAL at 23:05

## 2020-01-01 RX ADMIN — CLOTRIMAZOLE: 10 CREAM TOPICAL at 09:19

## 2020-01-01 RX ADMIN — FINASTERIDE 5 MG: 5 TABLET, FILM COATED ORAL at 09:09

## 2020-01-01 RX ADMIN — FINASTERIDE 5 MG: 5 TABLET, FILM COATED ORAL at 10:08

## 2020-01-01 RX ADMIN — ASPIRIN 81 MG: 81 TABLET, COATED ORAL at 09:09

## 2020-01-01 RX ADMIN — INSULIN LISPRO 2 UNITS: 100 INJECTION, SOLUTION INTRAVENOUS; SUBCUTANEOUS at 22:16

## 2020-01-01 RX ADMIN — LEVOTHYROXINE SODIUM 137 MCG: 0.14 TABLET ORAL at 05:50

## 2020-01-01 RX ADMIN — INSULIN LISPRO 4 UNITS: 100 INJECTION, SOLUTION INTRAVENOUS; SUBCUTANEOUS at 11:31

## 2020-01-01 RX ADMIN — FUROSEMIDE 20 MG: 20 TABLET ORAL at 10:06

## 2020-01-01 RX ADMIN — Medication 10 ML: at 23:06

## 2020-01-01 RX ADMIN — CEFEPIME HYDROCHLORIDE 1 G: 1 INJECTION, POWDER, FOR SOLUTION INTRAMUSCULAR; INTRAVENOUS at 21:48

## 2020-01-01 RX ADMIN — FUROSEMIDE 20 MG: 20 TABLET ORAL at 09:26

## 2020-01-01 RX ADMIN — ATORVASTATIN CALCIUM 20 MG: 20 TABLET, FILM COATED ORAL at 21:12

## 2020-01-01 RX ADMIN — LEVOFLOXACIN 750 MG: 750 TABLET, FILM COATED ORAL at 13:42

## 2020-01-01 RX ADMIN — DOXYCYCLINE HYCLATE 100 MG: 100 TABLET, COATED ORAL at 09:26

## 2020-01-01 RX ADMIN — TAMSULOSIN HYDROCHLORIDE 0.4 MG: 0.4 CAPSULE ORAL at 09:09

## 2020-01-01 RX ADMIN — INSULIN LISPRO 4 UNITS: 100 INJECTION, SOLUTION INTRAVENOUS; SUBCUTANEOUS at 21:00

## 2020-01-01 RX ADMIN — ZOLEDRONIC ACID 3 MG: 0.8 INJECTION, SOLUTION, CONCENTRATE INTRAVENOUS at 14:11

## 2020-01-01 RX ADMIN — FUROSEMIDE 20 MG: 20 TABLET ORAL at 08:24

## 2020-01-01 RX ADMIN — ASPIRIN 81 MG: 81 TABLET, COATED ORAL at 10:06

## 2020-01-01 RX ADMIN — SODIUM CHLORIDE 1500 ML: 9 INJECTION, SOLUTION INTRAVENOUS at 19:53

## 2020-01-01 RX ADMIN — ENOXAPARIN SODIUM 30 MG: 30 INJECTION SUBCUTANEOUS at 09:26

## 2020-01-01 SDOH — ECONOMIC STABILITY: FOOD INSECURITY: WITHIN THE PAST 12 MONTHS, THE FOOD YOU BOUGHT JUST DIDN'T LAST AND YOU DIDN'T HAVE MONEY TO GET MORE.: NEVER TRUE

## 2020-01-01 SDOH — ECONOMIC STABILITY: INCOME INSECURITY: HOW HARD IS IT FOR YOU TO PAY FOR THE VERY BASICS LIKE FOOD, HOUSING, MEDICAL CARE, AND HEATING?: NOT HARD AT ALL

## 2020-01-01 SDOH — ECONOMIC STABILITY: FOOD INSECURITY: WITHIN THE PAST 12 MONTHS, YOU WORRIED THAT YOUR FOOD WOULD RUN OUT BEFORE YOU GOT MONEY TO BUY MORE.: NEVER TRUE

## 2020-01-01 ASSESSMENT — ENCOUNTER SYMPTOMS
ALLERGIC/IMMUNOLOGIC NEGATIVE: 1
ALLERGIC/IMMUNOLOGIC NEGATIVE: 1
BACK PAIN: 0
DIARRHEA: 0
SORE THROAT: 0
CONSTIPATION: 0
SINUS PRESSURE: 0
NAUSEA: 0
COUGH: 1
ABDOMINAL PAIN: 0
ABDOMINAL DISTENTION: 0
VOMITING: 0
COLOR CHANGE: 0
NAUSEA: 0
EYE DISCHARGE: 0
DIARRHEA: 0
VOMITING: 0
CHEST TIGHTNESS: 0
ABDOMINAL PAIN: 0
NAUSEA: 0
DIARRHEA: 0
WHEEZING: 0
BACK PAIN: 1
NAUSEA: 0
RHINORRHEA: 0
DIARRHEA: 0
NAUSEA: 0
CONSTIPATION: 0
VOMITING: 0
EYE REDNESS: 0
ANAL BLEEDING: 0
EYE REDNESS: 1
WHEEZING: 0
RECTAL PAIN: 0
ABDOMINAL DISTENTION: 0
CONSTIPATION: 0
ABDOMINAL PAIN: 0
COUGH: 0
TROUBLE SWALLOWING: 0
ABDOMINAL PAIN: 0
SHORTNESS OF BREATH: 0
COUGH: 1
CONSTIPATION: 0
COLOR CHANGE: 0
SHORTNESS OF BREATH: 0
CHOKING: 1
FACIAL SWELLING: 0
ABDOMINAL PAIN: 0
ABDOMINAL PAIN: 0
VOMITING: 0
NAUSEA: 0
DIARRHEA: 0
RHINORRHEA: 0
COUGH: 0
NAUSEA: 0
SHORTNESS OF BREATH: 0
CHEST TIGHTNESS: 0
NAUSEA: 0
SHORTNESS OF BREATH: 0
NAUSEA: 0
ABDOMINAL PAIN: 0
ABDOMINAL PAIN: 0
GASTROINTESTINAL NEGATIVE: 1
DIARRHEA: 0
WHEEZING: 0
ABDOMINAL PAIN: 0
CONSTIPATION: 1
SORE THROAT: 0
APNEA: 0
SHORTNESS OF BREATH: 0
COUGH: 1
ALLERGIC/IMMUNOLOGIC NEGATIVE: 1
TACHYPNEA: 1
DIARRHEA: 1
SORE THROAT: 0
VOMITING: 0
ABDOMINAL DISTENTION: 0
DIARRHEA: 0
TROUBLE SWALLOWING: 1
CONSTIPATION: 0
EYES NEGATIVE: 1
BLOOD IN STOOL: 0
SHORTNESS OF BREATH: 0
CONSTIPATION: 0
VOMITING: 0
BACK PAIN: 0
EYE PAIN: 0
VOICE CHANGE: 1
EYE PAIN: 0
SHORTNESS OF BREATH: 0
SINUS PRESSURE: 0
COUGH: 1
WHEEZING: 0
EYE DISCHARGE: 1
SHORTNESS OF BREATH: 0
VOMITING: 0
BACK PAIN: 1
WHEEZING: 0
CHOKING: 1
SORE THROAT: 0
TROUBLE SWALLOWING: 1
EYE PAIN: 0
TROUBLE SWALLOWING: 1
SHORTNESS OF BREATH: 1
ABDOMINAL PAIN: 0
RECTAL PAIN: 0
EYE PAIN: 0
ANAL BLEEDING: 0
COLOR CHANGE: 0
BACK PAIN: 1
EYE PAIN: 0
NAUSEA: 0
CHEST TIGHTNESS: 0
BACK PAIN: 1
ABDOMINAL PAIN: 0
SINUS PRESSURE: 0
BLOOD IN STOOL: 0
COUGH: 1
ABDOMINAL PAIN: 0
BACK PAIN: 1
VOMITING: 0
RESPIRATORY NEGATIVE: 1
CONSTIPATION: 0
VOMITING: 0
SORE THROAT: 0
BLOOD IN STOOL: 0
SHORTNESS OF BREATH: 0
GASTROINTESTINAL NEGATIVE: 1
GASTROINTESTINAL NEGATIVE: 1
ABDOMINAL PAIN: 0
ABDOMINAL DISTENTION: 0
SINUS PRESSURE: 0
VOICE CHANGE: 1
BLOOD IN STOOL: 0
SHORTNESS OF BREATH: 0
BACK PAIN: 1
COUGH: 1
CHEST TIGHTNESS: 0
EYE ITCHING: 0
BLOOD IN STOOL: 0
COLOR CHANGE: 0
SORE THROAT: 0
ABDOMINAL PAIN: 0
SORE THROAT: 0
GASTROINTESTINAL NEGATIVE: 1
SHORTNESS OF BREATH: 0
SHORTNESS OF BREATH: 0
RHINORRHEA: 0
EYE REDNESS: 0
BACK PAIN: 1
PHOTOPHOBIA: 0
COUGH: 1
BACK PAIN: 1
NAUSEA: 0

## 2020-01-01 ASSESSMENT — PAIN SCALES - GENERAL
PAINLEVEL_OUTOF10: 0
PAINLEVEL_OUTOF10: 8
PAINLEVEL_OUTOF10: 2
PAINLEVEL_OUTOF10: 0
PAINLEVEL_OUTOF10: 5
PAINLEVEL_OUTOF10: 10
PAINLEVEL_OUTOF10: 8
PAINLEVEL_OUTOF10: 0
PAINLEVEL_OUTOF10: 0
PAINLEVEL_OUTOF10: 7
PAINLEVEL_OUTOF10: 9
PAINLEVEL_OUTOF10: 9
PAINLEVEL_OUTOF10: 10
PAINLEVEL_OUTOF10: 8
PAINLEVEL_OUTOF10: 6
PAINLEVEL_OUTOF10: 4
PAINLEVEL_OUTOF10: 0
PAINLEVEL_OUTOF10: 7

## 2020-01-01 ASSESSMENT — PAIN DESCRIPTION - PAIN TYPE
TYPE: ACUTE PAIN

## 2020-01-01 ASSESSMENT — PAIN DESCRIPTION - ORIENTATION
ORIENTATION: LEFT
ORIENTATION: LOWER;LEFT
ORIENTATION: MID
ORIENTATION: LEFT

## 2020-01-01 ASSESSMENT — PATIENT HEALTH QUESTIONNAIRE - PHQ9
2. FEELING DOWN, DEPRESSED OR HOPELESS: 0
SUM OF ALL RESPONSES TO PHQ QUESTIONS 1-9: 0
1. LITTLE INTEREST OR PLEASURE IN DOING THINGS: 0
SUM OF ALL RESPONSES TO PHQ QUESTIONS 1-9: 0
SUM OF ALL RESPONSES TO PHQ9 QUESTIONS 1 & 2: 0

## 2020-01-01 ASSESSMENT — PAIN DESCRIPTION - LOCATION
LOCATION: ABDOMEN
LOCATION: BACK
LOCATION: BACK
LOCATION: BACK;CHEST;RIB CAGE
LOCATION: BACK
LOCATION: EYE

## 2020-01-01 ASSESSMENT — PAIN DESCRIPTION - DESCRIPTORS: DESCRIPTORS: CONSTANT;SHARP

## 2020-01-01 ASSESSMENT — HEART SCORE: ECG: 1

## 2020-01-01 ASSESSMENT — PAIN DESCRIPTION - ONSET: ONSET: ON-GOING

## 2020-01-01 ASSESSMENT — PAIN DESCRIPTION - FREQUENCY
FREQUENCY: INTERMITTENT
FREQUENCY: INTERMITTENT

## 2020-01-30 NOTE — TELEPHONE ENCOUNTER
RECEIVED PHONE CALLS FROM PATIENT'S DAUGHTER AND [facility] STATING PET DENIED FOR 1/31/20. PATIENT WANTS TO CANCEL SINCE HE CANNOT AFFORD IT. ER TOLD FAMILY AND PATIENT HE HAS CANCER ALL OVER HIS BODY. WONDERS IF HE EVEN NEEDS TO COME BACK IN 2/5/20 TO SEE DR. GRIFFIN. UNSURE WHAT TO DO NOW. [phone]. CALLED [facility] AND TEST CANCELLED.

## 2020-02-03 NOTE — TELEPHONE ENCOUNTER
PER [facility] CT SCAN CHEST SCHEDULED 2/6/20 @ 2:45 PM ARRIVE @ 1:45 PM ST CORRINE RODRIGUEZ MD VISIT FOR 2/5 AND SCHEDULE AFTER CT SCAN  MD VISIT 2/7/20 @1015 AM PATIENT 'S DAUGHTER KIMBER NOTIFIED AND CHANGE IN SCHEDULE

## 2020-02-07 NOTE — ED PROVIDER NOTES
2013    thyroid    Carotid artery stenosis     Cataract     Cerebrovascular disease     Cervical disc herniation 11/13/2014    Cervical radiculopathy, chronic 11/14/2014    Cervical spondylosis 11/13/2014    CHF (congestive heart failure) (HCC)     Chronic systolic CHF (congestive heart failure), NYHA class 2 ([address] 6/21/2018    Coronary arteriosclerosis in native artery 3/30/2010    Overview:  H/O Acute anteriorseptal MI  11/8/99 Severe 3 vessel CAD , LM coronary dissection  S/P CABG  1999 L-LAD, SVG Ramus, SVG OM, SVG L PDA Stress test  10/2007  EF 49% no reversible ischemia Predominantly fixed infeior apical septal defects Low normal to mildly reduced LV sys function with :VEF 49%  Septal wall motion abnormlaity Compared with prior study no new reveresible perfusion defects    Degeneration of cervical intervertebral disc 9/25/2012    Elevated LFTs 9/19/2017    Elevated liver enzymes 2/28/2018    H/O malignant neoplasm of thyroid 12/10/2013    Heart attack ([address]     Heart block AV second degree     Hypercholesteremia 3/30/2010    Hypertension     Hypothyroidism     Ischemic cardiomyopathy 8/30/2019    Metastatic cancer ([address] 9/19/2017    Mild aortic regurgitation 3/30/2010    Overview:  Mild moderate MR and AR  Overview:  Mild moderate MR and AR    Pulmonary nodule 6/21/2018    Pulmonary nodules/lesions, multiple 9/19/2017    S/P CABG x 3 8/30/2019    Sick sinus syndrome ([address] 8/30/2019    SOB (shortness of breath) 8/30/2019    Spinal stenosis in cervical region 11/6/2014    Type 2 diabetes mellitus ([address] 3/30/2010    Overview:  without complications    Type II or unspecified type diabetes mellitus without mention of complication, not stated as uncontrolled      SURGICAL HISTORY       Past Surgical History:   Procedure Laterality Date    APPENDECTOMY      CARDIAC SURGERY      CHOLECYSTECTOMY      CORONARY ARTERY BYPASS GRAFT      ENDOSCOPIC ULTRASOUND (LOWER)      PACEMAKER Substance Use Topics    Alcohol use: No    Drug use: No     PHYSICAL EXAM     INITIAL VITALS: BP (!) 119/59   Pulse 60   Temp 97.5 °F (36.4 °C) (Oral)   Resp 27   Ht 5' 3\" (1.6 m)   Wt 153 lb (69.4 kg)   SpO2 98%   BMI 27.10 kg/m²    Physical Exam  Vitals signs and nursing note reviewed. Constitutional:       Appearance: Normal appearance. HENT:      Head: Normocephalic and atraumatic. Nose: No congestion. Eyes:      Conjunctiva/sclera: Conjunctivae normal.   Neck:      Musculoskeletal: Normal range of motion and neck supple. Cardiovascular:      Rate and Rhythm: Normal rate and regular rhythm. Heart sounds: No murmur. Pulmonary:      Effort: Pulmonary effort is normal.      Breath sounds: Normal breath sounds. Abdominal:      Palpations: Abdomen is soft. Tenderness: There is no abdominal tenderness. Musculoskeletal:         General: No signs of injury. Comments: Mild tenderness left upper back just medial to scapula, no deformities. Skin:     General: Skin is warm and dry. Capillary Refill: Capillary refill takes less than 2 seconds. Neurological:      General: No focal deficit present. Mental Status: He is alert and oriented to person, place, and time. GCS: GCS eye subscore is 4. GCS verbal subscore is 5. GCS motor subscore is 6. Sensory: Sensation is intact. Motor: Motor function is intact. MEDICAL DECISION MAKING:     Reviewed results. CT negative for pe, but does demonstrate numerous mets. Trop unchanged from baseline.  ekg unchanged. Labs are otherwise unchanged. On reeval, pt appears well, no distress, nontoxic. I do not suspect pe, dissection, pna, pneumo, acs or other emergent pathology. On reeval, pt is well appearing, no distress, nontoxic. Overall, suspect musculoskeletal pain vs cancer pain. Pt is appropriate for discharge and close follow up. Pt will call his oncologist.  Discussed results and plan with the pt. They expressed appropriate understanding. Pt given close follow up, supportive care instructions and strict return instructions at the bedside. CRITICAL CARE:       PROCEDURES:    Procedures    DIAGNOSTIC RESULTS   EKG:All EKG's are interpreted by the Emergency Department Physician who either signs or Co-signs this chart in the absence of a cardiologist.    Ekg, rate of 63, av paced, wide complex qrs, no st seg changes, no acute changes. RADIOLOGY:All plain film, CT, MRI, and formal ultrasound images (except ED bedside ultrasound) are read by the radiologist, see reports below, unless otherwisenoted in MDM or here. CT Chest Pulmonary Embolism W Contrast   Final Result   1. Negative for acute pulmonary embolism   2. Innumerable pulmonary metastasis   3. Otherwise, no acute abnormalities seen in the chest           LABS: All lab results were reviewed by myself, and all abnormals are listed below.   Labs Reviewed   COMPREHENSIVE METABOLIC PANEL - Abnormal; Notable for the following components:       Result Value    Glucose 222 (*)     Alb 3.3 (*)     All other components within normal limits   CBC WITH AUTO DIFFERENTIAL - Abnormal; Notable for the following components:    Hematocrit 39.8 (*)     RDW 15.5 (*)     Monocytes 8 (*)     All other components within normal limits   TROPONIN - Abnormal; Notable for the following components:    Troponin, High Sensitivity 24 (*)     All other components within normal limits       EMERGENCY DEPARTMENTCOURSE:         Vitals:    Vitals:    02/07/20 1238 02/07/20 1353   BP: (!) 129/58 (!) 119/59   Pulse: 95 60   Resp: 20 27   Temp: 97.5 °F (36.4 °C)    TempSrc: Oral    SpO2: 98% 98%   Weight: 153 lb (69.4 kg)    Height: 5' 3\" (1.6 m)        The patient was given the following medications while in the emergency department:  Orders Placed This Encounter   Medications    morphine sulfate (PF) injection 4 mg    ondansetron (ZOFRAN) injection 4 mg    0.9 % sodium chloride bolus    0.9 % sodium chloride bolus    sodium chloride flush 0.9 % injection 10 mL    iopamidol (ISOVUE-370) 76 % injection 75 mL    HYDROcodone-acetaminophen (NORCO) 5-325 MG per tablet     Sig: Take 1 tablet by mouth every 6 hours as needed for Pain for up to 3 days. Intended supply: 3 days. Take lowest dose possible to manage pain     Dispense:  12 tablet     Refill:  0     CONSULTS:  None    FINAL IMPRESSION      1. Acute left-sided low back pain without sciatica    2. Metastatic disease [facility])          DISPOSITION/PLAN   DISPOSITION        PATIENT REFERRED TO:  Chelsey Rogers MD  [place] [address]  [address] [address]  [phone]    Call in 1 day      DISCHARGE MEDICATIONS:  New Prescriptions    HYDROCODONE-ACETAMINOPHEN (NORCO) 5-325 MG PER TABLET    Take 1 tablet by mouth every 6 hours as needed for Pain for up to 3 days. Intended supply: 3 days.  Take lowest dose possible to manage pain     Dwight Gilliam MD  Attending Emergency Physician                    Tariq Barrow MD  02/07/20 5149

## 2020-02-07 NOTE — ED NOTES
Mode of arrival:  Family drove pt in      Residence prior to admit: home      Chief complaint on admission: L sided back pain  Pain started 2 days ago and only present when pt coughs. Pt states that he was told that he does have lung cancer, yet it is slow growing. Pt denies undergoing any current treatment. Pt is AOx4 and uses a cane to ambulate. C= \"Have you ever felt that you should Cut down on your drinking? \"  No  A= \"Have people Annoyed you by criticizing your drinking? \"  No  G= \"Have you ever felt bad or Guilty about your drinking? \"  No  E= \"Have you ever had a drink as an Eye-opener first thing in the morning to steady your nerves or to help a hangover? \"  No      Deferred []      Reason for deferring: N/A    *If yes to two or more: probable alcohol abuse. *         Marleny Mckeon RN  02/07/20 9770

## 2020-02-12 NOTE — PROGRESS NOTES
herniation 11/13/2014    Cervical radiculopathy, chronic 11/14/2014    Cervical spondylosis 11/13/2014    CHF (congestive heart failure) (HCC)     Chronic systolic CHF (congestive heart failure), NYHA class 2 ([address] 6/21/2018    Coronary arteriosclerosis in native artery 3/30/2010    Overview:  H/O Acute anteriorseptal MI  11/8/99 Severe 3 vessel CAD , LM coronary dissection  S/P CABG  1999 L-LAD, SVG Ramus, SVG OM, SVG L PDA Stress test  10/2007  EF 49% no reversible ischemia Predominantly fixed infeior apical septal defects Low normal to mildly reduced LV sys function with :VEF 49%  Septal wall motion abnormlaity Compared with prior study no new reveresible perfusion defects    Degeneration of cervical intervertebral disc 9/25/2012    Elevated LFTs 9/19/2017    Elevated liver enzymes 2/28/2018    H/O malignant neoplasm of thyroid 12/10/2013    Heart attack ([address]     Heart block AV second degree     Hypercholesteremia 3/30/2010    Hypertension     Hypothyroidism     Ischemic cardiomyopathy 8/30/2019    Metastatic cancer ([address] 9/19/2017    Mild aortic regurgitation 3/30/2010    Overview:  Mild moderate MR and AR  Overview:  Mild moderate MR and AR    Pulmonary nodule 6/21/2018    Pulmonary nodules/lesions, multiple 9/19/2017    S/P CABG x 3 8/30/2019    Sick sinus syndrome ([address] 8/30/2019    SOB (shortness of breath) 8/30/2019    Spinal stenosis in cervical region 11/6/2014    Type 2 diabetes mellitus ([address] 3/30/2010    Overview:  without complications    Type II or unspecified type diabetes mellitus without mention of complication, not stated as uncontrolled        Past Surgical History:   Procedure Laterality Date    APPENDECTOMY      CARDIAC SURGERY      CHOLECYSTECTOMY      CORONARY ARTERY BYPASS GRAFT      ENDOSCOPIC ULTRASOUND (LOWER)      PACEMAKER INSERTION  03/2015    [place] LAP,CHOLECYSTECTOMY N/A 3/2/2018    CHOLECYSTECTOMY LAPAROSCOPIC performed by Edvin Ag DO at [address]  SHOULDER SURGERY      TOTAL THYROIDECTOMY         Allergies   Allergen Reactions    Seasonal      Other reaction(s): Other: See Comments  seasonal upper respiratory irritation    Keflex [Cephalexin] Nausea And Vomiting       Current Outpatient Medications   Medication Sig Dispense Refill    tamsulosin (FLOMAX) 0.4 MG capsule Take 1 capsule by mouth daily 90 capsule 3    finasteride (PROSCAR) 5 MG tablet TAKE 1 TABLET DAILY 90 tablet 3    furosemide (LASIX) 20 MG tablet Take 1 tablet by mouth daily 30 tablet 1    vitamin D (ERGOCALCIFEROL) 86290 units CAPS capsule Take 50,000 Units by mouth once a week      Multiple Vitamins-Minerals (THERAPEUTIC MULTIVITAMIN-MINERALS) tablet Take 1 tablet by mouth daily      calcium carbonate (TUMS) 500 MG chewable tablet Take 1 tablet by mouth 2 times daily as needed for Heartburn 30 tablet 0    glimepiride (AMARYL) 2 MG tablet Take 2 mg by mouth every morning (before breakfast)      levothyroxine (LEVOXYL) 137 MCG tablet TAKE 1 TABLET DAILY      nitroGLYCERIN (NITROSTAT) 0.4 MG SL tablet Place 0.4 mg under the tongue. As directed      docusate sodium (COLACE) 100 MG capsule Take 100 mg by mouth nightly       atorvastatin (LIPITOR) 20 MG tablet   Take 20 mg by mouth nightly       aspirin 81 MG tablet Take 81 mg by mouth daily. No current facility-administered medications for this visit. Social History     Socioeconomic History    Marital status:       Spouse name: Not on file    Number of children: Not on file    Years of education: Not on file    Highest education level: Not on file   Occupational History    Occupation: Retired     Employer: [address] resource strain: Not on file    Food insecurity:     Worry: Not on file     Inability: Not on file    Transportation needs:     Medical: Not on file     Non-medical: Not on file   Tobacco Use    Smoking status: Never Smoker    Smokeless tobacco: Never dizziness, seizures, weakness, numbness  OBJECTIVE:         Vitals:    02/12/20 0951   BP: (!) 131/56   Pulse: 58   Resp: 16   Temp: 97.3 °F (36.3 °C)   PHYSICAL EXAM:   General appearance - well appearing, no in pain or distress   Mental status - alert and cooperative   Eyes - pupils equal and reactive, extraocular eye movements intact   Ears - bilateral TM's and external ear canals normal   Mouth - mucous membranes moist, pharynx normal without lesions   Neck - supple, no significant adenopathy   Lymphatics - no palpable lymphadenopathy, no hepatosplenomegaly   Chest - clear to auscultation, no wheezes, rales or rhonchi, symmetric air entry   Heart - normal rate, regular rhythm, normal S1, S2, no murmurs, rubs, clicks or gallops   Abdomen - soft, nontender, nondistended, no masses or organomegaly   Neurological - alert, oriented, normal speech, no focal findings or movement disorder noted   Musculoskeletal - no joint tenderness, deformity or swelling   Extremities - peripheral pulses normal, no pedal edema, no clubbing or cyanosis   Skin - normal coloration and turgor, no rashes, no suspicious skin lesions noted   LABORATORY DATA:     Lab Results   Component Value Date    WBC 8.5 02/07/2020    HGB 13.7 02/07/2020    HCT 39.8 (L) 02/07/2020    MCV 84.8 02/07/2020     02/07/2020    LYMPHOPCT 24 02/07/2020    RBC 4.70 02/07/2020    MCH 29.1 02/07/2020    MCHC 34.3 02/07/2020    RDW 15.5 (H) 02/07/2020    MONOPCT 8 (H) 02/07/2020    BASOPCT 1 02/07/2020    NEUTROABS 5.40 02/07/2020    LYMPHSABS 2.00 02/07/2020    MONOSABS 0.70 02/07/2020    EOSABS 0.40 02/07/2020    BASOSABS 0.10 02/07/2020       Chemistry        Component Value Date/Time     02/07/2020 1352    K 4.6 02/07/2020 1352     02/07/2020 1352    CO2 26 02/07/2020 1352    BUN 20 02/07/2020 1352    CREATININE 0.98 02/07/2020 1352        Component Value Date/Time    CALCIUM 8.7 02/07/2020 1352    ALKPHOS 107 02/07/2020 1352    AST 18 02/07/2020 1352    ALT 18 02/07/2020 1352    BILITOT 0.36 02/07/2020 1352        PATHOLOGY DATA:     Collected: 9/21/2017     -- Diagnosis --   FINE NEEDLE ASPIRATION (LEFT LOWER LOBE LUNG, CT-GUIDED):       - POSITIVE FOR MALIGNANCY     -- Diagnosis Comment --   THE HISTOMORPHOLOGY AND IMMUNOSTAIN FINDINGS ARE CONSISTENT WITH   METASTATIC PAPILLARY THYROID CARCINOMA. IMAGING DATA:    CT of the chest  Impression:      Innumerable small pulmonary nodules new from 11/24/2012.  Metastatic disease is suspected.  Follow-up chest CT is recommended to further evaluate the extent of the metastatic disease and in search for a primary tumor. No evidence of metastatic disease in the abdomen/ pelvis with the limitations of a noncontrast study. Cholelithiasis.  The gallbladder is moderately dilated, the gallbladder wall is slightly thickened and there is suspicion of minimal pericholecystic fat stranding.  Follow-up gallbladder ultrasound examination or nuclear medicine hepatobiliary imaging study may be helpful. CT abd pelvis 10/7/19  FINDINGS:   Lower Chest: Progression of the multiple pulmonary nodules identified   throughout both lung bases which have both increased in size and number. Lung nodules right lower lobe 1.7 x 1.0 cm in size.  1 nodule identified   within the lingula 1.7 x 1.0 cm in size.  Heart is enlarged.  Coronary   calcifications.  Pacemaker leads identified.       Organs: Previous cholecystectomy.  Scattered hypodense identified involving   the liver noted on prior contrast examination and appear unchanged. Gallbladder is absent.  Multiple bilateral renal cysts appear similar to the   prior exam.  No hydronephrosis.  No evidence of nephrolithiasis.  Otherwise   the spleen, adrenal glands, are unremarkable.  There is mild focal thickening   identified involving the pancreatic tail measuring 2.9 x 2.4 cm in size.       GI/Bowel: Retained stool throughout the colon.  No bowel obstruction.    Appendix unremarkable.       Pelvis: Prostate is large with this dystrophic calcifications   posterolaterally.  Bladder is mildly distended.  Probable bilateral bladder   diverticulum on the right posteriorly.       Peritoneum/Retroperitoneum: No free air or free fluid.  Aortic vascular   calcifications.  Aorta appears aneurysmal.  Right epicardial phrenic lymph   nodes subcentimeter in size.  A few scattered retroperitoneal lymph nodes are   not enlarged per CT criteria.       Bones/Soft Tissues: .  Moderate size right inguinal fat containing hernia. Moderate multilevel spondylosis involving the thoracic lumbar spine. Moderate severe facet arthropathy lower lumbar spine.  No acute compression   fracture.  Mild thickening of the right intercostal muscle at T10-11. Question minimal focal erosion involving the right 11th rib medially and   superiorly best seen on the coronal images.           Impression   Negative nephrolithiasis or obstructive uropathy.       Focal thickening involving the pancreatic tail may be related to developing   pancreatitis or could be due to underlying mass.  Please correlate with   pancreatic enzymes.  Postcontrast imaging could be beneficial for further   characterization as feasible/warranted       Progression of the multiple pulmonary nodules worrisome for metastatic   disease.       Mild thickening of the right T10-11 intercostal muscle.  This may represent   focal sprain or strain.  Intramuscular metastatic disease with may also be   considered in the differential.  Please correlate with exam findings. PET scan 10/18/2019:  Impression   1.  FDG avid cervical lymphadenopathy and pulmonary metastatic disease.       2.  Localized FDG activity in the right femoral neck without discrete bone   lesion.  Consider further evaluation with bone scan or MRI.       3.  Mild focal FDG activity and apparent enlargement in the tail the   pancreas.  A metastatic deposit can have this appearance.  This could be   further evaluated with contrast-enhanced CT or MRI if clinically appropriate.       4.  Short-segment of absent PET data in the upper abdomen, limiting this   region. ASSESSMENT:    Mr. Devonte Becker is a 80-year-old gentleman with a history of metastatic papillary thyroid cancer. He has history of total thyroidectomy in 2010 and also had modified radical neck dissection for metastatic lymph nodes. Lung biopsy confirmed papillary thyroid cancer usually to have good prognosis despite metastatic disease. His disease is limited to the lungs only. Among patients with small pulmonary metastases but no other metastases outside of the neck, the 10-year survival rate is 30 to 50 percent. He is receiving TSH suppression,    at [facility]. Recent CT scan showing possible progression. .During today's visit, the patient and the family had a number of reasonable questions which were answered to their satisfaction. They verbalized understanding of the information provided and they agreed to proceed as outlined above. PLAN:   I reviewed the results of recent CT scan. It seems his scans are stable and  clinically he is doing well. Patient and family explained that they want to preserve his quality of life and not interested in aggressive care. I discussed the option of tyrosine kinase inhibitors versus continuing current thyroid suppression therapy with his endocrinologist and monitor closely on lastly hospice   They want to continue current treatment as he is clinically doing well. I will plan to repeat scan in 4 months and will discuss options at that time. Return to clinic in 4 months       Abhay R. Beryle Critchley, MD  Hematologist/Medical Oncologist      This note is created with the assistance of a speech recognition program.  While intending to generate a document that actually reflects the content of the visit, the document can still have some errors including those of syntax and sound a like substitutions which may escape proof reading. It such instances, actual meaning can be extrapolated by contextual diversion.

## 2020-02-12 NOTE — TELEPHONE ENCOUNTER
[address] MD VISIT  DR Tyler Patterson IN TO SEE PATIENT  ORDERS RECEIVED  RV 4 MONTHS NO LABS  MD VISIT 6/17/20 @ 10AM  AVS PRINTED AND GIVEN TO PATIENT W/ INSTRUCTIONS  PATIENT DISCHARGED VIA WHEELCHAIR

## 2020-05-11 NOTE — TELEPHONE ENCOUNTER
Mary Taylor, See ER report dated 5-12-20. Spoke with Ariana Hopper. Told her to also contact lung doctor, Dr. Petra Cowan. They are going to take Dad to ER also.

## 2020-05-11 NOTE — TELEPHONE ENCOUNTER
Trnet Self (daughter) called stating her Father is rattling in his chest and has been like that for over 1 month. I told her to get him to ER. Could be pneumonia.

## 2020-05-12 NOTE — ED NOTES
Spoke with daughter Angela Aguirre on contact list- states she noticed Raspy voice\" Saturday- Rattle reported onset 1 month. States he was seen by pulmonologist- thyroid CA that mets to lungs- reported very slow growing. D/T age/grow speed- no new treatments initiated. Daughter/pt deny new/worsening pain. Daughter reports no change in any meds.       Griselda Dull, RN  05/12/20 [id number]

## 2020-05-12 NOTE — TELEPHONE ENCOUNTER
The patient daughter called and stated that the patient have a rattling sound in his voice she stated that he does not have any other symptoms. And she will have him give us a call back to screen.

## 2020-05-12 NOTE — ED PROVIDER NOTES
11/6/2014    Type 2 diabetes mellitus ([facility] 3/30/2010    Overview:  without complications    Type II or unspecified type diabetes mellitus without mention of complication, not stated as uncontrolled        SURGICAL HISTORY       Past Surgical History:   Procedure Laterality Date    APPENDECTOMY      CARDIAC SURGERY      CHOLECYSTECTOMY      CORONARY ARTERY BYPASS GRAFT      ENDOSCOPIC ULTRASOUND (LOWER)      PACEMAKER INSERTION  03/2015    CT LAP,CHOLECYSTECTOMY N/A 3/2/2018    CHOLECYSTECTOMY LAPAROSCOPIC performed by Iban Dominguez DO at [address] [facility]       Current Discharge Medication List      CONTINUE these medications which have NOT CHANGED    Details   tamsulosin (FLOMAX) 0.4 MG capsule Take 1 capsule by mouth daily  Qty: 90 capsule, Refills: 3    Associated Diagnoses: Frequency of urination; BPH with obstruction/lower urinary tract symptoms      finasteride (PROSCAR) 5 MG tablet TAKE 1 TABLET DAILY  Qty: 90 tablet, Refills: 3    Associated Diagnoses: BPH with obstruction/lower urinary tract symptoms      furosemide (LASIX) 20 MG tablet Take 1 tablet by mouth daily  Qty: 30 tablet, Refills: 1      vitamin D (ERGOCALCIFEROL) 48615 units CAPS capsule Take 50,000 Units by mouth once a week      Multiple Vitamins-Minerals (THERAPEUTIC MULTIVITAMIN-MINERALS) tablet Take 1 tablet by mouth daily      calcium carbonate (TUMS) 500 MG chewable tablet Take 1 tablet by mouth 2 times daily as needed for Heartburn  Qty: 30 tablet, Refills: 0      glimepiride (AMARYL) 2 MG tablet Take 2 mg by mouth every morning (before breakfast)      levothyroxine (LEVOXYL) 137 MCG tablet Take 137 mcg by mouth Daily       nitroGLYCERIN (NITROSTAT) 0.4 MG SL tablet Place 0.4 mg under the tongue.  As directed      docusate sodium (COLACE) 100 MG capsule Take 100 mg by mouth nightly       atorvastatin (LIPITOR) 20 MG tablet   Take 20 mg by mouth nightly aspirin 81 MG EC tablet Take 81 mg by mouth daily              ALLERGIES     is allergic to seasonal and keflex [cephalexin]. SOCIAL HISTORY      reports that he has never smoked. He has never used smokeless tobacco. He reports that he does not drink alcohol or use drugs. PHYSICAL EXAM     INITIAL VITALS: BP (!) 123/55   Pulse 66   Temp 98.1 °F (36.7 °C) (Oral)   Resp 16   Ht 5' 4\" (1.626 m)   Wt 150 lb (68 kg)   SpO2 96%   BMI 25.75 kg/m²      Physical Exam  Vitals signs and nursing note reviewed. Constitutional:       General: He is not in acute distress. Appearance: He is well-developed. He is not diaphoretic. HENT:      Head: Normocephalic and atraumatic. Eyes:      General: No scleral icterus. Right eye: No discharge. Left eye: No discharge. Conjunctiva/sclera: Conjunctivae normal.      Pupils: Pupils are equal, round, and reactive to light. Cardiovascular:      Rate and Rhythm: Normal rate and regular rhythm. Heart sounds: Normal heart sounds. No murmur. No friction rub. No gallop. Pulmonary:      Effort: Pulmonary effort is normal. No respiratory distress. Breath sounds: Rales (Diffuse) present. No wheezing. Chest:      Chest wall: No tenderness. Abdominal:      General: Bowel sounds are normal. There is no distension. Palpations: Abdomen is soft. There is no mass. Tenderness: There is no abdominal tenderness. There is no guarding or rebound. Musculoskeletal: Normal range of motion. General: No tenderness. Skin:     General: Skin is warm and dry. Coloration: Skin is not pale. Findings: No erythema or rash. Neurological:      Mental Status: He is alert and oriented to person, place, and time. Cranial Nerves: No cranial nerve deficit. Sensory: No sensory deficit. Motor: No abnormal muscle tone.       Coordination: Coordination normal.      Deep Tendon Reflexes: Reflexes normal.   Psychiatric: Behavior: Behavior normal.         Thought Content: Thought content normal.         Judgment: Judgment normal.         DIAGNOSTIC RESULTS     RADIOLOGY:All plain film, CT,MRI, and formal ultrasound images (except ED bedside ultrasound) are read by the radiologist and the interpretations are directly viewed by the emergency physician. Xr Chest Portable    Result Date: 5/12/2020  EXAMINATION: ONE XRAY VIEW OF THE CHEST 5/12/2020 5:48 pm COMPARISON: CT dated 02/07/2020 HISTORY: ORDERING SYSTEM PROVIDED HISTORY: Cough TECHNOLOGIST PROVIDED HISTORY: Cough Reason for Exam: Cough Acuity: Acute Type of Exam: Initial FINDINGS: LINES/TUBES/OTHER: There is a subclavian dual lead pacemaker with leads projecting the right atrium and right ventricle. Median sternotomy wires and clips are noted. HEART/MEDIASTINUM: The cardiomediastinal silhouette is within normal limits. PLEURA/LUNGS: Innumerable bilateral pulmonary nodules are as seen CT. Underlying airspace disease cannot be excluded. there is no appreciable pneumothorax. BONES/SOFT TISSUE: No acute abnormality. Bilateral nodules as seen prior imaging. Underlying airspace disease cannot be excluded. LABS: All lab results were reviewed by myself, and all abnormals are listed below.   Labs Reviewed   CBC WITH AUTO DIFFERENTIAL - Abnormal; Notable for the following components:       Result Value    Monocytes 10 (*)     All other components within normal limits   COMPREHENSIVE METABOLIC PANEL W/ REFLEX TO MG FOR LOW K - Abnormal; Notable for the following components:    Glucose 243 (*)     Chloride 96 (*)     Total Protein 9.2 (*)     All other components within normal limits   FIBRINOGEN - Abnormal; Notable for the following components:    Fibrinogen 736 (*)     All other components within normal limits   D-DIMER, QUANTITATIVE - Abnormal; Notable for the following components:    D-Dimer, Quant 0.81 (*)     All other components within normal limits   PROCALCITONIN -

## 2020-05-12 NOTE — PROGRESS NOTES
Medication History completed:    New medications: none    Medications discontinued: duplicate finasteride    Changes to dosing: none    Stated allergies: As listed    Other pertinent information: Medications confirmed with [facility].      Thank you,  Jane Davey, PharmD, BCPS  [phone]

## 2020-05-12 NOTE — ED TRIAGE NOTES
Mode of arrival (squad #, walk in, police, etc) : walk in        Chief complaint(s): cough, belching        Arrival Note (brief scenario, treatment PTA, etc). : Pt was sent by his doctor after his daughter called and her him cough. Pt denies cough, fever, SOB. Pt states he belches a lot. Pt is A&Ox4, in no acute distress, respirations even and unlabored, ambulatory to room. C= \"Have you ever felt that you should Cut down on your drinking? \"  No  A= \"Have people Annoyed you by criticizing your drinking? \"  No  G= \"Have you ever felt bad or Guilty about your drinking? \"  No  E= \"Have you ever had a drink as an Eye-opener first thing in the morning to steady your nerves or to help a hangover? \"  No      Deferred []      Reason for deferring: N/A    *If yes to two or more: probable alcohol abuse. *

## 2020-05-13 NOTE — CARE COORDINATION
they feel dry. Soap and water are the best option if hands are visibly dirty. Avoid touching your eyes, nose, and mouth with unwashed hands. Avoid sharing personal household items  You should not share dishes, drinking glasses, cups, eating utensils, towels, or bedding with other people or pets in your home. After using these items, they should be washed thoroughly with soap and water. Clean all high-touch surfaces everyday  High touch surfaces include counters, tabletops, doorknobs, bathroom fixtures, toilets, phones, keyboards, tablets, and bedside tables. Also, clean any surfaces that may have blood, stool, or body fluids on them. Use a household cleaning spray or wipe, according to the label instructions. Labels contain instructions for safe and effective use of the cleaning product including precautions you should take when applying the product, such as wearing gloves and making sure you have good ventilation during use of the product. Monitor your symptoms  Seek prompt medical attention if your illness is worsening (e.g., difficulty breathing). Before seeking care, call your healthcare provider and tell them that you have, or are being evaluated for, COVID-19. Put on a facemask before you enter the facility. These steps will help the healthcare providers office to keep other people in the office or waiting room from getting infected or exposed. Ask your healthcare provider to call the local or state health department. Persons who are placed under active monitoring or facilitated self-monitoring should follow instructions provided by their local health department or occupational health professionals, as appropriate. When working with your local health department check their available hours. If you have a medical emergency and need to call 911, notify the dispatch personnel that you have, or are being evaluated for COVID-19.  If possible, put on a facemask before emergency medical services

## 2020-06-04 NOTE — CARE COORDINATION
You Patient resolved from the Care Transitions episode on 13 May 2020  Discussed COVID-19 related testing which was not done at time of ED visit. Test results were N/A. Patient/family has been provided the following resources and education related to COVID-19:                         Signs, symptoms and red flags related to COVID-19            CDC exposure and quarantine guidelines            [employer] exposure contact - [phone]            Contact for their local Department of Health                 Patient currently reports that the following symptoms have improved:  cough     No further outreach scheduled with this CTN/ACM. Episode of Care resolved. Patient has this CTN/ACM contact information if future needs arise.

## 2020-07-20 NOTE — ED TRIAGE NOTES
Mode of arrival (squad #, walk in, police, etc) : Walk In        Chief complaint(s): Eye problem, urinary frequency        Arrival Note (brief scenario, treatment PTA, etc). : Pt arrives to ED c/o pain in the left eye. Family states that he called them last week and they made an appointment with his PCP. Family had noticed some swelling to the eye but family states that it appears to have decreased now. Family also concerned for a UTI as they noticed an odor to patients urine and states he seemed to have urinary frequency. Patient denies dysuria or hematuria. C= \"Have you ever felt that you should Cut down on your drinking? \"  No  A= \"Have people Annoyed you by criticizing your drinking? \"  No  G= \"Have you ever felt bad or Guilty about your drinking? \"  No  E= \"Have you ever had a drink as an Eye-opener first thing in the morning to steady your nerves or to help a hangover? \"  No      Deferred []      Reason for deferring: N/A    *If yes to two or more: probable alcohol abuse. *

## 2020-07-20 NOTE — ED NOTES
Pt discharged in stable condition with discharge instructions, including follow up with PCP. Pt ambulates to door with steady gait and without assistance.         Fe Spaulding RN  07/20/20 9965

## 2020-07-20 NOTE — ED PROVIDER NOTES
EMERGENCY DEPARTMENT ENCOUNTER    Pt Name: Samantha Sherman  MRN: [id number]  [place] 2/13/1928  Date of evaluation: 7/20/20  CHIEF COMPLAINT       Chief Complaint   Patient presents with    Eye Problem     HISTORY OF PRESENT ILLNESS   The pt presents for evaluation of left eye redness and swelling. Started about a week ago. Better now. Pt states that his eye is feeling better. Family member at bedside states that he had not seen it because his sister cares for the his father. They were also concerned about possible uti, pt has been appearing to have some urgency when passing urine. Pt denies any dysuria or other difficulties. The history is provided by the patient. Eye Problem   Location:  Left eye  Severity:  Moderate  Onset quality:  Gradual  Duration:  1 week  Timing:  Constant  Progression:  Improving  Chronicity:  New  Relieved by:  Nothing  Worsened by:  Nothing  Ineffective treatments:  None tried  Associated symptoms: discharge and redness        REVIEW OF SYSTEMS     Review of Systems   Constitutional: Negative. Negative for chills and fever. HENT: Negative. Negative for congestion. Eyes: Positive for discharge and redness. Respiratory: Negative. Cardiovascular: Negative. Gastrointestinal: Negative. Genitourinary: Negative. Musculoskeletal: Negative. Skin: Negative. Neurological: Negative. All other systems reviewed and are negative.     PASTMEDICAL HISTORY     Past Medical History:   Diagnosis Date    Abnormal ECG 8/30/2019    Acquired hypothyroidism 9/19/2014    Acute cholecystitis 2/28/2018    Atherosclerosis of coronary artery bypass graft(s) without angina pectoris 10/4/2016    Atrioventricular block, complete ([address] 8/65/0770    Biliary colic     Block, bundle branch, left 3/30/2010    Calculus of bile duct without cholecystitis and without obstruction     Calculus of gallbladder 9/19/2017    Cancer ([address] 2013    thyroid    Carotid artery stenosis     Cataract     Cerebrovascular disease     Cervical disc herniation 11/13/2014    Cervical radiculopathy, chronic 11/14/2014    Cervical spondylosis 11/13/2014    CHF (congestive heart failure) (HCC)     Chronic systolic CHF (congestive heart failure), NYHA class 2 ([address] 6/21/2018    Coronary arteriosclerosis in native artery 3/30/2010    Overview:  H/O Acute anteriorseptal MI  11/8/99 Severe 3 vessel CAD , LM coronary dissection  S/P CABG  1999 L-LAD, SVG Ramus, SVG OM, SVG L PDA Stress test  10/2007  EF 49% no reversible ischemia Predominantly fixed infeior apical septal defects Low normal to mildly reduced LV sys function with :VEF 49%  Septal wall motion abnormlaity Compared with prior study no new reveresible perfusion defects    Degeneration of cervical intervertebral disc 9/25/2012    Elevated LFTs 9/19/2017    Elevated liver enzymes 2/28/2018    H/O malignant neoplasm of thyroid 12/10/2013    Heart attack ([address]     Heart block AV second degree     Hypercholesteremia 3/30/2010    Hypertension     Hypothyroidism     Ischemic cardiomyopathy 8/30/2019    Metastatic cancer ([address] 9/19/2017    Mild aortic regurgitation 3/30/2010    Overview:  Mild moderate MR and AR  Overview:  Mild moderate MR and AR    Pulmonary nodule 6/21/2018    Pulmonary nodules/lesions, multiple 9/19/2017    S/P CABG x 3 8/30/2019    Sick sinus syndrome ([address] 8/30/2019    SOB (shortness of breath) 8/30/2019    Spinal stenosis in cervical region 11/6/2014    Type 2 diabetes mellitus ([address] 3/30/2010    Overview:  without complications    Type II or unspecified type diabetes mellitus without mention of complication, not stated as uncontrolled      Past Problem List  Patient Active Problem List   Diagnosis Code    Degeneration of cervical intervertebral disc M50.30    Acquired hypothyroidism E03.9    Spinal stenosis in cervical region M48.02    Cervical spondylosis M47.812    Cervical disc herniation M50.20    (AMARYL) 2 MG TABLET    Take 2 mg by mouth every morning (before breakfast)    LEVOTHYROXINE (LEVOXYL) 137 MCG TABLET    Take 137 mcg by mouth Daily     MULTIPLE VITAMINS-MINERALS (THERAPEUTIC MULTIVITAMIN-MINERALS) TABLET    Take 1 tablet by mouth daily    NITROGLYCERIN (NITROSTAT) 0.4 MG SL TABLET    Place 0.4 mg under the tongue. As directed    TAMSULOSIN (FLOMAX) 0.4 MG CAPSULE    Take 1 capsule by mouth daily    VITAMIN D (ERGOCALCIFEROL) 07129 UNITS CAPS CAPSULE    Take 50,000 Units by mouth once a week     ALLERGIES     is allergic to seasonal and keflex [cephalexin]. FAMILY HISTORY     He indicated that his mother is . He indicated that his father is . He indicated that his maternal grandmother is . He indicated that his maternal grandfather is . He indicated that his paternal grandmother is . He indicated that his paternal grandfather is . SOCIAL HISTORY       Social History     Tobacco Use    Smoking status: Never Smoker    Smokeless tobacco: Never Used   Substance Use Topics    Alcohol use: No    Drug use: No     PHYSICAL EXAM     INITIAL VITALS: BP (!) 111/48   Pulse 77   Temp 98.5 °F (36.9 °C) (Oral)   Resp 16   Ht 5' 4\" (1.626 m)   Wt 150 lb (68 kg)   SpO2 96%   BMI 25.75 kg/m²    Physical Exam  Vitals signs and nursing note reviewed. Constitutional:       Appearance: Normal appearance. HENT:      Head: Normocephalic and atraumatic. Nose: No congestion. Mouth/Throat:      Mouth: Mucous membranes are moist.   Eyes:      Extraocular Movements: Extraocular movements intact. Conjunctiva/sclera: Conjunctivae normal.      Pupils: Pupils are equal, round, and reactive to light. Musculoskeletal:         General: No signs of injury. Skin:     General: Skin is warm and dry. Capillary Refill: Capillary refill takes less than 2 seconds. Neurological:      General: No focal deficit present.       Mental Status: He is alert MD  [address] [facility]  [address]  [phone]    Call in 1 day      DISCHARGE MEDICATIONS:  New Prescriptions    No medications on file     Britney Ramirez MD  Attending Emergency Physician                   Stephanie Scott MD  07/20/20 2705

## 2020-07-24 NOTE — TELEPHONE ENCOUNTER
FYI:   Daughter Romana Piyush) called stating that Brother Rafiq Camacho) that lives with their Father Nuno Kirkland was exposed to [place] by other brother Rolly Villanueva who tested postive and is in the hospital.  Patient has appointment on 8-7-20 with you (need to do Virtual). I gave [facility] phone to Beth Valencia. Dennis Ledesma (sister) is having diarrhea also.

## 2020-08-24 NOTE — CARE COORDINATION
[address])  Patient DME:  Brenda Sprain cane, Walker, Wheelchair, [address] [facility] chair, Other  Other Patient DME:  grab bars  Do you have support at home?:  Child  Do you feel like you have everything you need to keep you well at home?:  Yes  Are you an active caregiver in your home?:  No  Care Transitions Interventions     Other Services:   (Comment: [facility])          Follow Up  Future Appointments   Date Time Provider Olga Duffy   8/26/2020  1:30 PM BRIAN Galeana - ABENA PB Triage Serena Ibrahim RN

## 2020-08-27 NOTE — TELEPHONE ENCOUNTER
THE PATIENT WAS RECENTLY DISCHARGED FROM NURSING HOME WHERE HE WAS BEING TREATED FOR PNEUMONIA AND POSITIVE COVID TEST. HE IS SCHEDULED TO SEE [facility] TOMORROW FOR FOLLOW UP ON COVID. THE PATIENT HAS BEEN BEEN RECEIVING LOVENOX INJECTIONS FOR THE PAST 28 DAYS AND HAS ANOTHER SCRIPT FOR 30 DAYS BUT TERRELL FROM [facility] IS RELUCTANT TO GIVE HIM ANYMORE WITHOUT HAVING YOU REVIEW HIS CHART AND APPROVE IT. SHE WAS WONDERING IF HE COULD STOP IT. PLEASE ADVISE.

## 2020-08-28 PROBLEM — U07.1 PNEUMONIA DUE TO COVID-19 VIRUS: Status: ACTIVE | Noted: 2020-01-01

## 2020-08-28 PROBLEM — Z95.0 PRESENCE OF CARDIAC PACEMAKER: Status: ACTIVE | Noted: 2020-01-01

## 2020-08-28 PROBLEM — J12.82 PNEUMONIA DUE TO COVID-19 VIRUS: Status: ACTIVE | Noted: 2020-01-01

## 2020-08-28 NOTE — PROGRESS NOTES
[address]  [address] [facility]  June Simmons, [address]  Phone:  [phone]  Fax:  [phone]    2020   Mihaela Manning (:  1928) is a 80 y.o. male had a CONFIRMED case of [place]. Patient is here for a follow up from hospitalization and   Chief Complaint   Patient presents with    Other     Pt was in [facility] and went to rehab and now follow up is needed     Cough     sometimes when he drinks    . TRANSITION OF CARE MANAGEMENT  Admit date: 2020  Discharge date: 2020  Hospital: [facility]  Phone contact: yes   PCP: Emeli Ravi MD  HPI  Mihaela Manning is a 80 y.o. male patient has a known history of Ischemic cardiomyopathy  Atherosclerosis of coronary artery bypass graft(s) without angina pectoris  Diabetes mellitus (CMS-HCC)Atherosclerosis of coronary artery bypass graft(s) without angina pectoris    Atrioventricular block, complete    CAD (coronary artery disease)    CHF (congestive heart failure) (CMS-HCC)    Diabetes mellitus (CMS-HCC)   without complications    Heart failure, unspecified (CMS-HCC)    Hyperlipidemia, unspecified    Ischemic cardiomyopathy    Mitral regurgitation   moderate    Nonrheumatic mitral (valve) insufficiency    Pancreatitis    Presence of aortocoronary bypass graft    Presence of cardiac pacemaker    Sick sinus syndrome (CMS-HCC)   Metastatic cancer-       Patient presented to the hospital for cough, SOB. Patient was admitted for Pneumonia related to [place] virus. Treatment received includes: [place] and Oxygen Therapy. Patient discharged  Home health: [address] [place] [address] [phone]- was in ECF- released last week per son. Symptom onset: 2020. QUARANTINE : 14 days  Type of Work:retired  Patient Status is are improving. Family reports that he is coughing when drinking water and thin liquids. Swallow study is scheduled per home health. Family has been thickening liquids.    Current Symptoms   Patient denies. Fever    Patient denies  Chills   Patient reports  Cough   Patient denies  Sputum production   Patient denies  Shortness of breath   Patient denies  Bodyaches/Myalgias   Patient has  Fatigue   Patient reports Anorexia- but improving   Patient denies Headache  DIAGNOSTICS   Chest XR/CT: Moderate infiltrates   CRP level was elevated  15.1   LDH level was normal  156   Ferritin level was elevated  487   Procalcitonin level was normal  0.14   D-DImer level was elevated  542   LFT's were normal   Hemoglobin/Hematocrit: normal   Other COVID positive on 7/25/2020  RECENT LAB and IMAGING RESULTS  Ferritin   Date Value Ref Range Status   05/12/2020 227 30 - 400 ug/L Final     CRP   Date Value Ref Range Status   05/12/2020 25.5 (H) 0.0 - 5.0 mg/L Final     LD   Date Value Ref Range Status   05/12/2020 185 135 - 225 U/L Final     Procalcitonin   Date Value Ref Range Status   05/12/2020 0.11 (H) <0.09 ng/mL Final     Comment:           Suspected Sepsis:  <0.50 ng/mL     Low likelihood of sepsis. 0.50-2.00 ng/mL     Increased likelihood of sepsis. Antibiotics encouraged. >2.00 ng/mL     High risk of sepsis/shock. Antibiotics strongly encouraged. Suspected Lower Resp Tract Infections:  <0.24 ng/mL     Low likelihood of bacterial infection. >0.24 ng/mL     Increased likelihood of bacterial infection. Antibiotics encouraged. With successful antibiotic therapy, PCT levels should decrease rapidly. (Half-life of 24 to   36 hours.)        Procalcitonin values from samples collected within the first 6 hours of systemic infection   may still be low. Retesting may be indicated. Values from day 1 and day 4 can be entered into the Change in Procalcitonin Calculator   (www.[facility]-pct-[email or web address] com) to determine the patient's Mortality Risk Prognosis        In healthy neonates, plasma Procalcitonin (PCT) concentrations increase gradually after   birth, reaching peak values at about 24 hours of age then decrease to normal values below   0.5 ng/mL by 48-72 hours of age. D-Dimer, Quant   Date Value Ref Range Status   05/12/2020 0.81 (H) 0.00 - 0.59 mg/L FEU Final     Comment:            When combined with a low clinical probability, a D dimer value of <0.50 mg/L FEU is   considered negative for DVT and PE (negative predictive value of 98%, sensitivity of 97%). If this test is not being used to help rule out DVT and PE, then the following reference   range should be utilized: 0.00 - 0.59 mg/L FEU. The D-Dimer assay is intended for use as an aid in the diagnosis of venous thromboembolism   (DVT and PE) and the results should be interpreted in conjunction with the patient's medical   history, clinical presentation, and other findings. Elevated levels of D-dimer activity can be seen in any state of coagulation activation and   is not recommended in patients with therapeutic dose anticoagulant therapy for >24 hours,   fibrinolytic therapy within the previous 7 days, trauma or surgery within the previous 4   weeks,   disseminated malignancies, aortic aneurysm, sepsis, severe infections, pneumonia, severe   skin infections, liver cirrhosis, advanced age, coronary disease, diabetes, and pregnancy. A very low percentage of patients with DVT may yield D-dimer results below the cutoff of   0.5 mg/L FEU. This is known to be more prevalent in patients with distal DVT.             No results found for: LEGM   Lab Results   Component Value Date    WBC 7.7 05/12/2020    HGB 15.1 05/12/2020    HCT 44.9 05/12/2020    MCV 83.7 05/12/2020     05/12/2020    LYMPHOPCT 27 05/12/2020    RBC 5.36 05/12/2020    MCH 28.2 05/12/2020    MCHC 33.6 05/12/2020    RDW 14.3 05/12/2020     Lab Results   Component Value Date     05/12/2020    K 4.0 05/12/2020    CL 96 (L) 05/12/2020    CO2 29 05/12/2020    BUN 17 05/12/2020    CREATININE 1.12 05/12/2020    GLUCOSE 243 (H) 05/12/2020    CALCIUM 9.1 05/12/2020    PROT 9.2 (H) 05/12/2020    LABALBU 3.7 05/12/2020    BILITOT 0.52 05/12/2020    ALKPHOS 119 05/12/2020    AST 18 05/12/2020    ALT 16 05/12/2020    LABGLOM >60 05/12/2020    GFRAA >60 05/12/2020    GLOB NOT REPORTED 04/02/2018     XR CHEST PORTABLE  Narrative: EXAMINATION:  ONE XRAY VIEW OF THE CHEST    5/12/2020 5:48 pm    COMPARISON:  CT dated 02/07/2020    HISTORY:  ORDERING SYSTEM PROVIDED HISTORY: Cough  TECHNOLOGIST PROVIDED HISTORY:  Cough  Reason for Exam: Cough  Acuity: Acute  Type of Exam: Initial    FINDINGS:  LINES/TUBES/OTHER: There is a subclavian dual lead pacemaker with leads  projecting the right atrium and right ventricle. Median sternotomy wires and  clips are noted. HEART/MEDIASTINUM: The cardiomediastinal silhouette is within normal limits. PLEURA/LUNGS: Innumerable bilateral pulmonary nodules are as seen CT. Underlying airspace disease cannot be excluded. there is no appreciable  pneumothorax. BONES/SOFT TISSUE: No acute abnormality. Impression: Bilateral nodules as seen prior imaging. Underlying airspace disease cannot  be excluded. Counseling given: Not Answered    Review of Systems   Constitutional: Positive for appetite change (decreased) and fatigue. Negative for chills and fever. HENT: Positive for trouble swallowing (water and thin liquids). Negative for congestion, ear pain, rhinorrhea and sore throat. Eyes: Negative for pain and visual disturbance. Respiratory: Positive for cough (dry). Negative for chest tightness and shortness of breath. Cardiovascular: Negative for chest pain, palpitations and leg swelling. Gastrointestinal: Negative for abdominal distention, abdominal pain, diarrhea, nausea and vomiting. Genitourinary: Negative for decreased urine volume and difficulty urinating. Musculoskeletal: Negative for arthralgias, gait problem, myalgias and neck pain. Skin: Negative for pallor and rash.    Neurological: Negative for weakness, light-headedness and headaches. Psychiatric/Behavioral: Negative for sleep disturbance. Social History     Tobacco Use    Smoking status: Never Smoker    Smokeless tobacco: Never Used   Substance Use Topics    Alcohol use: No      Vitals:    08/28/20 1259   BP: (!) 95/58   Site: Left Upper Arm   Position: Sitting   Cuff Size: Medium Adult   Pulse: 77   Resp: 16   Temp: 99 °F (37.2 °C)   SpO2: 94%   Weight: 118 lb 3.2 oz (53.6 kg)   Height: 5' 4\" (1.626 m)     Estimated body mass index is 20.29 kg/m² as calculated from the following:    Height as of this encounter: 5' 4\" (1.626 m). Weight as of this encounter: 118 lb 3.2 oz (53.6 kg). Prior to Visit Medications    Medication Sig Taking? Authorizing Provider   tamsulosin (FLOMAX) 0.4 MG capsule Take 1 capsule by mouth daily Yes Izabel Ingram MD   finasteride (PROSCAR) 5 MG tablet TAKE 1 TABLET DAILY Yes Izbael Ingram MD   furosemide (LASIX) 20 MG tablet Take 1 tablet by mouth daily Yes Lauren Mckeon MD   vitamin D (ERGOCALCIFEROL) 76974 units CAPS capsule Take 50,000 Units by mouth once a week Yes Historical Provider, MD   Multiple Vitamins-Minerals (THERAPEUTIC MULTIVITAMIN-MINERALS) tablet Take 1 tablet by mouth daily Yes Historical Provider, MD   calcium carbonate (TUMS) 500 MG chewable tablet Take 1 tablet by mouth 2 times daily as needed for Heartburn Yes Tj Oswald MD   glimepiride (AMARYL) 2 MG tablet Take 2 mg by mouth every morning (before breakfast) Yes Historical Provider, MD   levothyroxine (LEVOXYL) 137 MCG tablet Take 137 mcg by mouth Daily  Yes Historical Provider, MD   nitroGLYCERIN (NITROSTAT) 0.4 MG SL tablet Place 0.4 mg under the tongue.  As directed Yes Historical Provider, MD   docusate sodium (COLACE) 100 MG capsule Take 100 mg by mouth nightly  Yes Historical Provider, MD   atorvastatin (LIPITOR) 20 MG tablet   Take 20 mg by mouth nightly  Yes Historical Provider, MD   aspirin 81 MG EC tablet Take 81 mg by mouth daily  Yes Historical Provider, MD        Medication List          Accurate as of August 28, 2020  2:00 PM. If you have any questions, ask your nurse or doctor. CONTINUE taking these medications    aspirin 81 MG EC tablet     atorvastatin 20 MG tablet  Commonly known as:  LIPITOR     calcium carbonate 500 MG chewable tablet  Commonly known as:  TUMS  Take 1 tablet by mouth 2 times daily as needed for Heartburn     docusate sodium 100 MG capsule  Commonly known as:  COLACE     finasteride 5 MG tablet  Commonly known as:  PROSCAR  TAKE 1 TABLET DAILY     furosemide 20 MG tablet  Commonly known as:  Lasix  Take 1 tablet by mouth daily     glimepiride 2 MG tablet  Commonly known as:  AMARYL     Levoxyl 137 MCG tablet  Generic drug:  levothyroxine     nitroGLYCERIN 0.4 MG SL tablet  Commonly known as:  NITROSTAT     tamsulosin 0.4 MG capsule  Commonly known as:  FLOMAX  Take 1 capsule by mouth daily     therapeutic multivitamin-minerals tablet     vitamin D 1.25 MG (36588 UT) Caps capsule  Commonly known as:  ERGOCALCIFEROL           PPE  Given that patient was recently admitted due to a COVID 19 infection, proper use of PPE was done by me and the office staff in contact with the patient. Patient was wearing a mask at all times and when I was in the room. I was wearing a gown, gloves, N-95 mask and full facial shield at all times when in the room including doorway. I kept the appropriate distance of  > 6 feet from the patient until the physical examination. I wore double gloves during direct patient exam.   Physical Exam  Vitals signs and nursing note reviewed. Constitutional:       General: He is not in acute distress. Appearance: Normal appearance. HENT:      Head: Normocephalic and atraumatic. Right Ear: Tympanic membrane and ear canal normal.      Left Ear: Tympanic membrane and ear canal normal.      Nose: Nose normal. No rhinorrhea. Mouth/Throat:      Lips: Pink.       Mouth: Mucous membranes are moist.      Pharynx: Oropharynx is clear. Uvula midline. No oropharyngeal exudate or posterior oropharyngeal erythema. Eyes:      Extraocular Movements: Extraocular movements intact. Conjunctiva/sclera: Conjunctivae normal.      Pupils: Pupils are equal, round, and reactive to light. Neck:      Musculoskeletal: Normal range of motion and neck supple. Cardiovascular:      Rate and Rhythm: Bradycardia present. Pulses: Normal pulses. Pulmonary:      Effort: Pulmonary effort is normal. No tachypnea. Breath sounds: Decreased breath sounds present. No wheezing or rales. Abdominal:      General: Bowel sounds are normal. There is no distension. Palpations: Abdomen is soft. Tenderness: There is no abdominal tenderness. There is no guarding or rebound. Musculoskeletal: Normal range of motion. Right lower leg: No edema. Left lower leg: No edema. Lymphadenopathy:      Cervical: No cervical adenopathy. Skin:     General: Skin is warm and dry. Capillary Refill: Capillary refill takes less than 2 seconds. Findings: No rash. Neurological:      Mental Status: He is alert and oriented to person, place, and time. Coordination: Coordination normal.      Gait: Gait normal.   Psychiatric:         Mood and Affect: Mood normal.         Thought Content: Thought content normal.       Syeda Graham was seen today for other and cough. Diagnoses and all orders for this visit:    COVID-19 virus infection  -     COVID-19 Ambulatory; Future    Pneumonia due to COVID-19 virus  -     COVID-19 Ambulatory; Future    Dysphagia, unspecified type  -     COVID-19 Ambulatory; Future    Hospital discharge follow-up    Pt here for hospital follow up and repeat COVID-19 testing. Has Digna 161 coming in. Swallow study ordered per family due to pt having trouble handling thin liquids. Have been using thickener with liquids. Appetite is improving. Did not each much at the [facility].  - but eating more now at home. COVID-19 retest sent and sent to the lab. Continue on all medications as ordered. [address], BRIAN - [id number]/[id number]     Return if symptoms worsen or fail to improve.

## 2020-08-28 NOTE — PATIENT INSTRUCTIONS
pudding. You don't have to chew these foods. Examples include mashed potatoes with gravy; yogurt; pudding; and pureed meats, fruits, and vegetables. · Thicken the liquids you drink. Your doctor or speech therapist will tell you what kind of thickener to use and how thick to make the liquids. ? Nectar-thick liquids leave a thin coating when they are poured from a spoon. ? Honey-thick liquids drip slowly when they are poured from a spoon. ? Pudding-thick liquids do not pour from a spoon. Your speech therapist will help you learn exercises to train your muscles to work together so you can swallow. You may also need to learn how to position your body or how to put food in your mouth to be able to swallow better. Some people have severe trouble swallowing and can't get enough food and liquids. In those cases, the doctor can insert a feeding tube so the person gets enough nutrients. Follow-up care is a key part of your treatment and safety. Be sure to make and go to all appointments, and call your doctor if you are having problems. It's also a good idea to know your test results and keep a list of the medicines you take. Where can you learn more? Go to https://Crittercismpepiceweb.Raise. org and sign in to your [employer] account. Enter M028 in the [facility] box to learn more about \"Learning About the Diet for Swallowing Problems. \"     If you do not have an account, please click on the \"Sign Up Now\" link. Current as of: June 26, 2019               Content Version: 12.5  © 1246-6907 [employer]. Care instructions adapted under license by [facility] ([facility]). If you have questions about a medical condition or this instruction, always ask your healthcare professional. [place] [address] any warranty or liability for your use of this information.

## 2020-08-29 PROBLEM — R07.9 CHEST PAIN: Status: ACTIVE | Noted: 2020-01-01

## 2020-08-29 NOTE — ED PROVIDER NOTES
components within normal limits   BASIC METABOLIC PANEL W/ REFLEX TO MG FOR LOW K - Abnormal; Notable for the following components:    Glucose 134 (*)     All other components within normal limits   TROPONIN - Abnormal; Notable for the following components:    Troponin, High Sensitivity 30 (*)     All other components within normal limits   COVID-19 - Abnormal; Notable for the following components:    SARS-CoV-2, Rapid DETECTED (*)     All other components within normal limits   FIBRINOGEN - Abnormal; Notable for the following components:    Fibrinogen 675 (*)     All other components within normal limits   C-REACTIVE PROTEIN - Abnormal; Notable for the following components:    CRP 28.0 (*)     All other components within normal limits   PROCALCITONIN - Abnormal; Notable for the following components:    Procalcitonin 0.10 (*)     All other components within normal limits   TROPONIN - Abnormal; Notable for the following components:    Troponin, High Sensitivity 27 (*)     All other components within normal limits   CBC WITH AUTO DIFFERENTIAL - Abnormal; Notable for the following components:    RDW 15.9 (*)     Seg Neutrophils 68 (*)     Lymphocytes 21 (*)     All other components within normal limits   COMPREHENSIVE METABOLIC PANEL W/ REFLEX TO MG FOR LOW K - Abnormal; Notable for the following components:    Glucose 154 (*)     Sodium 131 (*)     Chloride 96 (*)     Anion Gap 8 (*)     Alb 2.9 (*)     Albumin/Globulin Ratio 0.6 (*)     All other components within normal limits   STREP PNEUMONIAE ANTIGEN   LEGIONELLA ANTIGEN, URINE   IMMATURE PLATELET FRACTION   D-DIMER, QUANTITATIVE   SPECIMEN REJECTION   FERRITIN   LACTATE DEHYDROGENASE   APTT   PROTIME-INR   TROPONIN   HEMOGLOBIN A1C   MYCOPLASMA PNEUMONIAE ANTIBODY, IGM   PROTIME-INR   FIBRINOGEN   PROCALCITONIN   CBC WITH AUTO DIFFERENTIAL   C-REACTIVE PROTEIN   CBC WITH AUTO DIFFERENTIAL   COMPREHENSIVE METABOLIC PANEL W/ REFLEX TO MG FOR LOW K   APTT HEMOGLOBIN A1C   POCT GLUCOSE   POCT GLUCOSE   POCT GLUCOSE   POCT GLUCOSE   TYPE AND SCREEN   BLOOD BANK SPECIMEN       CT CHEST PULMONARY EMBOLISM W CONTRAST   Final Result   1. No pulmonary embolus. 2. Extensive pulmonary metastases, which appear increased in the interval.   3. Destructive osseous lesion at T6 involving the left transverse process and   epidural space, consistent with osseous metastatic disease. 4. Multiple indeterminate lesions within the upper abdomen as described above. XR CHEST PORTABLE   Final Result   1. Background diffuse bronchial wall thickening in keeping with airways   disease. 2. Bilateral pulmonary nodules, better characterized on prior CT. RECENT VITALS:     Temp: 97.4 °F (36.3 °C),  Pulse: 64, Resp: 22, BP: 106/70, SpO2: 94 %    This patient is a 80 y.o. Male with COVID, chest pain admitted    1 [facility] [address] / RECOMMENDATIONS:      1. Await Bed placement  2. FINAL IMPRESSION:     1.  Chest pain, unspecified type        DISPOSITION:       DISPOSITION:  []  Discharge   []  Transfer -    [x]  Admission -     []  Against Medical Advice   []  Eloped   FOLLOW-UP: Cydney Tucker MD  [facility] [address]  [address] [facility] [address]  [facility] [address]  [phone]           DISCHARGE MEDICATIONS: Current Discharge Medication List             Liliya Cano DO  Emergency Medicine Resident  [address] [facility]        Liliya Cano [place]  Resident  08/30/20 0707

## 2020-08-29 NOTE — ED PROVIDER NOTES
[facility] ED  Emergency Department Encounter  EmergencyMedicine Resident     Pt Shelia Fu  MRN: [id number]  Mateo 2/13/1928  Date of evaluation: 8/29/20  PCP:  Julio Meng MD    [address] [place] [address]       Chief Complaint   Patient presents with    Chest Pain     left side cp since last night, kept him up all night        HISTORY OF PRESENT ILLNESS  (Location/Symptom, Timing/Onset, Context/Setting, Quality, Duration, Modifying Factors, Severity.)      Alana Reddy is a 80 y.o. male who presents to the emergency department with a 2-week history of midsternal chest pain that starts in the back and radiates to the front. Patient has a recent history of COVID-19 pneumonia for which he was hospitalized in late July and he was recently discharged from the nursing home on August 22 at patient's request.  His pain is been going on for 2 weeks but according to daughter Beba Bella father does not complain much so he did not say anything until today because he could not get any sleep last night\". Patient himself denies recent fever, chills, cough worse than baseline, nausea or vomiting, abdominal pain, or problems with urinary or bladder habits. No new numbness or tingling anywhere. No leg swelling or pain.     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, Atrioventricular block, complete ([address] Biliary colic, Block, bundle branch, left, Calculus of bile duct without cholecystitis and without obstruction, Calculus of gallbladder, Cancer (HCC), Carotid artery stenosis, Cataract, Cerebrovascular disease, Cervical disc herniation, Cervical radiculopathy, chronic, Cervical spondylosis, CHF (congestive heart failure) (HCC), Chronic systolic CHF (congestive heart failure), NYHA class 2 ([address] Coronary arteriosclerosis in native SYSTEMS    (2-9 systems for level 4, 10 or more for level 5)      Review of Systems   Constitutional: Positive for unexpected weight change (Loss of approximately 30 pounds since February per family). Negative for chills and fever. HENT: Negative for ear pain, hearing loss and sore throat. Eyes: Negative for visual disturbance. Respiratory: Negative for shortness of breath. Cardiovascular: Positive for chest pain. Negative for leg swelling. Gastrointestinal: Negative for abdominal pain, constipation, diarrhea, nausea and vomiting. Genitourinary: Negative for difficulty urinating and dysuria. Musculoskeletal: Negative for arthralgias and myalgias. Neurological: Negative for numbness. Psychiatric/Behavioral: Negative for agitation and confusion. PHYSICAL EXAM   (up to 7 for level 4, 8 or more for level 5)      INITIAL VITALS:   BP (!) 112/46   Pulse 60   Temp 98 °F (36.7 °C) (Oral)   Resp 17   Ht 5' 4\" (1.626 m)   Wt 118 lb (53.5 kg)   SpO2 94%   BMI 20.25 kg/m²     Physical Exam  Vitals signs and nursing note reviewed. Constitutional:       General: He is not in acute distress. Appearance: Normal appearance. He is well-developed. He is not ill-appearing or diaphoretic. Comments: On examination patient is thin appearing, tracking provider in the room   HENT:      Head: Normocephalic and atraumatic. Right Ear: External ear normal.      Left Ear: External ear normal.      Nose: Nose normal.      Mouth/Throat:      Mouth: Mucous membranes are moist.   Eyes:      Extraocular Movements: Extraocular movements intact. Conjunctiva/sclera: Conjunctivae normal.   Neck:      Musculoskeletal: Normal range of motion and neck supple. Trachea: No tracheal deviation. Cardiovascular:      Rate and Rhythm: Normal rate and regular rhythm. Heart sounds: Normal heart sounds. No murmur. No friction rub. No gallop.        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 No respiratory distress. Breath sounds: Normal breath sounds. No wheezing, rhonchi or rales. Abdominal:      General: Abdomen is flat. There is no distension. Palpations: Abdomen is soft. There is no mass. Tenderness: There is no abdominal tenderness. There is no guarding or rebound. Musculoskeletal: Normal range of motion. General: No swelling, deformity or signs of injury. Skin:     General: Skin is warm and dry. Capillary Refill: Capillary refill takes less than 2 seconds. Coloration: Skin is not jaundiced. Findings: No bruising or lesion. Neurological:      General: No focal deficit present. Mental Status: He is alert and oriented to person, place, and time. Mental status is at baseline. Motor: No abnormal muscle tone.          DIFFERENTIAL  DIAGNOSIS     PLAN (LABS / IMAGING / EKG):  Orders Placed This Encounter   Procedures    Strep Pneumoniae Antigen    Legionella antigen, urine    XR CHEST PORTABLE    CBC Auto Differential    Basic Metabolic Panel w/ Reflex to MG    Troponin    Troponin    Immature Platelet Fraction    COVID-19    D-Dimer, Quantitative    Fibrinogen    SPECIMEN REJECTION    C-Reactive Protein    Ferritin    Lactate Dehydrogenase    Procalcitonin    Troponin    Protime-INR    Fibrinogen    Procalcitonin    Troponin    Hemoglobin A1c    CBC auto differential    Hemoglobin A1C    PPE Instructions    PPE Instructions    Notify Provider    HYPOGLYCEMIA TREATMENT: blood glucose less than 50 mg/dL and patient  ALERT and TOLERATING PO    HYPOGLYCEMIA TREATMENT: blood glucose less than 70 mg/dL and patient ALERT and TOLERATING PO    HYPOGLYCEMIA TREATMENT: blood glucose less than 70 mg/dL and patient NOT ALERT or NPO    Bedrest with bathroom privileges    Elevate Head of Bed     Monitor for signs/symptoms of urinary retention    Telemetry Monitoring    Telemetry monitoring    Inpatient consult to Hospitalist    Consult to Infectious Disease    Inpatient consult to Case Management    Inpatient consult to Social Work    Droplet Plus Isolation    Initiate Oxygen Therapy Protocol    Respiratory care evaluation only    Pacer Interrogate    POCT glucose    POCT Glucose    EKG 12 Lead    Insert peripheral IV    PATIENT STATUS (FROM ED OR OR/PROCEDURAL) Inpatient    PATIENT STATUS (FROM ED OR OR/PROCEDURAL) Inpatient       MEDICATIONS ORDERED:  Orders Placed This Encounter   Medications    aspirin chewable tablet 324 mg    aspirin EC tablet 81 mg    atorvastatin (LIPITOR) tablet 20 mg    docusate sodium (COLACE) capsule 100 mg    finasteride (PROSCAR) tablet 5 mg    furosemide (LASIX) tablet 20 mg    levothyroxine (SYNTHROID) tablet 137 mcg    therapeutic multivitamin-minerals 1 tablet    nitroGLYCERIN (NITROSTAT) SL tablet 0.4 mg    tamsulosin (FLOMAX) capsule 0.4 mg    vitamin D (ERGOCALCIFEROL) capsule 50,000 Units    OR Linked Order Group     acetaminophen (TYLENOL) tablet 650 mg     acetaminophen (TYLENOL) suppository 650 mg    enoxaparin (LOVENOX) injection 30 mg    glucose (GLUTOSE) 40 % oral gel 15 g    dextrose 50 % IV solution    glucagon (rDNA) injection 1 mg    dextrose 5 % solution    insulin lispro (HUMALOG) injection vial 0-6 Units    insulin lispro (HUMALOG) injection vial 0-3 Units    sodium chloride flush 0.9 % injection 10 mL    sodium chloride flush 0.9 % injection 10 mL    polyethylene glycol (GLYCOLAX) packet 17 g    OR Linked Order Group     promethazine (PHENERGAN) tablet 12.5 mg     ondansetron (ZOFRAN) injection 4 mg    enoxaparin (LOVENOX) injection 40 mg       DDX: Othelia Smoker is a 80 y.o. male who presents to the emergency department with chest pain.  Differential diagnosis includes ACS, PE, pneumonia, pneumothorax, viral syndrome    DIAGNOSTIC RESULTS / EMERGENCY DEPARTMENT COURSE / MDM   LAB RESULTS:  Results for orders placed or performed during the hospital encounter of 08/29/20   Strep Pneumoniae Antigen    Specimen: Urine, clean catch   Result Value Ref Range    Source . URINE     Strep pneumo Ag NEGATIVE    Legionella antigen, urine    Specimen: Urine, clean catch   Result Value Ref Range    Legionella Pneumophilia Ag, Urine NEGATIVE    CBC Auto Differential   Result Value Ref Range    WBC 8.4 3.5 - 11.3 k/uL    RBC 5.00 4.21 - 5.77 m/uL    Hemoglobin 13.6 13.0 - 17.0 g/dL    Hematocrit 42.6 40.7 - 50.3 %    MCV 85.2 82.6 - 102.9 fL    MCH 27.2 25.2 - 33.5 pg    MCHC 31.9 28.4 - 34.8 g/dL    RDW 15.8 (H) 11.8 - 14.4 %    Platelets See Reflexed IPF Result 138 - 453 k/uL    MPV NOT REPORTED 8.1 - 13.5 fL    NRBC Automated 0.0 0.0 per 100 WBC    Differential Type NOT REPORTED     WBC Morphology NOT REPORTED     RBC Morphology ANISOCYTOSIS PRESENT     Platelet Estimate NOT REPORTED     Seg Neutrophils 67 (H) 36 - 65 %    Lymphocytes 19 (L) 24 - 43 %    Monocytes 9 3 - 12 %    Eosinophils % 4 1 - 4 %    Basophils 1 0 - 2 %    Immature Granulocytes 0 0 %    Segs Absolute 5.64 1.50 - 8.10 k/uL    Absolute Lymph # 1.62 1.10 - 3.70 k/uL    Absolute Mono # 0.75 0.10 - 1.20 k/uL    Absolute Eos # 0.33 0.00 - 0.44 k/uL    Basophils Absolute 0.06 0.00 - 0.20 k/uL    Absolute Immature Granulocyte 0.03 0.00 - 0.30 k/uL   Basic Metabolic Panel w/ Reflex to MG   Result Value Ref Range    Glucose 134 (H) 70 - 99 mg/dL    BUN 9 8 - 23 mg/dL    CREATININE 0.83 0.70 - 1.20 mg/dL    Bun/Cre Ratio NOT REPORTED 9 - 20    Calcium 8.9 8.6 - 10.4 mg/dL    Sodium 140 135 - 144 mmol/L    Potassium 3.9 3.7 - 5.3 mmol/L    Chloride 98 98 - 107 mmol/L    CO2 29 20 - 31 mmol/L    Anion Gap 13 9 - 17 mmol/L    GFR Non-African American >60 >60 mL/min    GFR African American >60 >60 mL/min    GFR Comment          GFR Staging NOT REPORTED    Troponin   Result Value Ref Range    Troponin, High Sensitivity 30 (H) 0 - 22 ng/L    Troponin T NOT REPORTED <0.03 ng/mL    Troponin Interp NOT REPORTED    Immature Platelet Fraction   Result Value Ref Range    Platelet, Immature Fraction NOT REPORTED 1.1 - 10.3 %    Platelet, Fluorescence Platelet clumps present, count appears adequate. 138 - 453 k/uL   COVID-19    Specimen: Other   Result Value Ref Range    SARS-CoV-2          SARS-CoV-2, Rapid DETECTED (A) Not Detected    Source . NASOPHARYNGEAL SWAB     SARS-CoV-2, PCR         D-Dimer, Quantitative   Result Value Ref Range    D-Dimer, Quant 1.05 mg/L FEU   Fibrinogen   Result Value Ref Range    Fibrinogen 675 (H) 140 - 420 mg/dL   SPECIMEN REJECTION   Result Value Ref Range    Specimen Source . BLOOD     Ordered Test CRP FERI LD PRCAL TROPI     Reason for Rejection Unable to perform testing: Specimen hemolyzed. - NOT REPORTED    C-Reactive Protein   Result Value Ref Range    CRP 28.0 (H) 0.0 - 5.0 mg/L   Ferritin   Result Value Ref Range    Ferritin 320 30 - 400 ug/L   Lactate Dehydrogenase   Result Value Ref Range     135 - 225 U/L   Procalcitonin   Result Value Ref Range    Procalcitonin 0.10 (H) <0.09 ng/mL   Troponin   Result Value Ref Range    Troponin, High Sensitivity 27 (H) 0 - 22 ng/L    Troponin T NOT REPORTED <0.03 ng/mL    Troponin Interp NOT REPORTED        IMPRESSION: Buzz Chao is a 80 y.o. male who presents to the emergency department with chest pain. On examination his vital signs are normal and examination demonstrates an elderly gentleman appearing of stated age with midsternal chest pain. Examination nonfocal but history and recent COVID diagnosis concerning for pneumonia versus ACS, will obtain cardiac work-up, COVID swab, likely this patient will have an elevated heart score and will require admission. RADIOLOGY:  No results found.     EKG  EKG Interpretation    Interpreted by emergency department physician    Rhythm: Ventricular paced rhythm  Rate: 65  Axis: normal  Ectopy: none  Conduction: Ventricular paced, QRS 154, QTc 440 with poor R wave progression in the lateral leads  ST Segments: no acute change  T Waves: Inversion in V1, V2, aVL, lead I  Q Waves: none    Clinical Impression: New T wave inversion since February 7, 2020, nonspecific EKG    Gabo Jacobs MD    All EKG's are interpreted by the Emergency Department Physician who either signs or co-signs this chart in the absence of a cardiologist.    EMERGENCY DEPARTMENT COURSE:  ED Course as of Aug 29 1740   Sat Aug 29, 2020   1019 Spoke to internal medicine who requested COVID swab and cofactors. Orders placed. [TS]   1738 To Dr. Rebecca Whipple who requests that patient be taken off COVID isolation due to history of prior positive test 1 month ago. Stated that despite current lung pathology and positive COVID test the patient currently likely does not in fact have COVID. [TS]      ED Course User Index  [TS] Johney Scheuermann, MD       PROCEDURES:  None    CONSULTS:  IP CONSULT TO HOSPITALIST  IP CONSULT TO INFECTIOUS DISEASES  IP CONSULT TO CASE MANAGEMENT  IP CONSULT TO SOCIAL WORK    CRITICAL CARE:  Please see attending note. FINAL IMPRESSION      1. Chest pain, unspecified type          DISPOSITION / PLAN     DISPOSITION        PATIENT REFERRED TO:  Laney Johnston MD  [address] [address]  [address] [place] [address]  [phone]            DISCHARGE MEDICATIONS:  New Prescriptions    No medications on file       Johney Scheuermann, MD  Emergency Medicine Resident    This patient was evaluated in the Emergency Department for symptoms described in the history of present illness. He/she was evaluated in the context of the global COVID-19 pandemic, which necessitated consideration that the patient might be at risk for infection with the SARS-CoV-2 virus that causes COVID-19.  Institutional protocols and algorithms that pertain to the evaluation of patients at risk for COVID-19 are in a state of rapid change based on information released by regulatory bodies including the CDC and federal and state organizations. These policies and algorithms were followed during the patient's care in the ED.     (Please note that portions of thisnote were completed with a voice recognition program.  Efforts were made to edit the dictations but occasionally words are mis-transcribed.)        Julianna West MD  Resident  08/29/20 512 Blessing Montalvo MD  Resident  08/29/20 [id number]

## 2020-08-29 NOTE — ED NOTES
Report called to car 2, will transport after shift change      Florance Fothergill, RN  08/29/20 [id number]

## 2020-08-29 NOTE — CONSULTS
[facility] -   Infectious diseases evaluation  admission date 8/29/2020    reason for consultation:   Chest pain    Impression :   Current:  · L pleuretic chest pain  · Osteo arthritis of the T spine  · T spine pain w anterior radiation   · Elevated inflammatory markers  · post COVID pneumonia + 7/25/20    Other:  ·  -[id number]  Discussion / summary of stay / plan of care   · Left pleuretic chest pain and recent covid pneumonia [facility]  · and abnormal CXR might be a sequella from recent pneumonia since he has no SOB on RA. ·   Recommendations   · No need to isolate for COVID, his test is + for more than 1 month  · Mycoplasma IGM  · Repeat CRP  · start doxy po     Infection Control Recommendations   · [place] Precautions  · Contact Isolation       Antimicrobial Stewardship Recommendations   · Simplification of therapy  · Targeted therapy  Coordination ofOutpatient Care:   · Estimated Length of IV antimicrobials:  · Patient will need Midline / picc Catheter Insertion:   · Patient will need SNF:  · Patient will need outpatient wound care:     History of Present Illness:   Initial history:  Buzz Chao is a 80y.o.-year-old male presented for a T spine pain radiating to the shoulders and to the ant chest wall -no radiation to the legs  Has had no fever and and has a hx of osteo arthritis w on off diffuse joint pains. The  chest pain x 24 hours and kept him all night. He seems he has had similar pain about x many  Weeks but mostly 2 weeks before he came in. He had a recent COVID illness with pneumonia July 2020, discharged to the nursing home in August.    No fever associated vomiting diarrhea no cough no shortness of breath. He has had a loss of weight since February about 30 pounds. Appears thin on exam.    On room air, not short of breath, oriented x4  Denies nausea vomiting and diarrhea.   Has a left pacemaker in place    CRP 28 and WBC normal  He was tested positive for COVID on the rapid test  Fibrinogen is 675, d-dimer 1.05  CRP 28,         Interval changes  8/29/2020     Summary of relevant labs:  Labs:  CRP 28  WBC 8  Fibrinogen 675  D-dimer 1.05  CRP 28      Micro:  COVID test +8/29  Growth with positive at [facility] 7/25/2020    Legionella urine antigen negative  Imaging:  Chest x-ray shows 8/29 background diffuse bronchial wall thickening and bilateral pulmonary nodules          I have personally reviewed the past medical history, past surgical history, medications, social history, and family history, and I haveupdated the database accordingly.   Past Medical History:     Past Medical History:   Diagnosis Date    Abnormal ECG 8/30/2019    Acquired hypothyroidism 9/19/2014    Acute cholecystitis 2/28/2018    Atherosclerosis of coronary artery bypass graft(s) without angina pectoris 10/4/2016    Atrioventricular block, complete ([address] 5/83/7999    Biliary colic     Block, bundle branch, left 3/30/2010    Calculus of bile duct without cholecystitis and without obstruction     Calculus of gallbladder 9/19/2017    Cancer ([address] 2013    thyroid    Carotid artery stenosis     Cataract     Cerebrovascular disease     Cervical disc herniation 11/13/2014    Cervical radiculopathy, chronic 11/14/2014    Cervical spondylosis 11/13/2014    CHF (congestive heart failure) (HCC)     Chronic systolic CHF (congestive heart failure), NYHA class 2 ([address] 6/21/2018    Coronary arteriosclerosis in native artery 3/30/2010    Overview:  H/O Acute anteriorseptal MI  11/8/99 Severe 3 vessel CAD , LM coronary dissection  S/P CABG  1999 L-LAD, SVG Ramus, SVG OM, SVG L PDA Stress test  10/2007  EF 49% no reversible ischemia Predominantly fixed infeior apical septal defects Low normal to mildly reduced LV sys function with :VEF 49%  Septal wall motion abnormlaity Compared with prior study no new reveresible perfusion defects    COVID-19 virus infection 07/25/2020    Degeneration of cervical intervertebral disc 9/25/2012    Elevated LFTs 9/19/2017    Elevated liver enzymes 2/28/2018    H/O malignant neoplasm of thyroid 12/10/2013    Heart attack ([facility]     Heart block AV second degree     Hypercholesteremia 3/30/2010    Hypertension     Hypothyroidism     Ischemic cardiomyopathy 8/30/2019    Metastatic cancer ([facility] 9/19/2017    Mild aortic regurgitation 3/30/2010    Overview:  Mild moderate MR and AR  Overview:  Mild moderate MR and AR    Pneumonia due to COVID-19 virus     07/25/2020    Pulmonary nodule 6/21/2018    Pulmonary nodules/lesions, multiple 9/19/2017    S/P CABG x 3 8/30/2019    Sick sinus syndrome (HCC) 8/30/2019    SOB (shortness of breath) 8/30/2019    Spinal stenosis in cervical region 11/6/2014    Type 2 diabetes mellitus ([facility] 3/30/2010    Overview:  without complications    Type II or unspecified type diabetes mellitus without mention of complication, not stated as uncontrolled        Past Surgical  History:     Past Surgical History:   Procedure Laterality Date    APPENDECTOMY      CARDIAC SURGERY      CHOLECYSTECTOMY      CORONARY ARTERY BYPASS GRAFT      ENDOSCOPIC ULTRASOUND (LOWER)      PACEMAKER INSERTION  03/2015    SD LAP,CHOLECYSTECTOMY N/A 3/2/2018    CHOLECYSTECTOMY LAPAROSCOPIC performed by Nataly Neumann DO at [address]      TOTAL THYROIDECTOMY         Medications:      aspirin  81 mg Oral Daily    atorvastatin  20 mg Oral Nightly    docusate sodium  100 mg Oral Nightly    finasteride  5 mg Oral Daily    furosemide  20 mg Oral Daily    levothyroxine  137 mcg Oral Daily    therapeutic multivitamin-minerals  1 tablet Oral Daily    tamsulosin  0.4 mg Oral Daily    vitamin D  50,000 Units Oral Weekly    enoxaparin  30 mg Subcutaneous BID    insulin lispro  0-6 Units Subcutaneous TID WC    insulin lispro  0-3 Units Subcutaneous Nightly    sodium chloride flush  10 mL Intravenous 2 times per day    enoxaparin  40 mg Subcutaneous Daily       Social History:     Social History     Socioeconomic History    Marital status:      Spouse name: Not on file    Number of children: Not on file    Years of education: Not on file    Highest education level: Not on file   Occupational History    Occupation: Retired     Employer: [address] resource strain: Not on file    Food insecurity     Worry: Not on file     Inability: Not on file   [employer] needs     Medical: Not on file     Non-medical: Not on file   Tobacco Use    Smoking status: Never Smoker    Smokeless tobacco: Never Used   Substance and Sexual Activity    Alcohol use: No    Drug use: No    Sexual activity: Not on file   Lifestyle    Physical activity     Days per week: Not on file     Minutes per session: Not on file    Stress: Not on file   Relationships    Social connections     Talks on phone: Not on file     Gets together: Not on file     Attends Yarsanism service: Not on file     Active member of club or organization: Not on file     Attends meetings of clubs or organizations: Not on file     Relationship status: Not on file    Intimate partner violence     Fear of current or ex partner: Not on file     Emotionally abused: Not on file     Physically abused: Not on file     Forced sexual activity: Not on file   Other Topics Concern    Not on file   Social History Narrative    Not on file       Family History:     Family History   Problem Relation Age of Onset    Cancer Mother         stomach    Other Father         pneumonia        Allergies:   Seasonal and Keflex [cephalexin]     Review of Systems:     Review of Systems   Constitutional: Positive for activity change. Negative for appetite change. HENT: Negative for congestion. Eyes: Negative for itching. Respiratory: Negative for apnea. Cardiovascular: Negative for chest pain.    Gastrointestinal: Negative for abdominal distention. Endocrine: Negative for heat intolerance. Genitourinary: Negative for dysuria. Musculoskeletal: Positive for arthralgias and back pain. Skin: Negative for color change. Allergic/Immunologic: Negative for immunocompromised state. Neurological: Negative for dizziness. Hematological: Negative for adenopathy. Psychiatric/Behavioral: Negative for agitation. Physical Examination :     Patient Vitals for the past 8 hrs:   BP Pulse SpO2   08/29/20 1703 (!) 112/46 60 94 %   08/29/20 1618 -- 64 94 %   08/29/20 1433 -- 61 --   08/29/20 1246 (!) 116/54 63 --       Physical Exam  Constitutional:       Appearance: Normal appearance. HENT:      Head: Normocephalic and atraumatic. Nose: Nose normal. No congestion. Mouth/Throat:      Mouth: Mucous membranes are moist.   Eyes:      General: No scleral icterus. Conjunctiva/sclera: Conjunctivae normal.   Neck:      Musculoskeletal: Neck supple. No neck rigidity. Cardiovascular:      Rate and Rhythm: Normal rate and regular rhythm. Heart sounds: Normal heart sounds. No murmur. Pulmonary:      Effort: No respiratory distress. Breath sounds: Normal breath sounds. No wheezing. Abdominal:      General: There is no distension. Palpations: Abdomen is soft. There is no mass. Tenderness: There is no abdominal tenderness. Genitourinary:     Comments: No esparza  Musculoskeletal:         General: No swelling or tenderness. Skin:     General: Skin is dry. Coloration: Skin is not jaundiced. Neurological:      General: No focal deficit present. Mental Status: He is alert and oriented to person, place, and time. Psychiatric:         Mood and Affect: Mood normal.         Thought Content:  Thought content normal.           Medical Decision Making:   I have independently reviewed/ordered the following labs:    CBC with Differential:   Recent Labs     08/29/20  0909   WBC 8.4   HGB 13.6   HCT 42.6   PLT See Reflexed IPF Result   LYMPHOPCT 19*   MONOPCT 9     BMP:  Recent Labs     08/29/20  0909      K 3.9   CL 98   CO2 29   BUN 9   CREATININE 0.83     Hepatic Function Panel: No results for input(s): PROT, LABALBU, BILIDIR, IBILI, BILITOT, ALKPHOS, ALT, AST in the last 72 hours. No results for input(s): RPR in the last 72 hours. No results for input(s): HIV in the last 72 hours. No results for input(s): BC in the last 72 hours. Lab Results   Component Value Date    CREATININE 0.83 08/29/2020    GLUCOSE 134 08/29/2020    GLUCOSE 143 04/16/2012       Detailed results: Thank you for allowing us to participate in the care of this patient. Please call with questions. This note is created with the assistance of a speech recognition program.  While intending to generate adocument that actually reflects the content of the visit, the document can still have some errors including those of syntax and sound a like substitutions which may escape proof reading. It such instances, actual meaningcan be extrapolated by contextual diversion.     Shazia Gurrola MD  Office: [phone]  Perfect serve / office [phone]

## 2020-08-29 NOTE — ED NOTES
Pt presented to ed via daughter c/o left sided cp/back pain since yesterday. Pt is A&Ox4 with even non labored breaths. Pt presented to room via w/c. Pt denies n/v/d. Pt denies sob. Pt is [place]. Pt only took synthroid medication this morning. Pt placed on full monitor, ekg done, and iv established.       Maureen Rivers RN  08/29/20 0587

## 2020-08-29 NOTE — H&P
@[facility]@    [facility]    HISTORY AND PHYSICAL EXAMINATION            Date:   8/29/2020  Patient name:  Sameer Eaton  Date of admission:  8/29/2020  7:59 AM  MRN:   [id number]  Account:  [de-identified]  YOB: 1928  PCP:    Sabrina Leggett MD  Room:   25/25  Code Status:    Prior    Chief Complaint:     Chief Complaint   Patient presents with    Chest Pain     left side cp since last night, kept him up all night         History Obtained From:     Patient, family member -son    History of Present Illness:     Sameer Eaton is a 80 y.o. / male who presents with Chest Pain (left side cp since last night, kept him up all night )   and is admitted to the hospital for the management of chest pain. Patient was seen with her son at the bedside. Apparently patient had had COVID-19 pneumonia in July over a month ago, presented to the emergency department with chest pain with associated shortness of breath and repeat testing was positive and therefore patient was admitted onto the hospitalist service for further work-up and management. Patient denies any fever or chills, loss of taste or appetite, headache, neck pain or stiffness, blurry vision or diplopia, fever or chills. He admits to having chest pain which is different from his usual with associated shortness of breath. There is no associated nausea or vomiting, diaphoresis dizziness or palpitation. Patient has extensive history including but not limited to essential hypertension, dyslipidemia, diabetes type 2, acquired hypothyroidism from total thyroidectomy for malignancy, BPH.       Past Medical History:     Past Medical History:   Diagnosis Date    Abnormal ECG 8/30/2019    Acquired hypothyroidism 9/19/2014    Acute cholecystitis 2/28/2018    Atherosclerosis of coronary artery bypass graft(s) without angina pectoris 10/4/2016    Atrioventricular block, complete mellitus without mention of complication, not stated as uncontrolled         Past Surgical History:     Past Surgical History:   Procedure Laterality Date    APPENDECTOMY      CARDIAC SURGERY      CHOLECYSTECTOMY      CORONARY ARTERY BYPASS GRAFT      ENDOSCOPIC ULTRASOUND (LOWER)      PACEMAKER INSERTION  03/2015    RI LAP,CHOLECYSTECTOMY N/A 3/2/2018    CHOLECYSTECTOMY LAPAROSCOPIC performed by Nghia Little DO at [facility]          Medications Prior to Admission:     Prior to Admission medications    Medication Sig Start Date End Date Taking? Authorizing Provider   tamsulosin (FLOMAX) 0.4 MG capsule Take 1 capsule by mouth daily 2/19/20   Chava Cadena MD   finasteride (PROSCAR) 5 MG tablet TAKE 1 TABLET DAILY 2/19/20   Chava Cadena MD   furosemide (LASIX) 20 MG tablet Take 1 tablet by mouth daily 11/30/18   Hollace Fleischer, MD   vitamin D (ERGOCALCIFEROL) 96025 units CAPS capsule Take 50,000 Units by mouth once a week    Historical Provider, MD   Multiple Vitamins-Minerals (THERAPEUTIC MULTIVITAMIN-MINERALS) tablet Take 1 tablet by mouth daily    Historical Provider, MD   calcium carbonate (TUMS) 500 MG chewable tablet Take 1 tablet by mouth 2 times daily as needed for Heartburn 3/7/18   She Guerrier MD   glimepiride (AMARYL) 2 MG tablet Take 2 mg by mouth every morning (before breakfast)    Historical Provider, MD   levothyroxine (LEVOXYL) 137 MCG tablet Take 137 mcg by mouth Daily  7/29/16   Historical Provider, MD   nitroGLYCERIN (NITROSTAT) 0.4 MG SL tablet Place 0.4 mg under the tongue.  As directed    Historical Provider, MD   docusate sodium (COLACE) 100 MG capsule Take 100 mg by mouth nightly     Historical Provider, MD   atorvastatin (LIPITOR) 20 MG tablet   Take 20 mg by mouth nightly     Historical Provider, MD   aspirin 81 MG EC tablet Take 81 mg by mouth daily     Historical Provider, MD        Allergies:     Seasonal and Keflex [cephalexin]    Social History:     Tobacco:    reports that he has never smoked. He has never used smokeless tobacco.  Alcohol:      reports no history of alcohol use. Drug Use:  reports no history of drug use. Family History:     Family History   Problem Relation Age of Onset   Carlos Filter Cancer Mother         stomach    Other Father         pneumonia       Review of Systems:     Positive and Negative as described in HPI. CONSTITUTIONAL:  negative for fevers, chills, sweats, fatigue, weight loss  HEENT:  negative for vision, hearing changes, runny nose, throat pain  RESPIRATORY:  positive for shortness of breath, cough, congestion, wheezing  CARDIOVASCULAR:  positive for chest pain, palpitations  GASTROINTESTINAL:  negative for nausea, vomiting, diarrhea, constipation, change in bowel habits, abdominal pain   GENITOURINARY:  negative for difficulty of urination, burning with urination, frequency   INTEGUMENT:  negative for rash, skin lesions, easy bruising   HEMATOLOGIC/LYMPHATIC:  negative for swelling/edema   ALLERGIC/IMMUNOLOGIC:  negative for urticaria , itching  ENDOCRINE:  negative increase in drinking, increase in urination, hot or cold intolerance  MUSCULOSKELETAL:  negative joint pains, muscle aches, swelling of joints  NEUROLOGICAL:  negative for headaches, dizziness, lightheadedness, numbness, pain, tingling extremities  BEHAVIOR/PSYCH:  negative for depression, anxiety    Physical Exam:   BP (!) 119/52   Pulse 60   Temp 98 °F (36.7 °C) (Oral)   Resp 17   Ht 5' 4\" (1.626 m)   Wt 118 lb (53.5 kg)   SpO2 96%   BMI 20.25 kg/m²   Temp (24hrs), Av.5 °F (36.9 °C), Min:98 °F (36.7 °C), Max:99 °F (37.2 °C)    No results for input(s): POCGLU in the last 72 hours.   No intake or output data in the 24 hours ending 20 1205    General Appearance: alert, ill appearing, and in mild acute respiratory distress  Mental status: oriented to person, place, and time  Head: normocephalic, atraumatic  Eye: no icterus, redness, pupils equal and reactive, extraocular eye movements intact, conjunctiva clear  Ear: normal external ear, no discharge, hearing intact  Nose: no drainage noted  Mouth: mucous membranes moist  Neck: supple, no carotid bruits, thyroid not palpable  Lungs: Bilateral equal air entry, clear to ausculation, no wheezing, rales or rhonchi, normal effort  Cardiovascular: normal rate, regular rhythm, no murmur, gallop, rub  Abdomen: Soft, nontender, nondistended, normal bowel sounds, no hepatomegaly or splenomegaly  Neurologic: There are no new focal motor or sensory deficits, normal muscle tone and bulk, no abnormal sensation, normal speech, cranial nerves II through XII grossly intact  Skin: No gross lesions, rashes, bruising or bleeding on exposed skin area  Extremities: peripheral pulses palpable, no pedal edema or calf pain with palpation  Psych: normal affect    Investigations:      Laboratory Testing:  Recent Results (from the past 24 hour(s))   CBC Auto Differential    Collection Time: 08/29/20  9:09 AM   Result Value Ref Range    WBC 8.4 3.5 - 11.3 k/uL    RBC 5.00 4.21 - 5.77 m/uL    Hemoglobin 13.6 13.0 - 17.0 g/dL    Hematocrit 42.6 40.7 - 50.3 %    MCV 85.2 82.6 - 102.9 fL    MCH 27.2 25.2 - 33.5 pg    MCHC 31.9 28.4 - 34.8 g/dL    RDW 15.8 (H) 11.8 - 14.4 %    Platelets See Reflexed IPF Result 138 - 453 k/uL    MPV NOT REPORTED 8.1 - 13.5 fL    NRBC Automated 0.0 0.0 per 100 WBC    Differential Type NOT REPORTED     WBC Morphology NOT REPORTED     RBC Morphology ANISOCYTOSIS PRESENT     Platelet Estimate NOT REPORTED     Seg Neutrophils 67 (H) 36 - 65 %    Lymphocytes 19 (L) 24 - 43 %    Monocytes 9 3 - 12 %    Eosinophils % 4 1 - 4 %    Basophils 1 0 - 2 %    Immature Granulocytes 0 0 %    Segs Absolute 5.64 1.50 - 8.10 k/uL    Absolute Lymph # 1.62 1.10 - 3.70 k/uL    Absolute Mono # 0.75 0.10 - 1.20 k/uL    Absolute Eos # 0.33 0.00 - 0.44 k/uL    Basophils Absolute 0.06 0.00 - 0.20 k/uL Absolute Immature Granulocyte 0.03 0.00 - 0.30 k/uL   Basic Metabolic Panel w/ Reflex to MG    Collection Time: 08/29/20  9:09 AM   Result Value Ref Range    Glucose 134 (H) 70 - 99 mg/dL    BUN 9 8 - 23 mg/dL    CREATININE 0.83 0.70 - 1.20 mg/dL    Bun/Cre Ratio NOT REPORTED 9 - 20    Calcium 8.9 8.6 - 10.4 mg/dL    Sodium 140 135 - 144 mmol/L    Potassium 3.9 3.7 - 5.3 mmol/L    Chloride 98 98 - 107 mmol/L    CO2 29 20 - 31 mmol/L    Anion Gap 13 9 - 17 mmol/L    GFR Non-African American >60 >60 mL/min    GFR African American >60 >60 mL/min    GFR Comment          GFR Staging NOT REPORTED    Troponin    Collection Time: 08/29/20  9:09 AM   Result Value Ref Range    Troponin, High Sensitivity 30 (H) 0 - 22 ng/L    Troponin T NOT REPORTED <0.03 ng/mL    Troponin Interp NOT REPORTED    Immature Platelet Fraction    Collection Time: 08/29/20  9:09 AM   Result Value Ref Range    Platelet, Immature Fraction NOT REPORTED 1.1 - 10.3 %    Platelet, Fluorescence Platelet clumps present, count appears adequate. 138 - 453 k/uL   D-Dimer, Quantitative    Collection Time: 08/29/20  9:16 AM   Result Value Ref Range    D-Dimer, Quant 1.05 mg/L FEU   Fibrinogen    Collection Time: 08/29/20  9:16 AM   Result Value Ref Range    Fibrinogen 675 (H) 140 - 420 mg/dL   COVID-19    Collection Time: 08/29/20 10:30 AM    Specimen: Other   Result Value Ref Range    SARS-CoV-2          SARS-CoV-2, Rapid DETECTED (A) Not Detected    Source . NASOPHARYNGEAL SWAB     SARS-CoV-2, PCR         SPECIMEN REJECTION    Collection Time: 08/29/20 10:46 AM   Result Value Ref Range    Specimen Source . BLOOD     Ordered Test CRP FERI LD PRCAL TROPI     Reason for Rejection Unable to perform testing: Specimen hemolyzed. - NOT REPORTED        Imaging/Diagnostics:  Xr Chest Portable    Result Date: 8/29/2020  1. Background diffuse bronchial wall thickening in keeping with airways disease. 2. Bilateral pulmonary nodules, better characterized on prior CT. Assessment :      Hospital Problems           Last Modified POA    * (Principal) Chest pain 8/29/2020 Yes    Acquired hypothyroidism 8/29/2020 Yes    Hypercholesteremia 8/29/2020 Yes    Type 2 diabetes mellitus ([facility] 8/29/2020 Yes    Overview Signed 8/30/2019  3:39 PM by Dav Anguiano     Overview:   without complications         Atherosclerosis of coronary artery bypass graft(s) without angina pectoris 8/29/2020 Yes    Chronic systolic CHF (congestive heart failure), NYHA class 2 ([facility] 8/29/2020 Yes    Ischemic cardiomyopathy 8/29/2020 Yes    S/P CABG x 3 8/29/2020 Yes    Pneumonia due to COVID-19 virus 8/29/2020 Yes          Plan:     Patient status inpatient in the Med/Surge    1. Patient has retested positive for COVID and therefore ID consult has been called into assess the patient for her input  2. Because of the recent COVID, I will obtain CTA of the chest to rule out pulmonary embolism  3. We will resume all home regimen, cycle cardiac enzymes to rule out acute coronary syndrome, involve cardiology consult for their input  4. May need to address patient's CODE STATUS especially with his age and multiple comorbidities and general poor health  5. Maintain in-house overnight for further evaluation and management  6. Consult placed for Case management and  to assess the patient for possible placement if indicated  7. A.m. labs  Plan details discussed with patient and son at the bedside and all questions and concerns were addressed. Consultations:   IP CONSULT TO HOSPITALIST  IP CONSULT TO INFECTIOUS DISEASES  IP CONSULT TO CASE MANAGEMENT  IP CONSULT TO SOCIAL WORK    Patient is admitted as inpatient status because of co-morbidities listed above, severity of signs and symptoms as outlined, requirement for current medical therapies and most importantly because of direct risk to patient if care not provided in a hospital setting. Expected length of stay > 48 hours.     Delores Townsend

## 2020-08-29 NOTE — ED NOTES
Advance Care Planning     Advance Care Planning Activator (Inpatient)  Conversation Note      Date of ACP Conversation: 8/29/2020    Conversation Conducted with: Patient with Decision Making Capacity    ACP Activator: Tom Wise    *When Decision Maker makes decisions on behalf of the incapacitated patient: Decision Maker is asked to consider and make decisions based on patient values, known preferences, or best interests. Health Care Decision Maker:     Current Designated Health Care Decision Maker:   Primary Decision Maker: Gus Greenwood - Lukas - [phone]  (If there is a valid Health Care Decision Maker named in the \"Healthcare Decision Makers\" box in the ACP activity, but it is not visible above, be sure to open that field and then select the health care decision maker relationship (ie \"primary\") in the blank space to the right of the name.) Validate  this information as still accurate & up-to-date; edit [place] field as needed.)    Note: Assess and validate information in current ACP documents, as indicated. If no Decision Maker listed above or available through scanned documents, then:    If no Authorized Decision Maker has previously been identified, then patient chooses [place]:  \"Who would you like to name as your primary health care decision-maker? \"               Name: Melissa Contreras        Relationship: Son          Phone number: [phone]  Cherise Mikaela this person be reached easily? \" Yes  \"Who would you like to name as your back-up decision maker? \"   Name: NA        Relationship: NA          Phone number: NA  \"Can this person be reached easily? \" No    Note: If the relationship of these Decision-Makers to the patient does NOT follow your state's Next of Kin hierarchy, recommend that patient complete ACP document that meets state-specific requirements to allow them to act on the patient's behalf when appropriate. Care Preferences    Ventilation:   \"If you were in your present state of health and suddenly became very ill and were unable to breathe on your own, what would your preference be about the use of a ventilator (breathing machine) if it were available to you? \"      Would the patient desire the use of ventilator (breathing machine)?: yes    \"If your health worsens and it becomes clear that your chance of recovery is unlikely, what would your preference be about the use of a ventilator (breathing machine) if it were available to you? \"     Would the patient desire the use of ventilator (breathing machine)?: Yes      Resuscitation  \"CPR works best to restart the heart when there is a sudden event, like a heart attack, in someone who is otherwise healthy. Unfortunately, CPR does not typically restart the heart for people who have serious health conditions or who are very sick. \"    \"In the event your heart stopped as a result of an underlying serious health condition, would you want attempts to be made to restart your heart (answer \"yes\" for attempt to resuscitate) or would you prefer a natural death (answer \"no\" for do not attempt to resuscitate)? \" yes      NOTE: If the patient has a valid advance directive AND now provides care preference(s) that are inconsistent with that prior directive, advise the patient to consider either: creating a new advance directive that complies with state-specific requirements; or, if that is not possible, orally revoking that prior directive in accordance with state-specific requirements, which must be documented in the EHR. [] Yes   [x] No   Educated Patient / Tamanna Turcios regarding differences between Advance Directives and portable DNR orders.     Length of ACP Conversation in minutes:  10  Conversation Outcomes:  [x] ACP discussion completed  [] Existing advance directive reviewed with patient; no changes to patient's previously recorded wishes  [] New Advance Directive completed  [] Portable Do Not Rescitate prepared

## 2020-08-29 NOTE — ED NOTES
Pt resting in bed with even non labored breaths. Pt A&Ox4. Pt denies any complaints. Pt shows no signs of distress. Will continue to monitor.         Jeffery Oliveira RN  08/29/20 2019

## 2020-08-29 NOTE — ED PROVIDER NOTES
[facility] HANDOFF       Handoff taken on the following patient from prior Attending Physician:  Pt Name: Yue Kramer  PCP:  Devyn Nj MD    Attestation  I was available and discussed any additional care issues that arose and coordinated the management plans with the resident(s) caring for the patient during my duty period. Any areas of disagreement with resident's documentation of care or procedures are noted on the chart. I was personally present for the key portions of any/all procedures during my duty period. I have documented in the chart those procedures where I was not present during the key portions. CHIEF COMPLAINT       Chief Complaint   Patient presents with    Chest Pain     left side cp since last night, kept him up all night          CURRENT MEDICATIONS     Previous Medications  Previous Medications    ASPIRIN 81 MG EC TABLET    Take 81 mg by mouth daily     ATORVASTATIN (LIPITOR) 20 MG TABLET      Take 20 mg by mouth nightly     CALCIUM CARBONATE (TUMS) 500 MG CHEWABLE TABLET    Take 1 tablet by mouth 2 times daily as needed for Heartburn    DOCUSATE SODIUM (COLACE) 100 MG CAPSULE    Take 100 mg by mouth nightly     FINASTERIDE (PROSCAR) 5 MG TABLET    TAKE 1 TABLET DAILY    FUROSEMIDE (LASIX) 20 MG TABLET    Take 1 tablet by mouth daily    GLIMEPIRIDE (AMARYL) 2 MG TABLET    Take 2 mg by mouth every morning (before breakfast)    LEVOTHYROXINE (LEVOXYL) 137 MCG TABLET    Take 137 mcg by mouth Daily     MULTIPLE VITAMINS-MINERALS (THERAPEUTIC MULTIVITAMIN-MINERALS) TABLET    Take 1 tablet by mouth daily    NITROGLYCERIN (NITROSTAT) 0.4 MG SL TABLET    Place 0.4 mg under the tongue.  As directed    TAMSULOSIN (FLOMAX) 0.4 MG CAPSULE    Take 1 capsule by mouth daily    VITAMIN D (ERGOCALCIFEROL) 38101 UNITS CAPS CAPSULE    Take 50,000 Units by mouth once a week       Encounter Medications  Orders Placed This Encounter   Medications    aspirin chewable occasionally words are mis-transcribed. )    Michaeline Sicard,, MD, F.A.C.E.P.   Attending Emergency Physician        Michaeline Sicard, MD  08/29/20 7069

## 2020-08-29 NOTE — ED PROVIDER NOTES
[facility]     Emergency Department     Faculty Attestation    I performed a history and physical examination of the patient and discussed management with the resident. I reviewed the resident´s note and agree with the documented findings and plan of care. Any areas of disagreement are noted on the chart. I was personally present for the key portions of any procedures. I have documented in the chart those procedures where I was not present during the key portions. I have reviewed the emergency nurses triage note. I agree with the chief complaint, past medical history, past surgical history, allergies, medications, social and family history as documented unless otherwise noted below. For Physician Assistant/ Nurse Practitioner cases/documentation I have personally evaluated this patient and have completed at least one if not all key elements of the E/M (history, physical exam, and MDM). Additional findings are as noted. Recent recovery from COVID, decreased breath sounds at the bases,  Heart exam normal , no pain or swelling on examination of the lower extremities , equal pulses both wrists , trachea midline. Abdomen is nontender without pulsatile mass or bruit. Chest pain radiates from the back to the front,  the pain is not pleuritic. Patient appears comfortable in no distress, skin is warm and dry.     Brielle Tomlinson MD [address]  Attending Physician       EKG Interpretation    Interpreted by emergency department physician    Rhythm: Atrial paced  Rate: normal/60  Axis: Left -37 degrees  Ectopy: none  Conduction: Atrial paced AK interval 220 ms, left bundle branch block QRS duration 160 ms  ST Segments: no acute change  T Waves: no acute change  Q Waves: none    Clinical Impression: Abnormal EKG    Evy Rivera, III            Evy Rivera MD  08/29/20 1570

## 2020-08-30 NOTE — CONSULTS
[facility] Cardiology Cardiology    Consult / H&P               Today's Date: 8/30/2020  Patient Name: Aide Swift  Date of admission: 8/29/2020  7:59 AM  Patient's age: 80 y.o., 2/13/1928  Admission Dx: Pneumonia due to COVID-19 virus [U07.1, J12.89]  Chest pain [R07.9]    Reason for Consult:  Cardiac evaluation    Requesting Physician: Yovany Villalpando MD    CHIEF COMPLAINT:        History Obtained From:  patient    HISTORY OF PRESENT ILLNESS:      45-year-old male with past medical history of hypertension, type 2 diabetes, thyroid cancer, BPH presented with complaint of left-sided shoulder pain. Patient denies any chest pain, shortness of breath, nausea, diaphoresis. As per patient he was having shoulder pain and back pain from last 1 week. Patient has history of CABG. History of complete heart block/sick sinus syndrome and had a pacemaker in the past.  Patient denied upgrade to ICD. History of metastatic thyroid cancer. Last echo done in 2018 showed ejection fraction 40 to 45% which is improved compared to his previous echo. Mild to moderate MR and mild to moderate AI. EKG showed paced rhythm. Patient is positive for COVID. Patient was positive for COVID in July. As per infectious disease patient does not need isolation for COVID his  test was positive for more than 1 month.     Past Medical History:   has a past medical history of Abnormal ECG, Acquired hypothyroidism, Acute cholecystitis, Atherosclerosis of coronary artery bypass graft(s) without angina pectoris, Atrioventricular block, complete ([address] Biliary colic, Block, bundle branch, left, Calculus of bile duct without cholecystitis and without obstruction, Calculus of gallbladder, Cancer (HCC), Carotid artery stenosis, Cataract, Cerebrovascular disease, Cervical disc herniation, Cervical radiculopathy, chronic, Cervical spondylosis, CHF (congestive heart failure) (HCC), Chronic systolic CHF (congestive heart failure), NYHA class 2 [facility]), Coronary arteriosclerosis in native artery, COVID-19 virus infection, Degeneration of cervical intervertebral disc, Elevated LFTs, Elevated liver enzymes, H/O malignant neoplasm of thyroid, Heart attack ([address] Heart block AV second degree, Hypercholesteremia, Hypertension, Hypothyroidism, Ischemic cardiomyopathy, Metastatic cancer ([address] Mild aortic regurgitation, Pneumonia due to COVID-19 virus, Pulmonary nodule, Pulmonary nodules/lesions, multiple, S/P CABG x 3, Sick sinus syndrome (HCC), SOB (shortness of breath), Spinal stenosis in cervical region, Type 2 diabetes mellitus ([address] and Type II or unspecified type diabetes mellitus without mention of complication, not stated as uncontrolled. Past Surgical History:   has a past surgical history that includes shoulder surgery; Cardiac surgery; Coronary artery bypass graft; Appendectomy; Total Thyroidectomy; Pacemaker insertion (03/2015); pr lap,cholecystectomy (N/A, 3/2/2018); Cholecystectomy; and Endoscopic ultrasonography, GI. Home Medications:    Prior to Admission medications    Medication Sig Start Date End Date Taking?  Authorizing Provider   tamsulosin (FLOMAX) 0.4 MG capsule Take 1 capsule by mouth daily 2/19/20   Toshia Villa MD   finasteride (PROSCAR) 5 MG tablet TAKE 1 TABLET DAILY 2/19/20   Toshia Villa MD   furosemide (LASIX) 20 MG tablet Take 1 tablet by mouth daily 11/30/18   Sonal Garcia MD   vitamin D (ERGOCALCIFEROL) 47979 units CAPS capsule Take 50,000 Units by mouth once a week    Historical Provider, MD   Multiple Vitamins-Minerals (THERAPEUTIC MULTIVITAMIN-MINERALS) tablet Take 1 tablet by mouth daily    Historical Provider, MD   calcium carbonate (TUMS) 500 MG chewable tablet Take 1 tablet by mouth 2 times daily as needed for Heartburn 3/7/18   Mignon Talamantes MD   glimepiride (AMARYL) 2 MG tablet Take 2 mg by mouth every morning (before breakfast)    Historical Provider, MD   levothyroxine (LEVOXYL) 137 MCG tablet Take 137 mcg by mouth Daily  7/29/16   Historical Provider, MD   nitroGLYCERIN (NITROSTAT) 0.4 MG SL tablet Place 0.4 mg under the tongue.  As directed    Historical Provider, MD   docusate sodium (COLACE) 100 MG capsule Take 100 mg by mouth nightly     Historical Provider, MD   atorvastatin (LIPITOR) 20 MG tablet   Take 20 mg by mouth nightly     Historical Provider, MD   aspirin 81 MG EC tablet Take 81 mg by mouth daily     Historical Provider, MD      Current Facility-Administered Medications: atorvastatin (LIPITOR) tablet 40 mg, 40 mg, Oral, Nightly  aspirin EC tablet 81 mg, 81 mg, Oral, Daily  docusate sodium (COLACE) capsule 100 mg, 100 mg, Oral, Nightly  finasteride (PROSCAR) tablet 5 mg, 5 mg, Oral, Daily  furosemide (LASIX) tablet 20 mg, 20 mg, Oral, Daily  levothyroxine (SYNTHROID) tablet 137 mcg, 137 mcg, Oral, Daily  therapeutic multivitamin-minerals 1 tablet, 1 tablet, Oral, Daily  nitroGLYCERIN (NITROSTAT) SL tablet 0.4 mg, 0.4 mg, Sublingual, Q5 Min PRN  tamsulosin (FLOMAX) capsule 0.4 mg, 0.4 mg, Oral, Daily  vitamin D (ERGOCALCIFEROL) capsule 50,000 Units, 50,000 Units, Oral, Weekly  acetaminophen (TYLENOL) tablet 650 mg, 650 mg, Oral, Q6H PRN **OR** acetaminophen (TYLENOL) suppository 650 mg, 650 mg, Rectal, Q6H PRN  enoxaparin (LOVENOX) injection 30 mg, 30 mg, Subcutaneous, BID  glucose (GLUTOSE) 40 % oral gel 15 g, 15 g, Oral, PRN  dextrose 50 % IV solution, 12.5 g, Intravenous, PRN  glucagon (rDNA) injection 1 mg, 1 mg, Intramuscular, PRN  dextrose 5 % solution, 100 mL/hr, Intravenous, PRN  insulin lispro (HUMALOG) injection vial 0-6 Units, 0-6 Units, Subcutaneous, TID WC  insulin lispro (HUMALOG) injection vial 0-3 Units, 0-3 Units, Subcutaneous, Nightly  sodium chloride flush 0.9 % injection 10 mL, 10 mL, Intravenous, 2 times per day  sodium chloride flush 0.9 % injection 10 mL, 10 mL, Intravenous, PRN  polyethylene glycol (GLYCOLAX) packet 17 g, 17 g, Oral, Daily PRN  promethazine (PHENERGAN) and expansion without use of accessory muscles  · Resp Auscultation: Good respiratory effort. No for increased work of breathing. On auscultation: clear to auscultation bilaterally  Cardiovascular:  · The apical impulse is not displaced  · Heart tones are crisp and normal. regular S1 and S2.  · Jugular venous pulsation Normal  · The carotid upstroke is normal in amplitude and contour without delay or bruit  · Peripheral pulses are symmetrical and full   Abdomen:   · No masses or tenderness  · Bowel sounds present  Extremities:  ·  No Cyanosis or Clubbing  ·  Lower extremity edema: No  ·  Skin: Warm and dry  Neurological:  · Alert and oriented. · Moves all extremities well  · No abnormalities of mood, affect, memory, mentation, or behavior are noted    DATA:    Diagnostics:    EKGAV paced rhythm   ECHO: reviewed. In 2018 showed EF 40-45%    Labs:     CBC:   Recent Labs     08/29/20 2009 08/30/20  0403   WBC 8.2 8.2   HGB 13.7 13.8   HCT 44.5 46.1    215     BMP:   Recent Labs     08/29/20 2009 08/30/20  0403   * 133*   K 4.4 4.2   CO2 27 23   BUN 8 8   CREATININE 0.78 0.59*   LABGLOM >60 >60   GLUCOSE 154* 87     BNP: No results for input(s): BNP in the last 72 hours. PT/INR: No results for input(s): PROTIME, INR in the last 72 hours. APTT:  Recent Labs     08/29/20 2009 08/30/20  0403   APTT 23.9 29.5     CARDIAC ENZYMES:No results for input(s): CKTOTAL, CKMB, CKMBINDEX, TROPONINI in the last 72 hours.   FASTING LIPID PANEL:  Lab Results   Component Value Date    HDL 29 08/23/2019    TRIG 207 08/23/2019     LIVER PROFILE:  Recent Labs     08/29/20 2009 08/30/20  0403   AST 15 24   ALT 12 12   LABALBU 2.9* 2.4*       IMPRESSION:    Patient Active Problem List   Diagnosis    Degeneration of cervical intervertebral disc    Acquired hypothyroidism    Spinal stenosis in cervical region    Cervical spondylosis    Cervical disc herniation    Cervical radiculopathy, chronic    Hypercholesteremia

## 2020-08-30 NOTE — PROGRESS NOTES
Comprehensive Nutrition Assessment    Type and Reason for Visit:  Initial    Nutrition Recommendations/Plan: Will increase carbs to 5 per tray and provide Glucerna supplements at all meals for increased protein and calorie intake. Nutrition Assessment:  Pt referred to RD due to 30# wt loss over past 6 months. Pt was recently hospitalized for COVID. Nursing documented swallowing difficulty on admit. Spoke to day shift nurse who states pt has had no trouble swallowing this morning. H/O CHF, DM, Ca noted. Malnutrition Assessment:  Malnutrition Status: At risk for malnutrition (Comment)    Context:  Chronic Illness     Findings of the 6 clinical characteristics of malnutrition:  Energy Intake:  7 - 75% or less estimated energy requirements for 1 month or longer  Weight Loss:  7 - Greater than 20% over 1 year     Body Fat Loss:  Unable to assess     Muscle Mass Loss:  Unable to assess    Fluid Accumulation:  No significant fluid accumulation     Strength:  Not Performed    Estimated Daily Nutrient Needs:  Energy (kcal):  1800 kcal (30 kcal/kg); Weight Used for Energy Requirements:  Ideal     Protein (g):  70 g (1.2) or more; Weight Used for Protein Requirements:  Ideal          Nutrition Related Findings:  Glu       Current Nutrition Therapies:    DIET CARB CONTROL;     Anthropometric Measures:  · Height: 5' 4\" (162.6 cm)  · Current Body Weight: 114 lb (51.7 kg)   · Usual Body Weight: 150 lb (68 kg)     · Ideal Body Weight: 130 lbs; BMI: 19.6  · BMI Categories: Underweight (BMI less than 22) age over 72       Nutrition Diagnosis:   · Inadequate oral intake related to (current medical condition) as evidenced by weight loss, BMI      Nutrition Interventions:   Food and/or Nutrient Delivery:  Modify Current Diet, Start Oral Nutrition Supplement  Nutrition Education/Counseling:  Education not indicated   Coordination of Nutrition Care:  No recommendation at this time    Goals:  po intake of 1 supplement daily       Nutrition Monitoring and Evaluation:   Food/Nutrient Intake Outcomes:  Food and Nutrient Intake, Supplement Intake  Physical Signs/Symptoms Outcomes:  Chewing or Swallowing, Biochemical Data, Skin, GI Status     Discharge Planning:     Too soon to determine     Electronically signed by Pratima Garcia RD, LD on 8/30/20 at 2:45 PM EDT    Contact: [phone]

## 2020-08-30 NOTE — PLAN OF CARE
Problem: Airway Clearance - Ineffective  Goal: Achieve or maintain patent airway  Outcome: Ongoing     Problem: Gas Exchange - Impaired  Goal: Absence of hypoxia  Outcome: Ongoing  Goal: Promote optimal lung function  Outcome: Ongoing     Problem: Breathing Pattern - Ineffective  Goal: Ability to achieve and maintain a regular respiratory rate  Outcome: Ongoing     Problem:  Body Temperature -  Risk of, Imbalanced  Goal: Ability to maintain a body temperature within defined limits  Outcome: Ongoing  Goal: Will regain or maintain usual level of consciousness  Outcome: Ongoing  Goal: Complications related to the disease process, condition or treatment will be avoided or minimized  Outcome: Ongoing     Problem: Isolation Precautions - Risk of Spread of Infection  Goal: Prevent transmission of infection  Outcome: Ongoing     Problem: Nutrition Deficits  Goal: Optimize nutrtional status  Outcome: Ongoing     Problem: Risk for Fluid Volume Deficit  Goal: Maintain normal heart rhythm  Outcome: Ongoing  Goal: Maintain absence of muscle cramping  Outcome: Ongoing  Goal: Maintain normal serum potassium, sodium, calcium, phosphorus, and pH  Outcome: Ongoing     Problem: Loneliness or Risk for Loneliness  Goal: Demonstrate positive use of time alone when socialization is not possible  Outcome: Ongoing     Problem: Fatigue  Goal: Verbalize increase energy and improved vitality  Outcome: Ongoing     Problem: Patient Education: Go to Patient Education Activity  Goal: Patient/Family Education  Outcome: Ongoing     Problem: Skin Integrity:  Goal: Will show no infection signs and symptoms  Description: Will show no infection signs and symptoms  Outcome: Ongoing  Goal: Absence of new skin breakdown  Description: Absence of new skin breakdown  Outcome: Ongoing     Problem: Falls - Risk of:  Goal: Will remain free from falls  Description: Will remain free from falls  Outcome: Ongoing  Goal: Absence of physical injury  Description: Absence of physical injury  Outcome: Ongoing

## 2020-08-30 NOTE — PLAN OF CARE
Nutrition Problem #1: Inadequate oral intake  Intervention: Food and/or Nutrient Delivery: Modify Current Diet, Start Oral Nutrition Supplement  Nutritional Goals: po intake of 1 supplement daily

## 2020-08-30 NOTE — CARE COORDINATION
Transitional planning. Spoke with son and he is on his way to pick him up. He lives with his daughter and has [address] for home care. Jorge Luis Heady at [address] and he is current with them. I told her he had a positive covid test but they are treating him negative, did not isolate. Needed information will be obtained in EPIC    1750 Notified Nicolasa Glaser RN to finish SHARYN.     Discharge [facility]  Written by: Keli Gaona RN    Patient Name: Rishi Cedeno  Attending Provider: Ailyn Shaw MD  Admit Date: 2020  7:59 AM  MRN: [id number]  Account: [de-identified]                     : 1928  Discharge Date:       Disposition: home with daughter and Sil Palacio RN

## 2020-08-30 NOTE — PROGRESS NOTES
Writer notified MD cardiologist Lo Munroe of patient blood pressure 102/41 and a MAP of 56, doctor ordered to just monitor patient for now.  Nevin Martinez RN

## 2020-08-30 NOTE — DISCHARGE INSTR - COC
Continuity of Care Form    Patient Name: Yue Kramer   :  1928  MRN:  [id number]    [facility] date:  2020  Discharge date:  2020    Code Status Order: Full Code   Advance Directives:   [facility] Documentation     Date/Time Healthcare Directive Type of Healthcare Directive Copy in [address] Agent's Name Healthcare Agent's Phone Number    20  Yes, patient has an advance directive for healthcare treatment  --  --  --  --  --          Admitting Physician:  Lisha Garnica MD  PCP: Devyn Nj MD    Discharging Nurse: cc  [address] Unit/Room#:   Discharging Unit Phone Number: [phone]      Emergency Contact:   Extended Emergency Contact Information  Primary Emergency Contact: Adi Root of [address] [place] [address] Phone: [phone]  Relation: Child  Secondary Emergency Contact: Claudialeanna Atkins  Address: [address] [place] [address]  Work Phone: [phone]  Relation: Child    Past Surgical History:  Past Surgical History:   Procedure Laterality Date    APPENDECTOMY      CARDIAC SURGERY      CHOLECYSTECTOMY      CORONARY ARTERY BYPASS GRAFT      ENDOSCOPIC ULTRASOUND (LOWER)      PACEMAKER INSERTION  2015    [place] LAP,CHOLECYSTECTOMY N/A 3/2/2018    CHOLECYSTECTOMY LAPAROSCOPIC performed by Favian Jacobo DO at [address] [place] [address]         Immunization History:   Immunization History   Administered Date(s) Administered    Influenza A (V6E9-08) Vaccine IM 2010    Influenza A (Q1M3-65) Vaccine PF IM 01/15/2010    Influenza Virus Vaccine 10/01/2013, 2015    Influenza Whole 09/15/2011    Influenza, Quadv, IM, (6 mo and older Fluzone, Flulaval, Fluarix and 3 yrs and older Afluria) 10/04/2016    Influenza, Quadv, IM, PF (6 mo and older Fluzone, Flulaval, Fluarix, and 3 yrs and older Afluria) 2017    Influenza, Triv, inactivated, subunit, adjuvanted, IM (Fluad 65 yrs and older) 12/21/2019    Pneumococcal Conjugate 13-valent ([id number]) 04/30/2019    Pneumococcal Polysaccharide (Zjamakcbh38) 09/15/2011, 11/25/2012, 11/09/2015, 05/09/2017    Tdap (Boostrix, Adacel) 01/28/2018       Active Problems:  Patient Active Problem List   Diagnosis Code    Degeneration of cervical intervertebral disc M50.30    Acquired hypothyroidism E03.9    Spinal stenosis in cervical region M48.02    Cervical spondylosis M47.812    Cervical disc herniation M50.20    Cervical radiculopathy, chronic M54.12    Hypercholesteremia E78.00    Heart block AV second degree I44.1    H/O malignant neoplasm of thyroid Z85.850    Mild aortic regurgitation I35.1    Type 2 diabetes mellitus (HCC) E11.9    Atherosclerosis of coronary artery bypass graft(s) without angina pectoris I25.810    Pulmonary nodules/lesions, multiple R91.8    Calculus of gallbladder K80.20    Elevated LFTs R94.5    Metastatic cancer (HCC) C79.9    Pulmonary nodule R91.1    Calculus of bile duct without cholecystitis and without obstruction K80.50    Acute cholecystitis K81.0    Elevated liver enzymes W58.2    Biliary colic T91.69    Abnormal ECG R94.31    Atrioventricular block, complete (HCC) I44.2    Block, bundle branch, left I44.7    Coronary arteriosclerosis in native artery I25.10    Chronic systolic CHF (congestive heart failure), NYHA class 2 (HCC) I50.22    Ischemic cardiomyopathy I25.5    S/P CABG x 3 Z95.1    Sick sinus syndrome (HCC) I49.5    SOB (shortness of breath) R06.02    Pneumonia due to COVID-19 virus U07.1, J12.89    Presence of cardiac pacemaker Z95.0    Chest pain R07.9       Isolation/Infection:   Isolation          Contact        Patient Infection Status     Infection Onset Added Last Indicated Last Indicated By Review Planned Expiration Resolved Resolved By    MDRO (multi-drug resistant organism)  04/30/18 04/30/18 Lux Theodore Discharge:   Respiratory Treatments: ***  Oxygen Therapy:  {Therapy; copd oxygen:88088}  Ventilator:    {MH CC Vent UOJM:351249452}    Rehab Therapies: {THERAPEUTIC INTERVENTION:2346460392}  Weight Bearing Status/Restrictions: [address] Weight Bearin}  Other Medical Equipment (for information only, NOT a DME order):  {EQUIPMENT:744903053}  Other Treatments: ***    Patient's personal belongings (please select all that are sent with patient):  {CHP DME Belongings:547060901}    RN SIGNATURE:  {Esignature:225113163}    CASE MANAGEMENT/SOCIAL WORK SECTION    Inpatient Status Date:     Readmission Risk Assessment Score:  Readmission Risk              Risk of Unplanned Readmission:        15           Discharging to Facility/ Agency   · Name: [facility]  · Address:  · Phone:  · Fax:    Dialysis Facility (if applicable)   · Name:  · Address:  · Dialysis Schedule:  · Phone:  · Fax:    / signature: Electronically signed by Pepito Santos RN on 20 at 5:52 PM EDT    PHYSICIAN SECTION    Prognosis: Good    Condition at Discharge: Stable    Rehab Potential (if transferring to Rehab): Good    Recommended Labs or Other Treatments After Discharge: CBC, BMP in 1 week    Physician Certification: I certify the above information and transfer of Aide Swift  is necessary for the continuing treatment of the diagnosis listed and that he requires Home Care for less 30 days.      Update Admission H&P: No change in H&P    PHYSICIAN SIGNATURE:  Electronically signed by Yovany Villalpando MD on 20 at 3:32 PM EDT

## 2020-08-31 NOTE — CARE COORDINATION
Phu 45 Transitions Initial Follow Up Call - spoke with patient's son, Joanne Gomez who drove patient home at [facility]  Call within 2 business days of discharge: Yes    Patient: Yulia Monroe Patient : 1928   MRN: [id number]    Reason for Admission: SOB, chest pain (recent admission one month ago for Covid positive [facility])  Discharge Date: 20   RARS: Readmission Risk Score: 16      [facility] Discharge [facility]       Complaint Diagnosis Description Type Department Provider    20 Chest Pain Chest pain, unspecified type . .. ED to Hosp-Admission (Discharged) (ADMITTED) [facility] CAR 2 Staci Hill MD; Anne Marie Barber ... Spoke with: son, Joanne Gomez -unable to currently reach daughter & patient - Leo Elizondo says it takes them awhile to answer phone - requested that he ask them to return my call. Hyun Bernabe reports that patient was doing well at timing of DC - no further SOB or chest pain - he does cough periodically & is continuing to work with therapist with his swallowing. Patient reportedly lost 30 lbs when previously at a facility. Leo Elizondo is picking up vibratabs this am so antibiotic can get started. Patient & his daughter, son all tested positive for covid in July (patient was hospitalized at [facility]). Patient tested positive still on  & as per Dr Kayla Parmar, ID note - patient no longer needs to isolate since it has been > one month. Patient's Covid Risk factors include: DM, CHF. Son, Leo Elizondo already has [address] hotline & contact numbers & has already communicated with them after previous episode when patient & family members tested positive. Spoke with Teresa Kebede, Care Coordinator with [address] re: resuming home care after this discharge. Confirmed start of care - request that home care nurse call CTN to review medications & request for [facility]. nurse to help arrange [facility] hospital f/u with family members.         Facility: [facility]    Non-face-to-face services provided:  Scheduled appointment with PCP-to be scheduled - will check with [facility] Roshni Forman for assistance  Obtained and reviewed discharge summary and/or continuity of care documents  Communication with home health agencies or other community services the patient is currently using-[facility]  Assessment and support for treatment adherence and medication management-spoke with son & instructed to  antibiotic today   Unable to complete 1111F until speak with [facility] Transition will continue to follow & patient's son has CTN contact number. 2:30pm - Incoming return call from [facility] [employer]. nurse - confirms that she is seeing patient - she checked medications & all match AVS, but brother is still planning to  antibiotic today. She discussed hospital f/u with PCP office - per office protocol, patient needs another covid test before able to be seen in office. Gave [facility] [employer]. nurse information about [facility] covid clinic for follow ups & testing. She tried to schedule with PCP for VV, but scheduling out to later dates. Care Transitions 24 Hour Call    Do you have any ongoing symptoms?:  No  Do you have a copy of your discharge instructions?:  Yes  Do you have all of your prescriptions and are they filled?:  No (Comment: son is picking up vibratabs & get antibiotic started )  Have you been contacted by a [address] [employer] Pharmacist?:  No  Have you scheduled your follow up appointment?:  No  Were you discharged with any Home Care or Post Acute Services:  Yes  Post Acute Services:  Home Health (Comment:  [address])  Patient DME:  Khushbu Mayer cane, Walker, Wheelchair, [facility] chair, Other  Other Patient DME:  grab bars  Do you have support at home?:  Child  Do you feel like you have everything you need to keep you well at home?:  Yes (Comment: home care plus lives with daughter, Domenica Resendez)  Are you an active caregiver in your home?:  No  Care Transitions Interventions     Other Services:   (Comment: [facility]) Petra Coleman RN

## 2020-09-01 NOTE — CARE COORDINATION
Audrey + diagnosis Follow Up Call  2020    Patient: Samantha Sherman  Patient : 1928   MRN: [id number]    Reason for Admission: SOB, chest pain (recent admission one month ago for Covid positive PNE [facility])  Discharge Date: 20         RARS: Readmission Risk Score: 12     Spoke with: patient's daughter - who sets up medications & visits patient daily. Daughter informs me that patient has lost almost 30 lbs since last discharge & then he went to SNF for awhile prior to this most recent DC. She would appreciate a script for ensure & will assist to obtain. Discussed hospital f/u @ covid clinic in [address] - she is interested in scheduling - informed her of contact number. Patient's additional issue is swallowing - history of thyroid CA - working with therapist re: this ongoing issue. Care Transitions Subsequent and Final Call    Subsequent and Final Calls  Do you have any ongoing symptoms?:  Yes  Onset of Patient-reported symptoms: In the past 7 days  Patient-reported symptoms:  Weakness  Interventions for patient-reported symptoms:  Notified Home Care  Have your medications changed?:  Yes  Patient Reports:  started vibratabs  Do you have any questions related to your medications?:  No  Do you currently have any active services?:  Yes  Are you currently active with any services?:  Home Health  Do you have any needs or concerns that I can assist you with?:  Yes (Comment: needs ensure script - assistance with covid clinic scheduling - referral eventually to ACM)  Care Transitions Interventions     Other Services:   (Comment: [facility])   Other Interventions: Follow Up - will assist scheduling at  red clinic since PCP office requires a negative covid test prior to scheduling in office. Patient tested positive still on  & as per Dr Shahana Flores, ID note - patient no longer needs to isolate since it has been > one month.   Patient's Covid Risk factors include: DM, CHF. Care Transition Nurse/ Ambulatory Care Manager contacted the family (DAUGHTER) by telephone to perform post discharge assessment. Call within 2 business days of discharge: Yes. Verified name and  with family as identifiers. Provided introduction to self, and explanation of the CTN/ACM role, and reason for call due to risk factors for infection and/or exposure to COVID-19. Symptoms reviewed with family who verbalized the following symptoms: fatigue, no new symptoms and no worsening symptoms. Due to no new or worsening symptoms encounter was not routed to provider for escalation. Discussed follow-up appointments. If no appointment was previously scheduled, appointment scheduling offered: Yes  [facility] follow up appointment(s): assisting with scheduling hospital f/u at [facility]  [address] follow up appointment(s):     Non-face-to-face services provided:  Obtained and reviewed discharge summary and/or continuity of care documents     Advance Care Planning:   Does patient have an Advance Directive:  reviewed and current. CTN/ACM reviewed discharge instructions, medical action plan and red flags such as increased shortness of breath, increasing fever and signs of decompensation with family who verbalized understanding. Discussed exposure protocols and quarantine with CDC Guidelines What to do if you are sick with coronavirus disease .  Family was given an opportunity for questions and concerns. The family agrees to contact the [employer] exposure line [phone], [facility] department [address]: ([phone]) and PCP office for questions related to their healthcare. CTN/ACM provided contact information for future needs. Reviewed and educated family on any new and changed medications related to discharge diagnosis     Not LOOP since patient requires additional calls for [facility]    Plan for follow-up call in 3-5 days based on severity of symptoms and risk factors.     Emeli Vogel

## 2020-09-03 NOTE — CARE COORDINATION
Phu 45 Transitions Follow Up Call (treated as COVID PNE July [facility] admission)  9/3/2020    Patient: iMhaela Manning  Patient : 1928   MRN: [id number]    Reason for Admission: SOB, chest pain (recent admission one month ago for Covid positive PNE [facility])  Discharge Date: 20         RARS: Readmission Risk Score: 12          Spoke with: patient's son, patient's daughter Abel Whitten RN at [facility] flu clinic    Informed by family that patient had ABD pain yesterday & almost went back to hospital.  Pain resolved spontaneously & it was thought that it could have been from recently started vibratabs which were ordered for a 10 days course as a follow-up on this discharge - abnormal chest-xay & covid PNE from July admission. He tested positive again this admission, but ID's note indicates that since he was over one month out from covid, he did not need to isolate. CTN is in process of assisting daughter, Arelis Tee to get follow-up evaluations/appts scheduled. Patient has a couple acute issues that need to be addressed - needs another covid test to determine if negative test now so that he can go to PCP office & it needs to be determined whether patient needs to be placed on a different antibiotic since he stopped vibratabs. Also patient has ongoing issues from July admission that need to be addressed by PCP. Daughter says that he has lost 30 lbs, needs ensure script & needs f/u swallow study test.  Patient had been discharged from [address] [place] [address] then went to SNF for a few weeks, then had [facility]. He was [email or web address] for  [facility] (still tested positive), discharged home with resumption of [address]).     Care Transition will continue to follow & discussed all with ROBE Clark @  flu clinic who is calling PCP office to determine if patient can be seen at PCP office soon or if patient will be need to be seen within next day at Flu clinic to get another covid test & be evaluated for antibiotic. Care Transitions Subsequent and Final Call    Subsequent and Final Calls  Do you have any ongoing symptoms?:  Yes  Patient-reported symptoms:  Weakness, Weight Loss  Interventions for patient-reported symptoms:  Other  Have your medications changed?:  Yes  Do you have any questions related to your medications?:  Yes (Comment: questions if other antibiotic needs to be ordered)  Patient Reports:  questions if other antibiotic needs to be ordered - since patient stopped vibratabs - thought they caused ABD pain  Do you currently have any active services?:  Yes  Are you currently active with any services?:  Home Health  Do you have any needs or concerns that I can assist you with?:   (Comment: assisting with sequence of appts)  Care Transitions Interventions     Other Services:   (Comment: [facility])   Other Interventions: Follow Up - checking with PCP office and/or [facility] flu clinic to retest for covid since PCP office policy is to have a negative test before going to office visit    3:30pm: Incoming call back from [facility] at [facility] notified PCP office & PCP is scheduled to see patient for a hospital f/u tomorrow 9/4 & patient will not have to go to [facility] flu clinic for testing prior to visit. 4:30pm: Notified daughter, Roland Johnson of this plan & verbalizes understanding.     Will plan for referral to Ambulatory Care Management at end of transitional period

## 2020-09-04 NOTE — PROGRESS NOTES
Subjective:      Patient ID: Leslie Cantu is a 80 y.o. male. Visit Information    Have you changed or started any medications since your last visit including any over-the-counter medicines, vitamins, or herbal medicines? no   Are you having any side effects from any of your medications? -  yes - ABDOMINAL PAIN FROM DOXY  Have you stopped taking any of your medications? Is so, why? -  no    Have you seen any other physician or provider since your last visit? Yes - Records Obtained  Have you had any other diagnostic tests since your last visit? Yes - Records Obtained  Have you been seen in the emergency room and/or had an admission to a hospital since we last saw you? Yes - Records Obtained  Have you had your routine dental cleaning in the past 6 months? no    Have you activated your [employer] account? If not, what are your barriers?  No:      Patient Care Team:  Dajuan Lema MD as PCP - General (Family Medicine)  Dajuan Lema MD as PCP - [facility] Provider  Marylene Alexandria, MD as Consulting Physician (Gastroenterology)  Christos Daniel MD as Consulting Physician (Infectious Diseases)  Gerda Luna MD as Consulting Physician (Pulmonary Disease)  Peter Vázquez RN as Care Transitions Nurse    Medical History Review  Past Medical, Family, and Social History reviewed and does contribute to the patient presenting condition    Health Maintenance   Topic Date Due    Shingles Vaccine (1 of 2) 02/13/1978    TSH testing  09/18/2018    PSA counseling  04/04/2019    Annual Wellness Visit (AWV)  05/29/2019    Flu vaccine (1) 09/01/2020    Lipid screen  08/23/2020    Potassium monitoring  08/30/2021    Creatinine monitoring  08/30/2021    DTaP/Tdap/Td vaccine (2 - Td) 01/28/2028    Pneumococcal 65+ yrs at Risk Vaccine  Completed    Hepatitis A vaccine  Aged Out    Hib vaccine  Aged Out    Meningococcal (ACWY) vaccine  Aged Out     HPI  Patient is a 80-year-old  male brought to the office by 1 of his daughters for hyperlipidemia, diabetes, hypothyroidism. She also states that patient has been having difficulty swallowing solid foods for several weeks. As a result of this his daughter states he has been gradually losing weight. Patient was diagnosed with COVID-19 in July of this year and was recently hospitalized for pneumonia. He was discharged on 8/30/2020. He states he is feeling better and denies any shortness of breath, fever, chills, chest pain or abdominal pain. He is taking and tolerating his routine medication except for doxycycline which was discontinued yesterday due to diarrhea and abdominal cramps. Review of Systems   Constitutional: Positive for unexpected weight change (  Weight loss). Negative for chills and fever. HENT: Negative for congestion. Respiratory: Negative for chest tightness and shortness of breath. Cardiovascular: Negative for chest pain. Gastrointestinal: Positive for diarrhea (  From doxycycline). Negative for abdominal pain and blood in stool. Genitourinary: Negative for dysuria and hematuria. Skin: Negative for rash. Neurological: Negative for dizziness. Psychiatric/Behavioral: Negative for dysphoric mood. Objective:   Physical Exam  Vitals signs and nursing note reviewed. Constitutional:       General: He is not in acute distress. Appearance: He is well-developed. HENT:      Head: Normocephalic and atraumatic. Right Ear: Tympanic membrane, ear canal and external ear normal.      Left Ear: Tympanic membrane, ear canal and external ear normal.      Nose: Nose normal.      Mouth/Throat:      Mouth: Mucous membranes are moist.      Pharynx: Oropharynx is clear. Eyes:      General: No scleral icterus. Right eye: No discharge. Left eye: No discharge. Conjunctiva/sclera: Conjunctivae normal.   Neck:      Musculoskeletal: Neck supple. Cardiovascular:      Rate and Rhythm: Normal rate and regular rhythm. Heart sounds: Normal heart sounds. Pulmonary:      Effort: Pulmonary effort is normal. No respiratory distress. Breath sounds: Normal breath sounds. No wheezing. Abdominal:      General: There is no distension. Palpations: Abdomen is soft. Tenderness: There is no abdominal tenderness. Skin:     General: Skin is warm and dry. Findings: No rash. Neurological:      Mental Status: He is alert and oriented to person, place, and time. Psychiatric:         Mood and Affect: Mood normal.         Behavior: Behavior normal.         Assessment:      1. Dysphagia, unspecified type  Patient referred to GI    2. Hypercholesteremia  Continue current management    3. Type 2 diabetes mellitus without complication, without long-term current use of insulin ([address]  Continue management by patient's endocrinologist    4. Postoperative hypothyroidism  Continue management by patient's endocrinologist    5.  Sick sinus syndrome ([address]  Continue management by patient's cardiologist          Plan:        Patient referred to GI for his dysphagia  Continue routine medications  Follow-up in 4 to 6 months or sooner if needed

## 2020-09-09 NOTE — CARE COORDINATION
Phu 45 Transitions Follow Up Call    2020    Patient: Kait Mckeon  Patient : 1928   MRN: [id number]  Reason for Admission:  (treated as COVID PNE July [facility] admission)  Discharge Date: 20 RARS: Readmission Risk Score: 16      Care Transitions Subsequent and Final Call    Subsequent and Final Calls  Do you have any ongoing symptoms?:  Yes  Onset of Patient-reported symptoms:  Other  Patient-reported symptoms:  Other  Have your medications changed?:  No  Do you have any questions related to your medications?:  No  Do you currently have any active services?:  Yes  Are you currently active with any services?:  [address] you have any needs or concerns that I can assist you with?:  No  Care Transitions Interventions     Other Services:   (Comment: [facility])   Other Interventions:        Has gained 7#'s and is working with [address]. Coughs with thin liquids. PCP F/U appointment attended, did not restart the Vibra-tabs that was thought to cause abdominal pain. Swallow study has not been scheduled yet. Denies questions/concern related to Hx. COVID 19 Dx. Follow Up:  [facility] referral when transitional care episode completed.   Future Appointments   Date Time Provider Olga Duffy   2020 11:15 AM Benetta Aase, MD [id number]   3/12/2021 10:00 AM Terrie Ruiz MD [address] [place] [address], RN

## 2020-09-17 NOTE — CARE COORDINATION
[facility] Transitions   2020    Patient: Mendez Adorno  Patient : 1928   MRN: [id number]    Reason for  Admission : SOB, chest pain (recent admission one month ago for Covid positive P[facility])  Discharge Date: 20 RARS: Readmission Risk Score: 16    Noted patient was seen in [facility] ED this afternoon & undergoing testing. Was seen recently by PCP ,  [facility] on 9/10, has GI appt scheduled for tomorrow in regard to swallowing issues. Care Transition will continue to follow.      Follow Up  Future Appointments   Date Time Provider Olga Duffy   2020 11:15 AM [facility]   3/12/2021 10:00 AM MD Sonia Roy, RN

## 2020-09-17 NOTE — ED PROVIDER NOTES
Block, bundle branch, left 3/30/2010    Calculus of bile duct without cholecystitis and without obstruction     Calculus of gallbladder 9/19/2017    Cancer ([address] 2013    thyroid    Carotid artery stenosis     Cataract     Cerebrovascular disease     Cervical disc herniation 11/13/2014    Cervical radiculopathy, chronic 11/14/2014    Cervical spondylosis 11/13/2014    CHF (congestive heart failure) (HCC)     Chronic systolic CHF (congestive heart failure), NYHA class 2 ([address] 6/21/2018    Coronary arteriosclerosis in native artery 3/30/2010    Overview:  H/O Acute anteriorseptal MI  11/8/99 Severe 3 vessel CAD , LM coronary dissection  S/P CABG  1999 L-LAD, SVG Ramus, SVG OM, SVG L PDA Stress test  10/2007  EF 49% no reversible ischemia Predominantly fixed infeior apical septal defects Low normal to mildly reduced LV sys function with :VEF 49%  Septal wall motion abnormlaity Compared with prior study no new reveresible perfusion defects    COVID-19 virus infection 07/25/2020    Degeneration of cervical intervertebral disc 9/25/2012    Elevated LFTs 9/19/2017    Elevated liver enzymes 2/28/2018    H/O malignant neoplasm of thyroid 12/10/2013    Heart attack ([address]     Heart block AV second degree     Hypercholesteremia 3/30/2010    Hypertension     Hypothyroidism     Ischemic cardiomyopathy 8/30/2019    Metastatic cancer ([address] 9/19/2017    Mild aortic regurgitation 3/30/2010    Overview:  Mild moderate MR and AR  Overview:  Mild moderate MR and AR    Pneumonia due to COVID-19 virus     07/25/2020    Pulmonary nodule 6/21/2018    Pulmonary nodules/lesions, multiple 9/19/2017    S/P CABG x 3 8/30/2019    Sick sinus syndrome ([address] 8/30/2019    SOB (shortness of breath) 8/30/2019    Spinal stenosis in cervical region 11/6/2014    Type 2 diabetes mellitus ([address] 3/30/2010    Overview:  without complications    Type II or unspecified type diabetes mellitus without mention of complication, not medications which have NOT CHANGED    Details   lidocaine (LIDODERM) 5 % Place 1 patch onto the skin daily 12 hours on, 12 hours off. APPLY BETWEEN THE LEFT SIDE OF NECK AND SHOULDER, Disp-30 patch,R-0Normal      tamsulosin (FLOMAX) 0.4 MG capsule Take 1 capsule by mouth daily, Disp-90 capsule, R-3Normal      finasteride (PROSCAR) 5 MG tablet TAKE 1 TABLET DAILY, Disp-90 tablet, R-3Normal      furosemide (LASIX) 20 MG tablet Take 1 tablet by mouth daily, Disp-30 tablet, R-1Print      vitamin D (ERGOCALCIFEROL) 35866 units CAPS capsule Take 50,000 Units by mouth once a weekHistorical Med      Multiple Vitamins-Minerals (THERAPEUTIC MULTIVITAMIN-MINERALS) tablet Take 1 tablet by mouth dailyHistorical Med      calcium carbonate (TUMS) 500 MG chewable tablet Take 1 tablet by mouth 2 times daily as needed for Heartburn, Disp-30 tablet, R-0DC to SNF      glimepiride (AMARYL) 2 MG tablet Take 2 mg by mouth every morning (before breakfast)Historical Med      levothyroxine (LEVOXYL) 137 MCG tablet Take 137 mcg by mouth Daily Historical Med      nitroGLYCERIN (NITROSTAT) 0.4 MG SL tablet Place 0.4 mg under the tongue. As directed      docusate sodium (COLACE) 100 MG capsule Take 100 mg by mouth nightly Historical Med      atorvastatin (LIPITOR) 20 MG tablet   Take 20 mg by mouth nightly       aspirin 81 MG EC tablet Take 81 mg by mouth daily Historical Med           ALLERGIES     is allergic to seasonal and keflex [cephalexin]. FAMILY HISTORY     He indicated that his mother is . He indicated that his father is . He indicated that his maternal grandmother is . He indicated that his maternal grandfather is . He indicated that his paternal grandmother is . He indicated that his paternal grandfather is .      SOCIAL HISTORY       Social History     Tobacco Use    Smoking status: Never Smoker    Smokeless tobacco: Never Used   Substance Use Topics    Alcohol use: No    Drug use: No     PHYSICAL EXAM     INITIAL VITALS: BP (!) 119/53   Pulse 76   Temp 98.4 °F (36.9 °C) (Oral)   Resp 16   Ht 5' 4\" (1.626 m)   Wt 125 lb (56.7 kg)   SpO2 98%   BMI 21.46 kg/m²    Physical Exam  Vitals signs and nursing note reviewed. Constitutional:       Appearance: Normal appearance. HENT:      Head: Normocephalic and atraumatic. Right Ear: External ear normal.      Left Ear: External ear normal.      Nose: Nose normal.      Mouth/Throat:      Mouth: Mucous membranes are moist.   Eyes:      Pupils: Pupils are equal, round, and reactive to light. Neck:      Musculoskeletal: Neck supple. Cardiovascular:      Rate and Rhythm: Normal rate and regular rhythm. Pulses: Normal pulses. Heart sounds: Normal heart sounds. Pulmonary:      Effort: Pulmonary effort is normal.      Breath sounds: Normal breath sounds. Abdominal:      General: Abdomen is flat. Palpations: Abdomen is soft. Tenderness: There is no abdominal tenderness. Musculoskeletal: Normal range of motion. General: No tenderness. Arms:    Skin:     General: Skin is warm and dry. Capillary Refill: Capillary refill takes less than 2 seconds. Neurological:      General: No focal deficit present. Mental Status: He is alert and oriented to person, place, and time. Psychiatric:         Behavior: Behavior normal.         MEDICAL DECISION MAKIN-year-old male presents with complaint of left-sided thoracic back pain. Patient has been having worsening pain over the last few days, initial exam patient is in no acute distress vital signs are stable, patient with mild tenderness palpation over the left thoracic area. Given history of metastatic cancer will obtain labs and imaging and reassess. Patient will also be provided with pain medication.     Labs reviewed and unremarkable chest x-ray is reviewed and no acute process was noted CT was reviewed negative for PE, noted to have extensive metastatic disease with destructive lesion at the left half of the T6 vertebral body. This is unchanged from his recent CT scan, no significant other changes were noted. Patient was reexamined after dose of pain medication and states that his pain is improved, discussed with the son and patient at bedside the results of the labs and CAT scan. Son states that the patient and family do know about his active cancer and is not undergoing any more treatment at this time, discussed the need for follow-up with his PCP to evaluate for long-term pain control. Discussed that we will provide short course of pain medication for the patient but it is very important that he follows up with his PCP for continued pain management evaluation and possible referral to pain management or other pain resources. The son voices understanding and states he is comfortable taking the patient home at this time and will continue to monitor at home, he states that his sister lives with the patient and will monitor his medications. Patient/Guardian was informed of their diagnosis and told to follow up with PCP  in 1-3 days. Patient demonstrates understanding and agreement with the plan. They were given the opportunity to ask questions and those questions were answered to the best of our ability with the available information. Patient/Guardian told to return to the ED for any new, worsening, changing or persistent symptoms. This dictation was prepared using Dolphin voice recognition software. As a result, errors may have occurred. When identified, these errors have been corrected.  While every attempt is made to correct errors in dictation, errors may still exist.        CRITICAL CARE:       PROCEDURES:    Procedures    DIAGNOSTIC RESULTS   EKG:All EKG's are interpreted by the Emergency Department Physician who either signs or Co-signs this chart in the absence of a cardiologist.        RADIOLOGY:All plain film, CT, MRI, and formal ultrasound images (except ED bedside ultrasound) are read by the radiologist, see reports below, unless otherwisenoted in MDM or here. CT CHEST PULMONARY EMBOLISM W CONTRAST   Preliminary Result   No CT evidence of acute pulmonary embolism. Extensive metastatic disease including a destructive lesion involving the   left half of the T6 vertebral body extending to the posterior elements and   epidural space. XR CHEST PORTABLE   Final Result   Mild edema or pneumonitis unchanged           LABS: All lab results were reviewed by myself, and all abnormals are listed below. Labs Reviewed   CBC WITH AUTO DIFFERENTIAL - Abnormal; Notable for the following components:       Result Value    Hemoglobin 13.2 (*)     Hematocrit 39.5 (*)     RDW 17.3 (*)     Seg Neutrophils 74 (*)     Lymphocytes 13 (*)     Monocytes 9 (*)     All other components within normal limits   BASIC METABOLIC PANEL W/ REFLEX TO MG FOR LOW K - Abnormal; Notable for the following components:    Glucose 185 (*)     Chloride 97 (*)     All other components within normal limits   TROPONIN - Abnormal; Notable for the following components:    Troponin, High Sensitivity 27 (*)     All other components within normal limits       EMERGENCY DEPARTMENTCOURSE:         Vitals:    Vitals:    09/17/20 1254   BP: (!) 119/53   Pulse: 76   Resp: 16   Temp: 98.4 °F (36.9 °C)   TempSrc: Oral   SpO2: 98%   Weight: 125 lb (56.7 kg)   Height: 5' 4\" (1.626 m)       The patient was given the following medications while in the emergency department:  Orders Placed This Encounter   Medications    DISCONTD: HYDROcodone-acetaminophen (NORCO) 5-325 MG per tablet 1 tablet    ioversol (OPTIRAY) 74 % injection 75 mL    DISCONTD: sodium chloride flush 0.9 % injection 10 mL    0.9 % sodium chloride bolus    HYDROcodone-acetaminophen (NORCO) 5-325 MG per tablet     Sig: Take 1 tablet by mouth every 6 hours as needed for Pain for up to 3 days.  Intended supply: 3 days. Take lowest dose possible to manage pain     Dispense:  10 tablet     Refill:  0     CONSULTS:  None    FINAL IMPRESSION      1. Chronic left-sided thoracic back pain    2. Metastatic disease [facility])          DISPOSITION/PLAN   DISPOSITION        PATIENT REFERRED TO:  Raymond Linton MD  [facility] [address] [facility] [address] [facility]    Schedule an appointment as soon as possible for a visit       [facility]  [facility] [address]  [address] [address]  [phone]    As needed, If symptoms worsen    [facility]  [facility] [address]  [address] [address]  [phone]  Schedule an appointment as soon as possible for a visit       DISCHARGE MEDICATIONS:  Discharge Medication List as of 9/17/2020  4:44 PM      START taking these medications    Details   HYDROcodone-acetaminophen (NORCO) 5-325 MG per tablet Take 1 tablet by mouth every 6 hours as needed for Pain for up to 3 days. Intended supply: 3 days.  Take lowest dose possible to manage pain, Disp-10 tablet,R-0Print           Bren Burgos DO  Attending Emergency Physician                  Bren Burgos DO  09/17/20 2149

## 2020-09-18 NOTE — TELEPHONE ENCOUNTER
RN Access attempted to contact pt to notify him of ED f/u appt. Attempt unsuccessful. Voicemail left on daughter's phone stating time and date of appt.

## 2020-09-18 NOTE — TELEPHONE ENCOUNTER
RN Access contacted Dr. Kirsty Feliciano office to schedule ED f/u appt. Spoke with [facility] appt scheduled for Monday 9-21 @1:00pm with Dr. [address].

## 2020-09-18 NOTE — PROGRESS NOTES
Reason for Referral:   Shawn Heller, APRN - CNP  [address] [facility], [address] [place] [address]    Chief Complaint   Patient presents with    Dysphagia     He is having trouble swallowing especially water. If he eats a lot of food then his stomach hurts. Recently also lost 30 pounds  especially when he was in a nursing home for a month. Bowel movments are good with a daily stool softener. HISTORY OF PRESENT ILLNESS: Adela Madrigal is a 80 y.o. male with dysphagia. He reports coughing while eating. This is mostly with thin liquids. Sometimes with food. He does okay with soups. He reports around 10 pounds of weight loss. He was in the hospital recently with COVID-19 infection. Past Medical,Family, and Social History reviewed and does not contribute to the patient presentingcondition. Patient's PMH/PSH,SH,PSYCH Hx, MEDs, ALLERGIES, and ROS were all reviewed and updated in the appropriate sections.     PAST MEDICAL HISTORY:  Past Medical History:   Diagnosis Date    Abnormal ECG 8/30/2019    Acquired hypothyroidism 9/19/2014    Acute cholecystitis 2/28/2018    Atherosclerosis of coronary artery bypass graft(s) without angina pectoris 10/4/2016    Atrioventricular block, complete ([address] 1/70/8430    Biliary colic     Block, bundle branch, left 3/30/2010    Calculus of bile duct without cholecystitis and without obstruction     Calculus of gallbladder 9/19/2017    Cancer ([address] 2013    thyroid    Carotid artery stenosis     Cataract     Cerebrovascular disease     Cervical disc herniation 11/13/2014    Cervical radiculopathy, chronic 11/14/2014    Cervical spondylosis 11/13/2014    CHF (congestive heart failure) (HCC)     Chronic systolic CHF (congestive heart failure), NYHA class 2 ([address] 6/21/2018    Coronary arteriosclerosis in native artery 3/30/2010    Overview:  H/O Acute anteriorseptal MI  11/8/99 Severe 3 vessel CAD , LM coronary dissection  S/P CABG  1999 L-LAD, SVG Ramus, SVG OM, SVG L PDA Stress test  10/2007  EF 49% no reversible ischemia Predominantly fixed infeior apical septal defects Low normal to mildly reduced LV sys function with :VEF 49%  Septal wall motion abnormlaity Compared with prior study no new reveresible perfusion defects    COVID-19 virus infection 07/25/2020    Degeneration of cervical intervertebral disc 9/25/2012    Elevated LFTs 9/19/2017    Elevated liver enzymes 2/28/2018    H/O malignant neoplasm of thyroid 12/10/2013    Heart attack ([address]     Heart block AV second degree     Hypercholesteremia 3/30/2010    Hypertension     Hypothyroidism     Ischemic cardiomyopathy 8/30/2019    Metastatic cancer ([address] 9/19/2017    Mild aortic regurgitation 3/30/2010    Overview:  Mild moderate MR and AR  Overview:  Mild moderate MR and AR    Pneumonia due to COVID-19 virus     07/25/2020    Pulmonary nodule 6/21/2018    Pulmonary nodules/lesions, multiple 9/19/2017    S/P CABG x 3 8/30/2019    Sick sinus syndrome ([address] 8/30/2019    SOB (shortness of breath) 8/30/2019    Spinal stenosis in cervical region 11/6/2014    Type 2 diabetes mellitus ([address] 3/30/2010    Overview:  without complications    Type II or unspecified type diabetes mellitus without mention of complication, not stated as uncontrolled        Past Surgical History:   Procedure Laterality Date    APPENDECTOMY      CARDIAC SURGERY      CHOLECYSTECTOMY      CORONARY ARTERY BYPASS GRAFT      ENDOSCOPIC ULTRASOUND (LOWER)      PACEMAKER INSERTION  03/2015    [place] LAP,CHOLECYSTECTOMY N/A 3/2/2018    CHOLECYSTECTOMY LAPAROSCOPIC performed by Leonides Boxer, DO at [address]      TOTAL THYROIDECTOMY         CURRENT MEDICATIONS:    Current Outpatient Medications:     HYDROcodone-acetaminophen (NORCO) 5-325 MG per tablet, Take 1 tablet by mouth every 6 hours as needed for Pain for up to 3 days. Intended supply: 3 days.  Take lowest dose possible to manage pain, Disp: stool, constipation, diarrhea, nausea, rectal pain and vomiting. Endocrine: Negative. Genitourinary: Negative. Negative for difficulty urinating. Musculoskeletal: Positive for arthralgias and back pain. Negative for gait problem and myalgias. Skin: Negative. Allergic/Immunologic: Negative. Negative for environmental allergies and food allergies. Neurological: Negative. Negative for dizziness, weakness, light-headedness, numbness and headaches. Hematological: Negative. Does not bruise/bleed easily. Psychiatric/Behavioral: Negative. Negative for sleep disturbance. The patient is not nervous/anxious. LABORATORY DATA: Reviewed  Lab Results   Component Value Date    WBC 8.0 09/17/2020    HGB 13.2 (L) 09/17/2020    HCT 39.5 (L) 09/17/2020    MCV 82.0 09/17/2020     09/17/2020     09/17/2020    K 4.1 09/17/2020    CL 97 (L) 09/17/2020    CO2 29 09/17/2020    BUN 11 09/17/2020    CREATININE 0.82 09/17/2020    LABPROT 7.1 12/13/2012    LABALBU 2.9 (L) 09/11/2020    BILITOT 0.27 (L) 09/11/2020    ALKPHOS 99 09/11/2020    AST 22 09/11/2020    ALT 17 09/11/2020    INR 1.1 05/12/2020         Lab Results   Component Value Date    RBC 4.82 09/17/2020    HGB 13.2 (L) 09/17/2020    MCV 82.0 09/17/2020    MCH 27.4 09/17/2020    MCHC 33.4 09/17/2020    RDW 17.3 (H) 09/17/2020    MPV 6.8 09/17/2020    BASOPCT 1 09/17/2020    LYMPHSABS 1.10 09/17/2020    MONOSABS 0.70 09/17/2020    NEUTROABS 6.00 09/17/2020    EOSABS 0.20 09/17/2020    BASOSABS 0.10 09/17/2020         DIAGNOSTIC TESTING:     Xr Chest Portable    Result Date: 9/17/2020  EXAMINATION: ONE XRAY VIEW OF THE CHEST 9/17/2020 3:14 pm COMPARISON: August 29, 2020 HISTORY: ORDERING SYSTEM PROVIDED HISTORY: cough TECHNOLOGIST PROVIDED HISTORY: cough Reason for Exam: cough Acuity: Acute Type of Exam: Initial FINDINGS: Bipolar pacer on the left unchanged. Sternotomy wires and mediastinal clips noted. Moderate interstitial edema.   Heart calcification. Sternal wires from prior heart surgery. Stable heart size with a pacemaker in place. Mild calcific plaque of the thoracic aorta. Lungs/pleura: Innumerable bilateral lung nodules compatible with metastatic disease. No new focal lung consolidation. There is mild dependent atelectasis and similar patchy ground-glass and parenchymal opacities in the lower lobes, left greater than right. Unchanged focal subpleural parenchymal density right upper lobe posterolaterally. Tiny calcified pleural plaque on the right. Tiny subpleural cysts and mild fibrotic changes in both lower lobes. Upper Abdomen: Limited images of the upper abdomen show no acute findings. Bilateral renal cysts and an approximately 15 mm hyperdense lesion left kidney. .  Calcified granulomas in the liver and spleen. Indeterminate pancreatic tail mass measuring up to 4.5 x 3 cm. Previous cholecystectomy. A couple of scattered small hypodensities in the liver are stable. Increase in size of heterogeneous mixed solid and cystic soft tissue mass by the inferior edge of the right lobe of the liver and abdominal/chest wall. Stable small left adrenal nodule. Soft Tissues/Bones: The bones are osteopenic with degenerative changes of the spine and shoulders. There are old rib fractures. Destructive lytic lesion involving the left posterior aspect of the T6 vertebral body extending posteriorly through the pedicle to the left transverse process, left facet joint T6-T7, posterior elements and spinous process and costovertebral junction with involvement of the epidural space. This is similar to perhaps minimally increased since the prior study. Metallic anchor right humeral head. No CT evidence of acute pulmonary embolism. Extensive metastatic disease including a destructive lesion involving the left half of the T6 vertebral body extending to the posterior elements and epidural space.      Ct Chest Pulmonary Embolism W Contrast    Result Date: 8/29/2020  EXAMINATION: CTA OF THE CHEST 8/29/2020 6:15 pm TECHNIQUE: CTA of the chest was performed after the administration of intravenous contrast.  Multiplanar reformatted images are provided for review. MIP images are provided for review. Dose modulation, iterative reconstruction, and/or weight based adjustment of the mA/kV was utilized to reduce the radiation dose to as low as reasonably achievable. COMPARISON: 02/07/2020 HISTORY: ORDERING SYSTEM PROVIDED HISTORY: SOB with chest pain TECHNOLOGIST PROVIDED HISTORY: SOB with chest pain Reason for Exam: Chest pain, SOB Acuity: Acute Type of Exam: Initial FINDINGS: Pulmonary Arteries: Pulmonary arteries are adequately opacified for evaluation. No evidence of intraluminal filling defect to suggest pulmonary embolism. Main pulmonary artery is normal in caliber. Mediastinum: Heart is mildly enlarged without pericardial effusion. Cardiac pacing leads are noted. Coronary artery atherosclerosis. Enlarged mediastinal lymph nodes with a subcarinal node measuring 1.7 x 2.3 cm. Lungs/pleura: No pleural effusion or pneumothorax. There are innumerable bilateral pulmonary nodules, for example a 1.3 x 1.6 cm nodule in the left upper lobe on image 49. This previously measured 1.3 x 1.0 cm. Upper Abdomen: No acute abnormality within the visualized upper abdomen. The ill-defined hypoattenuating hepatic lesions are indeterminate. Hypoattenuating bilateral renal lesions are also indeterminate, partially visualized. Prior cholecystectomy. Small hiatal hernia. There is a nodular appearance of the pancreatic tail, indeterminate. Soft Tissues/Bones: There is destructive osseous lesion in the left aspect of T6 involving the transverse process and epidural space, new/artery from prior examination. .     1. No pulmonary embolus.  2. Extensive pulmonary metastases, which appear increased in the interval. 3. Destructive osseous lesion at T6 involving the left transverse

## 2020-09-19 NOTE — ED PROVIDER NOTES
EMERGENCY DEPARTMENT ENCOUNTER   ATTENDING ATTESTATION     Pt Name: Mavis Hess  MRN: [id number]  [place] 2/13/1928  Date of evaluation: 9/19/20   Mavis Hess is a 80 y.o. male with CC: Pruritis    MDM:            CRITICAL CARE:       EKG: All EKG's are interpreted by the Emergency Department Physician who either signs or Co-signs this chart in the absence of a cardiologist.      RADIOLOGY:All plain film, CT, MRI, and formal ultrasound images (except ED bedside ultrasound) are read by the radiologist, see reports below, unless otherwise noted in MDM or here. No orders to display     LABS: All lab results were reviewed by myself, and all abnormals are listed below. Labs Reviewed - No data to display  CONSULTS:  None  FINAL IMPRESSION      1.  Rash and other nonspecific skin eruption            PASTMEDICAL HISTORY     Past Medical History:   Diagnosis Date    Abnormal ECG 8/30/2019    Acquired hypothyroidism 9/19/2014    Acute cholecystitis 2/28/2018    Atherosclerosis of coronary artery bypass graft(s) without angina pectoris 10/4/2016    Atrioventricular block, complete ([address] 7/42/2145    Biliary colic     Block, bundle branch, left 3/30/2010    Calculus of bile duct without cholecystitis and without obstruction     Calculus of gallbladder 9/19/2017    Cancer ([address] 2013    thyroid    Carotid artery stenosis     Cataract     Cerebrovascular disease     Cervical disc herniation 11/13/2014    Cervical radiculopathy, chronic 11/14/2014    Cervical spondylosis 11/13/2014    CHF (congestive heart failure) (HCC)     Chronic systolic CHF (congestive heart failure), NYHA class 2 ([address] 6/21/2018    Coronary arteriosclerosis in native artery 3/30/2010    Overview:  H/O Acute anteriorseptal MI  11/8/99 Severe 3 vessel CAD , LM coronary dissection  S/P CABG  1999 L-LAD, SVG Ramus, SVG OM, SVG L PDA Stress test  10/2007  EF 49% no reversible ischemia Predominantly fixed infeior apical septal defects Low normal to mildly reduced LV sys function with :VEF 49%  Septal wall motion abnormlaity Compared with prior study no new reveresible perfusion defects    COVID-19 virus infection 07/25/2020    Degeneration of cervical intervertebral disc 9/25/2012    Elevated LFTs 9/19/2017    Elevated liver enzymes 2/28/2018    H/O malignant neoplasm of thyroid 12/10/2013    Heart attack ([address]     Heart block AV second degree     Hypercholesteremia 3/30/2010    Hypertension     Hypothyroidism     Ischemic cardiomyopathy 8/30/2019    Metastatic cancer ([address] 9/19/2017    Mild aortic regurgitation 3/30/2010    Overview:  Mild moderate MR and AR  Overview:  Mild moderate MR and AR    Pneumonia due to COVID-19 virus     07/25/2020    Pulmonary nodule 6/21/2018    Pulmonary nodules/lesions, multiple 9/19/2017    S/P CABG x 3 8/30/2019    Sick sinus syndrome ([address] 8/30/2019    SOB (shortness of breath) 8/30/2019    Spinal stenosis in cervical region 11/6/2014    Type 2 diabetes mellitus ([address] 3/30/2010    Overview:  without complications    Type II or unspecified type diabetes mellitus without mention of complication, not stated as uncontrolled      SURGICAL HISTORY       Past Surgical History:   Procedure Laterality Date    APPENDECTOMY      CARDIAC SURGERY      CHOLECYSTECTOMY      CORONARY ARTERY BYPASS GRAFT      ENDOSCOPIC ULTRASOUND (LOWER)      PACEMAKER INSERTION  03/2015    GA LAP,CHOLECYSTECTOMY N/A 3/2/2018    CHOLECYSTECTOMY LAPAROSCOPIC performed by Raheem Currie DO at [address] [place] [address] And [address]       Previous Medications    ASPIRIN 81 MG EC TABLET    Take 81 mg by mouth daily     ATORVASTATIN (LIPITOR) 20 MG TABLET      Take 20 mg by mouth nightly     CALCIUM CARBONATE (TUMS) 500 MG CHEWABLE TABLET    Take 1 tablet by mouth 2 times daily as needed for Heartburn    DOCUSATE SODIUM (COLACE) 100 MG CAPSULE    Take 100 mg by mouth nightly FINASTERIDE (PROSCAR) 5 MG TABLET    TAKE 1 TABLET DAILY    FUROSEMIDE (LASIX) 20 MG TABLET    Take 1 tablet by mouth daily    GLIMEPIRIDE (AMARYL) 2 MG TABLET    Take 2 mg by mouth every morning (before breakfast)    HYDROCODONE-ACETAMINOPHEN (NORCO) 5-325 MG PER TABLET    Take 1 tablet by mouth every 6 hours as needed for Pain for up to 3 days. Intended supply: 3 days. Take lowest dose possible to manage pain    LEVOTHYROXINE (LEVOXYL) 137 MCG TABLET    Take 137 mcg by mouth Daily     LIDOCAINE (LIDODERM) 5 %    Place 1 patch onto the skin daily 12 hours on, 12 hours off. APPLY BETWEEN THE LEFT SIDE OF NECK AND SHOULDER    MULTIPLE VITAMINS-MINERALS (THERAPEUTIC MULTIVITAMIN-MINERALS) TABLET    Take 1 tablet by mouth daily    NITROGLYCERIN (NITROSTAT) 0.4 MG SL TABLET    Place 0.4 mg under the tongue. As directed    TAMSULOSIN (FLOMAX) 0.4 MG CAPSULE    Take 1 capsule by mouth daily    TRAMADOL-ACETAMINOPHEN (ULTRACET) 37.5-325 MG PER TABLET    as needed. VITAMIN D (ERGOCALCIFEROL) 47671 UNITS CAPS CAPSULE    Take 50,000 Units by mouth once a week     ALLERGIES     is allergic to seasonal and keflex [cephalexin]. FAMILY HISTORY     He indicated that his mother is . He indicated that his father is . He indicated that his maternal grandmother is . He indicated that his maternal grandfather is . He indicated that his paternal grandmother is . He indicated that his paternal grandfather is . SOCIAL HISTORY       Social History     Tobacco Use    Smoking status: Never Smoker    Smokeless tobacco: Never Used   Substance Use Topics    Alcohol use: No    Drug use: No       I personally evaluated and examined the patient in conjunction with the APC and agree with the assessment, treatment plan, and disposition of the patient as recorded by the APC.    Kathya Guerra MD  Attending Emergency Physician       Zulma Romero MD  20

## 2020-09-20 NOTE — ED PROVIDER NOTES
[address] [place] [address] ED  eMERGENCY dEPARTMENT eNCOUnter      Pt Name: Elba Merlos  MRN: [id number]  [place] 2/13/1928  Date of evaluation: 9/19/2020  Provider: [address] [place] [address] NP, BRIAN Toro 6432       Chief Complaint   Patient presents with    Pruritis         HISTORY OF PRESENT ILLNESS  (Location/Symptom, Timing/Onset, Context/Setting, Quality, Duration, Modifying Factors, Severity.)   Elba Merlos is a 80 y.o. male who presents to the emergency department by private vehicle for evaluation of a rash. Patient states that he noticed some itching to his back. He states he was treated a couple of days ago for some low back pain and was started on Norco for pain. They state that today he developed some itching to the back. They did not know that he had a rash until he came to the emergency room. He has not taken anything for the rash and itching prior to arrival.  Denies fevers or chills      Nursing Notes were reviewed. ALLERGIES     Seasonal and Keflex [cephalexin]    CURRENT MEDICATIONS       Discharge Medication List as of 9/19/2020  7:32 PM      CONTINUE these medications which have NOT CHANGED    Details   traMADol-acetaminophen (ULTRACET) 37.5-325 MG per tablet as needed. Historical Med      HYDROcodone-acetaminophen (NORCO) 5-325 MG per tablet Take 1 tablet by mouth every 6 hours as needed for Pain for up to 3 days. Intended supply: 3 days. Take lowest dose possible to manage pain, Disp-10 tablet,R-0Print      lidocaine (LIDODERM) 5 % Place 1 patch onto the skin daily 12 hours on, 12 hours off.  APPLY BETWEEN THE LEFT SIDE OF NECK AND SHOULDER, Disp-30 patch,R-0Normal      tamsulosin (FLOMAX) 0.4 MG capsule Take 1 capsule by mouth daily, Disp-90 capsule, R-3Normal      finasteride (PROSCAR) 5 MG tablet TAKE 1 TABLET DAILY, Disp-90 tablet, R-3Normal      furosemide (LASIX) 20 MG tablet Take 1 tablet by mouth daily, Disp-30 tablet, R-1Print      vitamin D (ERGOCALCIFEROL) 20123 units CAPS capsule Take 50,000 Units by mouth once a weekHistorical Med      Multiple Vitamins-Minerals (THERAPEUTIC MULTIVITAMIN-MINERALS) tablet Take 1 tablet by mouth dailyHistorical Med      calcium carbonate (TUMS) 500 MG chewable tablet Take 1 tablet by mouth 2 times daily as needed for Heartburn, Disp-30 tablet, R-0DC to SNF      glimepiride (AMARYL) 2 MG tablet Take 2 mg by mouth every morning (before breakfast)Historical Med      levothyroxine (LEVOXYL) 137 MCG tablet Take 137 mcg by mouth Daily Historical Med      nitroGLYCERIN (NITROSTAT) 0.4 MG SL tablet Place 0.4 mg under the tongue.  As directed      docusate sodium (COLACE) 100 MG capsule Take 100 mg by mouth nightly Historical Med      atorvastatin (LIPITOR) 20 MG tablet   Take 20 mg by mouth nightly       aspirin 81 MG EC tablet Take 81 mg by mouth daily Historical Med             PAST MEDICAL HISTORY         Diagnosis Date    Abnormal ECG 8/30/2019    Acquired hypothyroidism 9/19/2014    Acute cholecystitis 2/28/2018    Atherosclerosis of coronary artery bypass graft(s) without angina pectoris 10/4/2016    Atrioventricular block, complete ([address] 8/51/4705    Biliary colic     Block, bundle branch, left 3/30/2010    Calculus of bile duct without cholecystitis and without obstruction     Calculus of gallbladder 9/19/2017    Cancer ([address] 2013    thyroid    Carotid artery stenosis     Cataract     Cerebrovascular disease     Cervical disc herniation 11/13/2014    Cervical radiculopathy, chronic 11/14/2014    Cervical spondylosis 11/13/2014    CHF (congestive heart failure) (HCC)     Chronic systolic CHF (congestive heart failure), NYHA class 2 ([address] 6/21/2018    Coronary arteriosclerosis in native artery 3/30/2010    Overview:  H/O Acute anteriorseptal MI  11/8/99 Severe 3 vessel CAD , LM coronary dissection  S/P CABG  1999 L-LAD, SVG Ramus, SVG OM, SVG L PDA Stress test  10/2007  EF 49% no reversible ischemia Predominantly fixed infeior apical septal defects Low normal to mildly reduced LV sys function with :VEF 49%  Septal wall motion abnormlaity Compared with prior study no new reveresible perfusion defects    COVID-19 virus infection 2020    Degeneration of cervical intervertebral disc 2012    Elevated LFTs 2017    Elevated liver enzymes 2018    H/O malignant neoplasm of thyroid 12/10/2013    Heart attack ([address]     Heart block AV second degree     Hypercholesteremia 3/30/2010    Hypertension     Hypothyroidism     Ischemic cardiomyopathy 2019    Metastatic cancer ([address] 2017    Mild aortic regurgitation 3/30/2010    Overview:  Mild moderate MR and AR  Overview:  Mild moderate MR and AR    Pneumonia due to COVID-19 virus     2020    Pulmonary nodule 2018    Pulmonary nodules/lesions, multiple 2017    S/P CABG x 3 2019    Sick sinus syndrome ([address] 2019    SOB (shortness of breath) 2019    Spinal stenosis in cervical region 2014    Type 2 diabetes mellitus ([address] 3/30/2010    Overview:  without complications    Type II or unspecified type diabetes mellitus without mention of complication, not stated as uncontrolled        SURGICAL HISTORY           Procedure Laterality Date    APPENDECTOMY      CARDIAC SURGERY      CHOLECYSTECTOMY      CORONARY ARTERY BYPASS GRAFT      ENDOSCOPIC ULTRASOUND (LOWER)      PACEMAKER INSERTION  2015    [place] LAP,CHOLECYSTECTOMY N/A 3/2/2018    CHOLECYSTECTOMY LAPAROSCOPIC performed by Gabrielle Jauregui DO at [address]      TOTAL THYROIDECTOMY           FAMILY HISTORY           Problem Relation Age of Onset    Cancer Mother         stomach    Other Father         pneumonia     Family Status   Relation Name Status    Mother      Father      MGM      MGF      [address]      PGF          SOCIAL HISTORY      reports that he has never smoked.  He has never used smokeless Neurological:      Mental Status: He is alert and oriented to person, place, and time. LABS:  Labs Reviewed - No data to display    All other labs were within normal range or not returned as of this dictation. EMERGENCY DEPARTMENT COURSE and DIFFERENTIAL DIAGNOSIS/MDM:   Vitals:    Vitals:    09/19/20 1741   BP: (!) 141/61   Pulse: 74   Resp: 16   Temp: 97.7 °F (36.5 °C)   SpO2: 98%   Weight: 125 lb (56.7 kg)   Height: 5' 7\" (1.702 m)       Medical Decision Making:pt was seens with Dr Remigio Haines who personally saw this patient. Patient will be placed on triamcinolone cream in addition to Benadryl for the itching. Follow-up with primary care physician for recheck and reevaluation. Return for any worsening symptoms or concerns  FINAL IMPRESSION      1.  Rash and other nonspecific skin eruption          DISPOSITION/PLAN   DISPOSITION Decision To Discharge 09/19/2020 07:32:10 PM      PATIENT REFERRED TO:   Minoo Lemon MD  [facility]. [address] [place] [address]  [phone]    Schedule an appointment as soon as possible for a visit       [facility] ED  [address] [facility]  [phone]    If symptoms worsen      DISCHARGE MEDICATIONS:     Discharge Medication List as of 9/19/2020  7:32 PM      START taking these medications    Details   diphenhydrAMINE (BENADRYL) 25 MG capsule Take 1 capsule by mouth every 6 hours as needed for Itching, Disp-20 capsule,R-0Print      triamcinolone (KENALOG) 0.1 % cream Apply topically 2 times daily for 1 week., Disp-1 Tube,R-0, Print                 (Please note that portions of this note were completed with a voice recognition program.  Efforts were made to edit the dictations but occasionally words are mis-transcribed.)    Cal Mccabe NP, APRN - CNP  Certified Nurse Practitioner          BRIAN Gregory CNP  09/19/20 9865

## 2020-09-21 PROBLEM — G89.29 CHRONIC BACK PAIN: Status: ACTIVE | Noted: 2020-01-01

## 2020-09-21 PROBLEM — M54.9 CHRONIC BACK PAIN: Status: ACTIVE | Noted: 2020-01-01

## 2020-09-21 NOTE — PROGRESS NOTES
Vaccine Information Sheet, \"Influenza - Inactivated\"  given to Elba Merlos, or parent/legal guardian of  Elba Merlos and verbalized understanding. Patient responses:    Have you ever had a reaction to a flu vaccine? No  Do you have any current illness? No  Have you ever had Guillian Galien Syndrome? No  Do you have a serious allergy to any of the following: Neomycin, Polymyxin, Thimerosal, eggs or egg products? No    Flu vaccine given per order. Please see immunization tab. Risks and benefits explained. Current VIS given.       Immunizations Administered     Name Date Dose Route    Influenza, Quadv, adjuvanted, 65 yrs +, IM, PF (Fluad) 9/21/2020 0.5 mL Intramuscular    Site: Deltoid- Left    Lot: [id number]    NDC: 04201-663-53

## 2020-09-21 NOTE — PROGRESS NOTES
Subjective:      Patient ID: Kait Mckeon is a 80 y.o. male. Visit Information    Have you changed or started any medications since your last visit including any over-the-counter medicines, vitamins, or herbal medicines? no   Are you having any side effects from any of your medications? -  no  Have you stopped taking any of your medications? Is so, why? -  no    Have you seen any other physician or provider since your last visit? Yes - Records Obtained  Have you had any other diagnostic tests since your last visit? Yes - Records Obtained  Have you been seen in the emergency room and/or had an admission to a hospital since we last saw you? Yes - Records Obtained  Have you had your routine dental cleaning in the past 6 months? no    Have you activated your [employer] account? If not, what are your barriers?  No: will discuss      Patient Care Team:  Terrie Ruiz MD as PCP - General (Family Medicine)  Terrie Ruiz MD as PCP - [facility] Provider  Retia Oppenheim, MD as Consulting Physician (Gastroenterology)  Cynthia Martinez MD as Consulting Physician (Infectious Diseases)  Leonel Landry MD as Consulting Physician (Pulmonary Disease)  Anabelle Mcnally RN as Care Transitions Nurse    Medical History Review  Past Medical, Family, and Social History reviewed and does not contribute to the patient presenting condition    Health Maintenance   Topic Date Due    Shingles Vaccine (1 of 2) 02/13/1978    TSH testing  09/18/2018    PSA counseling  04/04/2019    Annual Wellness Visit (AWV)  05/29/2019    Flu vaccine (1) 09/01/2020    Lipid screen  08/23/2020    Potassium monitoring  09/17/2021    Creatinine monitoring  09/17/2021    DTaP/Tdap/Td vaccine (2 - Td) 01/28/2028    Pneumococcal 65+ yrs at Risk Vaccine  Completed    Hepatitis A vaccine  Aged Out    Hib vaccine  Aged Out    Meningococcal (ACWY) vaccine  Aged Out         HPI     Patient is a 70-year-old  male brought to the office by his son and daughter for diabetes, hyperlipidemia. He is also here for follow-up of recent ER visits for chronic back pain and itchy rash on his back. Patient has a history of metastatic cancer. Patient states that Benadryl prescribed by ER physician has helped with the itching along with the Kenalog cream.  He is taking and tolerating his routine medication. He denies any chest pain, abdominal pain, shortness of breath, fever or chills. Review of Systems   Constitutional: Negative for chills and fever. HENT: Negative for congestion. Respiratory: Negative for chest tightness and shortness of breath. Cardiovascular: Negative for chest pain. Gastrointestinal: Negative for abdominal pain and blood in stool. Genitourinary: Negative for dysuria and hematuria. Musculoskeletal: Positive for back pain. Skin: Positive for rash. Neurological: Negative for dizziness. Objective:   Physical Exam  Vitals signs and nursing note reviewed. Constitutional:       General: He is not in acute distress. Appearance: He is well-developed. HENT:      Head: Normocephalic and atraumatic. Right Ear: Tympanic membrane, ear canal and external ear normal.      Left Ear: Tympanic membrane, ear canal and external ear normal.      Nose: Nose normal.      Mouth/Throat:      Mouth: Mucous membranes are moist.      Pharynx: Oropharynx is clear. Eyes:      General: No scleral icterus. Right eye: No discharge. Left eye: No discharge. Conjunctiva/sclera: Conjunctivae normal.   Neck:      Musculoskeletal: Neck supple. Cardiovascular:      Rate and Rhythm: Normal rate and regular rhythm. Heart sounds: Normal heart sounds. Pulmonary:      Effort: Pulmonary effort is normal. No respiratory distress. Breath sounds: Normal breath sounds. No wheezing. Abdominal:      General: There is no distension. Palpations: Abdomen is soft. Tenderness:  There is no abdominal tenderness. Musculoskeletal:      Thoracic back: He exhibits pain. Skin:     General: Skin is warm and dry. Findings: Rash (  Resolving pinkish rash on patient's mid back area) present. Neurological:      Mental Status: He is alert and oriented to person, place, and time. Psychiatric:         Mood and Affect: Mood normal.         Behavior: Behavior normal.         Assessment:       Diagnosis Orders   1. Type 2 diabetes mellitus without complication, without long-term current use of insulin ([facility]     2. Hypercholesteremia     3. Dermatitis  diphenhydrAMINE (BENADRYL) 25 MG capsule   4. Chronic thoracic back pain, unspecified back pain laterality     5.  Need for vaccination  INFLUENZA, QUADV, ADJUVANTED, 72 YRS =, IM, PF, PREFILL SYR, 0.5ML (FLUAD)             Plan:      Orders Placed This Encounter   Procedures    INFLUENZA, QUADV, ADJUVANTED, 72 YRS =, IM, PF, PREFILL SYR, 0.5ML (FLUAD)     Orders Placed This Encounter   Medications    diphenhydrAMINE (BENADRYL) 25 MG capsule     Sig: Take 1 capsule by mouth every 6 hours as needed for Itching     Dispense:  30 capsule     Refill:  0         Patient referred to pain management for his chronic back pain  Patient also is to follow-up with his oncologist  Follow-up here in 6 months or sooner if needed

## 2020-09-22 NOTE — CARE COORDINATION
hPu 45 Transitions Follow Up Call    2020    Patient: Anisha Faust  Patient : 1928   MRN: [id number]  Reason for Admission: SOB, chest pain (recent admission one month ago for Covid positive [facility])  Discharge Date: 20 RARS: Readmission Risk Score: 16         Spoke with: Marlys Lanier and Zayfani Kriss    Attempted to contact Marlys Lanier but Marlys Lanier was unable to hear over the phone. Call was ended. Call placed to Fortino Monterroso and she said that he is hard of hearing. She said that Marlys Lanier has recovered from the Covid and pneumonia, but they have found another problem. In one of the CTA he had at one of his ED visit, cancer was found in the spine. Marlys Lanier has been complaining of back pain, and  Fortino Monterroso said that it is pretty strong. They have an appointment hemoc tomorrow and she is waiting on test that the  GI physician wanted Marlys Lanier to have for his swallowing difficulties. She had no further concerns or questions. Care Transitions Subsequent and Final Call    Subsequent and Final Calls  Do you have any ongoing symptoms?:  Yes  Onset of Patient-reported symptoms: In the past 7 days  Patient-reported symptoms:  Pain  Have your medications changed?:  No  Do you have any questions related to your medications?:  No  Do you currently have any active services?:  Yes  Are you currently active with any services?:  Home Health  Do you have any needs or concerns that I can assist you with?:  No  Care Transitions Interventions     Other Services:   (Comment: [facility])   Other Interventions:             Follow Up  Future Appointments   Date Time Provider Olga Duffy   2020  1:15 PM Henna Arthur MD [facility]   2020  2:15 PM Barbie Meyers MD [facility]   3/12/2021 10:00 AM MD Jaylin Rae, RN

## 2020-09-23 NOTE — TELEPHONE ENCOUNTER
PATIENT IS NOT BEING SEEN TODAY PER DR Gayle Brown. PATIENT WAS POSITIVE FOR COVID-19 ON 8/29/20. APPOINTMENT WAS RESCHEDULED TO 10/21/20.

## 2020-09-30 NOTE — TELEPHONE ENCOUNTER
The patient's daughter Susan Dallas called to see if the patiient's Rolinda Rideau 5/325mg could be refilled. He is not able to see Dr Tyesha Brock until 10/07/20 for his first appointment. [facility]. Please advise.

## 2020-09-30 NOTE — TELEPHONE ENCOUNTER
SPOKE WITH PT'S DAUGHTER ANALI DUE TO LIMITED COMMUNICATION / HEARING WITH RAVINDER. ANALI EXPLAINED THAT RAVINDER WAS LAST SEEN IN 02/2020 AND A RECENT APPOINTMENT WAS CANCELLED DUE TO A POSITIVE COVID TEST FROM 08/29/2020 BUT THAT IT HAS BEEN OVER 30 DAYS SINCE TEST AND MD EXAM WAS PUSHED TO 10/21/2020. RAVINDER HAS BEEN IN ER X [facility] FOR BACK PAIN. SCANS HAVE BEEN DONE IN ER AND RAVINDER AND FAMILY WERE TOLD \"HE HAS CANCER IN THE BACK\"    HIS PAIN SOMETIMES IS A '15 ON SCALE 1 TO 10 AND HAS BEEN GETTING PAIN MEDS AT ER\"    HE IS SCHEDULED WITH PAIN MANAGEMENT AS WELL. WRITER REVIEWED CASE WITH DR Lennice Frankel. OK TO MOVE MD EXAM TO BE WITHIN OFFICE GUIDELINES. MD MOVED UP TO 10/07/2020 @ 0800. NO SCRIPTS UNTIL SEEN, REVIEWED THIS WITH ANALI AND THEY WILL USE THE ER AGAIN UNTIL MD EXAM.    PINK SLIP TO CLERICAL TO PLACE ON SCHEDULE.

## 2020-10-07 PROBLEM — C73 PAPILLARY THYROID CARCINOMA (HCC): Status: ACTIVE | Noted: 2020-01-01

## 2020-10-07 PROBLEM — C79.51 BONE METASTASES (HCC): Status: ACTIVE | Noted: 2020-01-01

## 2020-10-07 NOTE — TELEPHONE ENCOUNTER
PER DR Colmenares Records PT WAS NOT SEEN ON 9/23/20 DUE TO THE OFFICE MANAGERS DECISION NOT DR Lucio Alejandra. PLEASE DISREGARD NOTE ON 09/23/20.

## 2020-10-07 NOTE — TELEPHONE ENCOUNTER
RAVINDER ARRIVES VIA WHEELCHAIR FOR MD VISIT  DR Boston Rai IN TO SEE PATIENT  ORDERS RECEIVED  ZOMETA ONCE A MONTH  CT SPINE  REFERRAL TO [facility]  REFERRAL TO NEURO SURG  RV 4 WEEKS  CT THORACIC SPINE SCHEDULED [facility] FOR 10/12/20 @3:15PM  ARRIVE BY 2:15PM [address], PT IS SCHEDULED FOR 10/9/20 @8:15AM  CALLED NEURO SURGERY DR HANSEN AT [facility],THE OFFICE @ [phone] @ SPOKE TO SAMANTHA PT IS SCHEDULED ON 10/28/20 @8:50AM W/DR FANG [facility] [place]  MD VISIT 11/4/20 @8:30AM  AVS PRINTED AND GIVEN TO PATIENT WITH INSTRUCTIONS  PATIENT DISCHARGED VIA WHEELCHAIR

## 2020-10-07 NOTE — PROGRESS NOTES
Patient ID: Ina Malave, 2/13/1928, [id number], 80 y.o. Diagnosis:   Metastatic papillary thyroid cancer  Patient following with endocrinologist at [facility] for thyroid suppression treatment  Recent CT scan on 9/17/2020 showed destructive lesion at T6  HISTORY OF PRESENT ILLNESS:    Oncologic History:  Nikita Kirkland is a 81 YO male with PMH of CAD, DM, CVA was admitted with abd pain. He speaks very limited english so history was provided by his family and chart.     His CT abd showed cholelithiasis and slightly thickened gall bladder wall. Also innumerable lung nodules were noted. No night sweats, No fever chills, NO wt loss.   NO NV or blood in stool.   Patient has history of papillary thyroid cancer s/p total thyroidectomy and neck dissection in 2010. He had LN mets. He underwent biopsy of the lung nodule which was consistent with metastatic papillary thyroid cancer. Interval history:  Patient is returning for follow-up visit. He recently presented to ER with worsening back pain, significant fatigue. As per family he is also losing weight. His CT scan of the chest showed destructive bony lesion at T6. He has appointment with pain clinic today. Patient has very limited mobility. He was positive with COVID-19 infection in August and was subsequently at nursing home. He still has some residual cough. During this visit patient's allergy, social, medical, surgical history and medications were reviewed and updated.     Past Medical History:   Diagnosis Date    Abnormal ECG 8/30/2019    Acquired hypothyroidism 9/19/2014    Acute cholecystitis 2/28/2018    Atherosclerosis of coronary artery bypass graft(s) without angina pectoris 10/4/2016    Atrioventricular block, complete ([address] 5/37/1063    Biliary colic     Block, bundle branch, left 3/30/2010    Calculus of bile duct without cholecystitis and without obstruction     Calculus of gallbladder 9/19/2017    Cancer ([address] 2013    thyroid    Carotid artery stenosis     Cataract     Cerebrovascular disease     Cervical disc herniation 11/13/2014    Cervical radiculopathy, chronic 11/14/2014    Cervical spondylosis 11/13/2014    CHF (congestive heart failure) (HCC)     Chronic systolic CHF (congestive heart failure), NYHA class 2 ([address] 6/21/2018    Coronary arteriosclerosis in native artery 3/30/2010    Overview:  H/O Acute anteriorseptal MI  11/8/99 Severe 3 vessel CAD , LM coronary dissection  S/P CABG  1999 L-LAD, SVG Ramus, SVG OM, SVG L PDA Stress test  10/2007  EF 49% no reversible ischemia Predominantly fixed infeior apical septal defects Low normal to mildly reduced LV sys function with :VEF 49%  Septal wall motion abnormlaity Compared with prior study no new reveresible perfusion defects    COVID-19 virus infection 07/25/2020    Degeneration of cervical intervertebral disc 9/25/2012    Elevated LFTs 9/19/2017    Elevated liver enzymes 2/28/2018    H/O malignant neoplasm of thyroid 12/10/2013    Heart attack ([address]     Heart block AV second degree     Hypercholesteremia 3/30/2010    Hypertension     Hypothyroidism     Ischemic cardiomyopathy 8/30/2019    Metastatic cancer ([address] 9/19/2017    Mild aortic regurgitation 3/30/2010    Overview:  Mild moderate MR and AR  Overview:  Mild moderate MR and AR    Pneumonia due to COVID-19 virus     07/25/2020    Pulmonary nodule 6/21/2018    Pulmonary nodules/lesions, multiple 9/19/2017    S/P CABG x 3 8/30/2019    Sick sinus syndrome ([address] 8/30/2019    SOB (shortness of breath) 8/30/2019    Spinal stenosis in cervical region 11/6/2014    Type 2 diabetes mellitus ([address] 3/30/2010    Overview:  without complications    Type II or unspecified type diabetes mellitus without mention of complication, not stated as uncontrolled        Past Surgical History:   Procedure Laterality Date    APPENDECTOMY      CARDIAC SURGERY      CHOLECYSTECTOMY      CORONARY ARTERY BYPASS GRAFT      ENDOSCOPIC ULTRASOUND (LOWER)      PACEMAKER INSERTION  03/2015    MS LAP,CHOLECYSTECTOMY N/A 3/2/2018    [facility]         Allergies   Allergen Reactions    Seasonal      Other reaction(s): Other: See Comments  seasonal upper respiratory irritation    Keflex [Cephalexin] Nausea And Vomiting       Current Outpatient Medications   Medication Sig Dispense Refill    diphenhydrAMINE (BENADRYL) 25 MG capsule Take 1 capsule by mouth every 6 hours as needed for Itching 30 capsule 0    triamcinolone (KENALOG) 0.1 % cream Apply topically 2 times daily for 1 week. 1 Tube 0    tamsulosin (FLOMAX) 0.4 MG capsule Take 1 capsule by mouth daily 90 capsule 3    finasteride (PROSCAR) 5 MG tablet TAKE 1 TABLET DAILY 90 tablet 3    furosemide (LASIX) 20 MG tablet Take 1 tablet by mouth daily 30 tablet 1    vitamin D (ERGOCALCIFEROL) 79577 units CAPS capsule Take 50,000 Units by mouth once a week      Multiple Vitamins-Minerals (THERAPEUTIC MULTIVITAMIN-MINERALS) tablet Take 1 tablet by mouth daily      calcium carbonate (TUMS) 500 MG chewable tablet Take 1 tablet by mouth 2 times daily as needed for Heartburn 30 tablet 0    glimepiride (AMARYL) 2 MG tablet Take 2 mg by mouth every morning (before breakfast)      levothyroxine (LEVOXYL) 137 MCG tablet Take 137 mcg by mouth Daily       nitroGLYCERIN (NITROSTAT) 0.4 MG SL tablet Place 0.4 mg under the tongue. As directed      docusate sodium (COLACE) 100 MG capsule Take 100 mg by mouth nightly       atorvastatin (LIPITOR) 20 MG tablet   Take 20 mg by mouth nightly       aspirin 81 MG EC tablet Take 81 mg by mouth daily        No current facility-administered medications for this visit. Social History     Socioeconomic History    Marital status:       Spouse name: Not on file    Number of children: Not on file    Years of education: Not on file    Highest education level: Not on file   Occupational History    Occupation: Retired     Employer: [address] resource strain: Not hard at all   [place] insecurity     Worry: Never true     Inability: Never true   Travellution needs     Medical: Not on file     Non-medical: Not on file   Tobacco Use    Smoking status: Never Smoker    Smokeless tobacco: Never Used   Substance and Sexual Activity    Alcohol use: No    Drug use: No    Sexual activity: Not on file   Lifestyle    Physical activity     Days per week: Not on file     Minutes per session: Not on file    Stress: Not on file   Relationships    Social connections     Talks on phone: Not on file     Gets together: Not on file     Attends [employer] service: Not on file     Active member of club or organization: Not on file     Attends meetings of clubs or organizations: Not on file     Relationship status: Not on file    Intimate partner violence     Fear of current or ex partner: Not on file     Emotionally abused: Not on file     Physically abused: Not on file     Forced sexual activity: Not on file   Other Topics Concern    Not on file   Social History Narrative    Not on file       Family History   Problem Relation Age of Onset    Cancer Mother         stomach    Other Father         pneumonia      REVIEW OF SYSTEM:   Constitutional: No fever or chills.  No night sweats, no weight loss   Eyes: No eye discharge, double vision, or eye pain   HEENT: negative for sore mouth, sore throat, hoarseness and voice change   Respiratory: negative for cough , sputum, dyspnea, wheezing, hemoptysis, chest pain   Cardiovascular: negative for chest pain, dyspnea, palpitations, orthopnea, PND   Gastrointestinal: negative for nausea, vomiting, diarrhea, constipation, abdominal pain, Dysphagia, hematemesis and hematochezia   Genitourinary: negative for frequency, dysuria, nocturia, urinary incontinence, and hematuria   Integument: negative for rash, skin lesions, bruises. Hematologic/Lymphatic: negative for easy bruising, bleeding, lymphadenopathy, petechiae and swelling/edema   Endocrine: negative for heat or cold intolerance, tremor, weight changes, change in bowel habits and hair loss   Musculoskeletal: negative for myalgias, arthralgias, pain, joint swelling,and bone pain   Neurological: negative for headaches, dizziness, seizures, weakness, numbness  OBJECTIVE:         There were no vitals filed for this visit. PHYSICAL EXAM:   General appearance - well appearing, no in pain or distress   Mental status - alert and cooperative   Eyes - pupils equal and reactive, extraocular eye movements intact   Ears - bilateral TM's and external ear canals normal   Mouth - mucous membranes moist, pharynx normal without lesions   Neck - supple, no significant adenopathy   Lymphatics - no palpable lymphadenopathy, no hepatosplenomegaly   Chest - clear to auscultation, no wheezes, rales or rhonchi, symmetric air entry   Heart - normal rate, regular rhythm, normal S1, S2, no murmurs, rubs, clicks or gallops   Abdomen - soft, nontender, nondistended, no masses or organomegaly   Neurological - alert, oriented, normal speech, no focal findings or movement disorder noted   Musculoskeletal - no joint tenderness, deformity or swelling   Extremities - peripheral pulses normal, no pedal edema, no clubbing or cyanosis   Skin - normal coloration and turgor, no rashes, no suspicious skin lesions noted   LABORATORY DATA:     Lab Results   Component Value Date    WBC 8.0 09/17/2020    HGB 13.2 (L) 09/17/2020    HCT 39.5 (L) 09/17/2020    MCV 82.0 09/17/2020     09/17/2020    LYMPHOPCT 13 (L) 09/17/2020    RBC 4.82 09/17/2020    MCH 27.4 09/17/2020    MCHC 33.4 09/17/2020    RDW 17.3 (H) 09/17/2020    MONOPCT 9 (H) 09/17/2020    BASOPCT 1 09/17/2020    NEUTROABS 6.00 09/17/2020    LYMPHSABS 1.10 09/17/2020    MONOSABS 0.70 09/17/2020    EOSABS 0.20 09/17/2020 BASOSABS 0.10 09/17/2020       Chemistry        Component Value Date/Time     09/17/2020 1450    K 4.1 09/17/2020 1450    CL 97 (L) 09/17/2020 1450    CO2 29 09/17/2020 1450    BUN 11 09/17/2020 1450    CREATININE 0.82 09/17/2020 1450        Component Value Date/Time    CALCIUM 8.9 09/17/2020 1450    ALKPHOS 99 09/11/2020 1600    AST 22 09/11/2020 1600    ALT 17 09/11/2020 1600    BILITOT 0.27 (L) 09/11/2020 1600        PATHOLOGY DATA:     Collected: 9/21/2017     -- Diagnosis --   FINE NEEDLE ASPIRATION (LEFT LOWER LOBE LUNG, CT-GUIDED):       - POSITIVE FOR MALIGNANCY     -- Diagnosis Comment --   THE HISTOMORPHOLOGY AND IMMUNOSTAIN FINDINGS ARE CONSISTENT WITH   METASTATIC PAPILLARY THYROID CARCINOMA. IMAGING DATA:    CT of the chest  Impression:      Innumerable small pulmonary nodules new from 11/24/2012.  Metastatic disease is suspected.  Follow-up chest CT is recommended to further evaluate the extent of the metastatic disease and in search for a primary tumor. No evidence of metastatic disease in the abdomen/ pelvis with the limitations of a noncontrast study. Cholelithiasis.  The gallbladder is moderately dilated, the gallbladder wall is slightly thickened and there is suspicion of minimal pericholecystic fat stranding.  Follow-up gallbladder ultrasound examination or nuclear medicine hepatobiliary imaging study may be helpful. CT abd pelvis 10/7/19  FINDINGS:   Lower Chest: Progression of the multiple pulmonary nodules identified   throughout both lung bases which have both increased in size and number. Lung nodules right lower lobe 1.7 x 1.0 cm in size.  1 nodule identified   within the lingula 1.7 x 1.0 cm in size.  Heart is enlarged.  Coronary   calcifications.  Pacemaker leads identified.       Organs: Previous cholecystectomy.  Scattered hypodense identified involving   the liver noted on prior contrast examination and appear unchanged.    Gallbladder is absent.  Multiple bilateral renal cysts appear similar to the   prior exam.  No hydronephrosis.  No evidence of nephrolithiasis.  Otherwise   the spleen, adrenal glands, are unremarkable.  There is mild focal thickening   identified involving the pancreatic tail measuring 2.9 x 2.4 cm in size.       GI/Bowel: Retained stool throughout the colon.  No bowel obstruction. Appendix unremarkable.       Pelvis: Prostate is large with this dystrophic calcifications   posterolaterally.  Bladder is mildly distended.  Probable bilateral bladder   diverticulum on the right posteriorly.       Peritoneum/Retroperitoneum: No free air or free fluid.  Aortic vascular   calcifications.  Aorta appears aneurysmal.  Right epicardial phrenic lymph   nodes subcentimeter in size.  A few scattered retroperitoneal lymph nodes are   not enlarged per CT criteria.       Bones/Soft Tissues: .  Moderate size right inguinal fat containing hernia. Moderate multilevel spondylosis involving the thoracic lumbar spine. Moderate severe facet arthropathy lower lumbar spine.  No acute compression   fracture.  Mild thickening of the right intercostal muscle at T10-11. Question minimal focal erosion involving the right 11th rib medially and   superiorly best seen on the coronal images.           Impression   Negative nephrolithiasis or obstructive uropathy.       Focal thickening involving the pancreatic tail may be related to developing   pancreatitis or could be due to underlying mass.  Please correlate with   pancreatic enzymes.  Postcontrast imaging could be beneficial for further   characterization as feasible/warranted       Progression of the multiple pulmonary nodules worrisome for metastatic   disease.       Mild thickening of the right T10-11 intercostal muscle.  This may represent   focal sprain or strain.  Intramuscular metastatic disease with may also be   considered in the differential.  Please correlate with exam findings.      PET scan 10/18/2019:  Impression 1.  FDG avid cervical lymphadenopathy and pulmonary metastatic disease.       2.  Localized FDG activity in the right femoral neck without discrete bone   lesion.  Consider further evaluation with bone scan or MRI.       3.  Mild focal FDG activity and apparent enlargement in the tail the   pancreas.  A metastatic deposit can have this appearance.  This could be   further evaluated with contrast-enhanced CT or MRI if clinically appropriate.       4.  Short-segment of absent PET data in the upper abdomen, limiting this   region. CT chest 9/17/2020: Impression    No CT evidence of acute pulmonary embolism.         Extensive metastatic disease including a destructive lesion involving the    left half of the T6 vertebral body extending to the posterior elements and    epidural space. ASSESSMENT:    Mr. Mahendra Berger is a 70-year-old gentleman with a history of metastatic papillary thyroid cancer. He has history of total thyroidectomy in 2010 and also had modified radical neck dissection for metastatic lymph nodes. Lung biopsy confirmed papillary thyroid cancer usually to have good prognosis despite metastatic disease. His disease is limited to the lungs only. Among patients with small pulmonary metastases but no other metastases outside of the neck, the 10-year survival rate is 30 to 50 percent. He is receiving TSH suppression,    [facility]. Recent CT scan showing progression with the second destructive lesion. .During today's visit, the patient and the family had a number of reasonable questions which were answered to their satisfaction. They verbalized understanding of the information provided and they agreed to proceed as outlined above. PLAN:   I reviewed the results of recent CT scan. His CT scan showing progression of disease with T6 destructive lesion  Patient and family wants to focus on quality of life and not interested in aggressive care.   I think he will benefit from palliative radiation. Also will get a CT scan of the spine to rule out any cord compression and set up an appointment with neurosurgery as per family wishes to discuss options if possible  He has appointment with pain clinic  I discussed option of starting him on Zometa for bone metastasis and patient and family agreeable  I discussed option of targeted therapy with lenvatinib. I discussed risk-benefit and side effects. To discuss option of palliative care/hospice care. I will see him in 4 weeks once family makes decision    Dustin Matias MD  Hematologist/Medical Oncologist      This note is created with the assistance of a speech recognition program.  While intending to generate a document that actually reflects the content of the visit, the document can still have some errors including those of syntax and sound a like substitutions which may escape proof reading. It such instances, actual meaning can be extrapolated by contextual diversion.

## 2020-10-07 NOTE — CONSULTS
Jose David Alvarez is a 80 y.o. male evaluated on 10/7/2020. Patient is referred by Melva Aoms MD  Modality of virtual service provided -via  telephone   Consent:  Patient and/or health care decision maker is aware that that patient may receive a bill for this telephone service, depending on one's insurance coverage, and has provided verbal consent to proceed: Yes    Patient identification was verified at the start of the visit: Yes    Chief complaint: Jose David Alvarez is 80 y.o.,  male, with chief complaint of back pain    Referring Diagnosis   M54.6, G89.29 (ICD-10-CM) - Chronic thoracic back pain, unspecified back pain laterality   M50.20 (ICD-10-CM) - Cervical disc herniation   C79.9 (ICD-10-CM) - Metastatic cancer ([facility] [address]     Patient is a 80-year-old male with a history of metastatic cancer referred to the pain clinic with chief complaint of pain involving the low back. Most of the history is obtained from patient's son as the patient is somewhat drowsy and sleepy. It does appear that the patient's daughter usually takes patient to all physicians offices and son has very little knowledge of his medical condition. According to the son the patient's pain is mostly in the low back on the left side. There is minimal pain in the thoracic region. Patient denies any radiation of the pain. Patient apparently is on hydrocodone which he takes a rather sparingly. Patient is complaining of pain involving the right lower lumbar region and he was evaluated by oncologist.  According to the patient's son and they are not sure if there are any metastasis in the bones in the pelvis. Back Pain   This is a chronic problem. The current episode started more than 1 year ago. The problem occurs constantly. The problem is unchanged. The pain is present in the lumbar spine and thoracic spine. Quality: Ebony Pennant? The pain does not radiate. Pain scale: Cannot obtain an numerical value on the pain scale patient is sleepy. Pertinent negatives include no abdominal pain, dysuria or numbness. Alleviating factors:nothing   Lifestyle changes experienced with pain: Prevents or limits ADLs  Mood changes,? Patient currently unemployed. Physical therapy did not do PT    Are you under psychological counseling at present: No  Goals for treatment include:  Decrease in pain  Enjoy daily and recreational activities, return to previous status. Patient relates current medications are helping the pain. Patient reports taking pain medications as prescribed, denies obtaining medications from different sources and denies use of illegal drugs. Patient denies side effects from medications like nausea, vomiting, constipation or drowsiness. Patient reports current activities of daily living ar possible due to medications and would like to continue them. ACTIVITY/SOCIAL/EMOTIONAL:  Sleep Pattern: 8 hours per night. generally restful sleep  Home Exercises: never none  Activity:decreased  Emotional Issues: Difficulty assess. Currently seeing a Psychiatrist or Psychologist:  No     ADVERSE MEDICATION EFFECTS:   Nausea and vomiting: no   Constipation: no-Undercontrol-: yes  Dizziness/drowsy/sleepy--?   Urinary Retention: no    ABERRANT BEHAVIORS SINCE LAST VISIT  Lost rx/pills:------------------------------------------ no  Taking  medication as prescribed: ----------- yes  Urine Drug Screen ---------------------------------  no  Recent ER visits: -------------------------------------No  Pill count is appropriate: ---------------------------not applicable   Refills for prescriptions appropriate:---------- yes      Past Medical History:   Diagnosis Date    Abnormal ECG 8/30/2019    Acquired hypothyroidism 9/19/2014    Acute cholecystitis 2/28/2018    Atherosclerosis of coronary artery bypass graft(s) without angina pectoris 10/4/2016    Atrioventricular block, complete ([facility] [address] 6/07/3987    Biliary colic     Block, bundle branch, left 3/30/2010    Calculus of bile duct without cholecystitis and without obstruction     Calculus of gallbladder 9/19/2017    Cancer ([address] 2013    thyroid    Carotid artery stenosis     Cataract     Cerebrovascular disease     Cervical disc herniation 11/13/2014    Cervical radiculopathy, chronic 11/14/2014    Cervical spondylosis 11/13/2014    CHF (congestive heart failure) (HCC)     Chronic systolic CHF (congestive heart failure), NYHA class 2 ([address] 6/21/2018    Coronary arteriosclerosis in native artery 3/30/2010    Overview:  H/O Acute anteriorseptal MI  11/8/99 Severe 3 vessel CAD , LM coronary dissection  S/P CABG  1999 L-LAD, SVG Ramus, SVG OM, SVG L PDA Stress test  10/2007  EF 49% no reversible ischemia Predominantly fixed infeior apical septal defects Low normal to mildly reduced LV sys function with :VEF 49%  Septal wall motion abnormlaity Compared with prior study no new reveresible perfusion defects    COVID-19 virus infection 07/25/2020    Degeneration of cervical intervertebral disc 9/25/2012    Elevated LFTs 9/19/2017    Elevated liver enzymes 2/28/2018    H/O malignant neoplasm of thyroid 12/10/2013    Heart attack ([address]     Heart block AV second degree     Hypercholesteremia 3/30/2010    Hypertension     Hypothyroidism     Ischemic cardiomyopathy 8/30/2019    Metastatic cancer ([address] 9/19/2017    Mild aortic regurgitation 3/30/2010    Overview:  Mild moderate MR and AR  Overview:  Mild moderate MR and AR    Pneumonia due to COVID-19 virus     07/25/2020    Pulmonary nodule 6/21/2018    Pulmonary nodules/lesions, multiple 9/19/2017    S/P CABG x 3 8/30/2019    Sick sinus syndrome ([address] 8/30/2019    SOB (shortness of breath) 8/30/2019    Spinal stenosis in cervical region 11/6/2014    Type 2 diabetes mellitus ([address] 3/30/2010    Overview:  without complications    Type II or unspecified type diabetes mellitus without mention of complication, not stated as uncontrolled Past Surgical History:   Procedure Laterality Date    APPENDECTOMY      CARDIAC SURGERY      CHOLECYSTECTOMY      CORONARY ARTERY BYPASS GRAFT      ENDOSCOPIC ULTRASOUND (LOWER)      PACEMAKER INSERTION  03/2015    [place] LAP,CHOLECYSTECTOMY N/A 3/2/2018    CHOLECYSTECTOMY LAPAROSCOPIC performed by Christina Guaman DO at [address]      TOTAL THYROIDECTOMY         Family History   Problem Relation Age of Onset    Cancer Mother         stomach    Other Father         pneumonia       Social History     Socioeconomic History    Marital status:      Spouse name: None    Number of children: None    Years of education: None    Highest education level: None   Occupational History    Occupation: Retired     Employer: [address] resource strain: Not hard at all   [place] insecurity     Worry: Never true     Inability: Never true   [employer] needs     Medical: None     Non-medical: None   Tobacco Use    Smoking status: Never Smoker    Smokeless tobacco: Never Used   Substance and Sexual Activity    Alcohol use: No    Drug use: No    Sexual activity: None   Lifestyle    Physical activity     Days per week: None     Minutes per session: None    Stress: None   Relationships    Social connections     Talks on phone: None     Gets together: None     Attends [employer] service: None     Active member of club or organization: None     Attends meetings of clubs or organizations: None     Relationship status: None    Intimate partner violence     Fear of current or ex partner: None     Emotionally abused: None     Physically abused: None     Forced sexual activity: None   Other Topics Concern    None   Social History Narrative    None       Allergies   Allergen Reactions    Seasonal      Other reaction(s):  Other: See Comments  seasonal upper respiratory irritation    Keflex [Cephalexin] Nausea And Vomiting       Current Outpatient Medications on File Prior to Encounter   Medication Sig Dispense Refill    diphenhydrAMINE (BENADRYL) 25 MG capsule Take 1 capsule by mouth every 6 hours as needed for Itching 30 capsule 0    triamcinolone (KENALOG) 0.1 % cream Apply topically 2 times daily for 1 week. 1 Tube 0    tamsulosin (FLOMAX) 0.4 MG capsule Take 1 capsule by mouth daily 90 capsule 3    finasteride (PROSCAR) 5 MG tablet TAKE 1 TABLET DAILY 90 tablet 3    furosemide (LASIX) 20 MG tablet Take 1 tablet by mouth daily 30 tablet 1    vitamin D (ERGOCALCIFEROL) 56889 units CAPS capsule Take 50,000 Units by mouth once a week      Multiple Vitamins-Minerals (THERAPEUTIC MULTIVITAMIN-MINERALS) tablet Take 1 tablet by mouth daily      calcium carbonate (TUMS) 500 MG chewable tablet Take 1 tablet by mouth 2 times daily as needed for Heartburn 30 tablet 0    glimepiride (AMARYL) 2 MG tablet Take 2 mg by mouth every morning (before breakfast)      levothyroxine (LEVOXYL) 137 MCG tablet Take 137 mcg by mouth Daily       nitroGLYCERIN (NITROSTAT) 0.4 MG SL tablet Place 0.4 mg under the tongue. As directed      docusate sodium (COLACE) 100 MG capsule Take 100 mg by mouth nightly       atorvastatin (LIPITOR) 20 MG tablet   Take 20 mg by mouth nightly       aspirin 81 MG EC tablet Take 81 mg by mouth daily        No current facility-administered medications on file prior to encounter. Review of Systems   Constitutional: Positive for activity change, appetite change and fatigue. HENT: Positive for hearing loss. Negative for congestion and sore throat. Eyes: Negative for photophobia, pain and visual disturbance. Respiratory: Positive for shortness of breath. History of lung cancer with metastasis   Cardiovascular:        Patient is very sleepy and his son is not sure   Gastrointestinal: Negative for abdominal pain, constipation, nausea and vomiting. Endocrine: Negative. Genitourinary: Negative for dysuria and hematuria. TECHNIQUE:    CTA of the chest was performed after the administration of intravenous    contrast.  Multiplanar reformatted images are provided for review.  MIP    images are provided for review. Dose modulation, iterative reconstruction,    and/or weight based adjustment of the mA/kV was utilized to reduce the    radiation dose to as low as reasonably achievable.         COMPARISON:    08/29/2020, abdomen and pelvic CT 10/07/2019         HISTORY:    ORDERING SYSTEM PROVIDED HISTORY: Pain    TECHNOLOGIST PROVIDED HISTORY:    Pain    Reason for Exam: Pt states left sided chest back pain. Acuity: Acute    Type of Exam: Initial         FINDINGS:    Pulmonary Arteries: Pulmonary arteries are adequately opacified for    evaluation.  No evidence of intraluminal filling defect to suggest pulmonary    embolism.  Main pulmonary artery is normal in caliber.         Mediastinum: Similar scattered nonenlarged and mildly enlarged mediastinal    lymph nodes.  Enlarged subcarinal node contains a punctate calcification. Sternal wires from prior heart surgery.  Stable heart size with a pacemaker    in place.  Mild calcific plaque of the thoracic aorta.         Lungs/pleura: Innumerable bilateral lung nodules compatible with metastatic    disease.  No new focal lung consolidation.  There is mild dependent    atelectasis and similar patchy ground-glass and parenchymal opacities in the    lower lobes, left greater than right.  Unchanged focal subpleural parenchymal    density right upper lobe posterolaterally.  Tiny calcified pleural plaque on    the right.  Tiny subpleural cysts and mild fibrotic changes in both lower    lobes.         Upper Abdomen: Limited images of the upper abdomen show no acute findings. Bilateral renal cysts and an approximately 15 mm hyperdense lesion left    kidney. .  Calcified granulomas in the liver and spleen.  Indeterminate    pancreatic tail mass measuring up to 4.5 x 3 cm.  Previous suggest pulmonary    embolism.  Main pulmonary artery is normal in caliber.         Mediastinum: Similar scattered nonenlarged and mildly enlarged mediastinal    lymph nodes.  Enlarged subcarinal node contains a punctate calcification. Sternal wires from prior heart surgery.  Stable heart size with a pacemaker    in place.  Mild calcific plaque of the thoracic aorta.         Lungs/pleura: Innumerable bilateral lung nodules compatible with metastatic    disease.  No new focal lung consolidation.  There is mild dependent    atelectasis and similar patchy ground-glass and parenchymal opacities in the    lower lobes, left greater than right.  Unchanged focal subpleural parenchymal    density right upper lobe posterolaterally.  Tiny calcified pleural plaque on    the right.  Tiny subpleural cysts and mild fibrotic changes in both lower    lobes.         Upper Abdomen: Limited images of the upper abdomen show no acute findings. Bilateral renal cysts and an approximately 15 mm hyperdense lesion left    kidney. .  Calcified granulomas in the liver and spleen.  Indeterminate    pancreatic tail mass measuring up to 4.5 x 3 cm.  Previous cholecystectomy. A couple of scattered small hypodensities in the liver are stable.  Increase    in size of heterogeneous mixed solid and cystic soft tissue mass by the    inferior edge of the right lobe of the liver and abdominal/chest wall. Stable small left adrenal nodule.         Soft Tissues/Bones:  The bones are osteopenic with degenerative changes of the    spine and shoulders. [place] are old rib fractures.  Destructive lytic lesion    involving the left posterior aspect of the T6 vertebral body extending    posteriorly through the pedicle to the left transverse process, left facet    joint T6-T7, posterior elements and spinous process and costovertebral    junction with involvement of the epidural space.  This is similar to perhaps    minimally increased since the prior study.  Metallic anchor right humeral    head.              Impression    No CT evidence of acute pulmonary embolism.         Extensive metastatic disease including a destructive lesion involving the    left half of the T6 vertebral body extending to the posterior elements and    epidural space.             Clinical  impression:  1. Degeneration of cervical intervertebral disc    2. Spinal stenosis in cervical region    3. Cervical spondylosis    4. Cervical disc herniation    5. Cervical radiculopathy, chronic    6. H/O malignant neoplasm of thyroid      Plan of care:  Patient's   [] x-ray    [x] CT scan    [] MRI  Were/was  Reviewed. These findings are consistent with the patient's symptoms a    [] Patient's findings on the x-ray were explained to the patient's son  Other reports reviewed include    [] Bone scan   [] EMG and nerve conduction studies   [x] Referral reports-  I also discussed with him the following treatment options Including advantages and disadvantages of each:    [] Physical therapy    [] Interventional pain treatment    [] Medication management    [] Surgical options    Patient's OARRS were reviewed. It is acceptable and appears patient is not receiving prescriptions from multiple prescribers. Patient is  forthcoming regarding prescriptions for pain medication in the past  Controlled Substances Monitoring: Periodic Controlled Substance Monitoring: No signs of potential drug abuse or diversion identified. , Assessed functional status. (Phillip Montaño MD)    Counselling/Preventive measures for pain  Control:    I discussed extensively with the patient son regarding the treatment options. I discussed about the findings of the CT with metastasis at T6. He was advised to talk to his oncologist regarding palliative radiation treatment as he is a poor candidate for any surgical procedures.   Patient is advised to take half a pill of hydrocodone every 4 hours as needed for pain control as 5 mg is causing him to be sleepy. It does not appear that he is taking medications very frequently. We also briefly discussed about palliative care. They are advised to talk to their oncologist regarding this. As the patient is not even taking the medication on a regular basis I am afraid to put him on a long-acting medication. Orders Placed This Encounter   Medications    HYDROcodone-acetaminophen (NORCO) 5-325 MG per tablet     Sig: Take 1 tablet by mouth every 6 hours as needed for Pain for up to 30 days. Dispense:  60 tablet     Refill:  0     Reduce doses taken as pain becomes manageable       Decision Making Process : Patient's health history and referral records thoroughly reviewed before focused physical examination and discussion with patient. Over 50% of today's visit is spent on examining the patient and counseling. Level of complexity of date to be reviewed is Moderate. The chart date reviewed include the following: Imaging Reports. Summary of Care. Time spent reviewing with patient the below reports:   Medication safety, Treatment options. Level of diagnosis and management options of this case is multiple: involving the following management options: Interventions as needed, medication management as appropriate, future visits, activity modification, heat/ice as needed, Urine drug screen as required. [x]The patient's questions were answered to the best of my abilities. Return in  4 weeks  with Landy Alva M.D.  for further plan of treatment. Due to the COVID-19 pandemic and the appropriate interventions by [address] [place] [address], our non-urgent pain management patients will not be seen in the office at this time for their protection and the protection of our staff.  To offer continuity of care, their prescriptions will be escribed this month after a careful chart review and review of their OARRS report  Pursuant to the emergency declaration under the [facility] Act and [facility], [place], [address] waiver authority and the [employer] Act, this Virtual Visit was conducted, with patient's consent, to reduce the patient's risk of exposure to COVID-19 and provide continuity of care for an established patient. Services were provided through a video synchronous discussion virtually to substitute for in-person appointment. \"  Documentation:  I communicated with the patient and/or health care decision maker about plan of care  Details of this discussion including any medical advice provided: Total Time: 45 to 55 minutes    I affirm this is a Patient Initiated Episode with an Established Patient who has not had a related appointment within my department in the past 7 days or scheduled within the next 24 hours. This note was created using voice recognition software. There may be inaccuracies of transcription  that are inadvertently overlooked prior to the signature. There is any questions about the transcription please contact me.     Electronically signed by Maria D Coronado MD on 10/7/2020 at 4:10 AM

## 2020-10-09 NOTE — PROCEDURES
INSTRUMENTAL SWALLOW REPORT  MODIFIED BARIUM SWALLOW    NAME: Pete Reyes   : 1928  MRN: [id number]       Date of Eval: 10/9/2020              Referring Diagnosis(es):  Dysphagia     Past Medical History:  has a past medical history of Abnormal ECG, Acquired hypothyroidism, Acute cholecystitis, Atherosclerosis of coronary artery bypass graft(s) without angina pectoris, Atrioventricular block, complete ([address] Biliary colic, Block, bundle branch, left, Calculus of bile duct without cholecystitis and without obstruction, Calculus of gallbladder, Cancer ([address] Carotid artery stenosis, Cataract, Cerebrovascular disease, Cervical disc herniation, Cervical radiculopathy, chronic, Cervical spondylosis, CHF (congestive heart failure) ([address] Chronic systolic CHF (congestive heart failure), NYHA class 2 ([address] Coronary arteriosclerosis in native artery, COVID-19 virus infection, Degeneration of cervical intervertebral disc, Elevated LFTs, Elevated liver enzymes, H/O malignant neoplasm of thyroid, Heart attack ([address] Heart block AV second degree, Hypercholesteremia, Hypertension, Hypothyroidism, Ischemic cardiomyopathy, Metastatic cancer ([address] Mild aortic regurgitation, Pneumonia due to COVID-19 virus, Pulmonary nodule, Pulmonary nodules/lesions, multiple, S/P CABG x 3, Sick sinus syndrome (HCC), SOB (shortness of breath), Spinal stenosis in cervical region, Type 2 diabetes mellitus ([address] and Type II or unspecified type diabetes mellitus without mention of complication, not stated as uncontrolled. Past Surgical History:  has a past surgical history that includes shoulder surgery; Cardiac surgery; Coronary artery bypass graft; Appendectomy; Total Thyroidectomy; Pacemaker insertion (2015); pr lap,cholecystectomy (N/A, 3/2/2018); Cholecystectomy; and Endoscopic ultrasonography, GI.     Current Diet Solid Consistency: Dysphagia Pureed (Dysphagia I)  Current Diet Liquid Consistency: Thin(Per daughter, some of Pt's drinks have been thickened, unsure to which consistency.)       Type of Study: Initial MBS       Recent CXR/CT of Chest: CT Chest (09/17): Impression    No CT evidence of acute pulmonary embolism.         Extensive metastatic disease including a destructive lesion involving the    left half of the T6 vertebral body extending to the posterior elements and    epidural space.           Patient Complaints/Reason for Referral:  Dat Nair was referred for a MBS to assess the efficiency of his/her swallow function, assess for aspiration, and to make recommendations regarding safe dietary consistencies, effective compensatory strategies, and safe eating environment. Patient complaints: [de-identified] of medical history provided by grandson and daughter on phone. Daughter reporting Pt. has been coughing with meals. They have been thickening Pt's liquids with thickener, unsure what consistency. Per daughter, Pt. Working with ST to address dysphagia and voicing. Onset of problem:    Per chart:    Armando Parsons is a 79 YO male with PMH of CAD, DM, CVA was admitted with abd pain. He speaks very limited english so history was provided by his family and chart.     His CT abd showed cholelithiasis and slightly thickened gall bladder wall. Also innumerable lung nodules were noted. No night sweats, No fever chills, NO wt loss.   NO NV or blood in stool.   Patient has history of papillary thyroid cancer s/p total thyroidectomy and neck dissection in 2010. He had LN mets. He underwent biopsy of the lung nodule which was consistent with metastatic papillary thyroid cancer. Subjective  Subjective: Pt. speaking little English. Pt. voice audibly weak.     Behavior/Cognition/Vision/Hearing:  Behavior/Cognition: Cooperative  Vision: Within Functional Limits  Hearing: Exceptions to [facility]  Hearing Exceptions: Hard of hearing/hearing concerns    Impressions:   Patient Position: Lateral     Consistencies Administered: Dysphagia Soft and Bite-Sized (Dysphagia III); Dysphagia Pureed (Dysphagia I); Nectar cup; Thin cup; Thin straw;Nectar straw    Compensatory Swallowing Strategies Attempted: Alternate solids and liquids;Eat/Feed slowly; No straws;Swallow 2 times per bite/sip         Oral Phase: Prolonged mastication with soft solid. Did not trial regular solid d/t difficulty with soft solid and dentition. Premature vallecular spillage with all consistencies tested. Oral residue present with soft solid, Pt. utilized napkin to expel residual.    Pharyngeal: Thin liquid: +Trace to deep penetration AFTER swallow with cup and DURING and AFTER swallow with straw on residue  +SILENT aspiration with cup and + aspiration with cough with straw  +mod amount vallecular, pyiform sinus, and UES residue, double-triple swallow effective in assisting to clear. Mildly thick liquids:  +Flash penetration with cup and straw (slightly deeper with straw)  -cough reflex  +mod amount vallecular residue, double/triple swallow to assist in clearing. No aspiration/deep penetration. Puree:  +Flash penetration with 1/4 trials only AFTER swallow on residue  +mod-max amount vallecular, pyriform sinus, and UES residue, double/triple swallow and liquid wash effective in assisting to clear. No aspiration/deep penetration. Soft solid: +Flash penetration only AFTER swallow, no aspiration/deep penetration. +mod-max amount vallecular, pyriform sinus, and UES residue, double/triple swallow and liquid wash effective in assisting to clear. Dysphagia Outcome Severity Scale: Level 3: Moderate dysphagia- Total assisstance, supervision or strategies.  Two or more diet consistencies restricted  Penetration-Aspiration Scale (PAS): 8 - Material Enters the airway, passes below the vocal folds, and no effort is made to eject    Recommended Diet:  Solid consistency: Dysphagia Pureed (Dysphagia I)  Liquid consistency: Mildly Thick (Nectar)  Liquid administration via: (Education provided re: avoiding straws as extra precaution as trace penetration was slightly deeper with straw than with cup)   Pt's family had several questions. All questions answered by [employer]. Extensive education provided re: results and recommendations to Pt. And family (grandson present and daughter via phone). Handout of information reviewed and given to grandson. Medication administration: Meds in puree    Safe Swallow Protocol:     Compensatory Swallowing Strategies: Alternate solids and liquids;Eat/Feed slowly;Upright as possible for all oral intake;Small bites/sips;Swallow 2 times per bite/sip; Remain upright for 30-45 minutes after meals              Recommendations/Treatment  Requires SLP Intervention: Yes           Postural Changes and/or Swallow Maneuvers: Upright 90 degrees;Upright 30 min after meal      Recommended Exercises:    Therapeutic Interventions: Mendelsohn;Diet tolerance monitoring;Oral motor exercises; Tongue base strengthening;Effortful swallow;Patient/Family education; Laryngeal exercises; Tere         Education: Recommendations were reviewed with Pt., grandson, and daughter (via telephone) following this exam.   Patient Education: Yes  Patient Education Response: Needs reinforcement               Oral Preparation / Oral Phase  Oral Phase: Impaired        Pharyngeal Phase  Pharyngeal Phase: Impaired  Pharyngeal Phase - Major Contributing Deficits  Premature Spillage to Valleculae: Soft solid;Puree;Nectar straw;Nectar cup; Thin cup; Thin straw  Pooling Valleculae: Soft solid;Puree;Nectar cup;Nectar straw; Thin cup; Thin straw  Pooling Pyriform: Reg solid;Puree;Nectar cup;Nectar straw; Thin cup; Thin straw  Pooling UES: Reg solid;Puree;Nectar cup;Nectar - straw; Thin cup; Thin straw  Reduced Airway/laryngeal Closure: Thin cup; Thin straw  Shallow Penetration After: Nectar straw;Nectar cup(deeper with straw)  Deep Penetration During: Thin cup; Thin straw  Aspiration During: Thin straw  Aspiration After: Thin cup; Thin straw  Delayed Cough Reflex: Thin straw  Pharyngeal Residue - Valleculae: Reg solid;Puree;Nectar cup;Nectar straw; Thin cup; Thin straw  Pharyngeal Residue - Pyriform: Reg solid;Puree;Nectar cup;Nectar straw; Thin cup; Thin straw  Pharyngeal Residue - UES: Reg solid;Puree;Nectar cup;Nectar straw; Thin cup; Thin straw      Esophageal Phase  Esophageal Screen: Impaired  Upper Esophageal Screen- Major Contributing Deficits  Reduced Cricopharyngeal Opening: Soft solid;Puree;Nectar cup;Nectar straw; Thin cup; Thin straw        Pain   Patient Currently in Pain: Denies         Therapy Time:   Individual Concurrent Group Co-treatment   Time In 1500         Time Out 1520         Minutes 20                 Elta Faden, M.A., CCC-SLP, 10/9/2020, 3:56 PM

## 2020-10-09 NOTE — CONSULTS
[facility]  NEW PATIENT CONSULTATION    Date of Service: 10/9/2020  Location: [facility]    Patient ID:   Phillip Case  : 1928   MRN: [id number]    Diagnosis:     Chief Complaint: \"I have cancer. \"    Dear Dr. Gay Nissen you for requesting a Radiation Oncology consultation on your patient, Phillip Case. As you know, he is 80  has history of papillary thyroid cancer s/p total thyroidectomy and neck dissection in . He had LN mets. He underwent biopsy of the lung nodule which was consistent with metastatic papillary thyroid cancer. Recent CT scan on 2020 showed destructive lesion at T6 causing pain also patient complained of numbness over left lower limbs has been there since couple of months he denied any weakness. He has been losing weight over the past few months. Patient started to experience hoarseness about 2 months ago and dysphagia.       Medical and Surgical History:  Past Medical History:   Diagnosis Date    Abnormal ECG 2019    Acquired hypothyroidism 2014    Acute cholecystitis 2018    Atherosclerosis of coronary artery bypass graft(s) without angina pectoris 10/4/2016    Atrioventricular block, complete ([address]     Biliary colic     Block, bundle branch, left 3/30/2010    Calculus of bile duct without cholecystitis and without obstruction     Calculus of gallbladder 2017    Cancer ([address]     thyroid    Carotid artery stenosis     Cataract     Cerebrovascular disease     Cervical disc herniation 2014    Cervical radiculopathy, chronic 2014    Cervical spondylosis 2014    CHF (congestive heart failure) (HCC)     Chronic systolic CHF (congestive heart failure), NYHA class 2 ([address] 2018    Coronary arteriosclerosis in native artery 3/30/2010    Overview:  H/O Acute anteriorseptal MI  99 Severe 3 vessel CAD , LM coronary dissection  S/P CABG   L-LAD, SVG Ramus, SVG OM, SVG L PDA Stress test  10/2007  EF 49% no reversible ischemia Predominantly fixed infeior apical septal defects Low normal to mildly reduced LV sys function with :VEF 49%  Septal wall motion abnormlaity Compared with prior study no new reveresible perfusion defects    COVID-19 virus infection 07/25/2020    Degeneration of cervical intervertebral disc 9/25/2012    Elevated LFTs 9/19/2017    Elevated liver enzymes 2/28/2018    H/O malignant neoplasm of thyroid 12/10/2013    Heart attack ([address]     Heart block AV second degree     Hypercholesteremia 3/30/2010    Hypertension     Hypothyroidism     Ischemic cardiomyopathy 8/30/2019    Metastatic cancer ([address] 9/19/2017    Mild aortic regurgitation 3/30/2010    Overview:  Mild moderate MR and AR  Overview:  Mild moderate MR and AR    Pneumonia due to COVID-19 virus     07/25/2020    Pulmonary nodule 6/21/2018    Pulmonary nodules/lesions, multiple 9/19/2017    S/P CABG x 3 8/30/2019    Sick sinus syndrome ([address] 8/30/2019    SOB (shortness of breath) 8/30/2019    Spinal stenosis in cervical region 11/6/2014    Type 2 diabetes mellitus ([address] 3/30/2010    Overview:  without complications    Type II or unspecified type diabetes mellitus without mention of complication, not stated as uncontrolled      Past Surgical History:   Procedure Laterality Date    APPENDECTOMY      CARDIAC SURGERY      CHOLECYSTECTOMY      CORONARY ARTERY BYPASS GRAFT      ENDOSCOPIC ULTRASOUND (LOWER)      PACEMAKER INSERTION  03/2015    VT LAP,CHOLECYSTECTOMY N/A 3/2/2018    CHOLECYSTECTOMY LAPAROSCOPIC performed by Aj Nolan, DO at [address]      TOTAL THYROIDECTOMY         Gyn Hx:    Social Hx:    Family Hx:    Allergies   Allergen Reactions    Seasonal      Other reaction(s):  Other: See Comments  seasonal upper respiratory irritation    Keflex [Cephalexin] Nausea And Vomiting       Current Outpatient Medications:     dexamethasone (DECADRON) 4 MG tablet, Take 8 mg by mouth once 8mg orally single dose today. , Disp: , Rfl:     HYDROcodone-acetaminophen (NORCO) 5-325 MG per tablet, Take 1 tablet by mouth every 6 hours as needed for Pain for up to 30 days. , Disp: 60 tablet, Rfl: 0    diphenhydrAMINE (BENADRYL) 25 MG capsule, Take 1 capsule by mouth every 6 hours as needed for Itching, Disp: 30 capsule, Rfl: 0    tamsulosin (FLOMAX) 0.4 MG capsule, Take 1 capsule by mouth daily, Disp: 90 capsule, Rfl: 3    finasteride (PROSCAR) 5 MG tablet, TAKE 1 TABLET DAILY, Disp: 90 tablet, Rfl: 3    furosemide (LASIX) 20 MG tablet, Take 1 tablet by mouth daily, Disp: 30 tablet, Rfl: 1    vitamin D (ERGOCALCIFEROL) 79499 units CAPS capsule, Take 50,000 Units by mouth once a week, Disp: , Rfl:     Multiple Vitamins-Minerals (THERAPEUTIC MULTIVITAMIN-MINERALS) tablet, Take 1 tablet by mouth daily, Disp: , Rfl:     calcium carbonate (TUMS) 500 MG chewable tablet, Take 1 tablet by mouth 2 times daily as needed for Heartburn, Disp: 30 tablet, Rfl: 0    glimepiride (AMARYL) 2 MG tablet, Take 2 mg by mouth every morning (before breakfast), Disp: , Rfl:     levothyroxine (LEVOXYL) 137 MCG tablet, Take 137 mcg by mouth Daily , Disp: , Rfl:     nitroGLYCERIN (NITROSTAT) 0.4 MG SL tablet, Place 0.4 mg under the tongue. As directed, Disp: , Rfl:     docusate sodium (COLACE) 100 MG capsule, Take 100 mg by mouth nightly , Disp: , Rfl:     atorvastatin (LIPITOR) 20 MG tablet,  Take 20 mg by mouth nightly , Disp: , Rfl:     aspirin 81 MG EC tablet, Take 81 mg by mouth daily , Disp: , Rfl:       SYSTEMS REVIEW:  I reviewed the complete 14-Point Review of Systems with the patient. Pertinent ones noted in the HPI and below. ROS(+)  ROS(-)    PHYSICAL EXAMINATION:  Vital Signs: BP (!) 124/59   Pulse 91   Temp 97.3 °F (36.3 °C) (Temporal)   Resp 16   Ht 5' 2\" (1.575 m)   Wt 124 lb (56.2 kg)   SpO2 96%   BMI 22.68 kg/m²   Pain 0/10, KPS .   GENERAL: Well-appearing, in no apparent distress, alert and fully oriented, answers questions appropriately. HEENT: Normocephalic, atraumatiNECK: No thyromegaly, cervical or supraclavicular lymphadenopathy. NEURO: Tired respond slowly to commands he is oriented he is able to walk. Subjective numbness start in the back radiates anteriorly and he also subjectively stated that he has some degree of decreased sensation in both lower limbs. Deep tendon reflexes at knees 2+ bilaterally. He has no Romberg's sign. PSYCH: Affect is appropriate for clinical situation. Insight and judgment do not appear impaired. LABS:    RADS:    PATHOLOGY:    ASSESSMENT:Metastatic papillary thyroid cancer  Patient following with endocrinologist at [facility] for thyroid suppression treatment   CT scan on 9/17/2020 showed destructive lesion at T6 causing the pain. Patient has significant hoarseness started 2 months ago which there is a question of left vocal cord paralysis secondary to possible mediastinal itz enlargement. Patient also has dysphagia. PLAN:1// Palliative course of radiation to T6 I am planning to give him 2000 cGy in 5 treatment by oblique beam arrangement to avoid the esophagus. This dose should control pain completely and 65% and partially 90%. With oblique beam arrangement of the esophagus will be avoided or get minimum dose. Early side effects include patient might feel little tired might have minor degree of dysphagia. If patient developed dysphagia will give him medical treatment. Late side effects there is very small chance of causing any late problem. Patient understand the benefit which is controlling his pain and side effects. 2//patient hoarseness possibly secondary to recurrent laryngeal nerve paralysis from possible compression with mediastinal lymph node.   Patient and family will be seen by gastroenterologist I mentioned to the family members to mention to the gastroenterologist to visualize the mobility of the vocal cords. Patient is fragile and I did not do an direct laryngoscopy or fiberoptic flexible scope because patient well might not tolerate the procedure. 3//I gave the patient a single dose of Decadron of 8 mg to hopefully improve the symptoms of numbness until his radiation starts. Patient has diabetes and he is on oral hypoglycemic so I mentioned to the daughter to make sure to monitor his blood glucose. Patient will be simulated Monday, October 12. I would like to thank you very much for letting us participate in his care and will keep you updated on him. I would like thank you very much for letting me participate in his care and we will keep you updated on him. Gia Lorenzo MD, [address] [place] [address]  [phone] (cell)    I spent 60 minutes with the patient. >50% of the time was allotted to education, answering questions, and coordinating care. CC:  Patient Care Team:  Luis E Soto MD as PCP - General (Family Medicine)  Luis E Soto MD as PCP - [facility]  Mohit Liu MD as Consulting Physician (Gastroenterology)  Bárbara Mcghee MD as Consulting Physician (Infectious Diseases)  Marisol Kiran MD as Consulting Physician (Pulmonary Disease)  Gustavo Menjivar MD as Consulting Physician (Hematology and Oncology)  Kellie Miranda MD as Consulting Physician (Radiation Oncology)     N.B. This note is created with the assistance of a speech recognition program.  While intending to generate a document that actually reflects the content of the visit, the document can still contain errors including those of syntax and sound-alike substitutions that may escape proof reading. In such instances, actual meaning can be extrapolated by contextual diversion.

## 2020-10-09 NOTE — PROGRESS NOTES
Referring Physician: Dr. Leisa Vernonr:    10/09/20 0837   BP: (!) 124/59   Pulse: 91   Resp: 16   Temp: 97.3 °F (36.3 °C)   SpO2: 96%    :  Patient Currently in Pain: Yes  Pain Assessment: 0-10  Pain Level: 8       Wt Readings from Last 1 Encounters:   10/09/20 124 lb (56.2 kg)        Body mass index is 22.68 kg/m². Height: 5' 2\" (157.5 cm)         Immunizations:    Influenza status:    [x]   Current   []   Patient declined    Pneumococcal status:  [x]   Current  []   Patient declined    Smoking Status:    [] Smoker - PPD:   [] Nonsmoker - Quit Date:               [x] Never a smoker      No chief complaint on file. Cancer Staging  No matching staging information was found for the patient. Prior Radiation Therapy? No, but did have radioactive iodine therapy   If yes, site treated:   Facility:                             Date:    Concurrent Chemo/radiation? No   If yes, start date:    Prior Chemotherapy? No   If yes    Facility:                             Date:    Prior Hormonal Therapy? No   If yes   Facility:                             Date:    Head and Neck Cancer? No   If yes, please remind physician to place swallow study order and speech therapy order. Pacemaker/Defibulator/ICD:  Yes             BREAST/GYN  Patient only:     LMP:    Age at first Menses:    Para:    :    Cup size:    Lymphedema Evaluation[de-identified]   [] left arm      [] right arm  Location:     Measurement (cm)    Upper Bicep :    Lower Bicep :           Current Outpatient Medications   Medication Sig Dispense Refill    HYDROcodone-acetaminophen (NORCO) 5-325 MG per tablet Take 1 tablet by mouth every 6 hours as needed for Pain for up to 30 days.  60 tablet 0    diphenhydrAMINE (BENADRYL) 25 MG capsule Take 1 capsule by mouth every 6 hours as needed for Itching 30 capsule 0    tamsulosin (FLOMAX) 0.4 MG capsule Take 1 capsule by mouth daily 90 capsule 3    finasteride (PROSCAR) 5 MG tablet TAKE 1 TABLET DAILY 90 tablet 3    furosemide (LASIX) 20 MG tablet Take 1 tablet by mouth daily 30 tablet 1    vitamin D (ERGOCALCIFEROL) 87136 units CAPS capsule Take 50,000 Units by mouth once a week      Multiple Vitamins-Minerals (THERAPEUTIC MULTIVITAMIN-MINERALS) tablet Take 1 tablet by mouth daily      calcium carbonate (TUMS) 500 MG chewable tablet Take 1 tablet by mouth 2 times daily as needed for Heartburn 30 tablet 0    glimepiride (AMARYL) 2 MG tablet Take 2 mg by mouth every morning (before breakfast)      levothyroxine (LEVOXYL) 137 MCG tablet Take 137 mcg by mouth Daily       nitroGLYCERIN (NITROSTAT) 0.4 MG SL tablet Place 0.4 mg under the tongue. As directed      docusate sodium (COLACE) 100 MG capsule Take 100 mg by mouth nightly       atorvastatin (LIPITOR) 20 MG tablet   Take 20 mg by mouth nightly       aspirin 81 MG EC tablet Take 81 mg by mouth daily        No current facility-administered medications for this encounter.         Past Medical History:   Diagnosis Date    Abnormal ECG 8/30/2019    Acquired hypothyroidism 9/19/2014    Acute cholecystitis 2/28/2018    Atherosclerosis of coronary artery bypass graft(s) without angina pectoris 10/4/2016    Atrioventricular block, complete ([facility] 0/81/7695    Biliary colic     Block, bundle branch, left 3/30/2010    Calculus of bile duct without cholecystitis and without obstruction     Calculus of gallbladder 9/19/2017    Cancer ([address] 2013    thyroid    Carotid artery stenosis     Cataract     Cerebrovascular disease     Cervical disc herniation 11/13/2014    Cervical radiculopathy, chronic 11/14/2014    Cervical spondylosis 11/13/2014    CHF (congestive heart failure) (HCC)     Chronic systolic CHF (congestive heart failure), NYHA class 2 ([address] 6/21/2018    Coronary arteriosclerosis in native artery 3/30/2010    Overview:  H/O Acute anteriorseptal MI  11/8/99 Severe 3 vessel CAD , LM coronary dissection  S/P CABG  1999 L-LAD, SVG Ramus, SVG OM, SVG L PDA Stress test  10/2007  EF 49% no reversible ischemia Predominantly fixed infeior apical septal defects Low normal to mildly reduced LV sys function with :VEF 49%  Septal wall motion abnormlaity Compared with prior study no new reveresible perfusion defects    COVID-19 virus infection 07/25/2020    Degeneration of cervical intervertebral disc 9/25/2012    Elevated LFTs 9/19/2017    Elevated liver enzymes 2/28/2018    H/O malignant neoplasm of thyroid 12/10/2013    Heart attack ([address]     Heart block AV second degree     Hypercholesteremia 3/30/2010    Hypertension     Hypothyroidism     Ischemic cardiomyopathy 8/30/2019    Metastatic cancer ([address] 9/19/2017    Mild aortic regurgitation 3/30/2010    Overview:  Mild moderate MR and AR  Overview:  Mild moderate MR and AR    Pneumonia due to COVID-19 virus     07/25/2020    Pulmonary nodule 6/21/2018    Pulmonary nodules/lesions, multiple 9/19/2017    S/P CABG x 3 8/30/2019    Sick sinus syndrome ([address] 8/30/2019    SOB (shortness of breath) 8/30/2019    Spinal stenosis in cervical region 11/6/2014    Type 2 diabetes mellitus ([address] 3/30/2010    Overview:  without complications    Type II or unspecified type diabetes mellitus without mention of complication, not stated as uncontrolled        Past Surgical History:   Procedure Laterality Date    APPENDECTOMY      CARDIAC SURGERY      CHOLECYSTECTOMY      CORONARY ARTERY BYPASS GRAFT      ENDOSCOPIC ULTRASOUND (LOWER)      PACEMAKER INSERTION  03/2015    [place] LAP,CHOLECYSTECTOMY N/A 3/2/2018    CHOLECYSTECTOMY LAPAROSCOPIC performed by Thony Doran DO at [address] [place] [address]      TOTAL THYROIDECTOMY         Family History   Problem Relation Age of Onset    Cancer Mother         stomach    Other Father         pneumonia       Social History     Socioeconomic History    Marital status:       Spouse name: Not on file    Number of children: Not on file    Years of education: Not on file    Highest education level: Not on file   Occupational History    Occupation: Retired     Employer: [address] resource strain: Not hard at all   Queenie-Simon insecurity     Worry: Never true     Inability: Never true   [employer] needs     Medical: Not on file     Non-medical: Not on file   Tobacco Use    Smoking status: Never Smoker    Smokeless tobacco: Never Used   Substance and Sexual Activity    Alcohol use: No    Drug use: No    Sexual activity: Not on file   Lifestyle    Physical activity     Days per week: Not on file     Minutes per session: Not on file    Stress: Not on file   Relationships    Social connections     Talks on phone: Not on file     Gets together: Not on file     Attends [employer]ism service: Not on file     Active member of club or organization: Not on file     Attends meetings of clubs or organizations: Not on file     Relationship status: Not on file    Intimate partner violence     Fear of current or ex partner: Not on file     Emotionally abused: Not on file     Physically abused: Not on file     Forced sexual activity: Not on file   Other Topics Concern    Not on file   Social History Narrative    Not on file             FALLS RISK SCREEN  Instructions:  Assess the patient and enter the appropriate indicators that are present for fall risk identification. Total the numbers entered and assign a fall risk score from Table 2.  Reassess patient at a minimum every 12 weeks or with status change. Assessment   Date  10/9/2020     1. Mental Ability: confusion/cognitively impaired 3     2. Elimination Issues: incontinence, frequency 3       3. Ambulatory: use of assistive devices (walker, cane, off-loading devices),        attached to equipment (IV pole, oxygen) 2     4. Sensory Limitations: dizziness, vertigo, impaired vision 3     5. Age less than 65        0     6. Age 72 or greater 0     7.   Medication: diuretics, strong analgesics, hypnotics, sedatives,        antihypertensive agents 3   8. Falls:  recent history of falls within the last 3 months (not to include slipping or        tripping) 7   TOTAL 21  If score of 4 or greater was education given? Yes       TABLE 2   Risk Score Risk Level Plan of Care   0-3 Little or  No Risk 1. Provide assistance as indicated for ambulation activities  2. Reorient confused/cognitively impaired patient  3. Call-light/bell within patient's reach  4. Chair/bed in low position, stretcher/bed with siderails up except when performing patient care activities  5. Educate patient/family/caregiver on falls prevention  6.  Reassess in 12 weeks or with any noted change in patient condition which places them at a risk for a fall   4-6 Moderate Risk 1. Provide assistance as indicated for ambulation activities  2. Reorient confused/cognitively impaired patient  3. Call-light/bell within patient's reach  4. Chair/bed in low position, stretcher/bed with siderails up except when performing patient care activities  5. Educate patient/family/caregiver on falls prevention     7 or   Higher High Risk 1. Place patient in easily observable treatment room  2. Patient attended at all times by family member or staff  3. Provide assistance as indicated for ambulation activities  4. Reorient confused/cognitively impaired patient  5. Call-light/bell within patient's reach  6. Chair/bed in low position, stretcher/bed with siderails up except when performing patient care activities  7. Educate patient/family/caregiver on falls prevention             Assessment/Plan: Patient was seen today for consultation. Pt has a history of Thyroid cancer with previous radioactive iodine treatment. Pt has mets to spine with pain. Here to discuss RT to spine. Pt has a pacemaker. Called [address] [place] [address] radiology to obtain pacer information as pt did not have card with him. Information given to physics.   Dr. Royal Peck updated and examined pt. Per MD pt signed informed consent and was given a time for simulation on 10/12/20. Per Dr. Babak Boo Decadron 8mg orally once today called to pt's [facility].          Sena Breen 10/9/2020 8:50 AM

## 2020-10-12 NOTE — DISCHARGE INSTR - COC
Continuity of Care Form    Patient Name: Julisa Fermin   :  1928  MRN:  [id number]    Admit date:  10/12/2020  Discharge date:  ***    Code Status Order: Prior   Advance Directives:     Admitting Physician:  No admitting provider for patient encounter. PCP: Jacqueline Simon MD    Discharging Nurse: [facility] Unit/Room#: No information available for this encounter. Discharging Unit Phone Number: ***    Emergency Contact:   Extended Emergency Contact Information  Primary Emergency Contact: [place] Lazara of [address] [place] [address] Phone: [phone]  Work Phone: [phone]  Mobile Phone: [phone]  Relation: Child   needed? No  Secondary Emergency Contact: Landy Alva  Address: [address] [place] [address] Phone: [phone]  Work Phone: [phone]  Mobile Phone: [phone]  Relation: Child   needed?  No    Past Surgical History:  Past Surgical History:   Procedure Laterality Date    APPENDECTOMY      CARDIAC SURGERY      CHOLECYSTECTOMY      CORONARY ARTERY BYPASS GRAFT      ENDOSCOPIC ULTRASOUND (LOWER)      PACEMAKER INSERTION  2015    [place] LAP,CHOLECYSTECTOMY N/A 3/2/2018    CHOLECYSTECTOMY LAPAROSCOPIC performed by Yaniv Yoon DO at [address]         Immunization History:   Immunization History   Administered Date(s) Administered    Influenza A (X4Q4-52) Vaccine IM 2010    Influenza A (R4K7-11) Vaccine PF IM 01/15/2010    Influenza Virus Vaccine 10/01/2013, 2015    Influenza Whole 09/15/2011    Influenza, Quadv, IM, (6 mo and older Fluzone, Flulaval, Fluarix and 3 yrs and older Afluria) 10/04/2016    Influenza, Quadv, IM, PF (6 mo and older Fluzone, Flulaval, Fluarix, and 3 yrs and older Afluria) 2017    Influenza, Quadv, adjuvanted, 65 yrs +, IM, PF (Fluad) 2020    Influenza, Triv, inactivated, subunit, adjuvanted, IM (Fluad 65 yrs and older) 12/21/2019    Pneumococcal Conjugate 13-valent ([id number]) 04/30/2019    Pneumococcal Polysaccharide ([id number]) 09/15/2011, 11/25/2012, 11/09/2015, 05/09/2017    Tdap (Boostrix, Adacel) 01/28/2018       Active Problems:  Patient Active Problem List   Diagnosis Code    Degeneration of cervical intervertebral disc M50.30    Acquired hypothyroidism E03.9    Spinal stenosis in cervical region M48.02    Cervical spondylosis M47.812    Cervical disc herniation M50.20    Cervical radiculopathy, chronic M54.12    Hypercholesteremia E78.00    Heart block AV second degree I44.1    H/O malignant neoplasm of thyroid Z85.850    Mild aortic regurgitation I35.1    Type 2 diabetes mellitus (HCC) E11.9    Atherosclerosis of coronary artery bypass graft(s) without angina pectoris I25.810    Pulmonary nodules/lesions, multiple R91.8    Calculus of gallbladder K80.20    Elevated LFTs R79.89    Metastatic cancer (HCC) C79.9    Pulmonary nodule R91.1    Calculus of bile duct without cholecystitis and without obstruction K80.50    Acute cholecystitis K81.0    Elevated liver enzymes K60.4    Biliary colic X40.91    Abnormal ECG R94.31    Atrioventricular block, complete (HCC) I44.2    Block, bundle branch, left I44.7    Coronary arteriosclerosis in native artery I25.10    Chronic systolic CHF (congestive heart failure), NYHA class 2 (HCC) I50.22    Ischemic cardiomyopathy I25.5    S/P CABG x 3 Z95.1    Sick sinus syndrome (HCC) I49.5    SOB (shortness of breath) R06.02    Pneumonia due to COVID-19 virus U07.1, J12.89    Presence of cardiac pacemaker Z95.0    Chest pain R07.9    Chronic back pain M54.9, G89.29    Papillary thyroid carcinoma (HCC) C73    Bone metastases (HCC) C79.51       Isolation/Infection:   Isolation          No Isolation        Patient Infection Status     Infection Onset Added Last Indicated Last Indicated By Review Planned Expiration Resolved Resolved By    MDRO (multi-drug resistant organism)  04/30/18 04/30/18 Kulwinder Almonte RN        5/2018 proteus    VRE  04/02/18 04/02/18 Kulwinder Almonte RN        03/29/2018 Urine    Resolved    COVID-19 Rule Out 10/05/20 10/06/20 10/05/20 Covid-19 Ambulatory (Ordered)   10/07/20 Rule-Out Test Resulted    COVID-19 08/29/20 08/29/20 08/29/20 COVID-19   08/30/20 Sara Sommer RN    Positive on 7/25/2020, per CDC and Dr. Ajith Pal no need for isolation     COVID-19 Rule Out 08/28/20 08/28/20 08/29/20 COVID-19 (Ordered)   08/29/20 Rule-Out Test Resulted          Nurse Assessment:  Last Vital Signs: There were no vitals taken for this visit. Last documented pain score (0-10 scale):    Last Weight:   Wt Readings from Last 1 Encounters:   10/09/20 124 lb (56.2 kg)     Mental Status:  {IP PT MENTAL STATUS:08990}    IV Access:  { SHARYN IV ACCESS:416961692}    Nursing Mobility/ADLs:  Walking   {CHP DME DWVE:234526415}  Transfer  {CHP DME GJWJ:567849086}  Bathing  {CHP DME FUIS:847386727}  Dressing  {CHP DME UPUW:832863110}  Toileting  {CHP DME TMWH:576593501}  Feeding  {CHP DME KGNO:018834121}  Med Admin  {CHP DME VMLE:699907303}  Med Delivery   { SHARYN MED Delivery:666701695}    Wound Care Documentation and Therapy:  Wound 03/29/18 Blister Foot Left;Plantar #2 left distal foot/noticed this weekend (Active)   Number of days: 927        Elimination:  Continence:   · Bowel: {YES / ED:30835}  · Bladder: {YES / RB:24944}  Urinary Catheter: {Urinary Catheter:899665011}   Colostomy/Ileostomy/Ileal Conduit: {YES / IB:67673}       Date of Last BM: ***  No intake or output data in the 24 hours ending 10/12/20 0817  No intake/output data recorded.     Safety Concerns:     [employer] Safety Concerns:397337050}    Impairments/Disabilities:      [employer] Impairments/Disabilities:611536856}    Nutrition Therapy:  Current Nutrition Therapy:   [employer] Diet List:547018329}    Routes of Feeding: {CHP DME Other Feedings:824420447}  Liquids: {Slp liquid thickness:25159}  Daily Fluid Restriction: {CHP DME Yes amt example:980042688}  Last Modified Barium Swallow with Video (Video Swallowing Test): {Done Not Done EICQ:260475655}    Treatments at the Time of Hospital Discharge:   Respiratory Treatments: ***  Oxygen Therapy:  {Therapy; copd oxygen:21758}  Ventilator:    {MH CC Vent KJQT:556740362}    Rehab Therapies: {THERAPEUTIC INTERVENTION:5137245298}  Weight Bearing Status/Restrictions: { CC Weight Bearin}  Other Medical Equipment (for information only, NOT a DME order):  {EQUIPMENT:998659097}  Other Treatments: ***    Patient's personal belongings (please select all that are sent with patient):  {CHP DME Belongings:037695748}    RN SIGNATURE:  {Esignature:118849833}    CASE MANAGEMENT/SOCIAL WORK SECTION    Inpatient Status Date: ***    Readmission Risk Assessment Score:  Readmission Risk              Risk of Unplanned Readmission:        0           Discharging to Facility/ Agency   · Name:   · Address:  · Phone:  · Fax:    Dialysis Facility (if applicable)   · Name:  · Address:  · Dialysis Schedule:  · Phone:  · Fax:    / signature: {Esignature:417557777}    PHYSICIAN SECTION    Prognosis: {Prognosis:0700292366}    Condition at Discharge: [facility] Patient Condition:327935550}    Rehab Potential (if transferring to Rehab): {Prognosis:0103507384}    Recommended Labs or Other Treatments After Discharge: ***    Physician Certification: I certify the above information and transfer of Carmen Roldan  is necessary for the continuing treatment of the diagnosis listed and that he requires {Admit to Appropriate Level of Care:38414} for {GREATER/LESS:637973268} 30 days.      Update Admission H&P: {CHP DME Changes in BSTW}    PHYSICIAN SIGNATURE:  {Esignature:751855938}

## 2020-10-12 NOTE — PROGRESS NOTES
Pt here today for teach and simulation scan. Radiation and You information provided along with additional education regarding radiation therapy and what to expect. Pt verbalizes understanding and all questions answered to the best of my knowledge. Samples of aquaphor and community resource information provided. Pt escorted to CT room without any difficulty. Radiation Therapy Education    · A Simulation Scan is like having a CT scan. Your physician will use the images obtained to develop your radiation treatment. · May take 30 minutes to 1 hour to complete  · Marking will be applied to your skin to help insure accurate positioning for your future treatments. · A mold or a mask may also be needed to help aid in your positioning    · Schedule: You will have treatments Monday - Friday  · Treatments should take about 15 minutes from the time you enter the treatment room  · You will have the same appointment time each day  · You will see your doctor and nurse one day weekly while you are undergoing treatments. · Every effort will be make to start your treatment at the scheduled time. However, there may be occasional delays due to emergency patients, technical problems, or other difficulties. · Side Effects: Radiation is a local treatment so any side effects you may experience will be in your treatment area only. · If you experience side effects they will typically occur after the 2nd or 3rd week of treatments. · Many patients do not experience severe side effects and are able to continue working during their treatments.  If you feel you treatments are interfering with your job, please notify your nurse        Skin Care During Radiation Treatments          Start applying moisturizers to the skin two times a day when you start your radiation        treatments  · Suggested moisturizers include: Aquaphor, Eucerin, Exclair, Borion gel (may be purchased at [facility]) or Norva Gowers (to order call [phone] or go to www.[employer])  · Do not apply anything to your skin in your treatment area that is not recommended by your radiation nurse and/or doctor  Good general hygiene/bathing should be performed daily  · Use moisturizing soaps that do not contain perfume or fragrances. Recommended soaps include Dove or Dial  · Use warm water, not hot water when bathing  · After bathing, pat the skin dry rather than rubbing it, especially at the treatment site  · Do not shave the treatment area. If you must shave, such as a beard, use an electric shaver. Don't use pre/after shave creams on treatment area. Avoid friction on and around your treatment area  · Do not use tape, bandages, or medicated patches in the treatment area  · Avoid tight fitting clothing  · No massaging or scratching    Avoid extremes of temperature on the treatment area such as heating pads, ice packs, hot tubs, or saunas    If the area being treated is exposed to the sun, apply sunscreen routinely to the treatment site whenever you are outdoors. A sunscreen with a minimum of SFF30 should be used. Since the area being treated will always be more sensitive than the rest of your skin, continue to protect the area from sun exposure after your treatment ends    If your armpit is in the treatment area, do not use antiperspirants. An aluminum-free, non-metallic deodorant may be used. Tell your nurse or doctor if you have any of the following symptoms:  · Blistered, open, swollen or tender areas of the skin in and around the treatment area  · Pain or itching that is not helped by prescribed medications or recommended ointments      Questions and Answers about Radiation Therapy  1. What is radiation therapy? Radiation therapy (also called radiotherapy) is a cancer treatment that uses high  doses of radiation to kill cancer cells and shrink tumors.  At low doses, radiation  is used as an x-ray to see inside your body and take pictures, such as x-rays of  your teeth or broken bones. 2.  How is radiation therapy given? Radiation therapy can be external beam or internal. External beam involves a  machine outside your body that aims radiaition of cancer cells. Internal radiation  therapy involves placing radiation inside your body, in or near the cancer. Sometimes ppeople get both forms of radiation therapy. To learn more about  external beam radiation therapy. 3.  What does radiation therapy do to cancer cells? Given in high doses, radiation kills or slows the growth of cancer cells. Radiaton  therapy is used to:  · Treat Cancer. Radiation can be used to cure cancer, to prevent it from returning, or to stop or slow its growth  · Reduce symptoms. When a cure is not possible, radiation may be used to treat pain and other problems caused by the cancer tumor. Or, it can prevent problems that may be caused by a growing tumor, such as blindness or loss of bowel and bladder control. 4.  How long does radiation therapy take to work? Radiation therapy does not kill cancer cells right away. It takes days or weeks of  treatments before cancer cells start to die. Then, cancer cells keep dying for  weeks or months after radiation therapy ends. 5.  What does radiation therapy do to healthy cells? Radiation not only dills or slows the growth of cancer cells, it can also affect  nearby healthy cells. The healthy cells almost always recover after treatment is  over. But sometimes people may have side effects that are severe or do not get  better. Other side effects may how up months or years after radiation therapy is  over. These are called late side effects. Doctors try to protect healthy cells during treatment by:  · Using as low a does of radiation as possible. The radiation dose is balanced between being high enough to kill cancer cells, yet low enough to limit damage to healthy cells. · Spreading out treatment over time.  You may get radiation therapy · Urinary and bladder changes    Most of these side effects go away within 2 months after you have finished radiation therapy. Late side effects may occur 6 or more months after radiation therapy is over. They vary by the part of your body that was treated and the dose of radiation you received. Late side effects may include infertility, joint problems, lymphedema, mouth problems, and rarely, second primary cancers. Everyone is different, so talk with your doctor or nurse about whether you might have late side effects and what signs to look for.      [employer]

## 2020-10-12 NOTE — TELEPHONE ENCOUNTER
Vickyguerrero Cline stated patient's plan is ready and ready to start 21  tomorrow 10-13-20. I informed Chilo Romano we need cardiologist approval and pacemaker rep. We called Dr. Fatuma Walter over to discuss case and he states to bypass the cardiologist and pacemaker rep due to the severity of the case. Chandni Setting in physics informed.

## 2020-10-13 NOTE — PROGRESS NOTES
SW reviewed patient's psychosocial distress screen and he has no needs at this time. Patient has [facility] in place. His insurance is [employer] as he is a [employer] retiree. SW following as needed. Umesh Colindres.  Kiera Chance

## 2020-10-13 NOTE — PROGRESS NOTES
Nutrition Brief: Wt loss trigger    Wt Readings from Last 5 Encounters:   10/13/20 117 lb (53.1 kg)   10/09/20 124 lb (56.2 kg)   10/07/20 119 lb 6.4 oz (54.2 kg)   09/23/20 121 lb 9.6 oz (55.2 kg)   09/21/20 124 lb (56.2 kg)   Height: 5' 2\" (157.5 cm)  Body mass index is 21.4 kg/m². Evaluation:  Significant +wt loss of 3.1 kg (5.5%) x past week. This may be r/t suboptimal energy intakes due to pain/difficulty with swallowing. Pt has a history of Thyroid cancer with previous radioactive iodine treatment. Pt has mets to spine with pain. Caregiver in room at time of visit, patient eats mashed potatoes, applesauce and nutrition supplements (thicker ones like Ensure are best tolerated). They were hoping to have insurance help with nutrition supplement coverage if possible. Esitmated daily requirements:  Calorie: 1,593- 1859 kcal (30-35 kcal/kg)   Protein: 64-80 g (1.2-1.5 g/kg)    Nutrition Diagnosis:  Inadequate oral intake related to difficulty swallowing and throat pain as evidenced by weight loss and patient and caregiver's report of difficulty swallowing. Recommendations:  1. Will refer patient to [facility] to check for insurance benefits and if covered will have script sent over to sign - suggest Ensure Plus (strawberry) three times daily to provide 1,050 kcal and 48 grams protein    2. Patients family/caregivers to try making smoothies with a moderately thick consistency for patient to drink daily - suggested using nutrition supplements as fluid, along with yogurt, bananas, and other ingredients that would not get stuck in throat.    3. Will continue to monitor weight change      Geovanna Roberts RDN, ABHISHEKN  RD Office Phone: [phone]

## 2020-10-13 NOTE — PROGRESS NOTES
GI CLINIC FOLLOW UP    INTERVAL HISTORY:   No referring provider defined for this encounter. Chief Complaint   Patient presents with    Dysphagia     Swallow study done. Going through radiation frim Thyroid cancer. He says he is not feeling good at all and hard to talk. HISTORY OF PRESENT ILLNESS: Remigio Ingram is a 80 y.o. male dysphagia. He had swallow eval.  He was recommended dietary changes. He has been benefiting from speech therapy. Family is requesting putting new consult for more speech therapy. He has not been able to gain weight. Past Medical,Family, and Social History reviewed and does not contribute to the patient presentingcondition. Patient's PMH/PSH,SH,PSYCH Hx, MEDs, ALLERGIES, and ROS were all reviewed and updated in the appropriate sections.     PAST MEDICAL HISTORY:  Past Medical History:   Diagnosis Date    Abnormal ECG 8/30/2019    Acquired hypothyroidism 9/19/2014    Acute cholecystitis 2/28/2018    Atherosclerosis of coronary artery bypass graft(s) without angina pectoris 10/4/2016    Atrioventricular block, complete ([address] 0/45/1918    Biliary colic     Block, bundle branch, left 3/30/2010    Calculus of bile duct without cholecystitis and without obstruction     Calculus of gallbladder 9/19/2017    Cancer ([address] 2013    thyroid    Carotid artery stenosis     Cataract     Cerebrovascular disease     Cervical disc herniation 11/13/2014    Cervical radiculopathy, chronic 11/14/2014    Cervical spondylosis 11/13/2014    CHF (congestive heart failure) (HCC)     Chronic systolic CHF (congestive heart failure), NYHA class 2 ([address] 6/21/2018    Coronary arteriosclerosis in native artery 3/30/2010    Overview:  H/O Acute anteriorseptal MI  11/8/99 Severe 3 vessel CAD , LM coronary dissection  S/P CABG  1999 L-LAD, SVG Ramus, SVG OM, SVG L PDA Stress test  10/2007  EF 49% no reversible ischemia Predominantly fixed infeior apical septal defects Low normal to mildly reduced LV sys function with :VEF 49%  Septal wall motion abnormlaity Compared with prior study no new reveresible perfusion defects    COVID-19 virus infection 07/25/2020    Degeneration of cervical intervertebral disc 9/25/2012    Elevated LFTs 9/19/2017    Elevated liver enzymes 2/28/2018    H/O malignant neoplasm of thyroid 12/10/2013    Heart [facility]     Heart block AV second degree     Hypercholesteremia 3/30/2010    Hypertension     Hypothyroidism     Ischemic cardiomyopathy 8/30/2019    Metastatic cancer ([address] 9/19/2017    Mild aortic regurgitation 3/30/2010    Overview:  Mild moderate MR and AR  Overview:  Mild moderate MR and AR    Pneumonia due to COVID-19 virus     07/25/2020    Pulmonary nodule 6/21/2018    Pulmonary nodules/lesions, multiple 9/19/2017    S/P CABG x 3 8/30/2019    Sick sinus syndrome ([address] 8/30/2019    SOB (shortness of breath) 8/30/2019    Spinal stenosis in cervical region 11/6/2014    Type 2 diabetes mellitus ([address] 3/30/2010    Overview:  without complications    Type II or unspecified type diabetes mellitus without mention of complication, not stated as uncontrolled        Past Surgical History:   Procedure Laterality Date    APPENDECTOMY      CARDIAC SURGERY      CHOLECYSTECTOMY      CORONARY ARTERY BYPASS GRAFT      ENDOSCOPIC ULTRASOUND (LOWER)      PACEMAKER INSERTION  03/2015    CA LAP,CHOLECYSTECTOMY N/A 3/2/2018    CHOLECYSTECTOMY LAPAROSCOPIC performed by Loren Oseguera DO at [address] [place] [address]         CURRENT MEDICATIONS:    Current Outpatient Medications:     dexamethasone (DECADRON) 4 MG tablet, Take 8 mg by mouth once 8mg orally single dose today. , Disp: , Rfl:     HYDROcodone-acetaminophen (NORCO) 5-325 MG per tablet, Take 1 tablet by mouth every 6 hours as needed for Pain for up to 30 days. , Disp: 60 tablet, Rfl: 0    diphenhydrAMINE (BENADRYL) 25 MG capsule, Take 1 capsule by mouth every 6 hours as needed for Itching, Disp: 30 capsule, Rfl: 0    tamsulosin (FLOMAX) 0.4 MG capsule, Take 1 capsule by mouth daily, Disp: 90 capsule, Rfl: 3    finasteride (PROSCAR) 5 MG tablet, TAKE 1 TABLET DAILY, Disp: 90 tablet, Rfl: 3    furosemide (LASIX) 20 MG tablet, Take 1 tablet by mouth daily, Disp: 30 tablet, Rfl: 1    vitamin D (ERGOCALCIFEROL) 35134 units CAPS capsule, Take 50,000 Units by mouth once a week, Disp: , Rfl:     Multiple Vitamins-Minerals (THERAPEUTIC MULTIVITAMIN-MINERALS) tablet, Take 1 tablet by mouth daily, Disp: , Rfl:     calcium carbonate (TUMS) 500 MG chewable tablet, Take 1 tablet by mouth 2 times daily as needed for Heartburn, Disp: 30 tablet, Rfl: 0    glimepiride (AMARYL) 2 MG tablet, Take 2 mg by mouth every morning (before breakfast), Disp: , Rfl:     levothyroxine (LEVOXYL) 137 MCG tablet, Take 137 mcg by mouth Daily , Disp: , Rfl:     nitroGLYCERIN (NITROSTAT) 0.4 MG SL tablet, Place 0.4 mg under the tongue. As directed, Disp: , Rfl:     docusate sodium (COLACE) 100 MG capsule, Take 100 mg by mouth nightly , Disp: , Rfl:     atorvastatin (LIPITOR) 20 MG tablet,  Take 20 mg by mouth nightly , Disp: , Rfl:     aspirin 81 MG EC tablet, Take 81 mg by mouth daily , Disp: , Rfl:     ALLERGIES:   Allergies   Allergen Reactions    Seasonal      Other reaction(s): Other: See Comments  seasonal upper respiratory irritation    Keflex [Cephalexin] Nausea And Vomiting       FAMILY HISTORY:       Problem Relation Age of Onset    Cancer Mother         stomach    Other Father         pneumonia         SOCIAL HISTORY:   Social History     Socioeconomic History    Marital status:       Spouse name: Not on file    Number of children: Not on file    Years of education: Not on file    Highest education level: Not on file   Occupational History    Occupation: Retired     Employer: [address] resource strain: Not hard at all   [facility] Food insecurity     Worry: Never true     Inability: Never true    Transportation needs     Medical: Not on file     Non-medical: Not on file   Tobacco Use    Smoking status: Never Smoker    Smokeless tobacco: Never Used   Substance and Sexual Activity    Alcohol use: No    Drug use: No    Sexual activity: Not on file   Lifestyle    Physical activity     Days per week: Not on file     Minutes per session: Not on file    Stress: Not on file   Relationships    Social connections     Talks on phone: Not on file     Gets together: Not on file     Attends Yazidi service: Not on file     Active member of club or organization: Not on file     Attends meetings of clubs or organizations: Not on file     Relationship status: Not on file    Intimate partner violence     Fear of current or ex partner: Not on file     Emotionally abused: Not on file     Physically abused: Not on file     Forced sexual activity: Not on file   Other Topics Concern    Not on file   Social History Narrative    Not on file       REVIEW OF SYSTEMS: A 12-point review of systemswas obtained and pertinent positives and negatives were enumerated above in the history of present illness. All other reviewed systems / symptoms were negative. Review of Systems   Constitutional: Positive for fatigue. Negative for appetite change and unexpected weight change. HENT: Positive for dental problem, trouble swallowing and voice change. Negative for postnasal drip, sinus pressure and sore throat. Eyes: Positive for visual disturbance. Respiratory: Positive for cough and choking. Negative for wheezing. Cardiovascular: Negative. Negative for chest pain, palpitations and leg swelling. Gastrointestinal: Negative. Negative for abdominal distention, abdominal pain, anal bleeding, blood in stool, constipation, diarrhea, nausea, rectal pain and vomiting. Endocrine: Negative. Genitourinary: Negative. Negative for difficulty urinating. Musculoskeletal: Positive for arthralgias and back pain. Negative for gait problem and myalgias. Skin: Negative. Allergic/Immunologic: Negative. Negative for environmental allergies and food allergies. Neurological: Negative. Negative for dizziness, weakness, light-headedness, numbness and headaches. Hematological: Negative. Does not bruise/bleed easily. Psychiatric/Behavioral: Negative. Negative for sleep disturbance. The patient is not nervous/anxious. LABORATORY DATA: Reviewed  Lab Results   Component Value Date    WBC 8.0 09/17/2020    HGB 13.2 (L) 09/17/2020    HCT 39.5 (L) 09/17/2020    MCV 82.0 09/17/2020     09/17/2020     09/17/2020    K 4.1 09/17/2020    CL 97 (L) 09/17/2020    CO2 29 09/17/2020    BUN 22 10/12/2020    CREATININE 0.72 10/12/2020    LABPROT 7.1 12/13/2012    LABALBU 2.9 (L) 09/11/2020    BILITOT 0.27 (L) 09/11/2020    ALKPHOS 99 09/11/2020    AST 22 09/11/2020    ALT 17 09/11/2020    INR 1.1 05/12/2020         Lab Results   Component Value Date    RBC 4.82 09/17/2020    HGB 13.2 (L) 09/17/2020    MCV 82.0 09/17/2020    MCH 27.4 09/17/2020    MCHC 33.4 09/17/2020    RDW 17.3 (H) 09/17/2020    MPV 6.8 09/17/2020    BASOPCT 1 09/17/2020    LYMPHSABS 1.10 09/17/2020    MONOSABS 0.70 09/17/2020    NEUTROABS 6.00 09/17/2020    EOSABS 0.20 09/17/2020    BASOSABS 0.10 09/17/2020         DIAGNOSTIC TESTING:     Ct Thoracic Spine W Wo Contrast    Result Date: 10/12/2020  EXAMINATION: CT OF THE THORACIC SPINE WITH AND WITHOUT CONTRAST  10/12/2020 3:42 pm: TECHNIQUE: CT of the thoracic spine was performed with and without the administration of intravenous contrast.  Multiplanar reformatted images are provided for review. Dose modulation, iterative reconstruction, and/or weight based adjustment of the mA/kV was utilized to reduce the radiation dose to as low as reasonably achievable. COMPARISON: 10/18/2019.  HISTORY: ORDERING SYSTEM PROVIDED HISTORY: Papillary thyroid carcinoma [facility]) TECHNOLOGIST PROVIDED HISTORY: T6 fracture Reason for Exam: follow upf papillary thyroid carcinoma Acuity: Unknown Type of Exam: Unknown FINDINGS: BONES/ALIGNMENT: Destructive osseous lesion in the left T6 vertebral body extends to involve the transverse process and posterior elements. There is some peripheral enhancement. This also involves the left epidural space. No pathologic fracture is seen at this time. There is ill-defined sclerosis in the L1 vertebral body. Thoracic vertebral body heights are maintained. Minimal kyphosis. No listhesis. DEGENERATIVE CHANGES: No gross spinal canal stenosis or bony neural foraminal narrowing of the thoracic spine. SOFT TISSUES: Innumerable pulmonary nodules, consistent with metastatic disease. These are better characterized on recent CT chest.  Centrally necrotic left lower cervical lymphadenopathy. Mildly enlarged mediastinal lymph nodes. Ill-defined hepatic lesions, more conspicuous than on prior CT, which may be due to contrast timing. Partially visualized centrally necrotic lesion adjacent to the inferior right hepatic lobe. .  There is a 5.0 x 2.7 cm heterogeneously enhancing mass in the tail of the pancreas. Hypoattenuating bilateral renal lesions, likely cysts. 1. Destructive osseous lesion at T6 is again noted, consistent with osseous metastatic disease. There is some invasion of the left epidural space. No pathologic fracture visualized at this time. 2. Innumerable pulmonary nodules, ill-defined hypoattenuating hepatic lesions, and pancreatic tail lesion are concerning for metastases. 3. Ill-defined sclerosis in the L1 vertebral body is indeterminate. This could represent an atypical hemangioma, but additional metastasis cannot be excluded. MRI may be useful for further characterization.      Xr Chest Portable    Result Date: 9/17/2020  EXAMINATION: ONE XRAY VIEW OF THE CHEST 9/17/2020 3:14 pm COMPARISON: August 29, 2020 HISTORY: ORDERING SYSTEM PROVIDED HISTORY: cough TECHNOLOGIST PROVIDED HISTORY: cough Reason for Exam: cough Acuity: Acute Type of Exam: Initial FINDINGS: Bipolar pacer on the left unchanged. Sternotomy wires and mediastinal clips noted. Moderate interstitial edema. Heart and mediastinum normal.  Bony thorax intact. Metallic anchor right humeral head. Mild edema or pneumonitis unchanged     Ct Chest Pulmonary Embolism W Contrast    Result Date: 9/18/2020  EXAMINATION: CTA OF THE CHEST, 9/17/2020 3:17 pm TECHNIQUE: CTA of the chest was performed after the administration of intravenous contrast.  Multiplanar reformatted images are provided for review. MIP images are provided for review. Dose modulation, iterative reconstruction, and/or weight based adjustment of the mA/kV was utilized to reduce the radiation dose to as low as reasonably achievable. COMPARISON: 08/29/2020, abdomen and pelvic CT 10/07/2019 HISTORY: ORDERING SYSTEM PROVIDED HISTORY: Pain TECHNOLOGIST PROVIDED HISTORY: Pain Reason for Exam: Pt states left sided chest back pain. Acuity: Acute Type of Exam: Initial FINDINGS: Pulmonary Arteries: Pulmonary arteries are adequately opacified for evaluation. No evidence of intraluminal filling defect to suggest pulmonary embolism. Main pulmonary artery is normal in caliber. Mediastinum: Similar scattered nonenlarged and mildly enlarged mediastinal lymph nodes. Enlarged subcarinal node contains a punctate calcification. Sternal wires from prior heart surgery. Stable heart size with a pacemaker in place. Mild calcific plaque of the thoracic aorta. Lungs/pleura: Innumerable bilateral lung nodules compatible with metastatic disease. No new focal lung consolidation. There is mild dependent atelectasis and similar patchy ground-glass and parenchymal opacities in the lower lobes, left greater than right. Unchanged focal subpleural parenchymal density right upper lobe posterolaterally.   Tiny calcified pleural plaque on the right. Tiny subpleural cysts and mild fibrotic changes in both lower lobes. Upper Abdomen: Limited images of the upper abdomen show no acute findings. Bilateral renal cysts and an approximately 15 mm hyperdense lesion left kidney. .  Calcified granulomas in the liver and spleen. Indeterminate pancreatic tail mass measuring up to 4.5 x 3 cm. Previous cholecystectomy. A couple of scattered small hypodensities in the liver are stable. Increase in size of heterogeneous mixed solid and cystic soft tissue mass by the inferior edge of the right lobe of the liver and abdominal/chest wall. Stable small left adrenal nodule. Soft Tissues/Bones: The bones are osteopenic with degenerative changes of the spine and shoulders. There are old rib fractures. Destructive lytic lesion involving the left posterior aspect of the T6 vertebral body extending posteriorly through the pedicle to the left transverse process, left facet joint T6-T7, posterior elements and spinous process and costovertebral junction with involvement of the epidural space. This is similar to perhaps minimally increased since the prior study. Metallic anchor right humeral head. No CT evidence of acute pulmonary embolism. Extensive metastatic disease including a destructive lesion involving the left half of the T6 vertebral body extending to the posterior elements and epidural space. Fl Modified Barium Swallow W Video    Result Date: 10/9/2020  EXAMINATION: MODIFIED BARIUM SWALLOW WAS PERFORMED IN CONJUNCTION WITH [facility] TECHNIQUE: Fluoroscopic evaluation of the swallowing mechanism was performed with multiple consistency of barium product.  FLUOROSCOPY DOSE AND TYPE OR TIME AND EXPOSURES: DAP [id number] AIR KERMA 30.87mGy 3 minutes and 57 seconds COMPARISON: None HISTORY: Reason for Exam: dysphagia, evaluate for aspiration FINDINGS: Premature vallecular spillage and moderate vallecular/piriform stasis with all [facility]  O: #[phone]

## 2020-10-13 NOTE — PROGRESS NOTES
Debbie Fragoso  10/13/2020  Wt Readings from Last 3 Encounters:   10/13/20 117 lb (53.1 kg)   10/09/20 124 lb (56.2 kg)   10/07/20 119 lb 6.4 oz (54.2 kg)     Body mass index is 21.4 kg/m². Pt here for OTV. First treatment. Dr. Pearl Hayes to eval.    Treatment Area:spine    Patient was seen today for weekly visit. Comfort Alteration  Fatigue: Severe      Nutritional Alteration  Anorexia: No   Nausea: No   Vomiting: No       Elimination Alterations  Constipation: sometimes  Diarrhea:  no    Skin Alteration   Sensation:none    Radiation Dermatitis:  Intact [x]     Erythema  []     Discoloration  []     Rash []     Dry desquamation  []     Moist desquamation []       Emotional  Coping: effective      Injury, potential bleeding or infection: none    Lab Results   Component Value Date    WBC 8.0 09/17/2020     09/17/2020         /60   Pulse 79   Temp 97.2 °F (36.2 °C) (Oral)   Resp 16   Wt 117 lb (53.1 kg)   SpO2 97%   BMI 21.40 kg/m²   Patient Currently in Pain: Yes  Pain Assessment: 0-10  Pain Level: 10         Assessment/Plan: Patient was seen today for weekly visit.       Saray Rod

## 2020-10-13 NOTE — PROGRESS NOTES
Patient: Tosin Velazquez     : 1928      Date of Service: 10/13/2020    [facility]  On Treatment Visit (OTV) Note    Diagnosis: Cancer Staging  No matching staging information was found for the patient. Dose Accrued: He completed 400 out of 2000 cGy, 1 out of 5 treatment    S: No new symptoms. O: /60   Pulse 79   Temp 97.2 °F (36.2 °C) (Oral)   Resp 16   Wt 117 lb (53.1 kg)   SpO2 97%   BMI 21.40 kg/m² . Well-appearing, in no apparent distress, alert and fully oriented, answers questions appropriately. No signs of acute radiation-induced toxicity on physical exam.    IGRT: Set-up images acquired to ensure daily treatment accuracy and precision were reviewed by the physician. A&P: Continue radiotherapy as planned. Moisturize treated area as instructed. We reviewed reasonably anticipated side effects in the upcoming weeks, as well as expected trajectory of recovery. Patient verbalized a good understanding to everything.     Electronically signed by Rosi Luciano MD on 10/13/2020 at 5:17 PM

## 2020-10-14 NOTE — TELEPHONE ENCOUNTER
The patient is requesting a script for Miralax since he has been constipated. [facility] to [facility]. Please advise.

## 2020-10-15 NOTE — PATIENT INSTRUCTIONS
not in retention     On dual therapy     Will try Myrbetriq to see if this improves his s/s.      Timed urination every 2 hours throughout the day.      Follow up one month

## 2020-10-15 NOTE — PROGRESS NOTES
Review of Systems   Unable to perform ROS: Mental status change   Constitutional: Negative for appetite change, chills and fever. Eyes: Negative for pain, redness and visual disturbance. Respiratory: Negative for cough, shortness of breath and wheezing. Cardiovascular: Negative for chest pain and leg swelling. Gastrointestinal: Negative for abdominal pain, constipation, diarrhea, nausea and vomiting. Genitourinary: Positive for frequency. Negative for difficulty urinating, dysuria, flank pain, hematuria and urgency. Musculoskeletal: Negative for back pain, joint swelling and myalgias. Skin: Negative for rash and wound. Neurological: Negative for dizziness, tremors and numbness. Hematological: Does not bruise/bleed easily.      PVR done today 57ml/ per us

## 2020-10-15 NOTE — TELEPHONE ENCOUNTER
Left a message for the patient's daughter, Temo Polanco to let her know that a script for Miralax was sent to the pharmacy.

## 2020-10-15 NOTE — LETTER
[address] [address]  Dept: [phone]  Dept Fax: [phone]        10/15/20    Patient: Andi Butcher  YOB: 1928    Dear Rex Belcher MD,    I had the pleasure of seeing one of your patients, She Gutierrez today in the office today. Below are the relevant portions of my assessment and plan of care. IMPRESSION:  1. Urgency of urination    2. Urinary frequency    3. Incomplete bladder emptying    4. Mixed stress and urge urinary incontinence        PLAN:  not in retention    On dual therapy    Will try Myrbetriq to see if this improves his s/s. Timed urination every 2 hours throughout the day. Follow up one month   No follow-ups on file. Prescriptions Ordered:  Orders Placed This Encounter   Medications    mirabegron (MYRBETRIQ) 50 MG TB24     Sig: Take 50 mg by mouth daily     Dispense:  28 tablet     Refill:  0    MYRBETRIQ 50 MG TB24     Sig: Take 50 mg by mouth daily Has a sample will call if he wants refill. Dispense:  30 tablet     Refill:  11     Orders Placed:  No orders of the defined types were placed in this encounter. Thank you for allowing me to participate in the care of this patient. I will keep you updated on this patient's follow up and I look forward to serving you and your patients again in the future.     BRIAN Tijerina - CNP

## 2020-10-15 NOTE — PROGRESS NOTES
[address] [address]  Dept: [address] [facility] Urology Office Note - Established    Patient:  Gavin Martinez  YOB: 1928  Date: 10/15/2020    The patient is a 80 y.o. male who presents todayfor evaluation of the following problems:   Chief Complaint   Patient presents with    Incontinence       HPI  Here with his son with complaints of increasing incontinence. He has urgency and sometimes has to urinate outside when he is going to appts. He is voiding more frequently, has nocturia 3-4 times, no dysuria, no hematuria,. 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 He has lost a lot of weight DT his cancer and is having aspiration issues. He is living with his daughter. Summary of old records: N/A    Additional History: N/A    Procedures Today: N/A    Urinalysis today:  No results found for this visit on 10/15/20.   Last several PSA's:  Lab Results   Component Value Date    PSA 6.18 (H) 04/04/2018    PSA 3.35 01/13/2014     Last total testosterone:  No results found for: TESTOSTERONE    AUA Symptom Score (10/15/2020):  INCOMPLETE EMPTYING: How often have you had the sensation of not emptying your bladder?: Not at all  FREQUENCY: How often do you have to urinate less than every two hours?: About Half the time  INTERMITTENCY: How often have you found you stopped and started again several times when you urinated?: Not at all  URGENCY: How often have you found it difficult to postpone urination?: More than Half the time  WEAK STREAM: How often have you had a weak urinary stream?: Not at all  STRAINING: How often have you had to strain to start  urination?: Not at all  NOCTURIA: How many times did you typically get up at night to uriniate?: 3 Times  TOTAL I-PSS SCORE[de-identified] 10  How would you feel if you were to spend the rest of your life with your urinary condition?: Mostly Dissatisfied    Last BUN and creatinine:  Lab Results   Component Value Date    BUN 22 10/12/2020     Lab Results   Component Value Date    CREATININE 0.72 10/12/2020       Additional Lab/Culture results:     Imaging Reviewed during this Office Visit:   (results were independently reviewed by physician and radiology report verified)    PAST MEDICAL, FAMILY AND SOCIAL HISTORY UPDATE:  Past Medical History:   Diagnosis Date    Abnormal ECG 8/30/2019    Acquired hypothyroidism 9/19/2014    Acute cholecystitis 2/28/2018    Atherosclerosis of coronary artery bypass graft(s) without angina pectoris 10/4/2016    Atrioventricular block, complete ([address] 8/96/1083    Biliary colic     Block, bundle branch, left 3/30/2010    Calculus of bile duct without cholecystitis and without obstruction     Calculus of gallbladder 9/19/2017    Cancer ([address] 2013    thyroid    Carotid artery stenosis     Cataract     Cerebrovascular disease     Cervical disc herniation 11/13/2014    Cervical radiculopathy, chronic 11/14/2014    Cervical spondylosis 11/13/2014    CHF (congestive heart failure) (HCC)     Chronic systolic CHF (congestive heart failure), NYHA class 2 ([address] 6/21/2018    Coronary arteriosclerosis in native artery 3/30/2010    Overview:  H/O Acute anteriorseptal MI  11/8/99 Severe 3 vessel CAD , LM coronary dissection  S/P CABG  1999 L-LAD, SVG Ramus, SVG OM, SVG L PDA Stress test  10/2007  EF 49% no reversible ischemia Predominantly fixed infeior apical septal defects Low normal to mildly reduced LV sys function with :VEF 49%  Septal wall motion abnormlaity Compared with prior study no new reveresible perfusion defects    COVID-19 virus infection 07/25/2020    Degeneration of cervical intervertebral disc 9/25/2012    Elevated LFTs 9/19/2017    Elevated liver enzymes 2/28/2018    H/O malignant neoplasm of thyroid 12/10/2013    Heart attack ([address]     Heart block AV second degree     Hypercholesteremia 3/30/2010    Hypertension     Hypothyroidism     Ischemic cardiomyopathy 8/30/2019    Metastatic cancer ([facility] 9/19/2017    Mild aortic regurgitation 3/30/2010    Overview:  Mild moderate MR and AR  Overview:  Mild moderate MR and AR    Pneumonia due to COVID-19 virus     07/25/2020    Pulmonary nodule 6/21/2018    Pulmonary nodules/lesions, multiple 9/19/2017    S/P CABG x 3 8/30/2019    Sick sinus syndrome ([facility] 8/30/2019    SOB (shortness of breath) 8/30/2019    Spinal stenosis in cervical region 11/6/2014    Type 2 diabetes mellitus ([facility] 3/30/2010    Overview:  without complications    Type II or unspecified type diabetes mellitus without mention of complication, not stated as uncontrolled      Past Surgical History:   Procedure Laterality Date    APPENDECTOMY      CARDIAC SURGERY      CHOLECYSTECTOMY      CORONARY ARTERY BYPASS GRAFT      ENDOSCOPIC ULTRASOUND (LOWER)      PACEMAKER INSERTION  03/2015    IN LAP,CHOLECYSTECTOMY N/A 3/2/2018    CHOLECYSTECTOMY LAPAROSCOPIC performed by Melissa Peterson DO at [address]       Family History   Problem Relation Age of Onset    Cancer Mother         stomach    Other Father         pneumonia     Outpatient Medications Marked as Taking for the 10/15/20 encounter (Office Visit) with BRIAN Marrero CNP   Medication Sig Dispense Refill    polyethylene glycol (MIRALAX) 17 g packet Take 17 g by mouth daily as needed for Constipation 527 g 1    mirabegron (MYRBETRIQ) 50 MG TB24 Take 50 mg by mouth daily 28 tablet 0    MYRBETRIQ 50 MG TB24 Take 50 mg by mouth daily Has a sample will call if he wants refill. 30 tablet 11    HYDROcodone-acetaminophen (NORCO) 5-325 MG per tablet Take 1 tablet by mouth every 6 hours as needed for Pain for up to 30 days.  60 tablet 0    diphenhydrAMINE (BENADRYL) 25 MG capsule Take 1 capsule by mouth every 6 hours as needed for Itching 30 capsule 0    tamsulosin (FLOMAX) 0.4 MG capsule Take 1 capsule by mouth daily 90 capsule 3    finasteride (PROSCAR) 5 MG tablet TAKE 1 TABLET DAILY 90 tablet 3    furosemide (LASIX) 20 MG tablet Take 1 tablet by mouth daily 30 tablet 1    vitamin D (ERGOCALCIFEROL) 58564 units CAPS capsule Take 50,000 Units by mouth once a week      Multiple Vitamins-Minerals (THERAPEUTIC MULTIVITAMIN-MINERALS) tablet Take 1 tablet by mouth daily      calcium carbonate (TUMS) 500 MG chewable tablet Take 1 tablet by mouth 2 times daily as needed for Heartburn 30 tablet 0    glimepiride (AMARYL) 2 MG tablet Take 2 mg by mouth every morning (before breakfast)      levothyroxine (LEVOXYL) 137 MCG tablet Take 137 mcg by mouth Daily       nitroGLYCERIN (NITROSTAT) 0.4 MG SL tablet Place 0.4 mg under the tongue. As directed      docusate sodium (COLACE) 100 MG capsule Take 100 mg by mouth nightly       atorvastatin (LIPITOR) 20 MG tablet   Take 20 mg by mouth nightly       aspirin 81 MG EC tablet Take 81 mg by mouth daily          Seasonal and Keflex [cephalexin]  Social History     Tobacco Use   Smoking Status Never Smoker   Smokeless Tobacco Never Used     (Ifpatient a smoker, smoking cessation counseling offered)    Social History     Substance and Sexual Activity   Alcohol Use No       REVIEW OF SYSTEMS:  Review of Systems    Physical Exam:      Vitals:    10/15/20 1523   BP: 126/66   Pulse: 83   Temp: 97.3 °F (36.3 °C)     There is no height or weight on file to calculate BMI. Patient is a 80 y.o. male in no acute distress and alert and oriented to person, place and time. Physical Exam  Constitutional: Patient in no acute distress. Neuro: Alert and oriented to person, place and time.   Psych: Mood normal, affect normal  Skin: No rash noted  HEENT: Head: Normocephalic andatraumatic  Conjunctivae and EOM are normal. Pupils are equal, round  Nose:Normal  Right External Ear: Normal; Left External Ear: Normal  Mouth: Mucosa Moist  Neck: Supple  Lungs: Respiratory effort is normal  Cardiovascular: Warm & Pink  Abdomen: Soft, non-tender, non-distended with no CVA,  No flank tenderness,  Or hepatosplenomegaly   Lymphatics: No palpablelymphadenopathy. Bladder non-tender and not distended. Musculoskeletal: Normal gait and station  Testiscles: Normal, bilaterally  Prostate:    Assessment and Plan      1. Urgency of urination    2. Urinary frequency    3. Incomplete bladder emptying    4. Mixed stress and urge urinary incontinence           Plan:     not in retention    On dual therapy    Will try Myrbetriq to see if this improves his s/s. Timed urination every 2 hours throughout the day. Follow up one month   No follow-ups on file. Prescriptions Ordered:  Orders Placed This Encounter   Medications    mirabegron (MYRBETRIQ) 50 MG TB24     Sig: Take 50 mg by mouth daily     Dispense:  28 tablet     Refill:  0    MYRBETRIQ 50 MG TB24     Sig: Take 50 mg by mouth daily Has a sample will call if he wants refill. Dispense:  30 tablet     Refill:  11     Orders Placed:  No orders of the defined types were placed in this encounter. BRIAN Nelson CNP    Reviewed and Agree with the ROS entered by the MA.

## 2020-10-17 NOTE — ED NOTES
Mode of arrival:  Family drove pt in      Residence prior to admit: home with home care      Chief complaint on admission: back pain  Pt is receiving radiation for cancer mainly on his spine. Pt told family that pain in the back is \"really bad. \"  Pt is on pureed diet with thickened liquids. Pt's children help to care for pt. Pt is A&O to self, uses assistive devices, and does not appear to be in any distress at this time. C= \"Have you ever felt that you should Cut down on your drinking? \"  No  A= \"Have people Annoyed you by criticizing your drinking? \"  No  G= \"Have you ever felt bad or Guilty about your drinking? \"  No  E= \"Have you ever had a drink as an Eye-opener first thing in the morning to steady your nerves or to help a hangover? \"  No      Deferred []      Reason for deferring: N/A    *If yes to two or more: probable alcohol abuse. [facility]  10/17/20 3542

## 2020-10-17 NOTE — ED NOTES
Report given to Salvador Schlatter from [place], [address] [place] [address]. Report method in person   The following was reviewed with receiving RN:   Current vital signs:  /63   Pulse 88   Temp 98.7 °F (37.1 °C) (Oral)   Resp (!) 31   Ht 5' 4\" (1.626 m)   Wt 120 lb (54.4 kg)   SpO2 97%   BMI 20.60 kg/m²                MEWS Score: 1     Any medication or safety alerts were reviewed. Any pending diagnostics and notifications were also reviewed, as well as any safety concerns or issues, abnormal labs, abnormal imaging, and abnormal assessment findings. Questions were answered.             Adolfo Ferrell RN  10/17/20 7147

## 2020-10-17 NOTE — ED PROVIDER NOTES
[address]  EMERGENCY DEPARTMENT ENCOUNTER      Pt Name: Phillip Case  MRN: [id number]  Mateo 2/13/1928  Date of evaluation: 10/17/20      CHIEF COMPLAINT       Chief Complaint   Patient presents with    Back Pain    Constipation     HISTORY OF PRESENT ILLNESS   HPI 80 y.o. male presents with c/o presents with complaints of back pain and constipation. The patient has a history of papillary thyroid cancer going back to 2010. He had a thyroidectomy and neck dissection at that time. He had recurrence of his cancer recently, with thyroid nodules that were biopsied showing metastatic papillary thyroid cancer. CT scan in September of this year showed destructive lesion at T6 which is because the patient chronic pain since that time. Patient is complaining today of constipation. Last bowel movement was over a week ago. No vomiting. No abdominal pain. Persistent pain in his back. He is following in pain management. He is not taking his Norco.  In speaking with the family, the patient does not want to be admitted to the hospital or nursing home, he wants comfort measures / symptom . REVIEW OF SYSTEMS       Review of Systems   Constitutional: Negative for fever. HENT: Negative for congestion. Respiratory: Negative for shortness of breath. Cardiovascular: Negative for chest pain. Gastrointestinal: Positive for constipation. Negative for nausea and vomiting. Genitourinary: Negative for difficulty urinating. Musculoskeletal: Positive for back pain. Skin: Negative for rash. Psychiatric/Behavioral: Negative for confusion.        PAST MEDICAL HISTORY     Past Medical History:   Diagnosis Date    Abnormal ECG 8/30/2019    Acquired hypothyroidism 9/19/2014    Acute cholecystitis 2/28/2018    Atherosclerosis of coronary artery bypass graft(s) without angina pectoris 10/4/2016    Atrioventricular block, complete ([address] 7/16/0070    Biliary colic     Block, bundle branch, left 3/30/2010  Calculus of bile duct without cholecystitis and without obstruction     Calculus of gallbladder 9/19/2017    Cancer ([address] 2013    thyroid    Carotid artery stenosis     Cataract     Cerebrovascular disease     Cervical disc herniation 11/13/2014    Cervical radiculopathy, chronic 11/14/2014    Cervical spondylosis 11/13/2014    CHF (congestive heart failure) (HCC)     Chronic systolic CHF (congestive heart failure), NYHA class 2 ([address] 6/21/2018    Coronary arteriosclerosis in native artery 3/30/2010    Overview:  H/O Acute anteriorseptal MI  11/8/99 Severe 3 vessel CAD , LM coronary dissection  S/P CABG  1999 L-LAD, SVG Ramus, SVG OM, SVG L PDA Stress test  10/2007  EF 49% no reversible ischemia Predominantly fixed infeior apical septal defects Low normal to mildly reduced LV sys function with :VEF 49%  Septal wall motion abnormlaity Compared with prior study no new reveresible perfusion defects    COVID-19 virus infection 07/25/2020    Degeneration of cervical intervertebral disc 9/25/2012    Elevated LFTs 9/19/2017    Elevated liver enzymes 2/28/2018    H/O malignant neoplasm of thyroid 12/10/2013    Heart attack ([address]     Heart block AV second degree     Hypercholesteremia 3/30/2010    Hypertension     Hypothyroidism     Ischemic cardiomyopathy 8/30/2019    Metastatic cancer ([address] 9/19/2017    Mild aortic regurgitation 3/30/2010    Overview:  Mild moderate MR and AR  Overview:  Mild moderate MR and AR    Pneumonia due to COVID-19 virus     07/25/2020    Pulmonary nodule 6/21/2018    Pulmonary nodules/lesions, multiple 9/19/2017    S/P CABG x 3 8/30/2019    Sick sinus syndrome ([address] 8/30/2019    SOB (shortness of breath) 8/30/2019    Spinal stenosis in cervical region 11/6/2014    Type 2 diabetes mellitus ([address] 3/30/2010    Overview:  without complications    Type II or unspecified type diabetes mellitus without mention of complication, not stated as uncontrolled levothyroxine (LEVOXYL) 137 MCG tablet Take 137 mcg by mouth Daily Historical Med      nitroGLYCERIN (NITROSTAT) 0.4 MG SL tablet Place 0.4 mg under the tongue. As directed      docusate sodium (COLACE) 100 MG capsule Take 100 mg by mouth nightly Historical Med      atorvastatin (LIPITOR) 20 MG tablet   Take 20 mg by mouth nightly       aspirin 81 MG EC tablet Take 81 mg by mouth daily Historical Med       !! - Potential duplicate medications found. Please discuss with provider. ALLERGIES     is allergic to seasonal and keflex [cephalexin]. FAMILY HISTORY     He indicated that his mother is . He indicated that his father is . He indicated that his maternal grandmother is . He indicated that his maternal grandfather is . He indicated that his paternal grandmother is . He indicated that his paternal grandfather is . SOCIAL HISTORY      reports that he has never smoked. He has never used smokeless tobacco. He reports that he does not drink alcohol or use drugs. PHYSICAL EXAM     INITIAL VITALS: /63   Pulse 88   Temp 98.7 °F (37.1 °C) (Oral)   Resp (!) 31   Ht 5' 4\" (1.626 m)   Wt 120 lb (54.4 kg)   SpO2 97%   BMI 20.60 kg/m²   Gen: nad  Head: Normocephalic, atraumatic  Eye: Pupils equal round reactive to light, no conjunctivitis  Heart: Regular rate and rhythm no murmurs  Lungs: Clear to auscultation bilaterally, no respiratory distress  Abdomen: Soft, nontender, nondistended, with no peritoneal signs  MSK: thoracic midline ttp  Neurologic: pt is alert. Fluent speech. He is able to move bilatreral lower extremities though he is generally weak. Extremities: no edema. MEDICAL DECISION MAKING:     [facility]  80 y.o. male presenting with constipation. His abdomen is soft and nondistended. There is no focal tenderness. I doubt any acute intra-abdominal process.   In regards to the back pain, this is been a chronic issue for him and is related to his metastatic cancer. There is no evidence Of any acute cauda equina syndrome /cord compression. Will provide symptomatic treatment. We will get an x-ray of the abdomen. And reassess. Emergency Department course:  X-ray shows a moderate to large amount of stool burden but no sign of obstruction. The patient was able to have a large bowel movement and is feeling better. His pain in his back is improved. D/w pt and his son the results, treatment plan, warning precautions for prompt ED return and importance of close OP FU, they verbalize understanding and agrees with the treatment plan. DIAGNOSTIC RESULTS     RADIOLOGY:All plain film, CT, MRI, and formal ultrasound images (except ED bedside ultrasound) are read by the radiologist and the images and interpretations are directly viewed by the emergency physician. XR ABDOMEN (KUB) (SINGLE AP VIEW)   Final Result   Moderate to large amount of stool seen throughout the colon, with a   nonobstructive bowel gas pattern           EMERGENCY DEPARTMENT COURSE:   Vitals:    Vitals:    10/17/20 1815 10/17/20 1845 10/17/20 1900 10/17/20 1915   BP: (!) 103/52 119/63 113/65 109/63   Pulse: 79 94 90 88   Resp: 24 29 (!) 38 (!) 31   Temp:       TempSrc:       SpO2:  97% 97% 97%   Weight:       Height:           The patient was given the following medications while in the emergency department:  Orders Placed This Encounter   Medications    morphine sulfate (PF) injection 4 mg    fleet rectal enema 1 enema    magnesium citrate solution 296 mL     -------------------------  CRITICAL CARE:   CONSULTS: None  PROCEDURES: Procedures     FINAL IMPRESSION      1. Constipation, unspecified constipation type    2.  Cancer associated pain          DISPOSITION/PLAN   DISPOSITION Decision To Discharge 10/17/2020 07:51:57 PM      PATIENT REFERRED TO:  Anuja Lockett MD  [address] [address] [address]  [address] [address]  [phone]    In 2 days      [facility]  [address]  [address] [facility]  [phone]    If symptoms worsen      DISCHARGE MEDICATIONS:  Discharge Medication List as of 10/17/2020  7:52 PM            Echo Kirkpatrick MD  Attending Emergency Physician                      Echo Kirkpatrick MD  10/17/20 0634

## 2020-10-19 NOTE — TELEPHONE ENCOUNTER
Patricia Jin with [facility] called. She states daughter told her patient fell on Saturday, 10-17-20 in the bathroom walkway. He did not hit his head, and has no pain or bruising.

## 2020-10-20 NOTE — TELEPHONE ENCOUNTER
SPOKE WITH PT'S [address] VIA PHONE. CLARIFY APPTS FOR TOMORROW. CONFIRMED WILL BE HERE.
Supervision was available

## 2020-10-21 NOTE — PLAN OF CARE
Problem: SAFETY  Goal: Free from accidental physical injury  10/21/2020 1415 by Kartik Ag RN  Outcome: Completed  10/21/2020 1415 by Kartik Ag RN  Outcome: Met This Shift

## 2020-10-21 NOTE — TELEPHONE ENCOUNTER
RAVINDER ARRIVES VIA WHEELCHAIR FOR MD VISIT & [address] IN TO SEE PATIENT  ORDERS RECEIVED  KEVIN BLAIR AS PLANNED  RV 4 WEEKS W/CDP CMP  SENT MESSAGE TO Gina Tang (KEVIN)  MD VISIT 11/18/20 @12:45PM  [email or web address]  LABS CDP CMP 11/18/20  AS PRINTED AND GIVEN TO PATIENT WITH INSTRUCTIONS  PATIENT DISCHARGED TO TX AREA

## 2020-10-21 NOTE — PROGRESS NOTES
Pt arrives to treatment area per wheelchair with son after md visit and lab called to say that Blood glucose is 428, writer went to speak with Dr Henry Lord x 2 and notified and md orders 500 ml NS fluid infusion and pt to follow up with PCP. Writer spoke with pt and son, pt is elderly, slightly hard of hearing. Notified to see his family md due to elevated blood sugar and son state spt has not had blood sugars checked at home for a long time and is on only 1 hypoglycemic orally. Son states that his sister who lives with pt already made an appt for him for tomorrow. Pt tolerated hydration well. CREAT 0.89 and CA 9.3 and pt denies any jaw pain or recent or recently planned dental work. Notified pt and son if any jaw apin or problems with teeth occur to let md and nurses know, due to would not be able to have zometa if this occurs. Pt and son state understanding. NS infusing before and after zometa and no reactions or complaints and blood return present throughout infusions. Pt tolerated zometa well and then assisted to wheelchair and discharged with son with AVS with next appts from .

## 2020-10-22 NOTE — PROGRESS NOTES
HPI      80year old male accompanied by his son presents with management uncontrolled DM HTN fever, cough,  hyponatremia and decubitus ulcer in buttock area. Currently is on low dose amaryl. Pt had some blood tests yesterday and was noted to have blood sugar was iver 400 and low sodium. A1c is 10.4 today ( was 8.6 in Aug). He was noted to be febrile 100.3 with intermittent cough. Son states that pt is lethargic today and also noted a sore on pt's buttock yesterday. bp is stable with monotherapy. Hx of metastatic cancer and currently follows up with Dr. Lion Hinds. Review of Systems   Constitutional: Positive for fever. Negative for chills. Respiratory: Positive for cough. Negative for shortness of breath. Cardiovascular: Negative for chest pain and palpitations. Gastrointestinal: Negative for abdominal pain and nausea. Musculoskeletal: Positive for gait problem. Skin: Positive for wound (in buttock). Neurological: Negative for dizziness and weakness. Psychiatric/Behavioral: Negative for agitation and behavioral problems. Objective:   Physical Exam  Vitals signs and nursing note reviewed. Constitutional:       Appearance: He is ill-appearing. Comments: Appears lethargic. HENT:      Nose: Nose normal. No congestion. Eyes:      General: No scleral icterus. Conjunctiva/sclera: Conjunctivae normal.   Neck:      Musculoskeletal: Neck supple. Cardiovascular:      Rate and Rhythm: Normal rate and regular rhythm. Pulses: Normal pulses. Heart sounds: Normal heart sounds. Pulmonary:      Effort: Pulmonary effort is normal. No respiratory distress. Breath sounds: Normal breath sounds. Abdominal:      Palpations: Abdomen is soft. Tenderness: There is no abdominal tenderness. Comments: Stool incontinence   Musculoskeletal:      Comments: On wheel chair    Lymphadenopathy:      Cervical: No cervical adenopathy.    Skin:         Neurological:      Cranial Nerves: No cranial nerve deficit. Comments: Appears lethargic    Psychiatric:         Mood and Affect: Mood normal.         Behavior: Behavior normal.         Assessment:      1. Type 2 diabetes mellitus without complication, without long-term current use of insulin ([facility]    2. Pressure injury of left buttock, stage 1    3. Fever, unspecified fever cause    4. Cough    5. Hyponatremia            Plan:      BP Readings from Last 3 Encounters:   10/25/20 (!) 114/57   10/22/20 138/68   10/21/20 (!) 108/50     /68 (Site: Right Upper Arm, Position: Sitting, Cuff Size: Medium Adult)   Pulse 92   Temp 100.1 °F (37.8 °C) (Oral)   Resp 18   Lab Results   Component Value Date    WBC 7.5 10/23/2020    HGB 11.0 (L) 10/23/2020    HCT 34.0 (L) 10/23/2020     10/23/2020    CHOL 123 08/23/2019    TRIG 207 (H) 08/23/2019    HDL 29 (L) 08/23/2019    ALT 16 10/22/2020    AST 21 10/22/2020     (L) 10/23/2020    K 4.2 10/23/2020     10/23/2020    CREATININE 0.71 10/25/2020    BUN 18 10/23/2020    CO2 24 10/23/2020    TSH 0.26 (L) 09/18/2017    PSA 6.18 (H) 04/04/2018    INR 1.2 10/22/2020    LABA1C 11.2 (H) 10/22/2020    LABMICR 22 (H) 09/25/2017     Lab Results   Component Value Date    CALCIUM 7.6 (L) 10/23/2020    PHOS 2.8 09/24/2017     Lab Results   Component Value Date    LDLCHOLESTEROL 53 08/23/2019         1. Type 2 diabetes mellitus without complication, without long-term current use of insulin (HCC)  - uncontrolled. Cont amaryl and add Januvia  - POCT glycosylated hemoglobin (Hb A1C)  - SITagliptin (JANUVIA) 100 MG tablet; Take 1 tablet by mouth daily  Dispense: 90 tablet; Refill: 0  - refer pt to endo for further evaluation  - POC written down and conveyed to pt's son., who verbalized understanding. 2. Pressure injury of left buttock, stage 1  - healing. Topical bactroban oint   - mupirocin (BACTROBAN) 2 % ointment; Apply topically to affected area  3 times daily.   Dispense: 22 g; Refill: 0    3. Fever, unspecified fever cause  - CBC Auto Differential; Future    4. Cough  - XR CHEST (2 VW); Future  - CBC Auto Differential; Future    5. Hyponatremia  - repeat sodium   - Sodium; Future    Requested Prescriptions     Signed Prescriptions Disp Refills    SITagliptin (JANUVIA) 100 MG tablet 90 tablet 0     Sig: Take 1 tablet by mouth daily    mupirocin (BACTROBAN) 2 % ointment 22 g 0     Sig: Apply topically to affected area  3 times daily. There are no discontinued medications. Discussed use, benefit, and side effects of prescribed medications. Barriers to medication compliance addressed. All patient questions answered. Pt voiced understanding. Return in about 4 months (around 2/22/2021) for diabetes, HTN, HLD - metastatic cancer .

## 2020-10-22 NOTE — ED NOTES
Second set of cultures drawn per left forearm, 10 ml into each bottle     Bhavin Yeung RN  10/22/20 0416

## 2020-10-22 NOTE — TELEPHONE ENCOUNTER
Can you write an order for Home Care for the patient. Will fax to [facility]. Thank you.

## 2020-10-23 PROBLEM — C73 METASTASIS FROM THYROID CANCER (HCC): Status: ACTIVE | Noted: 2017-09-19

## 2020-10-23 PROBLEM — N39.0 UTI (URINARY TRACT INFECTION): Status: ACTIVE | Noted: 2020-01-01

## 2020-10-23 PROBLEM — A41.9 SEPSIS (HCC): Status: ACTIVE | Noted: 2020-01-01

## 2020-10-23 NOTE — CARE COORDINATION
CASE MANAGEMENT NOTE:    Admission Date:  10/22/2020 Obdulio Mckee is a 80 y.o.  male    Admitted for : UTI (urinary tract infection) [N39.0]    Met with:  Patient's Daughter, Miesha Grandchild, Via Phone    PCP:  Natalia Singh                                Insurance:  [employer]      Current Residence/ Living Arrangements:  at home dependent on family care, Daughter, Nubia Ramsey Lives w/ Him however, she has her own health issues            Current Services PTA:  Yes, Follows at [facility] [employer], Was receiving Radiation for Thyroid Cancer w/ Mets. [place] [address] [facility]. Is patient agreeable to VNS: Yes    Freedom of choice provided:  Yes    List of [address] provided: No    VNS chosen:  Yes, Per Miesha Grandchild, pt. Has had [address] in past, would like again. Maximo Simpson, from , notified. DME:  straight cane, walker, wheelchair and shower chair, GB    Home Oxygen: No    Nebulizer: No    CPAP/BIPAP: No    Supplier: N/A    Potential Assistance Needed: Yes, VNS, Daughter, tia [facility]    SNF needed: Daughter statesVic will refuse\"    Freedom of choice and list provided: NA    Pharmacy:  [facility]       Does Patient want to use MEDS to BEDS? No    Is patient currently receiving oral anticoagulation therapy? No    Is the Patient an [facility] with Readmission Risk Score greater than 14%? Yes  If yes, pt needs a follow up appointment made within 7 days. Family Members/Caregivers that pt would like involved in their care:    Yes    If yes, list name here:  DaughterArmando. Transportation Provider:  Family             Is patient in Isolation/One on One/Altered Mental Status? Yes  If yes, skip next question. If no, would they like an I-Pad to  use? NA  If yes, call [phone]. Discharge Plan:  10/23/20 [employer] Pt. Lives in 2 story home w/ Steps, has [employer] 1st floor. Daughter Nubia Ramsey, lives w/ Him, but she has her own Health issues. DME, Cane, Walker, W/C, SC, GB, Wants BSC.  Wants VNS, [facility] jose juan, Jacqueline De La Rosa notified of referral. Would like information on Palliative Care Services. HX of Thyroid Cancer w/ Mets, was on Radiation at [address]. [place] [address] [facility]. Hemoc/Cardio, IV Cefepime/Vanco. +Covid in August. [place] Header 31%. Needs Apt. Carlos will need signed/completed.  Will Follow//KB                Electronically signed by: Jose Tran RN on 10/23/2020 at 12:14 PM

## 2020-10-23 NOTE — PROGRESS NOTES
Pharmacy Note  Vancomycin Consult    Blanca Childs is a 80 y.o. male started on Vancomycin for sepsis/UTI; consult received from Dr. Rama Osler to manage therapy. Also receiving the following antibiotics: cefepime.     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    Pulmonary nodules/lesions, multiple    Calculus of gallbladder    Elevated LFTs    Metastatic cancer ([address]    Pulmonary nodule    Calculus of bile duct without cholecystitis and without obstruction    Acute cholecystitis    Elevated liver enzymes    Biliary colic    Abnormal ECG    Atrioventricular block, complete (HCC)    Block, bundle branch, left    Coronary arteriosclerosis in native artery    Chronic systolic CHF (congestive heart failure), NYHA class 2 (HCC)    Ischemic cardiomyopathy    S/P CABG x 3    Sick sinus syndrome (HCC)    SOB (shortness of breath)    Pneumonia due to COVID-19 virus    Presence of cardiac pacemaker    Chest pain    Chronic back pain    Papillary thyroid carcinoma ([address]    Bone metastases ([address]    UTI (urinary tract infection)    Sepsis ([address]       Allergies:  Seasonal and Keflex [cephalexin]     Temp max: 100.4    Recent Labs     10/21/20  1246 10/22/20  2030   BUN 24* 24*       Recent Labs     10/21/20  1246 10/22/20  2030   CREATININE 0.89 1.06       Recent Labs     10/22/20  1935   WBC 10.4       No intake or output data in the 24 hours ending 10/23/20 0326    Culture Date      Source                       Results  See micro    Ht Readings from Last 1 Encounters:   10/23/20 5' 2\" (1.575 m)        [employer] Readings from Last 1 Encounters:   10/23/20 116 lb 13.5 oz (53 kg)         Body mass index is 21.37 kg/m². Estimated Creatinine Clearance: 33 mL/min (based on SCr of 1.06 mg/dL). Goal Trough Level: 15-20 mcg/mL    Assessment/Plan:  Patient was given Vancomycin 1250 mg loading dose in ER at 2053 on 10/22/2020, start vancomycin 1000 mg IV every 24 hours. Timing of trough level will be determined based on culture results, renal function, and clinical response. Thank you for the consult. Will continue to follow.      Melecio Martinez RPh     -10/23/2020 at 3:28 AM

## 2020-10-23 NOTE — ED PROVIDER NOTES
([facility] Chronic systolic CHF (congestive heart failure), NYHA class 2 ([facility] Coronary arteriosclerosis in native artery, COVID-19 virus infection, Degeneration of cervical intervertebral disc, Elevated LFTs, Elevated liver enzymes, H/O malignant neoplasm of thyroid, Heart attack ([facility] Heart block AV second degree, Hypercholesteremia, Hypertension, Hypothyroidism, Ischemic cardiomyopathy, Metastatic cancer ([facility] Mild aortic regurgitation, Pneumonia due to COVID-19 virus, Pulmonary nodule, Pulmonary nodules/lesions, multiple, S/P CABG x 3, Sick sinus syndrome (HCC), SOB (shortness of breath), Spinal stenosis in cervical region, Type 2 diabetes mellitus ([facility] and Type II or unspecified type diabetes mellitus without mention of complication, not stated as uncontrolled. has a past surgical history that includes shoulder surgery; Cardiac surgery; Coronary artery bypass graft; Appendectomy; Total Thyroidectomy; Pacemaker insertion (03/2015); pr lap,cholecystectomy (N/A, 3/2/2018); Cholecystectomy; and Endoscopic ultrasonography, GI. Social History     Socioeconomic History    Marital status:       Spouse name: Not on file    Number of children: Not on file    Years of education: Not on file    Highest education level: Not on file   Occupational History    Occupation: Retired     Employer: [address] resource strain: Not hard at all   [place] insecurity     Worry: Never true     Inability: Never true   [employer] needs     Medical: Not on file     Non-medical: Not on file   Tobacco Use    Smoking status: Never Smoker    Smokeless tobacco: Never Used   Substance and Sexual Activity    Alcohol use: No    Drug use: No    Sexual activity: Not on file   Lifestyle    Physical activity     Days per week: Not on file     Minutes per session: Not on file    Stress: Not on file   Relationships    Social connections     Talks on phone: Not on file     Gets together: Not on file     Attends Jain service: Not on file     Active member of club or organization: Not on file     Attends meetings of clubs or organizations: Not on file     Relationship status: Not on file    Intimate partner violence     Fear of current or ex partner: Not on file     Emotionally abused: Not on file     Physically abused: Not on file     Forced sexual activity: Not on file   Other Topics Concern    Not on file   Social History Narrative    Not on file       Family History   Problem Relation Age of Onset    Cancer Mother         stomach    Other Father         pneumonia       Allergies:  Seasonal and Keflex [cephalexin]    Home Medications:  Prior to Admission medications    Medication Sig Start Date End Date Taking? Authorizing Provider   SITagliptin (JANUVIA) 100 MG tablet Take 1 tablet by mouth daily 10/22/20  Yes BRIAN Hurt CNP   mupirocin (BACTROBAN) 2 % ointment Apply topically to affected area  3 times daily. 10/22/20 10/29/20 Yes BRIAN Hurt CNP   polyethylene glycol (MIRALAX) 17 g packet Take 17 g by mouth daily as needed for Constipation 10/15/20  Yes Brennon Davenport MD   mirabegron [facility]) 50 MG TB24 Take 50 mg by mouth daily 10/15/20  Yes BRIAN Treadwell CNP   HYDROcodone-acetaminophen (NORCO) 5-325 MG per tablet Take 1 tablet by mouth every 6 hours as needed for Pain for up to 30 days.  10/7/20 11/6/20 Yes Amy Daugherty MD   diphenhydrAMINE (BENADRYL) 25 MG capsule Take 1 capsule by mouth every 6 hours as needed for Itching 9/21/20  Yes Brennon Davenport MD   tamsulosin [facility]) 0.4 MG capsule Take 1 capsule by mouth daily 2/19/20  Yes Mindy Rebollar MD   finasteride (PROSCAR) 5 MG tablet TAKE 1 TABLET DAILY 2/19/20  Yes Mindy Rebollar MD   furosemide (LASIX) 20 MG tablet Take 1 tablet by mouth daily 11/30/18  Yes Jsesica Ryan MD   vitamin D (ERGOCALCIFEROL) 87914 units CAPS capsule Take 50,000 Units by mouth once a week   Yes Historical Provider, MD   Multiple Vitamins-Minerals (THERAPEUTIC MULTIVITAMIN-MINERALS) tablet Take 1 tablet by mouth daily   Yes Historical Provider, MD   calcium carbonate (TUMS) 500 MG chewable tablet Take 1 tablet by mouth 2 times daily as needed for Heartburn 3/7/18  Yes Lisbeth Small MD   glimepiride (AMARYL) 2 MG tablet Take 2 mg by mouth every morning (before breakfast)   Yes Historical Provider, MD   levothyroxine (LEVOXYL) 137 MCG tablet Take 137 mcg by mouth Daily  7/29/16  Yes Historical Provider, MD   nitroGLYCERIN (NITROSTAT) 0.4 MG SL tablet Place 0.4 mg under the tongue. As directed   Yes Historical Provider, MD   docusate sodium (COLACE) 100 MG capsule Take 100 mg by mouth nightly    Yes Historical Provider, MD   atorvastatin (LIPITOR) 20 MG tablet   Take 20 mg by mouth nightly    Yes Historical Provider, MD   aspirin 81 MG EC tablet Take 81 mg by mouth daily    Yes Historical Provider, MD       REVIEW OF SYSTEMS    (2-9 systems for level 4, 10 or more for level 5)      Review of Systems   Unable to perform ROS: Other (Language barrier, hard of hearing, possible confusion)        PHYSICAL EXAM   (up to 7 for level 4, 8 or more for level 5)      INITIAL VITALS:   BP (!) 107/49   Pulse 74   Temp 99.1 °F (37.3 °C) (Rectal)   Resp 20   Wt 116 lb (52.6 kg)   SpO2 96%   BMI 19.91 kg/m²      Vitals:    10/22/20 1806 10/22/20 1920 10/22/20 2034 10/22/20 2233   BP:  103/62 (!) 118/54 (!) 107/49   Pulse:  90 88 74   Resp:  24 (!) 36 20   Temp: 100.3 °F (37.9 °C) 98.3 °F (36.8 °C) 99.1 °F (37.3 °C)    TempSrc: Oral Oral Rectal    SpO2:  96% 100% 96%   Weight:            Physical Exam  Vitals signs reviewed. Constitutional:       General: He is not in acute distress. Appearance: He is not diaphoretic. HENT:      Head: Normocephalic and atraumatic. Mouth/Throat:      Mouth: Mucous membranes are dry. Eyes:      Extraocular Movements: Extraocular movements intact.       Pupils: Pupils are equal, round, and reactive to light. Cardiovascular:      Rate and Rhythm: Regular rhythm. Tachycardia present. Abdominal:      General: Abdomen is flat. There is no distension. Palpations: Abdomen is soft. Tenderness: There is abdominal tenderness (Diffuse. ). There is guarding (Voluntary). Musculoskeletal:      Right lower leg: No edema. Left lower leg: No edema. Skin:     General: Skin is warm and dry. Neurological:      Mental Status: He is alert. Comments: Moves all extremities, eyes track. No facial droop. Able to stand with assist x2.          DIFFERENTIAL  DIAGNOSIS     PLAN (LABS / IMAGING / EKG):  Orders Placed This Encounter   Procedures    Culture, Urine    Culture, Blood 1    Culture, Blood 1    XR CHEST PORTABLE    CT ABDOMEN PELVIS W IV CONTRAST Additional Contrast? None    CBC with DIFF    Troponin    Urinalysis with Microscopic    Lactate, Sepsis    SPECIMEN REJECTION    SPECIMEN REJECTION    Brain Natriuretic Peptide    Comprehensive Metabolic Panel w/ Reflex to MG    C-Reactive Protein    Lipase    Troponin    Protime-INR    APTT    Straight cath    Full code    Inpatient consult to Cardiology    Inpatient consult to Internal Medicine    Inpatient consult to Cardiology    Droplet Plus Isolation    Pacer Interrogate    EKG 12 Lead    PATIENT STATUS (FROM ED OR OR/PROCEDURAL) Inpatient       MEDICATIONS ORDERED:  Orders Placed This Encounter   Medications    cefepime (MAXIPIME) 2 g IVPB minibag    vancomycin (VANCOCIN) 1,250 mg in dextrose 5 % 250 mL IVPB (ADDAVIAL)     Order Specific Question:   Antimicrobial Indications     Answer:   Pneumonia (HAP)    0.9 % sodium chloride bolus    0.9 % sodium chloride bolus    0.9 % sodium chloride bolus    sodium chloride flush 0.9 % injection 10 mL    iopamidol (ISOVUE-370) 76 % injection 75 mL    DISCONTD: 0.9 % sodium chloride bolus    vancomycin (VANCOCIN) 1,250 mg in dextrose 5 % 250 mL IVPB (ADDAVIAL)     Order Specific Question:   Antimicrobial Indications     Answer:   Pneumonia (HAP)       DIAGNOSTIC RESULTS / EMERGENCY DEPARTMENT COURSE / MDM   LAB RESULTS:  Results for orders placed or performed during the hospital encounter of 10/22/20   CBC with DIFF   Result Value Ref Range    WBC 10.4 3.5 - 11.0 k/uL    RBC 5.20 4.5 - 5.9 m/uL    Hemoglobin 14.0 13.5 - 17.5 g/dL    Hematocrit 43.0 41 - 53 %    MCV 82.7 80 - 100 fL    MCH 27.0 26 - 34 pg    MCHC 32.6 31 - 37 g/dL    RDW 16.7 (H) 11.5 - 14.9 %    Platelets 702 270 - 297 k/uL    MPV 7.1 6.0 - 12.0 fL    NRBC Automated NOT REPORTED per 100 WBC    Differential Type NOT REPORTED     Immature Granulocytes NOT REPORTED 0 %    Absolute Immature Granulocyte NOT REPORTED 0.00 - 0.30 k/uL    WBC Morphology NOT REPORTED     RBC Morphology NOT REPORTED     Platelet Estimate NOT REPORTED     Seg Neutrophils 94 (H) 36 - 66 %    Lymphocytes 3 (L) 24 - 44 %    Monocytes 3 1 - 7 %    Eosinophils % 0 0 - 4 %    Basophils 0 0 - 2 %    Segs Absolute 9.78 (H) 1.3 - 9.1 k/uL    Absolute Lymph # 0.31 (L) 1.0 - 4.8 k/uL    Absolute Mono # 0.31 0.1 - 1.3 k/uL    Absolute Eos # 0.00 0.0 - 0.4 k/uL    Basophils Absolute 0.00 0.0 - 0.2 k/uL    Morphology ANISOCYTOSIS PRESENT    Troponin   Result Value Ref Range    Troponin, High Sensitivity 32 (H) 0 - 22 ng/L    Troponin T NOT REPORTED <0.03 ng/mL    Troponin Interp NOT REPORTED    Urinalysis with Microscopic   Result Value Ref Range    Color, UA YELLOW YELLOW    Turbidity UA CLEAR CLEAR    Glucose, Ur 2+ (A) NEGATIVE    Bilirubin Urine NEGATIVE NEGATIVE    Ketones, Urine NEGATIVE NEGATIVE    Specific Gravity, UA 1.043 (H) 1.000 - 1.030    Urine Hgb NEGATIVE NEGATIVE    pH, UA 8.0 5.0 - 8.0    Protein, UA NEGATIVE NEGATIVE    Urobilinogen, Urine Normal Normal    Nitrite, Urine NEGATIVE NEGATIVE    Leukocyte Esterase, Urine SMALL (A) NEGATIVE    Urinalysis Comments NOT REPORTED     -          WBC, UA 10 TO 20 /HPF RBC, UA 0 TO 2 /HPF    Casts UA NOT REPORTED /LPF    Crystals, UA NOT REPORTED None /HPF    Epithelial Cells UA 2 TO 5 /HPF    Renal Epithelial, UA NOT REPORTED 0 /HPF    Bacteria, UA FEW (A) None    Mucus, UA NOT REPORTED None    Trichomonas, UA NOT REPORTED None    Amorphous, UA NOT REPORTED None    Other Observations UA NOT REPORTED NOT REQ. Yeast, UA NOT REPORTED None   Lactate, Sepsis   Result Value Ref Range    Lactic Acid, Sepsis 3.1 (H) 0.5 - 1.9 mmol/L    Lactic Acid, Sepsis, Whole Blood NOT REPORTED 0.5 - 1.9 mmol/L   Lactate, Sepsis   Result Value Ref Range    Lactic Acid, Sepsis 2.5 (H) 0.5 - 1.9 mmol/L    Lactic Acid, Sepsis, Whole Blood NOT REPORTED 0.5 - 1.9 mmol/L   SPECIMEN REJECTION   Result Value Ref Range    Specimen Source . BLOOD     Ordered Test PT PTT     Reason for Rejection Unable to perform testing: Specimen hemolyzed. - NOT REPORTED    SPECIMEN REJECTION   Result Value Ref Range    Specimen Source . BLOOD     Ordered Test BNP,CMPX,CRP,LIP,TROPI     Reason for Rejection Unable to perform testing: Specimen hemolyzed.      - NOT REPORTED    Brain Natriuretic Peptide   Result Value Ref Range    Pro-BNP 1,161 (H) <300 pg/mL    BNP Interpretation Pro-BNP Reference Range:    Comprehensive Metabolic Panel w/ Reflex to MG   Result Value Ref Range    Glucose 308 (H) 70 - 99 mg/dL    BUN 24 (H) 8 - 23 mg/dL    CREATININE 1.06 0.70 - 1.20 mg/dL    Bun/Cre Ratio NOT REPORTED 9 - 20    Calcium 8.8 8.6 - 10.4 mg/dL    Sodium 130 (L) 135 - 144 mmol/L    Potassium 4.5 3.7 - 5.3 mmol/L    Chloride 94 (L) 98 - 107 mmol/L    CO2 26 20 - 31 mmol/L    Anion Gap 10 9 - 17 mmol/L    Alkaline Phosphatase 114 40 - 129 U/L    ALT 16 5 - 41 U/L    AST 21 <40 U/L    Total Bilirubin 0.32 0.3 - 1.2 mg/dL    Total Protein 8.4 (H) 6.4 - 8.3 g/dL    Alb 2.1 (L) 3.5 - 5.2 g/dL    Albumin/Globulin Ratio NOT REPORTED 1.0 - 2.5    GFR Non-African American >60 >60 mL/min    GFR African American >60 >60 mL/min    GFR Comment          GFR Staging NOT REPORTED    C-Reactive Protein   Result Value Ref Range    .8 (H) 0.0 - 5.0 mg/L   Lipase   Result Value Ref Range    Lipase 46 13 - 60 U/L   Troponin   Result Value Ref Range    Troponin, High Sensitivity 33 (H) 0 - 22 ng/L    Troponin T NOT REPORTED <0.03 ng/mL    Troponin Interp NOT REPORTED    Protime-INR   Result Value Ref Range    Protime 15.0 (H) 11.8 - 14.6 sec    INR 1.2    APTT   Result Value Ref Range    PTT 33.7 24.0 - 36.0 sec         RADIOLOGY:  CT ABDOMEN PELVIS W IV CONTRAST Additional Contrast? None   Final Result   No acute inflammatory process or bowel obstruction. Redemonstration of multiple hepatic metastases. , these have progressed from   prior contrast-enhanced exam dating back to 2018 however most recent exams   have been performed without contrast so difficult to compare with more recent   exams      Colonic diverticulosis noted. Distal pancreatic metastases not significant changed. Multiple bilateral metastatic pulmonary nodules question mild progression         XR CHEST PORTABLE   Final Result   Permanent left-sided AV sequential bipolar pacemaker. CABG. No cardiomegaly, interstitial edema or pleural effusion. Bilateral lower lobe non consolidating infiltration was again noted. This is   either due to chronic pneumonia or underlying bronchiectasis. Pulmonary   parenchymal nodularity was again identified. No significant change has occurred from 09/17/2020. EKG    EKG Interpretation    Interpreted by me    Rhythm: normal sinus   Rate: Tachycardia  Axis: Left axis  Ectopy: none  Conduction: Left bundle branch block  ST Segments: no acute change  T Waves: no acute change  Q Waves: none    Clinical Impression: Left bundle branch block, sinus tachycardia comparison previous ECG no acute changes.     All EKG's are interpreted by the Emergency Department Physician who either signs or Co-signs this chart in the absence of a cardiologist.      INITIAL IMPRESSION:     chest pain, tachycardia, tachypnea     EMERGENCY DEPARTMENT COURSE & MDM:    Arrives for evaluation of chest pain and fecal incontinence. Due to the language barrier as documented below and no family at bedside we are unclear why exactly he is here. While awaiting confirmation from family will get sepsis labs, cardiac work-up, interrogate pacemaker, and CT abdomen pelvis due to abdominal/tenderness on examination. ED Course as of Oct 23 0021   Thu Oct 22, 2020   1110 Patient is [place]-speaking only. Trying to find  phone to communicate (1 is missing the emergency room and the other is not charged), currently contacted house supervisor and attempting to contact family. [CS]   2013 WBC: 10.4 [CS]   2013 IMPRESSION:  Permanent left-sided AV sequential bipolar pacemaker.     CABG.     No cardiomegaly, interstitial edema or pleural effusion.     Bilateral lower lobe non consolidating infiltration was again noted. This is  either due to chronic pneumonia or underlying bronchiectasis. Pulmonary  parenchymal nodularity was again identified.     No significant change has occurred from 09/17/2020. XR CHEST PORTABLE [CS]   2013 Lactic Acid, Sepsis(!): 3.1 [CS]   2019 Attempted to call family, no answer    [CS]   2025 Attempted video . Patient either cannot hear it or is confused and was unable to respond. Will attempt reaching family again. [CS]   2040 Spoke with daughter Maria Del Carmen Rojas. States recently he has been feeling fatigued, experiencing weight loss. At baseline he is talkative. She is unclear of CODE STATUS. States brother may be better to talk with. Here today due to fecal incontinence and suspected chest pain. [CS]   2109 Troponin at baseline. ECG shows no acute changes.    Troponin, High Sensitivity(!): 33 [CS]   1693 IMPRESSION:  No acute inflammatory process or bowel obstruction.     Redemonstration of multiple hepatic CARDIOLOGY    CRITICAL CARE:  Please see attending note    FINAL IMPRESSION      1. NSTEMI (non-ST elevated myocardial infarction) ([facility]    2. Pneumonia due to organism    3. Fever, unspecified fever cause    4. Metastasis from thyroid cancer ([facility]    5. Urinary tract infection without hematuria, site unspecified    6. Septicemia ([facility]        DISPOSITION / PLAN     DISPOSITION        PATIENT REFERRED TO:  No follow-up provider specified.     DISCHARGE MEDICATIONS:  New Prescriptions    No medications on file       Risa Cross DO  Emergency Medicine Resident    (Please note that portions of thisnote were completed with a voice recognition program.  Efforts were made to edit the dictations but occasionally words are mis-transcribed.)        Risa Cross DO  Resident  10/23/20 3086

## 2020-10-23 NOTE — CONSULTS
Cardiology Consult           Date of Admission:  10/22/2020  Date of Consultation:  10/23/2020      PCP:  Mohan Rosales MD      Reason for consult: Chest pain    History of Present Illness:  Ina Malave is a 80 y.o. male who presents with chest pain. History includes native vessel CAD with prior CABG x3 LIMA-LAD SVG-Ramus/LPA, ischemic cardiomyopathy with EF 40 to 45%, complete heart block/sick sinus syndrome with permanent pacemaker, metastatic thyroid cancer to the lungs, and recent positive test for Covid 19. Known chronic left bundle branch block    Patient is a poor historian but reportedly presented with fever, cough, and chest pain. Found to have UTI. Most of history obtained from nurse but patient does report having some chest pain yesterday but now it is resolved. Breathing does appear stable. No reports of syncopal episodes. Reportedly, per RN, patient's family is considering palliative care. Currently resting in bed. Again he is a poor historian, but denies any active chest pain. EKG shows chronic left bundle branch block with monitor findings with the exception of some ST depressions in the lateral leads. High-sensitivity troponin initially 33, repeat 32.  proBNP is elevated. Lactate was initially elevated, now improved.     PMH:   has a past medical history of Abnormal ECG, Acquired hypothyroidism, Acute cholecystitis, Atherosclerosis of coronary artery bypass graft(s) without angina pectoris, Atrioventricular block, complete ([address] Biliary colic, Block, bundle branch, left, Calculus of bile duct without cholecystitis and without obstruction, Calculus of gallbladder, Cancer (HCC), Carotid artery stenosis, Cataract, Cerebrovascular disease, Cervical disc herniation, Cervical radiculopathy, chronic, Cervical spondylosis, CHF (congestive heart failure) (HCC), Chronic systolic CHF (congestive heart failure), NYHA class 2 ([address] Coronary arteriosclerosis in native artery, COVID-19 virus infection, Degeneration of cervical intervertebral disc, Elevated LFTs, Elevated liver enzymes, H/O malignant neoplasm of thyroid, Heart attack ([address] Heart block AV second degree, Hypercholesteremia, Hypertension, Hypothyroidism, Ischemic cardiomyopathy, Metastatic cancer ([address] Mild aortic regurgitation, Pneumonia due to COVID-19 virus, Pulmonary nodule, Pulmonary nodules/lesions, multiple, S/P CABG x 3, Sick sinus syndrome (HCC), SOB (shortness of breath), Spinal stenosis in cervical region, Type 2 diabetes mellitus ([address] and Type II or unspecified type diabetes mellitus without mention of complication, not stated as uncontrolled. PSH:   has a past surgical history that includes shoulder surgery; Cardiac surgery; Coronary artery bypass graft; Appendectomy; Total Thyroidectomy; Pacemaker insertion (03/2015); pr lap,cholecystectomy (N/A, 3/2/2018); Cholecystectomy; and Endoscopic ultrasonography, GI. Allergies: Allergies   Allergen Reactions    Seasonal      Other reaction(s): Other: See Comments  seasonal upper respiratory irritation    Keflex [Cephalexin] Nausea And Vomiting        Home Meds:    Prior to Admission medications    Medication Sig Start Date End Date Taking? Authorizing Provider   SITagliptin (JANUVIA) 100 MG tablet Take 1 tablet by mouth daily 10/22/20  Yes Ian Fitting, APRN - CNP   mupirocin (BACTROBAN) 2 % ointment Apply topically to affected area  3 times daily. 10/22/20 10/29/20 Yes Ian Fitting, APRN - CNP   polyethylene glycol (MIRALAX) 17 g packet Take 17 g by mouth daily as needed for Constipation 10/15/20  Yes Jonah Sanchez MD   mirabegron [facility]) 50 MG TB24 Take 50 mg by mouth daily 10/15/20  Yes BRIAN Paetl CNP   HYDROcodone-acetaminophen (NORCO) 5-325 MG per tablet Take 1 tablet by mouth every 6 hours as needed for Pain for up to 30 days.  10/7/20 11/6/20 Yes Vanda Hernandez MD   diphenhydrAMINE (BENADRYL) 25 MG capsule Take 1 capsule by mouth every 6 hours as needed for Itching 9/21/20  Yes Quiana Hernandez MD   tamsulosin [facility]) 0.4 MG capsule Take 1 capsule by mouth daily 2/19/20  Yes Wilbur Izquierdo MD   finasteride (PROSCAR) 5 MG tablet TAKE 1 TABLET DAILY 2/19/20  Yes Wilbur Izquierdo MD   furosemide (LASIX) 20 MG tablet Take 1 tablet by mouth daily 11/30/18  Yes Marin Elizabeth MD   vitamin D (ERGOCALCIFEROL) 43368 units CAPS capsule Take 50,000 Units by mouth once a week   Yes Historical Provider, MD   Multiple Vitamins-Minerals (THERAPEUTIC MULTIVITAMIN-MINERALS) tablet Take 1 tablet by mouth daily   Yes Historical Provider, MD   calcium carbonate (TUMS) 500 MG chewable tablet Take 1 tablet by mouth 2 times daily as needed for Heartburn 3/7/18  Yes Joe Welch MD   glimepiride (AMARYL) 2 MG tablet Take 2 mg by mouth every morning (before breakfast)   Yes Historical Provider, MD   levothyroxine (LEVOXYL) 137 MCG tablet Take 137 mcg by mouth Daily  7/29/16  Yes Historical Provider, MD   nitroGLYCERIN (NITROSTAT) 0.4 MG SL tablet Place 0.4 mg under the tongue.  As directed   Yes Historical Provider, MD   docusate sodium (COLACE) 100 MG capsule Take 100 mg by mouth nightly    Yes Historical Provider, MD   atorvastatin (LIPITOR) 20 MG tablet   Take 20 mg by mouth nightly    Yes Historical Provider, MD   aspirin 81 MG EC tablet Take 81 mg by mouth daily    Yes Historical Provider, MD        [facility] Meds:    Current Facility-Administered Medications   Medication Dose Route Frequency Provider Last Rate Last Dose    cefepime (MAXIPIME) 1 g IVPB minibag  1 g Intravenous Q12H Courtney Williamson MD        aspirin EC tablet 81 mg  81 mg Oral Daily Courtney Williamson MD        atorvastatin (LIPITOR) tablet 20 mg  20 mg Oral Nightly Courtney Williamson MD        diphenhydrAMINE (BENADRYL) tablet 25 mg  25 mg Oral Q6H PRN Courtney Williamson MD        docusate sodium (COLACE) capsule 100 mg  100 mg Oral Nightly Courtney Williamson MD        finasteride · Constitutional: Poor historian, is awake and alert  · Eyes: No visual changes or diplopia. No scleral icterus. · ENT: No Headaches, hearing loss or vertigo. No mouth sores or sore throat. · Cardiovascular: Per HPI. · Respiratory: Per HPI. · Gastrointestinal: No abdominal pain, appetite loss, blood in stools. No change in bowel or bladder habits. · Genitourinary: No dysuria, trouble voiding, or hematuria. · Musculoskeletal:  No gait disturbance, weakness or joint complaints. · Integumentary: No rash or pruritis. · Neurological: Per HPI. · Psychiatric: No anxiety, or depression. · Endocrine: No temperature intolerance. No excessive thirst, fluid intake, or urination. No tremor. · Hematologic/Lymphatic: No abnormal bruising or bleeding, blood clots or swollen lymph nodes. · Allergic/Immunologic: No nasal congestion or hives. Physical Exam    Vital Signs: BP (!) 116/52   Pulse 65   Temp 98.4 °F (36.9 °C) (Oral)   Resp 20   Ht 5' 2\" (1.575 m)   Wt 116 lb 13.5 oz (53 kg)   SpO2 96%   BMI 21.37 kg/m²        Admission Weight: 116 lb (52.6 kg)     General appearance: Awake, Alert Cooperative    Head: Normocephalic, without obvious abnormality, atraumatic    Eyes: Conjunctivae/corneas clear. PERRL, EOM's intact. Fundi benign    Neck: no adenopathy, no carotid bruit, no JVD, supple, symmetrical, trachea midline and thyroid: not enlarged, symmetric, no tenderness/mass/nodules    Lungs: Diminished to auscultation bilaterally    Heart: regular rate and rhythm, S1, S2 normal, no murmur, click, rub or gallop    Abdomen: Soft, non-tender. Bowel sounds normal. No masses,  no organomegaly    Extremities: extremities normal, atraumatic, no cyanosis or edema    Skin: Skin color, texture, turgor normal. No rashes or lesions    Neurologic: Grossly normal        MEDICAL DECISION MAKING/TESTING    Cardiac Cath:    Unable to scan in    Echo/Stress:     Contrast agent was used for left ventricular opacification. The   estimated left ventricular ejection fraction is 40 - 45%. Moderately   reduced left ventricular ejection fraction. · Pacer/ICD lead present in the right ventricle. · Mild-moderate aortic valve regurgitation. · Mild-to-moderate mitral regurgitation. · Mild pulmonic regurgitation. · Mild tricuspid valve regurgitation. · Estimated RVSP: <30 mmHg. EKG:          Labs:      CBC:   Recent Labs     10/22/20  1935 10/23/20  0420   WBC 10.4 7.5   HGB 14.0 11.0*   HCT 43.0 34.0*   MCV 82.7 82.2    227     BMP:   Recent Labs     10/21/20  1246 10/22/20  2030 10/23/20  0420   * 130* 134*   K 4.4 4.5 4.2   CL 90* 94* 102   CO2 28 26 24   BUN 24* 24* 18   CREATININE 0.89 1.06 0.80     PT/INR:   Recent Labs     10/22/20  2030   PROTIME 15.0*   INR 1.2     APTT:   Recent Labs     10/22/20  2030   APTT 33.7     MAG: No results for input(s): MG in the last 72 hours. D Dimer: No results for input(s): DDIMER in the last 72 hours. Troponin T   Recent Labs     10/22/20  2030 10/22/20  2318   TROPONINT NOT REPORTED NOT REPORTED     ProBNP Invalid input(s): PRO-BNP      Assessment:    1. Chest pain - resolved - mildly elevated high-sensitivity troponin -EKG with slight changes  2. Native vessel CAD with prior CABG x3 - now presenting with chest pain  3. Ischemic cardiomyopathy with EF 40 to 45%  4. UTI  5. Recent COVID-19 infection August 2020 - ED suspected some form of pneumonia but did not test patient again due to recent positive testing  6. Chronic systolic heart failure  7. Sick sinus syndrome with permanent pacemaker  8. History of metastatic thyroid cancer with mets to lungs  9. Likely underlying dementia  10. Diabetes mellitus  11. History of carotid artery disease    Plan:    Reportedly patient's family is strongly considering palliative care. Trend troponins. We will start therapeutic anticoagulation with Lovenox milligram per kilogram twice daily. Continue aspirin and statin therapy. Hold off on beta-blocker as blood pressure has been soft. Continue to monitor and treat conservatively. Discuss further with attending.

## 2020-10-23 NOTE — PLAN OF CARE
Problem: Falls - Risk of:  Goal: Will remain free from falls  Description: Will remain free from falls  10/23/2020 1719 by Lin Muir RN  Outcome: Ongoing  10/23/2020 0502 by Braulio Eisenmenger, RN  Outcome: Ongoing  Note: No falls during this shift. Call light is within reach. Side rails up x2 with bed in lowest position. Patient safety maintained. Goal: Absence of physical injury  Description: Absence of physical injury  10/23/2020 1719 by Lin Muir RN  Outcome: Ongoing  10/23/2020 0502 by Braulio Eisenmenger, RN  Outcome: Ongoing  Note: No injury this shift. Patient safety maintained. Problem: Skin Integrity:  Goal: Will show no infection signs and symptoms  Description: Will show no infection signs and symptoms  10/23/2020 1719 by Lin Muir RN  Outcome: Ongoing  10/23/2020 0502 by Braulio Eisenmenger, RN  Outcome: Ongoing  Goal: Absence of new skin breakdown  Description: Absence of new skin breakdown  10/23/2020 1719 by Lin Muir RN  Outcome: Ongoing  10/23/2020 0502 by Braulio Eisenmenger, RN  Outcome: Ongoing  Note: Skin assessment as charted.

## 2020-10-23 NOTE — CARE COORDINATION
[facility] [address] Encounter Date/Time: 10/22/2020 Conrad Herrera Account: [de-identified]    MRN: [id number]    Patient: Jose David Alvarez    Contact Serial #: [id number]      ENCOUNTER          Patient Class: I Private Enc? No Unit RM BD: [facility] PROG    Hospital Service: Intermediate   ADM DX: UTI (urinary tract infec*   ADM Provider: Brigitte Stevens MD   Procedure:     ATT Provider: Brigitte Stevens MD   REF Provider:        PATIENT  Name: Jose David Alvarez : 1928 (89 yrs)   Address: [place] [address] Sex: Male   [place] city: [address] [place], [place] [id number]         Marital Status:    Employer: [employer]         Scientologist: Buddhist   Primary Care Provider: Garry Mendez MD         Primary Phone: [phone]   EMERGENCY CONTACT   Contact Name Legal Guardian? Relationship to Patient Home Phone Work Phone   1. Lucretia Altamirano  2. Shari Santos No  No Child  Child [phone]         GUARANTOR            Guarantor: Jose David Alvarez     : 1928   Address: [place] [address] Sex: Male   [place] [id number]     Relation to Patient: Self       Home Phone: [phone]   Guarantor ID: [id number]       Work Phone:     Guarantor Employer: [employer]         Status: RETIRED      COVERAGE        PRIMARY INSURANCE   Payor: [employer] MEDICARE Plan: [facility]*   Payor Address: [facility] [id number] [place] [id number]       Group Number: [id number] Insurance Type: [id number] Name: Jazmin Manuel : 1928   Subscriber ID: Madina Ansari. Rel. to Sub: Self   SECONDARY INSURANCE   Payor:   Plan:     Payor Address:  ,           Group Number:   Insurance Type:     Subscriber Name:   Subscriber :     Subscriber ID:   Richelle.  Rel. to Sub:

## 2020-10-23 NOTE — PROGRESS NOTES
Patient ID: Ashia Rothman, 2/13/1928, [id number], 80 y.o. Diagnosis:   Metastatic papillary thyroid cancer  Patient following with endocrinologist at [facility] clinic for thyroid suppression treatment  Recent CT scan on 9/17/2020 showed destructive lesion at T6  HISTORY OF PRESENT ILLNESS:    Oncologic History:  Ally Bingham is a 79 YO male with PMH of CAD, DM, CVA was admitted with abd pain. He speaks very limited english so history was provided by his family and chart.     His CT abd showed cholelithiasis and slightly thickened gall bladder wall. Also innumerable lung nodules were noted. No night sweats, No fever chills, NO wt loss.   NO NV or blood in stool.   Patient has history of papillary thyroid cancer s/p total thyroidectomy and neck dissection in 2010. He had LN mets. He underwent biopsy of the lung nodule which was consistent with metastatic papillary thyroid cancer. Interval history:  Patient is returning for follow-up visit. He recently presented to ER with worsening back pain, significant fatigue. He is following with pain clinic. He has completed palliative radiation to spine. He has very poor performance status with ECOG 3. He has difficulty in swallowing. During this visit patient's allergy, social, medical, surgical history and medications were reviewed and updated.     Past Medical History:   Diagnosis Date    Abnormal ECG 8/30/2019    Acquired hypothyroidism 9/19/2014    Acute cholecystitis 2/28/2018    Atherosclerosis of coronary artery bypass graft(s) without angina pectoris 10/4/2016    Atrioventricular block, complete ([address] 2/13/5867    Biliary colic     Block, bundle branch, left 3/30/2010    Calculus of bile duct without cholecystitis and without obstruction     Calculus of gallbladder 9/19/2017    Cancer ([address] 2013    thyroid    Carotid artery stenosis     Cataract     Cerebrovascular disease     Cervical disc herniation 11/13/2014    Cervical radiculopathy, chronic 11/14/2014    Cervical spondylosis 11/13/2014    CHF (congestive heart failure) (HCC)     Chronic systolic CHF (congestive heart failure), NYHA class 2 ([address] 6/21/2018    Coronary arteriosclerosis in native artery 3/30/2010    Overview:  H/O Acute anteriorseptal MI  11/8/99 Severe 3 vessel CAD , LM coronary dissection  S/P CABG  1999 L-LAD, SVG Ramus, SVG OM, SVG L PDA Stress test  10/2007  EF 49% no reversible ischemia Predominantly fixed infeior apical septal defects Low normal to mildly reduced LV sys function with :VEF 49%  Septal wall motion abnormlaity Compared with prior study no new reveresible perfusion defects    COVID-19 virus infection 07/25/2020    Degeneration of cervical intervertebral disc 9/25/2012    Elevated LFTs 9/19/2017    Elevated liver enzymes 2/28/2018    H/O malignant neoplasm of thyroid 12/10/2013    Heart attack ([address]     Heart block AV second degree     Hypercholesteremia 3/30/2010    Hypertension     Hypothyroidism     Ischemic cardiomyopathy 8/30/2019    Metastatic cancer ([address] 9/19/2017    Mild aortic regurgitation 3/30/2010    Overview:  Mild moderate MR and AR  Overview:  Mild moderate MR and AR    Pneumonia due to COVID-19 virus     07/25/2020    Pulmonary nodule 6/21/2018    Pulmonary nodules/lesions, multiple 9/19/2017    S/P CABG x 3 8/30/2019    Sick sinus syndrome ([address] 8/30/2019    SOB (shortness of breath) 8/30/2019    Spinal stenosis in cervical region 11/6/2014    Type 2 diabetes mellitus ([address] 3/30/2010    Overview:  without complications    Type II or unspecified type diabetes mellitus without mention of complication, not stated as uncontrolled        Past Surgical History:   Procedure Laterality Date    APPENDECTOMY      CARDIAC SURGERY      CHOLECYSTECTOMY      CORONARY ARTERY BYPASS GRAFT      ENDOSCOPIC ULTRASOUND (LOWER)      PACEMAKER INSERTION  03/2015    NE LAP,CHOLECYSTECTOMY N/A 3/2/2018    CHOLECYSTECTOMY LAPAROSCOPIC performed No eye discharge, double vision, or eye pain   HEENT: negative for sore mouth, sore throat, hoarseness and voice change   Respiratory: negative for cough , sputum, dyspnea, wheezing, hemoptysis, chest pain   Cardiovascular: negative for chest pain, dyspnea, palpitations, orthopnea, PND   Gastrointestinal: negative for nausea, vomiting, diarrhea, constipation, abdominal pain, Dysphagia, hematemesis and hematochezia   Genitourinary: negative for frequency, dysuria, nocturia, urinary incontinence, and hematuria   Integument: negative for rash, skin lesions, bruises.    Hematologic/Lymphatic: negative for easy bruising, bleeding, lymphadenopathy, petechiae and swelling/edema   Endocrine: negative for heat or cold intolerance, tremor, weight changes, change in bowel habits and hair loss   Musculoskeletal: negative for myalgias, arthralgias, pain, joint swelling,and bone pain   Neurological: negative for headaches, dizziness, seizures, weakness, numbness  OBJECTIVE:         Vitals:    10/21/20 1314   BP: (!) 108/50   Pulse: 81   Temp: 98.1 °F (36.7 °C)   PHYSICAL EXAM:   General appearance - well appearing, no in pain or distress   Mental status - alert and cooperative   Eyes - pupils equal and reactive, extraocular eye movements intact   Ears - bilateral TM's and external ear canals normal   Mouth - mucous membranes moist, pharynx normal without lesions   Neck - supple, no significant adenopathy   Lymphatics - no palpable lymphadenopathy, no hepatosplenomegaly   Chest - clear to auscultation, no wheezes, rales or rhonchi, symmetric air entry   Heart - normal rate, regular rhythm, normal S1, S2, no murmurs, rubs, clicks or gallops   Abdomen - soft, nontender, nondistended, no masses or organomegaly   Neurological - alert, oriented, normal speech, no focal findings or movement disorder noted   Musculoskeletal - no joint tenderness, deformity or swelling   Extremities - peripheral pulses normal, no pedal edema, no clubbing or cyanosis   Skin - normal coloration and turgor, no rashes, no suspicious skin lesions noted   LABORATORY DATA:     Lab Results   Component Value Date    WBC 10.4 10/22/2020    HGB 14.0 10/22/2020    HCT 43.0 10/22/2020    MCV 82.7 10/22/2020     10/22/2020    LYMPHOPCT 3 (L) 10/22/2020    RBC 5.20 10/22/2020    MCH 27.0 10/22/2020    MCHC 32.6 10/22/2020    RDW 16.7 (H) 10/22/2020    MONOPCT 3 10/22/2020    BASOPCT 0 10/22/2020    NEUTROABS 9.78 (H) 10/22/2020    LYMPHSABS 0.31 (L) 10/22/2020    MONOSABS 0.31 10/22/2020    EOSABS 0.00 10/22/2020    BASOSABS 0.00 10/22/2020       Chemistry        Component Value Date/Time     (L) 10/22/2020 2030    K 4.5 10/22/2020 2030    CL 94 (L) 10/22/2020 2030    CO2 26 10/22/2020 2030    BUN 24 (H) 10/22/2020 2030    CREATININE 1.06 10/22/2020 2030        Component Value Date/Time    CALCIUM 8.8 10/22/2020 2030    ALKPHOS 114 10/22/2020 2030    AST 21 10/22/2020 2030    ALT 16 10/22/2020 2030    BILITOT 0.32 10/22/2020 2030        PATHOLOGY DATA:     Collected: 9/21/2017     -- Diagnosis --   FINE NEEDLE ASPIRATION (LEFT LOWER LOBE LUNG, CT-GUIDED):       - POSITIVE FOR MALIGNANCY     -- Diagnosis Comment --   THE HISTOMORPHOLOGY AND IMMUNOSTAIN FINDINGS ARE CONSISTENT WITH   METASTATIC PAPILLARY THYROID CARCINOMA. IMAGING DATA:    CT of the chest  Impression:      Innumerable small pulmonary nodules new from 11/24/2012.  Metastatic disease is suspected.  Follow-up chest CT is recommended to further evaluate the extent of the metastatic disease and in search for a primary tumor. No evidence of metastatic disease in the abdomen/ pelvis with the limitations of a noncontrast study. Cholelithiasis.  The gallbladder is moderately dilated, the gallbladder wall is slightly thickened and there is suspicion of minimal pericholecystic fat stranding.  Follow-up gallbladder ultrasound examination or nuclear medicine hepatobiliary imaging study may be helpful.    CT abd pelvis 10/7/19  FINDINGS:   Lower Chest: Progression of the multiple pulmonary nodules identified   throughout both lung bases which have both increased in size and number. Lung nodules right lower lobe 1.7 x 1.0 cm in size.  1 nodule identified   within the lingula 1.7 x 1.0 cm in size.  Heart is enlarged.  Coronary   calcifications.  Pacemaker leads identified.       Organs: Previous cholecystectomy.  Scattered hypodense identified involving   the liver noted on prior contrast examination and appear unchanged. Gallbladder is absent.  Multiple bilateral renal cysts appear similar to the   prior exam.  No hydronephrosis.  No evidence of nephrolithiasis.  Otherwise   the spleen, adrenal glands, are unremarkable.  There is mild focal thickening   identified involving the pancreatic tail measuring 2.9 x 2.4 cm in size.       GI/Bowel: Retained stool throughout the colon.  No bowel obstruction. Appendix unremarkable.       Pelvis: Prostate is large with this dystrophic calcifications   posterolaterally.  Bladder is mildly distended.  Probable bilateral bladder   diverticulum on the right posteriorly.       Peritoneum/Retroperitoneum: No free air or free fluid.  Aortic vascular   calcifications.  Aorta appears aneurysmal.  Right epicardial phrenic lymph   nodes subcentimeter in size.  A few scattered retroperitoneal lymph nodes are   not enlarged per CT criteria.       Bones/Soft Tissues: .  Moderate size right inguinal fat containing hernia. Moderate multilevel spondylosis involving the thoracic lumbar spine. Moderate severe facet arthropathy lower lumbar spine.  No acute compression   fracture.  Mild thickening of the right intercostal muscle at T10-11.    Question minimal focal erosion involving the right 11th rib medially and   superiorly best seen on the coronal images.           Impression   Negative nephrolithiasis or obstructive uropathy.       Focal thickening involving the pancreatic tail may be related to developing   pancreatitis or could be due to underlying mass.  Please correlate with   pancreatic enzymes.  Postcontrast imaging could be beneficial for further   characterization as feasible/warranted       Progression of the multiple pulmonary nodules worrisome for metastatic   disease.       Mild thickening of the right T10-11 intercostal muscle.  This may represent   focal sprain or strain.  Intramuscular metastatic disease with may also be   considered in the differential.  Please correlate with exam findings. PET scan 10/18/2019:  Impression   1.  FDG avid cervical lymphadenopathy and pulmonary metastatic disease.       2.  Localized FDG activity in the right femoral neck without discrete bone   lesion.  Consider further evaluation with bone scan or MRI.       3.  Mild focal FDG activity and apparent enlargement in the tail the   pancreas.  A metastatic deposit can have this appearance.  This could be   further evaluated with contrast-enhanced CT or MRI if clinically appropriate.       4.  Short-segment of absent PET data in the upper abdomen, limiting this   region. CT chest 9/17/2020: Impression    No CT evidence of acute pulmonary embolism.         Extensive metastatic disease including a destructive lesion involving the    left half of the T6 vertebral body extending to the posterior elements and    epidural space. ASSESSMENT:    Mr. Aurora Dallas is a 80-year-old gentleman with a history of metastatic papillary thyroid cancer. He has history of total thyroidectomy in 2010 and also had modified radical neck dissection for metastatic lymph nodes. Lung biopsy confirmed papillary thyroid cancer usually to have good prognosis despite metastatic disease. His disease is limited to the lungs only. Among patients with small pulmonary metastases but no other metastases outside of the neck, the 10-year survival rate is 30 to 50 percent.     He is receiving TSH suppression,    at [place] clinic. Recent CT scan showing progression with the second destructive lesion. .During today's visit, the patient and the family had a number of reasonable questions which were answered to their satisfaction. They verbalized understanding of the information provided and they agreed to proceed as outlined above. PLAN:   I reviewed the results of recent CT scan. His CT scan showing progression of disease with T6 destructive lesion. Completed palliative RT  Patient and family wants to focus on quality of life and not interested in aggressive care. He is following with pain clinic  I discussed option of starting him on Zometa for bone metastasis and patient and family agreeable  I discussed option of targeted therapy with lenvatinib. I discussed risk-benefit and side effects. Plan to hold off for now and reevaluate him in 4 weeks if he can tolerate it  I discussed option of palliative care/hospice care. I will see him in 4 weeks once family makes decision    Dustin Dudley MD  Hematologist/Medical Oncologist      This note is created with the assistance of a speech recognition program.  While intending to generate a document that actually reflects the content of the visit, the document can still have some errors including those of syntax and sound a like substitutions which may escape proof reading. It such instances, actual meaning can be extrapolated by contextual diversion.

## 2020-10-23 NOTE — PROGRESS NOTES
Patient arrives to PCU room 2098 at this time via ED bed. NO distress noted in patient. Vitals obtained and admission to be completed. Call light is in place and 2/4 side rails are up. Bed alarm turned on due to patient being a high fall risk.

## 2020-10-23 NOTE — CONSULTS
_                         Today's Date: 10/23/2020  Patient Name: Jose David Alvarez  Date of admission: 10/22/2020  6:06 PM  Patient's age: 80 y.o., 2/13/1928  Admission Dx: UTI (urinary tract infection) [N39.0]      Requesting Physician: Brigitte Stevens MD    CHIEF COMPLAINT: Excessive weakness and fatigue. Consult for metastatic papillary thyroid cancer. Bone metastasis    History Obtained From:  patient, electronic medical record    HISTORY OF PRESENT ILLNESS:    The patient is a 80 y.o.  male who is admitted to the hospital for further management of extreme weakness and fatigue and possible sepsis and possible cardiac events. The patient is known to our practice. He has history of metastatic papillary thyroid cancer. He has bone metastasis. He had significant pain and he had follow-up in the pain clinic. Recent scans showed evidence of destructive lesion at T6 vertebra. Patient completed radiation therapy earlier this week. Patient is not giving any further history. Most information is from the son. Patient's performance status is getting worse. He was admitted for further management. He was evaluated by cardiology and due to his poor performance and is they elected conservative management.     Past Medical History:   has a past medical history of Abnormal ECG, Acquired hypothyroidism, Acute cholecystitis, Atherosclerosis of coronary artery bypass graft(s) without angina pectoris, Atrioventricular block, complete ([address] Biliary colic, Block, bundle branch, left, Calculus of bile duct without cholecystitis and without obstruction, Calculus of gallbladder, Cancer (HCC), Carotid artery stenosis, Cataract, Cerebrovascular disease, Cervical disc herniation, Cervical radiculopathy, chronic, Cervical spondylosis, CHF (congestive heart failure) (HCC), Chronic systolic CHF (congestive heart failure), NYHA class 2 ([address] Coronary arteriosclerosis in native artery, COVID-19 virus infection, Degeneration of cervical intervertebral disc, Elevated LFTs, Elevated liver enzymes, H/O malignant neoplasm of thyroid, Heart attack ([address] Heart block AV second degree, Hypercholesteremia, Hypertension, Hypothyroidism, Ischemic cardiomyopathy, Metastatic cancer ([address] Mild aortic regurgitation, Pneumonia due to COVID-19 virus, Pulmonary nodule, Pulmonary nodules/lesions, multiple, S/P CABG x 3, Sick sinus syndrome (HCC), SOB (shortness of breath), Spinal stenosis in cervical region, Type 2 diabetes mellitus ([address] and Type II or unspecified type diabetes mellitus without mention of complication, not stated as uncontrolled. Past Surgical History:   has a past surgical history that includes shoulder surgery; Cardiac surgery; Coronary artery bypass graft; Appendectomy; Total Thyroidectomy; Pacemaker insertion (03/2015); pr lap,cholecystectomy (N/A, 3/2/2018); Cholecystectomy; and Endoscopic ultrasonography, GI. Family History: family history includes Cancer in his mother; Other in his father. Social History:   reports that he has never smoked. He has never used smokeless tobacco. He reports that he does not drink alcohol or use drugs. Medications:    Prior to Admission medications    Medication Sig Start Date End Date Taking? Authorizing Provider   SITagliptin (JANUVIA) 100 MG tablet Take 1 tablet by mouth daily 10/22/20  Yes BRIAN Damon CNP   mupirocin (BACTROBAN) 2 % ointment Apply topically to affected area  3 times daily. 10/22/20 10/29/20 Yes BRIAN Damon CNP   polyethylene glycol (MIRALAX) 17 g packet Take 17 g by mouth daily as needed for Constipation 10/15/20  Yes MD joao Zavaletaabegron [facility]) 50 MG TB24 Take 50 mg by mouth daily 10/15/20  Yes BRIAN Corley CNP   HYDROcodone-acetaminophen (NORCO) 5-325 MG per tablet Take 1 tablet by mouth every 6 hours as needed for Pain for up to 30 days.  10/7/20 11/6/20 Yes Pipe Segundo MD diphenhydrAMINE (BENADRYL) 25 MG capsule Take 1 capsule by mouth every 6 hours as needed for Itching 9/21/20  Yes Rex Belcher MD   tamsulosin [facility]) 0.4 MG capsule Take 1 capsule by mouth daily 2/19/20  Yes Juana Felty, MD   finasteride (PROSCAR) 5 MG tablet TAKE 1 TABLET DAILY 2/19/20  Yes Juana Felty, MD   furosemide (LASIX) 20 MG tablet Take 1 tablet by mouth daily 11/30/18  Yes Anitha Herrera MD   vitamin D (ERGOCALCIFEROL) 30916 units CAPS capsule Take 50,000 Units by mouth once a week   Yes Historical Provider, MD   Multiple Vitamins-Minerals (THERAPEUTIC MULTIVITAMIN-MINERALS) tablet Take 1 tablet by mouth daily   Yes Historical Provider, MD   calcium carbonate (TUMS) 500 MG chewable tablet Take 1 tablet by mouth 2 times daily as needed for Heartburn 3/7/18  Yes Gautam Alston MD   glimepiride (AMARYL) 2 MG tablet Take 2 mg by mouth every morning (before breakfast)   Yes Historical Provider, MD   levothyroxine (LEVOXYL) 137 MCG tablet Take 137 mcg by mouth Daily  7/29/16  Yes Historical Provider, MD   nitroGLYCERIN (NITROSTAT) 0.4 MG SL tablet Place 0.4 mg under the tongue.  As directed   Yes Historical Provider, MD   docusate sodium (COLACE) 100 MG capsule Take 100 mg by mouth nightly    Yes Historical Provider, MD   atorvastatin (LIPITOR) 20 MG tablet   Take 20 mg by mouth nightly    Yes Historical Provider, MD   aspirin 81 MG EC tablet Take 81 mg by mouth daily    Yes Historical Provider, MD     Current Facility-Administered Medications   Medication Dose Route Frequency Provider Last Rate Last Dose    cefepime (MAXIPIME) 1 g IVPB minibag  1 g Intravenous Q12H Lennie Ledesma MD   Stopped at 10/23/20 0819    aspirin EC tablet 81 mg  81 mg Oral Daily Lennie Ledesma MD   81 mg at 10/23/20 0745    atorvastatin (LIPITOR) tablet 20 mg  20 mg Oral Nightly Lennie Ledesma MD        diphenhydrAMINE (BENADRYL) tablet 25 mg  25 mg Oral Q6H PRN Lennie Ledesma MD        docusate sodium (COLACE) capsule 100 mg  100 mg Oral Nightly Nicole Pascual MD        finasteride (PROSCAR) tablet 5 mg  5 mg Oral Daily Nicole Pascual MD   5 mg at 10/23/20 0745    furosemide (LASIX) tablet 20 mg  20 mg Oral Daily Nicole Pascual MD   20 mg at 10/23/20 0745    HYDROcodone-acetaminophen (NORCO) 5-325 MG per tablet 1 tablet  1 tablet Oral Q6H PRN Nicole Pascual MD        levothyroxine (SYNTHROID) tablet 137 mcg  137 mcg Oral Daily Nicole Pascual MD   137 mcg at 10/23/20 8645    nitroGLYCERIN (NITROSTAT) SL tablet 0.4 mg  0.4 mg Sublingual Q5 Min PRN Nicole Pascual MD        polyethylene glycol (GLYCOLAX) packet 17 g  17 g Oral Daily PRN Nicole Pascual MD        tamsulosin (FLOMAX) capsule 0.4 mg  0.4 mg Oral Daily Nicole Pascual MD   0.4 mg at 10/23/20 0745    insulin lispro (HUMALOG) injection vial 0-12 Units  0-12 Units Subcutaneous TID WC Nicole Pascual MD   4 Units at 10/23/20 1153    insulin lispro (HUMALOG) injection vial 0-6 Units  0-6 Units Subcutaneous Nightly Nicole Pascual MD        glucose (GLUTOSE) 40 % oral gel 15 g  15 g Oral PRN Nicole Pascual MD        dextrose 50 % IV solution  12.5 g Intravenous PRN Nicole Pascual MD        glucagon (rDNA) injection 1 mg  1 mg Intramuscular PRN Niocle Pascual MD        dextrose 5 % solution  100 mL/hr Intravenous PRN Nicole Pascual MD        vancomycin Blanca Dago) intermittent dosing (placeholder)   Other RX Placeholder Nicole Pascual MD        vancomycin 1000 mg IVPB in 250 mL D5W addavial  1,000 mg Intravenous Q24H Nicole Pascual MD           Allergies:  Seasonal and Keflex [cephalexin]    REVIEW OF SYSTEMS:    · Difficult to obtain considering patient's general mental status. Most information from patient's son. No other complaints about what is mentioned above.       PHYSICAL EXAM:      BP (!) 117/51   Pulse 65   Temp 98.4 °F (36.9 °C) (Oral)   Resp 14   Ht 5' 2\" (1.575 m)   Wt 116 lb 13.5 oz (53 kg)   SpO2 95%   BMI 21.37 kg/m²    Temp (24hrs), Av.4 °F (36.9 °C), Min:98.2 °F (36.8 °C), Max:99.1 °F (37.3 °C)      General appearance - not in pain or distress  Mental status - a patient is sleepy and slight disorientation. Eyes - pupils equal and reactive, extraocular eye movements intact  Ears - bilateral TM's and external ear canals normal  Nose - normal and patent, no erythema, discharge or polyps  Mouth - mucous membranes moist, pharynx normal without lesions  Neck - supple, no significant adenopathy  Lymphatics - no palpable lymphadenopathy, no hepatosplenomegaly  Chest - clear to auscultation, no wheezes, rales or rhonchi, symmetric air entry  Heart - normal rate, regular rhythm, normal S1, S2, no murmurs, rubs, clicks or gallops  Abdomen - soft, nontender, nondistended, no masses or organomegaly  Neurological -slightly disoriented, no focal deficit  Musculoskeletal - no joint tenderness, deformity or swelling  Extremities - peripheral pulses normal, no pedal edema, no clubbing or cyanosis  Skin - normal coloration and turgor, no rashes, no suspicious skin lesions noted           DATA:      Labs:       CBC:   Recent Labs     10/22/20  1935 10/23/20  0420   WBC 10.4 7.5   HGB 14.0 11.0*   HCT 43.0 34.0*    227     BMP:   Recent Labs     10/22/20  2030 10/23/20  0420   * 134*   K 4.5 4.2   CO2 26 24   BUN 24* 18   CREATININE 1.06 0.80   LABGLOM >60 >60   GLUCOSE 308* 231*     PT/INR:   Recent Labs     10/22/20  2030   PROTIME 15.0*   INR 1.2     APTT:  Recent Labs     10/22/20  2030   APTT 33.7     LIVER PROFILE:  Recent Labs     10/21/20  1246 10/22/20  2030   AST 19 21   ALT 14 16   LABALBU 2.4* 2.1*               IMPRESSION:    Primary Problem  Sepsis [facility])    Active Hospital Problems    Diagnosis Date Noted    UTI (urinary tract infection) [N39.0] 10/23/2020    Sepsis ([address] [A41.9] 10/23/2020   Advanced metastatic papillary thyroid cancer  Bone metastasis    RECOMMENDATIONS:  1.  Records and labs and images were reviewed and discussed with the patient and his son. 2. Considering patient's advanced age and advanced disease, prognosis is poor. 3. Previous discussion with the patient and the family in the cancer center in regard to further management. With consideration of prognosis, they elected to follow conservative management only. 4. Similar approach was with cardiologist today. 5. I had lengthy discussion with the son. No active chemotherapy or immunotherapy would be given. 6. Thank you for allowing us to participate in the care of this pleasant patient. Discussed with patient and Nurse. Zulma Brennan MD                            [address] [place] [address] [facility]                          Cell: [phone]    This note is created with the assistance of a speech recognition program.  While intending to generate a document that actually reflects the content of the visit, the document can still have some errors including those of syntax and sound a like substitutions which may escape proof reading. It such instances, actual meaning can be extrapolated by contextual diversion.

## 2020-10-23 NOTE — PROGRESS NOTES
Unable to fully complete admission. Patient unable to answer some admission questions and is unable to verify home medications. Will notify day shift to try and obtain relevant information from family later on.

## 2020-10-23 NOTE — ED NOTES
Unable to interrogate pacemaker. Pt is unable to answer what kind of pacemaker he is. Unable to locate type of pacemaker in the pt's chart. Attempted interrogation twice with [employer], unsuccessful with both attempts. Dr. Luis Felipe Zhao notified, physician stated that this can be done at a later time.       John Baptiste, RN  10/23/20 1262

## 2020-10-23 NOTE — CARE COORDINATION
Writer notified, Landen Oliver, from [facility], [facility], that pt's Daughter, Oj Alcaraz, would like home care again, from [facility]. Writer faxed Face sheet to office for them to follow. Landen Oliver will notify Office of Referral & will follow.

## 2020-10-23 NOTE — ED NOTES
Admission Dx: UTI    Pts Chief Complaints on Arrival: CHEST PAIN    ADL's - Total assistance    Pending Diagnostics:  PACEMAKER NEEDS INTERROGATED    Residence PTA: single story home    Special Considerations/Circumstances:  N/A    Vitals: Current vital signs:  /65   Pulse 65   Temp 98.2 °F (36.8 °C) (Oral)   Resp 21   Wt 116 lb (52.6 kg)   SpO2 99%   BMI 19.91 kg/m²                MEWS Score: 98 Sylvia Wood RN  10/23/20 1979

## 2020-10-23 NOTE — PLAN OF CARE
Problem: Falls - Risk of:  Goal: Will remain free from falls  Description: Will remain free from falls  Outcome: Ongoing  Note: No falls during this shift. Call light is within reach. Side rails up x2 with bed in lowest position. Patient safety maintained. Problem: Falls - Risk of:  Goal: Absence of physical injury  Description: Absence of physical injury  Outcome: Ongoing  Note: No injury this shift. Patient safety maintained. Problem: Skin Integrity:  Goal: Absence of new skin breakdown  Description: Absence of new skin breakdown  Outcome: Ongoing  Note: Skin assessment as charted.

## 2020-10-23 NOTE — DISCHARGE INSTR - COC
Continuity of Care Form    Patient Name: Tosin Velazquez   :  1928  MRN:  [id number]    Admit date:  10/22/2020  Discharge date:  10/27/2020    Code Status Order: Full Code   Advance Directives:   [facility] Documentation       Date/Time Healthcare Directive Type of Healthcare Directive Copy in [facility] [address] Agent's Name Healthcare Agent's Phone Number    10/23/20 0151  No, patient does not have an advance directive for healthcare treatment -- -- -- -- --            Admitting Physician:  Nick Sherman MD  PCP: Oliverio Ross MD    Discharging Nurse: [facility]/Room#: 2098/2098-01  Discharging Unit Phone Number: [phone]    Emergency Contact:   Extended Emergency Contact Information  Primary Emergency Contact: Nancy Fischer [address] Phone: [phone]  Work Phone: [phone]  Mobile Phone: [phone]  Relation: Child   needed? No  Secondary Emergency Contact: Bria Flo  Address: [address] [place] [address] Phone: [phone]  Work Phone: [phone]  Mobile Phone: [phone]  Relation: Child   needed?  No    Past Surgical History:  Past Surgical History:   Procedure Laterality Date    APPENDECTOMY      CARDIAC SURGERY      CHOLECYSTECTOMY      CORONARY ARTERY BYPASS GRAFT      ENDOSCOPIC ULTRASOUND (LOWER)      PACEMAKER INSERTION  2015    RI LAP,CHOLECYSTECTOMY N/A 3/2/2018    CHOLECYSTECTOMY LAPAROSCOPIC performed by Mirlande Obrien DO at [address] [place] [address]         Immunization History:   Immunization History   Administered Date(s) Administered    Influenza A (X5N9-00) Vaccine IM 2010    Influenza A (L3O1-61) Vaccine PF IM 01/15/2010    Influenza Virus Vaccine 10/01/2013, 2015    Influenza Whole 09/15/2011    Influenza, Quadv, IM, (6 mo and older Fluzone, Flulaval, Fluarix and 3 yrs and older Afluria) 10/04/2016    Influenza, Quadv, IM, PF (6 mo and older Fluzone, Flulaval, Fluarix, and 3 yrs and older Afluria) 12/12/2017    Influenza, Quadv, adjuvanted, 65 yrs +, IM, PF (Fluad) 09/21/2020    Influenza, Triv, inactivated, subunit, adjuvanted, IM (Fluad 65 yrs and older) 12/21/2019    Pneumococcal Conjugate 13-valent ([id number]) 04/30/2019    Pneumococcal Polysaccharide ([id number]) 09/15/2011, 11/25/2012, 11/09/2015, 05/09/2017    Tdap (Boostrix, Adacel) 01/28/2018       Active Problems:  Patient Active Problem List   Diagnosis Code    Degeneration of cervical intervertebral disc M50.30    Acquired hypothyroidism E03.9    Spinal stenosis in cervical region M48.02    Cervical spondylosis M47.812    Cervical disc herniation M50.20    Cervical radiculopathy, chronic M54.12    Hypercholesteremia E78.00    Heart block AV second degree I44.1    H/O malignant neoplasm of thyroid Z85.850    Mild aortic regurgitation I35.1    Type 2 diabetes mellitus (HCC) E11.9    Atherosclerosis of coronary artery bypass graft(s) without angina pectoris I25.810    Pulmonary nodules/lesions, multiple R91.8    Calculus of gallbladder K80.20    Elevated LFTs R79.89    Metastatic cancer (HCC) C79.9    Pulmonary nodule R91.1    Calculus of bile duct without cholecystitis and without obstruction K80.50    Acute cholecystitis K81.0    Elevated liver enzymes D18.3    Biliary colic Z14.75    Abnormal ECG R94.31    Atrioventricular block, complete (HCC) I44.2    Block, bundle branch, left I44.7    Coronary arteriosclerosis in native artery I25.10    Chronic systolic CHF (congestive heart failure), NYHA class 2 (HCC) I50.22    Ischemic cardiomyopathy I25.5    S/P CABG x 3 Z95.1    Sick sinus syndrome (HCC) I49.5    SOB (shortness of breath) R06.02    Pneumonia due to COVID-19 virus U07.1, J12.89    Presence of cardiac pacemaker Z95.0    Chest pain R07.9    Chronic back pain M54.9, G89.29    Papillary thyroid carcinoma ([facility] C73    Bone metastases ([facility] C79.51    UTI (urinary tract infection) N39.0    Sepsis ([facility] A41.9       Isolation/Infection:   Isolation            No Isolation          Patient Infection Status       Infection Onset Added Last Indicated Last Indicated By Review Planned Expiration Resolved Resolved By    MDRO (multi-drug resistant organism)  04/30/18 04/30/18 Guido Buckley RN        5/2018 proteus    VRE  04/02/18 04/02/18 Guido Buckley RN        03/29/2018 Urine    Resolved    COVID-19 Rule Out 10/05/20 10/06/20 10/05/20 Covid-19 Ambulatory (Ordered)   10/07/20 Rule-Out Test Resulted    COVID-19 08/29/20 08/29/20 08/29/20 COVID-19   08/30/20 Tyrone Omer RN    Positive on 7/25/2020, per CDC and Dr. Hoover no need for isolation     COVID-19 Rule Out 08/28/20 08/28/20 08/29/20 COVID-19 (Ordered)   08/29/20 Rule-Out Test Resulted            Nurse Assessment:  Last Vital Signs: BP (!) 96/48   Pulse 66   Temp 98.2 °F (36.8 °C) (Oral)   Resp 12   Ht 5' 2\" (1.575 m)   Wt 116 lb 13.5 oz (53 kg)   SpO2 96%   BMI 21.37 kg/m²     Last documented pain score (0-10 scale): Pain Level: 0  Last Weight:   Wt Readings from Last 1 Encounters:   10/23/20 116 lb 13.5 oz (53 kg)     Mental Status:  oriented and alert    IV Access:  - None    Nursing Mobility/ADLs:  Walking   Assisted  Transfer  Assisted  Bathing  Assisted  Dressing  Assisted  Toileting  Assisted  Feeding  Assisted  Med Admin  Dependent  Med Delivery   crushed and prefers mixed with applesauce    Wound Care Documentation and Therapy:  Wound 03/29/18 Blister Foot Left;Plantar #2 left distal foot/noticed this weekend (Active)   Number of days: 938        Elimination:  Continence:   · Bowel:  Yes  · Bladder: Yes  Urinary Catheter: None   Colostomy/Ileostomy/Ileal Conduit: No       Date of Last BM: 10/25/20    Intake/Output Summary (Last 24 hours) at 10/23/2020 1223  Last data filed at 10/23/2020 1043  Gross per 24 hour   Intake 710 ml   Output 440 ml   Net 270 ml     I/O last 3 completed shifts: In: 710 [P.O.:100; I.V.:610]  Out: -     Safety Concerns: At Risk for Falls    Impairments/Disabilities:      None    Nutrition Therapy:  Current Nutrition Therapy:   - Oral Diet:  Dysphagia 1 pureed    Routes of Feeding: Oral  Liquids: Honey Thick Liquids  Daily Fluid Restriction: no  Last Modified Barium Swallow with Video (Video Swallowing Test): not done    Treatments at the Time of Hospital Discharge:   Respiratory Treatments: n/a  Oxygen Therapy:  is not on home oxygen therapy. Ventilator:    - No ventilator support    Rehab Therapies: Physical Therapy, Occupational Therapy and Speech/Language Therapy  Weight Bearing Status/Restrictions: No weight bearing restirctions  Other Medical Equipment (for information only, NOT a DME order):  wheelchair, cane and walker, SC, Bedside Commode  Other Treatments: Skilled Nursing Assessment Per Protocol. Medication Education & Monitoring. Family would like information on Palliative Care Services. Home health care agency's  to evaluate patient two weeks prior to discharge from home health to determine post-discharge services. Patient's personal belongings (please select all that are sent with patient):  None    RN SIGNATURE:  Electronically signed by Nuvia Frazier on 10/27/20 at 9:26 AM EDT    CASE MANAGEMENT/SOCIAL WORK SECTION    Inpatient Status Date: 10/22/2020    Readmission Risk Assessment Score:  Readmission Risk              Risk of Unplanned Readmission:        30           Discharging to Facility/ [facility] Phone: [phone] Fax [phone]      / signature: Electronically signed by Darrel Bradley RN on 10/23/20 at 12:24 PM EDT    PHYSICIAN SECTION    Prognosis: Fair    Condition at Discharge: Stable    Rehab Potential (if transferring to Rehab):  Fair    Recommended Labs or Other Treatments After Discharge: none    Physician Certification: I certify the above information and transfer of Blanca Childs  is necessary for the continuing treatment of the diagnosis listed and that he requires [address] for greater 30 days.      Update Admission H&P: No change in H&P    PHYSICIAN SIGNATURE:  Electronically signed by Danita Hanley MD on 10/27/20 at 8:18 AM EDT

## 2020-10-23 NOTE — ED PROVIDER NOTES
[address]     Pt Name: Tosin Velazquez  MRN: [id number]  [place] 2/13/1928  Date of evaluation: 10/22/20       Tosin Velazquez is a 80 y.o. male who presents with Chest Pain and Cough      MDM:   Fever cough and chest pain  Suspect pneumonia  He was positive for covid 8/29/2020, so we are not testing as per hospital policy to not test anyone who has been positive recently    Vitals:   Vitals:    10/22/20 1804 10/22/20 1806 10/22/20 1920 10/22/20 2034   BP: (!) 107/48  103/62 (!) 118/54   Pulse: 102  90 88   Resp: 14  24 (!) 36   Temp:  100.3 °F (37.9 °C) 98.3 °F (36.8 °C) 99.1 °F (37.3 °C)   TempSrc:  Oral Oral Rectal   SpO2: 96%  96% 100%   Weight: 116 lb (52.6 kg)            I personally evaluated and examined the patient in conjunction with the resident and agree with the assessment, treatment plan, and disposition of the patient as recorded by the resident. I performed a history and physical examination of the patient and discussed management with the resident. I reviewed the residents note and agree with the documented findings and plan of care. Any areas of disagreement are noted on the chart. I was personally present for the key portions of any procedures. I have documented in the chart those procedures where I was not present during the key portions. I have personally reviewed all images and agree with the resident's interpretation. I have reviewed the emergency nurses triage note. I agree with the chief complaint, past medical history, past surgical history, allergies, medications, social and family history as documented unless otherwise noted.     Layton Booker MD  Attending Emergency Physician           Layton Booker MD  10/22/20 2114

## 2020-10-24 NOTE — PLAN OF CARE
Problem: Falls - Risk of:  Goal: Will remain free from falls  Outcome:Met This Shift     Patient remained free from falls and injury per shift; call light within reach, bed kept at lowest position, pathway clear, hourly rounds implemented.    Goal: Absence of physical injury  Outcome: Ongoing     Problem: Skin Integrity:  Goal: Will show no infection signs and symptoms  Outcome: Ongoing  Goal: Absence of new skin breakdown  Outcome: Met This Shift      No new skin breakdown/pressure ulcer per shift

## 2020-10-24 NOTE — PROGRESS NOTES
[facility] CARDIOLOGY Progress Note    10/24/2020 6:27 AM      Subjective:  Mr. Deirdre Tran states that he is doing \"failure \". Patient any chest pain or shortness of breath or palpitations or lightheadedness or dizziness. Review of systems:  No fever or chills. No cough. No headaches. Nurse updated overnight events. LABS:     Recent Results (from the past 24 hour(s))   POC Glucose Fingerstick    Collection Time: 10/23/20  7:02 AM   Result Value Ref Range    POC Glucose 181 (H) 75 - 110 mg/dL   POC Glucose Fingerstick    Collection Time: 10/23/20 11:50 AM   Result Value Ref Range    POC Glucose 232 (H) 75 - 110 mg/dL   POC Glucose Fingerstick    Collection Time: 10/23/20  4:19 PM   Result Value Ref Range    POC Glucose 135 (H) 75 - 110 mg/dL   POC Glucose Fingerstick    Collection Time: 10/23/20  8:26 PM   Result Value Ref Range    POC Glucose 251 (H) 75 - 110 mg/dL       Pulse Ox: SpO2  Av.8 %  Min: 95 %  Max: 97 %    Supplemental O2:       Current Meds:    cefepime  1 g Intravenous Q12H    aspirin  81 mg Oral Daily    atorvastatin  20 mg Oral Nightly    docusate sodium  100 mg Oral Nightly    finasteride  5 mg Oral Daily    furosemide  20 mg Oral Daily    levothyroxine  137 mcg Oral Daily    tamsulosin  0.4 mg Oral Daily    insulin lispro  0-12 Units Subcutaneous TID WC    insulin lispro  0-6 Units Subcutaneous Nightly    vancomycin (VANCOCIN) intermittent dosing (placeholder)   Other RX Placeholder    vancomycin  1,000 mg Intravenous Q24H    clotrimazole   Topical BID              VITAL SIGNS:    BP (!) 119/50   Pulse 66   Temp 98.2 °F (36.8 °C) (Oral)   Resp 16   Ht 5' 2\" (1.575 m)   Wt 120 lb 2.4 oz (54.5 kg)   SpO2 95%   BMI 21.98 kg/m²         Admit Weight:  116 lb (52.6 kg)    Last 3 weights:   Wt Readings from Last 3 Encounters:   10/24/20 120 lb 2.4 oz (54.5 kg)   10/21/20 116 lb 3.2 oz (52.7 kg)   10/17/20 120 lb (54.4 kg)       BMI: Body mass index is 21.98 kg/m². INPUT/OUTPUT:          Intake/Output Summary (Last 24 hours) at 10/24/2020 0627  Last data filed at 10/23/2020 1300  Gross per 24 hour   Intake 710 ml   Output 640 ml   Net 70 ml         Telemetry shows atrial paced rhythm. EXAM:     General appearance: awake, alert. Pleasant. Laying in bed. Neck: No JVD. Chest: clear bilaterally. No tenderness. No rhonchi or wheezing. Cardiac: Regular rate and rhythm. No Z5fapvak or rubs. Abdomen: soft, non-tender. Extremities: no cyanosis, no clubbing, no calf tenderness, no leg edema. Skin:  warm and dry. Neuro:  Able to move all 4 extremities. EKG:    Sinus tachycardia   Left axis deviation   Left bundle branch block   Abnormal ECG   When compared with ECG of 17-SEP-2020 14:28,   Sinus rhythm has replaced Electronic atrial pacemaker   T wave inversion more evident in Lateral leads     ASSESSMENT:    Minimal nonspecific high-sensitivity troponin with intermittent chest pain (which could represent angina pectoris) by history. Doubt any acute coronary syndrome or acute myocardial infarction. ASCVD, prior CABG. Stable angina pectoris. Ischemic cardiomyopathy, chronic systolic heart failure with reduced LVEF 40 to 45%. No fluid overload clinically. Sick sinus syndrome, status post permanent pacemaker. Atrial paced rhythm on telemetry. 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 Suspected dementia. Poor historian. Very advanced age. Other problems as charted. REC/PLAN:    Improved clinically and stable overall cardiac wise. We will add low-dose Toprol-XL 12.5 mg daily. Continue on all current cardiac medications including aspirin, atorvastatin, Lasix, nitroglycerin sublingual as needed chest pain. No plans for any further cardiac work-up at this time. Overall very guarded prognosis due to advanced age and comorbid conditions. No family members at bedside to discuss.     Discussed with the nurses. Hopefully discharge soon. Electronically signed by Uriel Murray MD, [facility] [place]        PLEASE NOTE:  This progress note was completed using a voice transcription system. Every effort was made to ensure accuracy. However, inadvertent computerized transcription errors may be present.

## 2020-10-24 NOTE — CARE COORDINATION
ONGOING DISCHARGE PLAN:    Unable to speak to pt. At this time. Currently sound asleep. No Family at the bedside. Pt is from home. Writer spoke to Daughter, Daniel [place], yesterday & she requested VNS w/ [address] they are continuing to follow. Pt. Remains on IV Cefepime/Vanco.    PT/OT on board, will follow for any rec/needs. Will continue to follow for additional discharge needs.     Electronically signed by Dannie Youssef RN on 10/24/2020 at 5:10 PM

## 2020-10-24 NOTE — PROGRESS NOTES
Physical Therapy    Facility/Department: [address] [place] [address] CARE  Initial Assessment    NAME: Ilsa Perez  : 1928  MRN: [id number]    Date of Service: 10/24/2020    Discharge Recommendations  Continue to assess pending progress   PT Equipment Recommendations  Equipment Needed: No    Assessment  Body structures, Functions, Activity limitations: Decreased functional mobility   Treatment Diagnosis: Impaired functional mobility  Prognosis: Excellent  Decision Making: Low Complexity  History: No personal factors were apparent that may have an effect on this patient's plan of care. Exam: Decreased transfers and gait/CGA  Clinical Presentation: Pt's symptoms are evolving. PT Education: Goals;PT Role;Plan of Care  REQUIRES PT FOLLOW UP: Yes    Activity Tolerance  Patient Tolerated treatment well      Patient Diagnosis(es): The primary encounter diagnosis was NSTEMI (non-ST elevated myocardial infarction) ([facility] Diagnoses of Pneumonia due to organism, Fever, unspecified fever cause, Metastasis from thyroid cancer [facility]), Urinary tract infection without hematuria, site unspecified, and Septicemia ([facility] were also pertinent to this visit.      has a past medical history of Abnormal ECG, Acquired hypothyroidism, Acute cholecystitis, Atherosclerosis of coronary artery bypass graft(s) without angina pectoris, Atrioventricular block, complete ([address] Biliary colic, Block, bundle branch, left, Calculus of bile duct without cholecystitis and without obstruction, Calculus of gallbladder, Cancer (HCC), Carotid artery stenosis, Cataract, Cerebrovascular disease, Cervical disc herniation, Cervical radiculopathy, chronic, Cervical spondylosis, CHF (congestive heart failure) (HCC), Chronic systolic CHF (congestive heart failure), NYHA class 2 ([address] Coronary arteriosclerosis in native artery, COVID-19 virus infection, Degeneration of cervical intervertebral disc, Elevated LFTs, Elevated liver enzymes, H/O malignant neoplasm of thyroid, Heart attack ([facility] Heart block AV second degree, Hypercholesteremia, Hypertension, Hypothyroidism, Ischemic cardiomyopathy, Metastatic cancer ([facility] Mild aortic regurgitation, Pneumonia due to COVID-19 virus, Pulmonary nodule, Pulmonary nodules/lesions, multiple, S/P CABG x 3, Sick sinus syndrome (HCC), SOB (shortness of breath), Spinal stenosis in cervical region, Type 2 diabetes mellitus ([facility] and Type II or unspecified type diabetes mellitus without mention of complication, not stated as uncontrolled. has a past surgical history that includes shoulder surgery; Cardiac surgery; Coronary artery bypass graft; Appendectomy; Total Thyroidectomy; Pacemaker insertion (03/2015); pr lap,cholecystectomy (N/A, 3/2/2018); Cholecystectomy; and Endoscopic ultrasonography, GI.   Restrictions  Restrictions/Precautions: Up as Tolerated, Contact Precautions  Vision/Hearing  Vision: Within Functional Limits  Hearing: Within functional limits       General  Chart Reviewed: Yes  Patient assessed for rehabilitation services?: Yes  Response To Previous Treatment: Not applicable  Referring Practitioner: Jennifer Reed MD  Referral Date : 10/23/20  Diagnosis: Sepsis  Follows Commands: Within Functional Limits    Subjective  Pain Screening  Patient Currently in Pain: No    Social/Functional History  Lives With: Daughter  Type of Home: House  Home Layout: Two level, Able to Live on Main level with bedroom/bathroom  Home Equipment: Rolling walker  Ambulation Assistance: Independent(With RW)  Transfer Assistance: Independent    Objective  Strength  Strength RLE  Strength RLE: WFL  Strength LLE  Strength LLE: WFL  Strength RUE  Strength RUE: WFL  Strength LUE  Strength LUE: WFL  Bed mobility  Rolling to Left: Independent  Rolling to Right: Independent  Supine to Sit: Independent  Sit to Supine: Independent  Scooting: Independent  Transfers  Sit to Stand: Contact guard assistance  Stand to sit: Contact guard assistance  Bed to Chair:

## 2020-10-24 NOTE — PROGRESS NOTES
Received return call and updated Dr. Salazar. Order received via telephone with readback for lotrimin. Will continue to monitor.

## 2020-10-24 NOTE — PROGRESS NOTES
Pharmacy Note  Vancomycin Consult  Vancomycin Day: 3  Dose = 1000 mg every 24 hours. Plan:  trough level 10/26 2300 hrs    Patient's labs, cultures, vitals, and vancomycin regimen reviewed. No changes today. Verito Miles. Ph.  10/24/2020  9:05 AM

## 2020-10-24 NOTE — PROGRESS NOTES
_    Today's Date: 10/24/2020  Patient Name: Merlyn Turner  Date of admission: 10/22/2020  6:06 PM  Patient's age: 80 y.o., 2/13/1928  Admission Dx: UTI (urinary tract infection) [N39.0]      Requesting Physician: Enriqueta Yu MD    CHIEF COMPLAINT: Excessive weakness and fatigue. Consult for metastatic papillary thyroid cancer. Bone metastasis    SUBJECTIVE:    The patient is seen and examined  Pain controlled  NO NV   No fever chills    HISTORY OF PRESENT ILLNESS IN BRIEF:    The patient is a 80 y.o.  male who is admitted to the hospital for further management of extreme weakness and fatigue and possible sepsis and possible cardiac events. The patient is known to our practice. He has history of metastatic papillary thyroid cancer. He has bone metastasis. He had significant pain and he had follow-up in the pain clinic. Recent scans showed evidence of destructive lesion at T6 vertebra. Patient completed radiation therapy earlier this week. Patient is not giving any further history. Most information is from the son. Patient's performance status is getting worse. He was admitted for further management. He was evaluated by cardiology and due to his poor performance and is they elected conservative management.     Past Medical History:   has a past medical history of Abnormal ECG, Acquired hypothyroidism, Acute cholecystitis, Atherosclerosis of coronary artery bypass graft(s) without angina pectoris, Atrioventricular block, complete ([address] Biliary colic, Block, bundle branch, left, Calculus of bile duct without cholecystitis and without obstruction, Calculus of gallbladder, Cancer (HCC), Carotid artery stenosis, Cataract, Cerebrovascular disease, Cervical disc herniation, Cervical radiculopathy, chronic, Cervical spondylosis, CHF (congestive heart failure) (HCC), Chronic systolic CHF (congestive heart failure), NYHA class 2 ([address] Coronary arteriosclerosis in native artery, COVID-19 virus infection, Degeneration of cervical intervertebral disc, Elevated LFTs, Elevated liver enzymes, H/O malignant neoplasm of thyroid, Heart attack ([address] Heart block AV second degree, Hypercholesteremia, Hypertension, Hypothyroidism, Ischemic cardiomyopathy, Metastatic cancer ([address] Mild aortic regurgitation, Pneumonia due to COVID-19 virus, Pulmonary nodule, Pulmonary nodules/lesions, multiple, S/P CABG x 3, Sick sinus syndrome (HCC), SOB (shortness of breath), Spinal stenosis in cervical region, Type 2 diabetes mellitus ([address] and Type II or unspecified type diabetes mellitus without mention of complication, not stated as uncontrolled. Past Surgical History:   has a past surgical history that includes shoulder surgery; Cardiac surgery; Coronary artery bypass graft; Appendectomy; Total Thyroidectomy; Pacemaker insertion (03/2015); pr lap,cholecystectomy (N/A, 3/2/2018); Cholecystectomy; and Endoscopic ultrasonography, GI. Family History: family history includes Cancer in his mother; Other in his father. Social History:   reports that he has never smoked. He has never used smokeless tobacco. He reports that he does not drink alcohol or use drugs. Medications:    Prior to Admission medications    Medication Sig Start Date End Date Taking? Authorizing Provider   SITagliptin (JANUVIA) 100 MG tablet Take 1 tablet by mouth daily 10/22/20  Yes BRIAN Tolentino CNP   mupirocin (BACTROBAN) 2 % ointment Apply topically to affected area  3 times daily. 10/22/20 10/29/20 Yes BRIAN Tolentino CNP   polyethylene glycol (MIRALAX) 17 g packet Take 17 g by mouth daily as needed for Constipation 10/15/20  Yes MD joao Diasabegron [facility]) 50 MG TB24 Take 50 mg by mouth daily 10/15/20  Yes BRIAN Marrero CNP   HYDROcodone-acetaminophen (NORCO) 5-325 MG per tablet Take 1 tablet by mouth every 6 hours as needed for Pain for up to 30 days.  10/7/20 11/6/20 Yes Edwin Oleary Rafael Delgado MD   diphenhydrAMINE (BENADRYL) 25 MG capsule Take 1 capsule by mouth every 6 hours as needed for Itching 9/21/20  Yes Rachel Clancy MD   tamsulosin [facility]) 0.4 MG capsule Take 1 capsule by mouth daily 2/19/20  Yes Sweetie Martinez MD   finasteride (PROSCAR) 5 MG tablet TAKE 1 TABLET DAILY 2/19/20  Yes Sweetie Martinez MD   furosemide (LASIX) 20 MG tablet Take 1 tablet by mouth daily 11/30/18  Yes Laron Landis MD   vitamin D (ERGOCALCIFEROL) 55717 units CAPS capsule Take 50,000 Units by mouth once a week   Yes Historical Provider, MD   Multiple Vitamins-Minerals (THERAPEUTIC MULTIVITAMIN-MINERALS) tablet Take 1 tablet by mouth daily   Yes Historical Provider, MD   calcium carbonate (TUMS) 500 MG chewable tablet Take 1 tablet by mouth 2 times daily as needed for Heartburn 3/7/18  Yes Huy Rosado MD   glimepiride (AMARYL) 2 MG tablet Take 2 mg by mouth every morning (before breakfast)   Yes Historical Provider, MD   levothyroxine (LEVOXYL) 137 MCG tablet Take 137 mcg by mouth Daily  7/29/16  Yes Historical Provider, MD   nitroGLYCERIN (NITROSTAT) 0.4 MG SL tablet Place 0.4 mg under the tongue.  As directed   Yes Historical Provider, MD   docusate sodium (COLACE) 100 MG capsule Take 100 mg by mouth nightly    Yes Historical Provider, MD   atorvastatin (LIPITOR) 20 MG tablet   Take 20 mg by mouth nightly    Yes Historical Provider, MD   aspirin 81 MG EC tablet Take 81 mg by mouth daily    Yes Historical Provider, MD     Current Facility-Administered Medications   Medication Dose Route Frequency Provider Last Rate Last Dose    metoprolol succinate (TOPROL XL) extended release tablet 12.5 mg  12.5 mg Oral Daily Vikram Lai MD        cefepime (MAXIPIME) 1 g IVPB minibag  1 g Intravenous Q12H Remberto Gannon MD   Stopped at 10/24/20 1040    aspirin EC tablet 81 mg  81 mg Oral Daily Remberto Gannon MD   81 mg at 10/24/20 1006    atorvastatin (LIPITOR) tablet 20 mg  20 mg Oral Nightly Nick Sherman MD   20 mg at 10/23/20 2148    diphenhydrAMINE (BENADRYL) tablet 25 mg  25 mg Oral Q6H PRN Nick Sherman MD        docusate sodium (COLACE) capsule 100 mg  100 mg Oral Nightly Nick Sherman MD   100 mg at 10/23/20 2149    finasteride (PROSCAR) tablet 5 mg  5 mg Oral Daily Nick Sherman MD   5 mg at 10/24/20 1006    furosemide (LASIX) tablet 20 mg  20 mg Oral Daily Nick Sherman MD   20 mg at 10/24/20 1006    HYDROcodone-acetaminophen (NORCO) 5-325 MG per tablet 1 tablet  1 tablet Oral Q6H PRN Nick Sherman MD        levothyroxine (SYNTHROID) tablet 137 mcg  137 mcg Oral Daily Nick Sherman MD   137 mcg at 10/24/20 0550    nitroGLYCERIN (NITROSTAT) SL tablet 0.4 mg  0.4 mg Sublingual Q5 Min PRN Nick Sherman MD        polyethylene glycol (GLYCOLAX) packet 17 g  17 g Oral Daily PRN Nick Sherman MD        tamsulosin (FLOMAX) capsule 0.4 mg  0.4 mg Oral Daily Nick Sherman MD   0.4 mg at 10/24/20 1006    insulin lispro (HUMALOG) injection vial 0-12 Units  0-12 Units Subcutaneous TID WC Nick Sherman MD   2 Units at 10/24/20 1237    insulin lispro (HUMALOG) injection vial 0-6 Units  0-6 Units Subcutaneous Nightly Nick Sherman MD   3 Units at 10/23/20 2149    glucose (GLUTOSE) 40 % oral gel 15 g  15 g Oral PRN Nick Sherman MD        dextrose 50 % IV solution  12.5 g Intravenous PRN Nick Sherman MD        glucagon (rDNA) injection 1 mg  1 mg Intramuscular PRN Nick Sherman MD        dextrose 5 % solution  100 mL/hr Intravenous PRN Nick Sherman MD        vancomycin Donna Petty) intermittent dosing (placeholder)   Other RX Placeholder Nick Sherman MD        vancomycin 1000 mg IVPB in 250 mL D5W addavial  1,000 mg Intravenous Q24H Nick Sherman MD   Stopped at 10/24/20 0050    clotrimazole (LOTRIMIN) 1 % cream   Topical BID Eunice Meehan MD           Allergies:  Seasonal and Keflex [cephalexin]    REVIEW OF SYSTEMS:    · Difficult to obtain considering patient's general mental status. Most information from patient's son. No other complaints about what is mentioned above. PHYSICAL EXAM:      BP (!) 98/42   Pulse 66   Temp 98.4 °F (36.9 °C) (Oral)   Resp 16   Ht 5' 2\" (1.575 m)   Wt 120 lb 2.4 oz (54.5 kg)   SpO2 94%   BMI 21.98 kg/m²    Temp (24hrs), Av.2 °F (36.8 °C), Min:97.9 °F (36.6 °C), Max:98.4 °F (36.9 °C)      General appearance - not in pain or distress  Mental status - a patient is sleepy and slight disorientation.   Eyes - pupils equal and reactive, extraocular eye movements intact  Ears - bilateral TM's and external ear canals normal  Nose - normal and patent, no erythema, discharge or polyps  Mouth - mucous membranes moist, pharynx normal without lesions  Neck - supple, no significant adenopathy  Lymphatics - no palpable lymphadenopathy, no hepatosplenomegaly  Chest - clear to auscultation, no wheezes, rales or rhonchi, symmetric air entry  Heart - normal rate, regular rhythm, normal S1, S2, no murmurs, rubs, clicks or gallops  Abdomen - soft, nontender, nondistended, no masses or organomegaly  Neurological -slightly disoriented, no focal deficit  Musculoskeletal - no joint tenderness, deformity or swelling  Extremities - peripheral pulses normal, no pedal edema, no clubbing or cyanosis  Skin - normal coloration and turgor, no rashes, no suspicious skin lesions noted           DATA:      Labs:       CBC:   Recent Labs     10/22/20  1935 10/23/20  0420   WBC 10.4 7.5   HGB 14.0 11.0*   HCT 43.0 34.0*    227     BMP:   Recent Labs     10/22/20  2030 10/23/20  0420 10/24/20  0931   * 134*  --    K 4.5 4.2  --    CO2 26 24  --    BUN 24* 18  --    CREATININE 1.06 0.80 0.70   LABGLOM >60 >60 >60   GLUCOSE 308* 231*  --      PT/INR:   Recent Labs     10/22/20  2030   PROTIME 15.0*   INR 1.2     APTT:  Recent Labs     10/22/20  2030   APTT 33.7     LIVER PROFILE:  Recent Labs     10/22/20  2030   AST 21   ALT 16   LABALBU 2.1* IMPRESSION:    Primary Problem  Sepsis [facility])    Active Hospital Problems    Diagnosis Date Noted    UTI (urinary tract infection) [N39.0] 10/23/2020    Sepsis ([facility] [A41.9] 10/23/2020    NSTEMI (non-ST elevated myocardial infarction) ([facility] [I21.4]     Fever [R50.9]     Pneumonia due to organism [J18.9]     Metastasis from thyroid cancer ([facility] [C79.9, C73] 09/19/2017   Advanced metastatic papillary thyroid cancer  Bone metastasis    RECOMMENDATIONS:  1. Records and labs and images were reviewed and discussed with the patient and his son. 2. Considering patient's advanced age and advanced disease, prognosis is poor. 3. I had discussion with family With consideration of prognosis, they elected to follow conservative management only. 4. At this time   No active chemotherapy or immunotherapy would be given. 5. Thank you for allowing us to participate in the care of this pleasant patient. Discussed with patient and Nurse. MD Dustin Jose MD  Hematologist/Medical Oncologist    Cell: [phone]      This note is created with the assistance of a speech recognition program.  While intending to generate a document that actually reflects the content of the visit, the document can still have some errors including those of syntax and sound a like substitutions which may escape proof reading. It such instances, actual meaning can be extrapolated by contextual diversion.

## 2020-10-24 NOTE — H&P
Hospital Medicine  History and Physical                                                                                                                                                 Full Code    Patient:  Marielos Monterroso  MRN: [id number]                                                                                                                                                                     CHIEF COMPLAINT:  weakness    History Obtained From:  family member - son, electronic medical record  PCP: Luna Marin MD    HISTORY OF PRESENT ILLNESS:   The patient is a 80 y.o. male who presents with admitted with weakness,pt was in his pcp office when went down, was unable to get up,pt was seen in ER and admitted,pt has h/o metastatic papillary thyroid cancer ,pt has h/o of t6 path fx,pt recently radiation therapy    Past Medical History:        Diagnosis Date    Abnormal ECG 8/30/2019    Acquired hypothyroidism 9/19/2014    Acute cholecystitis 2/28/2018    Atherosclerosis of coronary artery bypass graft(s) without angina pectoris 10/4/2016    Atrioventricular block, complete ([address] 7/23/4064    Biliary colic     Block, bundle branch, left 3/30/2010    Calculus of bile duct without cholecystitis and without obstruction     Calculus of gallbladder 9/19/2017    Cancer ([address] 2013    thyroid    Carotid artery stenosis     Cataract     Cerebrovascular disease     Cervical disc herniation 11/13/2014    Cervical radiculopathy, chronic 11/14/2014    Cervical spondylosis 11/13/2014    CHF (congestive heart failure) (HCC)     Chronic systolic CHF (congestive heart failure), NYHA class 2 ([address] 6/21/2018    Coronary arteriosclerosis in native artery 3/30/2010    Overview:  H/O Acute anteriorseptal MI  11/8/99 Severe 3 vessel CAD , LM coronary dissection  S/P CABG  1999 L-LAD, SVG Ramus, SVG OM, SVG L PDA Stress test  10/2007  EF 49% no reversible ischemia Predominantly fixed infeior apical septal defects Low normal to mildly reduced LV sys function with :VEF 49%  Septal wall motion abnormlaity Compared with prior study no new reveresible perfusion defects    COVID-19 virus infection 07/25/2020    Degeneration of cervical intervertebral disc 9/25/2012    Elevated LFTs 9/19/2017    Elevated liver enzymes 2/28/2018    H/O malignant neoplasm of thyroid 12/10/2013    Heart attack ([facility]     Heart block AV second degree     Hypercholesteremia 3/30/2010    Hypertension     Hypothyroidism     Ischemic cardiomyopathy 8/30/2019    Metastatic cancer ([address] 9/19/2017    Mild aortic regurgitation 3/30/2010    Overview:  Mild moderate MR and AR  Overview:  Mild moderate MR and AR    Pneumonia due to COVID-19 virus     07/25/2020    Pulmonary nodule 6/21/2018    Pulmonary nodules/lesions, multiple 9/19/2017    S/P CABG x 3 8/30/2019    Sick sinus syndrome ([facility] 8/30/2019    SOB (shortness of breath) 8/30/2019    Spinal stenosis in cervical region 11/6/2014    Type 2 diabetes mellitus ([address] 3/30/2010    Overview:  without complications    Type II or unspecified type diabetes mellitus without mention of complication, not stated as uncontrolled        Past Surgical History:        Procedure Laterality Date    APPENDECTOMY      CARDIAC SURGERY      CHOLECYSTECTOMY      CORONARY ARTERY BYPASS GRAFT      ENDOSCOPIC ULTRASOUND (LOWER)      PACEMAKER INSERTION  03/2015    FL LAP,CHOLECYSTECTOMY N/A 3/2/2018    CHOLECYSTECTOMY LAPAROSCOPIC performed by Melissa Peterson DO at [address] [place] [address]         Medications Prior to Admission:    Prior to Admission medications    Medication Sig Start Date End Date Taking? Authorizing Provider   SITagliptin (JANUVIA) 100 MG tablet Take 1 tablet by mouth daily 10/22/20  Yes BRIAN Tolentino CNP   mupirocin (BACTROBAN) 2 % ointment Apply topically to affected area  3 times daily.  10/22/20 10/29/20 Yes BRIAN Tolentino CNP   polyethylene glycol (MIRALAX) 17 g packet Take 17 g by mouth daily as needed for Constipation 10/15/20  Yes Dunia Pollard MD   mirabegron [facility]) 50 MG TB24 Take 50 mg by mouth daily 10/15/20  Yes BRIAN Reyes - CNP   HYDROcodone-acetaminophen (NORCO) 5-325 MG per tablet Take 1 tablet by mouth every 6 hours as needed for Pain for up to 30 days. 10/7/20 11/6/20 Yes Genevieve Purvis MD   diphenhydrAMINE (BENADRYL) 25 MG capsule Take 1 capsule by mouth every 6 hours as needed for Itching 9/21/20  Yes Dunia Pollard MD   tamsulosin [facility]) 0.4 MG capsule Take 1 capsule by mouth daily 2/19/20  Yes Pat Alicia MD   finasteride (PROSCAR) 5 MG tablet TAKE 1 TABLET DAILY 2/19/20  Yes Pat Alicia MD   furosemide (LASIX) 20 MG tablet Take 1 tablet by mouth daily 11/30/18  Yes Renate Felty, MD   vitamin D (ERGOCALCIFEROL) 91255 units CAPS capsule Take 50,000 Units by mouth once a week   Yes Historical Provider, MD   Multiple Vitamins-Minerals (THERAPEUTIC MULTIVITAMIN-MINERALS) tablet Take 1 tablet by mouth daily   Yes Historical Provider, MD   calcium carbonate (TUMS) 500 MG chewable tablet Take 1 tablet by mouth 2 times daily as needed for Heartburn 3/7/18  Yes Cass Ghosh MD   glimepiride (AMARYL) 2 MG tablet Take 2 mg by mouth every morning (before breakfast)   Yes Historical Provider, MD   levothyroxine (LEVOXYL) 137 MCG tablet Take 137 mcg by mouth Daily  7/29/16  Yes Historical Provider, MD   nitroGLYCERIN (NITROSTAT) 0.4 MG SL tablet Place 0.4 mg under the tongue.  As directed   Yes Historical Provider, MD   docusate sodium (COLACE) 100 MG capsule Take 100 mg by mouth nightly    Yes Historical Provider, MD   atorvastatin (LIPITOR) 20 MG tablet   Take 20 mg by mouth nightly    Yes Historical Provider, MD   aspirin 81 MG EC tablet Take 81 mg by mouth daily    Yes Historical Provider, MD       Current meds  Scheduled Meds:   cefepime  1 g Intravenous Q12H    aspirin  81 mg Oral Daily    atorvastatin  20 mg Oral Nightly    docusate sodium  100 mg Oral Nightly    finasteride  5 mg Oral Daily    furosemide  20 mg Oral Daily    levothyroxine  137 mcg Oral Daily    tamsulosin  0.4 mg Oral Daily    insulin lispro  0-12 Units Subcutaneous TID WC    insulin lispro  0-6 Units Subcutaneous Nightly    vancomycin (VANCOCIN) intermittent dosing (placeholder)   Other RX Placeholder    vancomycin  1,000 mg Intravenous Q24H    clotrimazole   Topical BID     Continuous Infusions:   dextrose       PRN Meds:.diphenhydrAMINE, HYDROcodone-acetaminophen, nitroGLYCERIN, polyethylene glycol, glucose, dextrose, glucagon (rDNA), dextrose    Allergies:  Seasonal and Keflex [cephalexin]    Social History:   TOBACCO:   reports that he has never smoked. He has never used smokeless tobacco.  ETOH:   reports no history of alcohol use.   OCCUPATION:      Family History:       Problem Relation Age of Onset   [employer] Cancer Mother         stomach    Other Father         pneumonia       REVIEW OF SYSTEMS:  Constitutional:  negative for  fevers, chills, sweats and weight loss  HEENT:  negative for  hearing loss, ear drainage, nasal congestion, snoring, hoarseness and voice change  Neck:  No swollen glands and No h/o goiter or thyroid disease  Cardiac:  negative for  chest pain, dyspnea, palpitations, orthopnea, PND, edema  Respiratory:  negative for  dry cough, cough with sputum, dyspnea, wheezing and hemoptysis  Gastrointestinal:  negative for nausea, vomiting, change in bowel habits, diarrhea, constipation, abdominal pain and hemtochezia  :  negative for frequency, dysuria, urinary incontinence, hesitancy, decreased stream and hematuria  Musculoskeletal:  negative for  myalgias, arthralgias, joint swelling and stiff joints  Neuro:  negative for headaches, dizziness, seizures, memory problems, visual disturbance, weakness and syncope      Physical Exam:    Vitals: BP (!) 117/51   Pulse 65   Temp 98.4 °F (36.9 °C) (Oral) Resp 14   Ht 5' 2\" (1.575 m)   Wt 116 lb 13.5 oz (53 kg)   SpO2 95%   BMI 21.37 kg/m²   General appearance: alert, appears stated age and cooperative  Skin: Skin color, texture, turgor normal. No rashes or lesions  HEENT: Head: Normocephalic, no lesions, without obvious abnormality. Eye: Normal external eye, conjunctiva, lids cornea, SAMANTHA  Neck: no adenopathy, no carotid bruit, no JVD, supple, symmetrical, trachea midline and thyroid not enlarged, symmetric, no tenderness/mass/nodules  Lungs: clear to auscultation bilaterally  Heart: regular rate and rhythm, S1, S2 normal, no murmur, click, rub or gallop  Abdomen: soft, non-tender; bowel sounds normal; no masses,  no organomegaly  Extremities: extremities normal, atraumatic, no cyanosis or edema  Neurologic: Mental status: Alert, disoriented,     CBC:   Recent Labs     10/22/20  1935 10/23/20  0420   WBC 10.4 7.5   HGB 14.0 11.0*    227     BMP:    Recent Labs     10/21/20  1246 10/22/20  2030 10/23/20  0420   * 130* 134*   K 4.4 4.5 4.2   CL 90* 94* 102   CO2 28 26 24   BUN 24* 24* 18   CREATININE 0.89 1.06 0.80   GLUCOSE 428* 308* 231*     Hepatic:   Recent Labs     10/21/20  1246 10/22/20  2030   AST 19 21   ALT 14 16   BILITOT 0.40 0.32   ALKPHOS 120 114     Troponin: No results for input(s): TROPONINI in the last 72 hours. BNP: No results for input(s): BNP in the last 72 hours. Lipids: No results for input(s): CHOL, HDL in the last 72 hours.     Invalid input(s): LDLCALCU  ABGs: No results found for: PHART, PO2ART, VLN9PRY  INR:   Recent Labs     10/22/20  2030   INR 1.2     -----------------------------------------------------------------  PA/lat CXR: Ct Abdomen Pelvis W Iv Contrast Additional Contrast? None    Result Date: 10/22/2020  EXAMINATION: CT OF THE ABDOMEN AND PELVIS WITH CONTRAST 10/22/2020 9:31 pm TECHNIQUE: CT of the abdomen and pelvis was performed with the administration of intravenous contrast. Multiplanar reformatted images are provided for review. Dose modulation, iterative reconstruction, and/or weight based adjustment of the mA/kV was utilized to reduce the radiation dose to as low as reasonably achievable. COMPARISON: CT abdomen pelvis 10/07/2018, CT chest 09/17/2020, CT and pelvis 02/27/2018 HISTORY: ORDERING SYSTEM PROVIDED HISTORY: abdominal pain, diarrhea TECHNOLOGIST PROVIDED HISTORY: abdominal pain, diarrhea Reason for Exam: abdominal pain, diarrhea Acuity: Acute Type of Exam: Initial Relevant Medical/Surgical History: surg- gallbladder, appendix FINDINGS: Lower Chest: Minimal pulmonary nodules again identified large within the left lower lobe 1.5 cm greatest dimension. Pacemaker leads identified with heart. Heart is enlarged. Coronary calcifications. Organs: Multiple hepatic masses again identified consistent with metastatic disease. Large within the right hepatic lobe 3.0 x 3.6 cm in size. Large within left hepatic lobe 3.9 x 4.1 cm in size. These demonstrate ill-defined margins. Soft tissue density identified along the posterior aspect of the right hepatic lobe again identified with central area of hypoattenuation may represent central necrosis. This measures 5.4 x 1.9 cm in size. Spleen, adrenal glands, are unremarkable. There are masses identified involving the distal pancreatic tail again identified these appears slightly progressed since the previous exam up to 4.4 x 3.0 cm in size. Multiple bilateral renal cysts. Left lateral pole renal density again identified likely proteinaceous or hemorrhagic cyst measuring 2.1 cm. GI/Bowel: Mild-to-moderate retained stool throughout the colon. No bowel obstruction. Moderate sigmoid colonic diverticulosis. Right inguinal hernia containing fat and small knuckle of bowel again identified. No CT findings acute appendicitis. Pelvis: Prostate is enlarged. Mild bladder distention. Right inguinal hernia noted. Peritoneum/Retroperitoneum: No free fluid. No free air. Aortic vascular calcifications. No suspicious adenopathy. Bones/Soft Tissues: Question hemangioma involving L1 vertebral body otherwise no definite suspicious osseous lesion identified. No acute inflammatory process or bowel obstruction. Redemonstration of multiple hepatic metastases. , these have progressed from prior contrast-enhanced exam dating back to 2018 however most recent exams have been performed without contrast so difficult to compare with more recent exams Colonic diverticulosis noted. Distal pancreatic metastases not significant changed. Multiple bilateral metastatic pulmonary nodules question mild progression     Xr Chest Portable    Result Date: 10/22/2020  EXAMINATION: ONE XRAY VIEW OF THE CHEST 10/22/2020 6:14 pm COMPARISON: 09/17/2020 HISTORY: ORDERING SYSTEM PROVIDED HISTORY: Cough, fever. +COVID in september. Hx of CHF, CABG, pulm nodule, Aortic regurge TECHNOLOGIST PROVIDED HISTORY: Cough, fever. +COVID in september. Hx of CHF, CABG, pulm nodule, Aortic regurge Reason for Exam: Cough, fever. Positive COVID in september. Hx of CHF, CABG, pulmonary nodule, aortic regurgitation. Acuity: Acute Type of Exam: Initial Additional signs and symptoms: Cough, fever. Positive COVID in september. Hx of CHF, CABG, pulmonary nodule, aortic regurgitation. FINDINGS: A permanent left-sided AV sequential bipolar pacemaker was noted. Postoperative changes were noted from prior coronary artery bypass surgery. Parenchymal nodularity was again noted. No cardiomegaly, interstitial edema or pleural effusion were noted. A metallic clip was noted in the right shoulder from prior rotator cuff repair. Bilateral lower lobe non consolidating infiltration was again noted. This is either due to chronic pneumonia or underlying bronchiectasis. Little change has occurred from 09/17/2020. Permanent left-sided AV sequential bipolar pacemaker. CABG. No cardiomegaly, interstitial edema or pleural effusion.  Bilateral lower lobe non consolidating infiltration was again noted. This is either due to chronic pneumonia or underlying bronchiectasis. Pulmonary parenchymal nodularity was again identified. No significant change has occurred from 09/17/2020. Prophylaxis:   DVT with  [] lovenox        [] heparin        [x] Scd        [] none:     Assessment and Plan   1.  Sepsis/uti/cefepime    Patient Active Problem List   Diagnosis Code    Degeneration of cervical intervertebral disc M50.30    Acquired hypothyroidism E03.9    Spinal stenosis in cervical region M48.02    Cervical spondylosis M47.812    Cervical disc herniation M50.20    Cervical radiculopathy, chronic M54.12    Hypercholesteremia E78.00    Heart block AV second degree I44.1    H/O malignant neoplasm of thyroid Z85.850    Mild aortic regurgitation I35.1    Type 2 diabetes mellitus (HCC) E11.9    Atherosclerosis of coronary artery bypass graft(s) without angina pectoris I25.810    Pulmonary nodules/lesions, multiple R91.8    Calculus of gallbladder K80.20    Elevated LFTs R79.89    Metastasis from thyroid cancer (HCC) C79.9, C73    Pulmonary nodule R91.1    Calculus of bile duct without cholecystitis and without obstruction K80.50    Acute cholecystitis K81.0    Elevated liver enzymes L36.3    Biliary colic M27.97    Abnormal ECG R94.31    Atrioventricular block, complete (HCC) I44.2    Block, bundle branch, left I44.7    Coronary arteriosclerosis in native artery I25.10    Chronic systolic CHF (congestive heart failure), NYHA class 2 (HCC) I50.22    Ischemic cardiomyopathy I25.5    S/P CABG x 3 Z95.1    Sick sinus syndrome (HCC) I49.5    SOB (shortness of breath) R06.02    Pneumonia due to COVID-19 virus U07.1, J12.89    Presence of cardiac pacemaker Z95.0    Chest pain R07.9    Chronic back pain M54.9, G89.29    Papillary thyroid carcinoma (HCC) C73    Bone metastases (HCC) C79.51    UTI (urinary tract infection) N39.0    Sepsis ([facility] A41.9    NSTEMI (non-ST elevated myocardial infarction) [facility] I21.4    Fever R50.9    Pneumonia due to organism J18.9       Anticipated Disposition upon discharge: [] Home                                                                         [] Home with Home Health                                                                         [] Ronaldo Thomas                                                                         [] [address] [facility] [address]          Patient is admitted as inpatient status because of co-morbidities listed above, severity of signs and symptoms as outlined, requirement for current medical therapies and most importantly because of direct risk to patient if care not provided in a hospital setting.     Rodriguez Daugherty MD  Admitting Hospitalist

## 2020-10-24 NOTE — PROGRESS NOTES
[facility]    Progress Note    10/24/2020    7:36 PM    Name:   Jose David Alvarez  MRN:     [id number]     Acct:      [de-identified]   Room:   209/2098[facility] Day:  1  Admit Date:  10/22/2020  6:06 PM    PCP:   Garry Mendez MD  Code Status:  Full Code    Subjective:       80year old man with history of metastatic papillary thyroid cancer admitted with extreme fatigue and weakness  Patient is currently awake and alert and denies any significant pain  He is getting ready to work with physical therapy  Patient has been placed on Vanco, cefepime  CT Abdomen showed no acute process. Multiple hepatic metastases  CXR- bilateral lower lobe non-consolidating infiltrate. No significant change since last month    Review of Systems:     Constitutional:  negative for chills, fevers, sweats  Respiratory:  negative for cough, dyspnea on exertion,   Cardiovascular:  negative for chest pain,   Gastrointestinal:  negative for  diarrhea, nausea, vomiting    Medications: Allergies: Allergies   Allergen Reactions    Seasonal      Other reaction(s):  Other: See Comments  seasonal upper respiratory irritation    Keflex [Cephalexin] Nausea And Vomiting       Current Meds:   Scheduled Meds:    metoprolol succinate  12.5 mg Oral Daily    cefepime  1 g Intravenous Q12H    aspirin  81 mg Oral Daily    atorvastatin  20 mg Oral Nightly    docusate sodium  100 mg Oral Nightly    finasteride  5 mg Oral Daily    furosemide  20 mg Oral Daily    levothyroxine  137 mcg Oral Daily    tamsulosin  0.4 mg Oral Daily    insulin lispro  0-12 Units Subcutaneous TID WC    insulin lispro  0-6 Units Subcutaneous Nightly    vancomycin (VANCOCIN) intermittent dosing (placeholder)   Other RX Placeholder    vancomycin  1,000 mg Intravenous Q24H    clotrimazole   Topical BID     Continuous Infusions:    dextrose       PRN Meds: diphenhydrAMINE, HYDROcodone-acetaminophen, nitroGLYCERIN, polyethylene glycol, glucose, dextrose, glucagon (rDNA), dextrose    Data:     Past Medical History:   has a past medical history of Abnormal ECG, Acquired hypothyroidism, Acute cholecystitis, Atherosclerosis of coronary artery bypass graft(s) without angina pectoris, Atrioventricular block, complete ([address] Biliary colic, Block, bundle branch, left, Calculus of bile duct without cholecystitis and without obstruction, Calculus of gallbladder, Cancer (HCC), Carotid artery stenosis, Cataract, Cerebrovascular disease, Cervical disc herniation, Cervical radiculopathy, chronic, Cervical spondylosis, CHF (congestive heart failure) (HCC), Chronic systolic CHF (congestive heart failure), NYHA class 2 ([address] Coronary arteriosclerosis in native artery, COVID-19 virus infection, Degeneration of cervical intervertebral disc, Elevated LFTs, Elevated liver enzymes, H/O malignant neoplasm of thyroid, Heart attack ([address] Heart block AV second degree, Hypercholesteremia, Hypertension, Hypothyroidism, Ischemic cardiomyopathy, Metastatic cancer ([address] Mild aortic regurgitation, Pneumonia due to COVID-19 virus, Pulmonary nodule, Pulmonary nodules/lesions, multiple, S/P CABG x 3, Sick sinus syndrome (HCC), SOB (shortness of breath), Spinal stenosis in cervical region, Type 2 diabetes mellitus ([address] and Type II or unspecified type diabetes mellitus without mention of complication, not stated as uncontrolled. Social History:   reports that he has never smoked. He has never used smokeless tobacco. He reports that he does not drink alcohol or use drugs.      Family History:   Family History   Problem Relation Age of Onset   [facility] Cancer Mother         stomach    Other Father         pneumonia       Vitals:  BP (!) 98/42   Pulse 66   Temp 98.4 °F (36.9 °C) (Oral)   Resp 16   Ht 5' 2\" (1.575 m)   Wt 120 lb 2.4 oz (54.5 kg)   SpO2 94%   BMI 21.98 kg/m²   Temp (24hrs), Av.2 °F (36.8 °C), Min:97.9 °F (36.6 °C), Max:98.4 °F (36.9 °C)    Recent Labs     10/23/20  2026 10/24/20  0711 10/24/20  1117 10/24/20  1644   POCGLU 251* 235* 173* 261*       I/O (24Hr): Intake/Output Summary (Last 24 hours) at 10/24/2020 1936  Last data filed at 10/24/2020 1440  Gross per 24 hour   Intake 240 ml   Output 1020 ml   Net -780 ml       Labs:  Hematology:  Recent Labs     10/22/20  1935 10/22/20  2030 10/23/20  0420   WBC 10.4  --  7.5   RBC 5.20  --  4.13*   HGB 14.0  --  11.0*   HCT 43.0  --  34.0*   MCV 82.7  --  82.2   MCH 27.0  --  26.7   MCHC 32.6  --  32.5   RDW 16.7*  --  16.7*     --  227   MPV 7.1  --  6.8   CRP  --  184.8*  --    INR  --  1.2  --      Chemistry:  Recent Labs     10/22/20  2030 10/22/20  2318 10/23/20  0420 10/24/20  0931   *  --  134*  --    K 4.5  --  4.2  --    CL 94*  --  102  --    CO2 26  --  24  --    GLUCOSE 308*  --  231*  --    BUN 24*  --  18  --    CREATININE 1.06  --  0.80 0.70   ANIONGAP 10  --  8*  --    LABGLOM >60  --  >60 >60   GFRAA >60  --  >60 >60   CALCIUM 8.8  --  7.6*  --    PROBNP 1,161*  --   --   --    TROPHS 33* 32*  --   --      Recent Labs     10/22/20  1935 10/22/20  2030  10/23/20  1150 10/23/20  1619 10/23/20  2026 10/24/20  0711 10/24/20  1117 10/24/20  1644   PROT  --  8.4*  --   --   --   --   --   --   --    LABALBU  --  2.1*  --   --   --   --   --   --   --    LABA1C 11.2*  --   --   --   --   --   --   --   --    AST  --  21  --   --   --   --   --   --   --    ALT  --  16  --   --   --   --   --   --   --    ALKPHOS  --  114  --   --   --   --   --   --   --    BILITOT  --  0.32  --   --   --   --   --   --   --    LIPASE  --  46  --   --   --   --   --   --   --    POCGLU  --   --    < > 232* 135* 251* 235* 173* 261*    < > = values in this interval not displayed.      ABG:No results found for: POCPH, PHART, PH, POCPCO2, LNI8TSV, PCO2, POCPO2, PO2ART, PO2, POCHCO3, BUC6AJF, HCO3, NBEA, PBEA, BEART, BE, THGBART, THB, QEC0IXF, UZUD4JOM, P6DVMVHT, O2SAT, FIO2  Lab Results Component Value Date/Time    SPECIAL NOT REPORTED 10/22/2020 10:40 PM     Lab Results   Component Value Date/Time    CULTURE NO SIGNIFICANT GROWTH 10/22/2020 10:40 PM       Radiology:  Ct Abdomen Pelvis W Iv Contrast Additional Contrast? None    Result Date: 10/22/2020  No acute inflammatory process or bowel obstruction. Redemonstration of multiple hepatic metastases. , these have progressed from prior contrast-enhanced exam dating back to 2018 however most recent exams have been performed without contrast so difficult to compare with more recent exams Colonic diverticulosis noted. Distal pancreatic metastases not significant changed. Multiple bilateral metastatic pulmonary nodules question mild progression     Xr Chest Portable    Result Date: 10/22/2020  Permanent left-sided AV sequential bipolar pacemaker. CABG. No cardiomegaly, interstitial edema or pleural effusion. Bilateral lower lobe non consolidating infiltration was again noted. This is either due to chronic pneumonia or underlying bronchiectasis. Pulmonary parenchymal nodularity was again identified. No significant change has occurred from 09/17/2020. Physical Examination:        General appearance:  alert, cooperative and no distress  Mental Status:  oriented to person, place and time and normal affect  Lungs:  Decreased breath sounds bilaterally, normal effort  Heart:  regular rate and rhythm, no murmur  Abdomen:  soft, nontender, nondistended, normal bowel sounds,     Assessment:        Hospital Problems           Last Modified POA    * (Principal) Sepsis ([address] 10/23/2020 Yes    Metastasis from thyroid cancer ([address] 10/23/2020 Yes    UTI (urinary tract infection) 10/23/2020 Yes    NSTEMI (non-ST elevated myocardial infarction) ([address] 10/23/2020 Yes    Fever 10/23/2020 Yes    Pneumonia due to organism 10/23/2020 Yes          Plan: 1. Patient is afebrile, has no leucocytosis. Cultures have been negative.  Consider discontinuing antibiotics or de-escalating if patient continues to do well  2. Metastatic thyroid cancer, was evaluated by hem-onc. Out patient follow up has been recommended  3. Minimally elevated troponin-  No further work up planned per cardiology  4. DM- will restart Januvia.  Continue Mayuri Vega MD  10/24/2020  7:36 PM

## 2020-10-25 NOTE — CARE COORDINATION
ONGOING DISCHARGE PLAN:    Writer spoke to [employer] Daughter, CarmenshireenHema White. Plan remains for Pt. To return to home w/ VNS,[facility]. Pt. Has HX of Thyroid Cancer w/ Mets. Family would like Palliative Care information from [facility]. This has been placed on Carlos for them to follow. PT/OT on board, will follow for any rec/needs. Temo Polanco, is requesting a BSC for home, writer is awaiting orders/ F2F notes, from Primary. Will have [facility] follow for this. Pt. Started on Oral Levaquin, Per Temo Polanco, Dr. Sarah Grace her that they will monitor pt overnight, for he has had a reaction before, when placed on Oral Antibiotics. Anticipate DC cindi. Will continue to follow for additional discharge needs.     Electronically signed by Higinio Linares RN on 10/25/2020 at 2:39 PM

## 2020-10-25 NOTE — PROGRESS NOTES
Physical Therapy  Facility/Department: [facility] PROGRESSIVE CARE  Daily Treatment Note  NAME: Ilsa Perez  : 1928  MRN: [id number]    Date of Service: 10/25/2020    Discharge Recommendations  Continue to assess pending progress     Assessment  Body structures, Functions, Activity limitations: Decreased functional mobility   Treatment Diagnosis: Impaired functional mobility  Prognosis: Excellent  PT Education: Transfer Training;Functional Mobility Training;Gait Training  REQUIRES PT FOLLOW UP: Yes    Activity Tolerance  Patient Tolerated treatment well    Patient Diagnosis(es): The primary encounter diagnosis was NSTEMI (non-ST elevated myocardial infarction) ([facility] Diagnoses of Pneumonia due to organism, Fever, unspecified fever cause, Metastasis from thyroid cancer [facility]), Urinary tract infection without hematuria, site unspecified, and Septicemia ([facility] were also pertinent to this visit.      has a past medical history of Abnormal ECG, Acquired hypothyroidism, Acute cholecystitis, Atherosclerosis of coronary artery bypass graft(s) without angina pectoris, Atrioventricular block, complete ([address] Biliary colic, Block, bundle branch, left, Calculus of bile duct without cholecystitis and without obstruction, Calculus of gallbladder, Cancer (HCC), Carotid artery stenosis, Cataract, Cerebrovascular disease, Cervical disc herniation, Cervical radiculopathy, chronic, Cervical spondylosis, CHF (congestive heart failure) (HCC), Chronic systolic CHF (congestive heart failure), NYHA class 2 ([facility] Coronary arteriosclerosis in native artery, COVID-19 virus infection, Degeneration of cervical intervertebral disc, Elevated LFTs, Elevated liver enzymes, H/O malignant neoplasm of thyroid, Heart attack ([address] Heart block AV second degree, Hypercholesteremia, Hypertension, Hypothyroidism, Ischemic cardiomyopathy, Metastatic cancer ([facility] Mild aortic regurgitation, Pneumonia due to COVID-19 virus, Pulmonary nodule, Pulmonary nodules/lesions, multiple, S/P CABG x 3, Sick sinus syndrome (HCC), SOB (shortness of breath), Spinal stenosis in cervical region, Type 2 diabetes mellitus ([address] and Type II or unspecified type diabetes mellitus without mention of complication, not stated as uncontrolled. has a past surgical history that includes shoulder surgery; Cardiac surgery; Coronary artery bypass graft; Appendectomy; Total Thyroidectomy; Pacemaker insertion (03/2015); pr lap,cholecystectomy (N/A, 3/2/2018); Cholecystectomy; and Endoscopic ultrasonography, GI. Restrictions  Restrictions/Precautions: Up as Tolerated, Contact Precautions    General  Chart Reviewed: Yes  Response To Previous Treatment: Not applicable  Family / Caregiver Present: No  Referring Practitioner: Adelita Parry MD    Subjective  Pain Screening  Patient Currently in Pain: No  Orientation  Overall Orientation Status: Within Normal Limits    Objective  Bed mobility  Rolling to Right: Independent  Supine to Sit: Independent  Sit to Supine: Independent  Scooting: Independent  Transfers  Sit to Stand: Contact guard assistance  Stand to sit: Contact guard assistance  Bed to Chair: Contact guard assistance  Stand Pivot Transfers: Contact guard assistance  Ambulation  Surface: level tile  Device: Rolling Walker  Assistance: Contact guard assistance  Gait Deviations: Slow Arlette  Distance: 60 ft  Comments: Slight unsteadiness still felt at times during the walk.   Ambulation 2  Surface - 2: level tile  Device 2: Rolling Walker  Assistance 2: Contact guard assistance  Gait Deviations: Slow Arlette  Distance: 100 ft  Stairs/Curb  Stairs?: No  Exercises  Straight Leg Raise: 10 reps (AAROM)  Heelslides: 10 reps (AAROM)  Hip Abduction: 10 reps (AAROM)  Ankle Pumps: 10 reps (AAROM)     AM-PAC Score  AM-PAC Inpatient Mobility without Stair Climbing Raw Score : 17 (10/24/20 1030)  AM-PAC Inpatient without Stair Climbing T-Scale Score : 48.47 (10/24/20 1030)  Mobility Inpatient CMS 0-100% Score: 32.72 (10/24/20 1030)  Mobility Inpatient without Stair CMS G-Code Modifier : Consuelo Heredia (10/24/20 1030)    Goals  Short term goals  Time Frame for Short term goals: 6 visits  Short term goal 1: Pt will demonstrate independence with his transfers sit to stand, stand to sit and bed to chair. Short term goal 2: Pt will be able to stand/ambulate 100 ft with RW/independently. Short term goal 3: Pt will be able to tolerate a 30 minute treatment session without a change in symptoms. Patient Goals   Patient goals :  To get better and go home    Plan  Times per week: 6-7x  Times per day: Daily  Current Treatment Recommendations: Functional Mobility Training, Transfer Training, Patient/Caregiver Education & Training, Gait Training  Safety Devices  Type of devices: Left in bed, Call light within reach, Bed alarm in place     Therapy Time   Individual Concurrent Group Co-treatment   Time In 1010         Time Out 1040         Minutes [facility], PT  DPT

## 2020-10-25 NOTE — PLAN OF CARE
Problem: Falls - Risk of:  Goal: Will remain free from falls  10/25/2020 1554 by Santy Garcia RN  Outcome: Met This Shift: Patient remained free from falls and injury per shift; call light within reach, bed kept at lowest position, pathway clear, hourly rounds implemented. 10/25/2020 0426 by Delight Sever, RN  Outcome: Ongoing  Note: Patient remained free from falls. Call light within reach. 10/25/2020 0425 by Delight Sever, RN  Outcome: Ongoing  Note: Patient remained free from falls. Call light within reach. Goal: Absence of physical injury  Outcome: Ongoing     Problem: Skin Integrity:  Goal: Will show no infection signs and symptoms  10/25/2020 1554 by Santy Garcia RN  Outcome: Ongoing  10/25/2020 0426 by Delight Sever, RN  Outcome: Ongoing  Note: No new signs or symptoms of infection noted this shift. 10/25/2020 0425 by Delight Sever, RN  Outcome: Ongoing  Goal: Absence of new skin breakdown  10/25/2020 1554 by Santy Garcia RN  Outcome: Met This Shift: No new skin breakdown/pressure ulcer per shift   10/25/2020 0426 by Delight Sever, RN  Outcome: Ongoing  Note: No new alterations in skin integrity. Problem: Infection:  Goal: Will remain free from infection  Outcome: Ongoing     Problem: Safety:  Goal: Free from accidental physical injury  10/25/2020 1554 by Santy Garcia RN  Outcome: Ongoing  10/25/2020 0426 by Delight Sever, RN  Outcome: Ongoing  Note: Patient free from accidental physical injury. Bed alarm on. Goal: Free from intentional harm  Outcome: Ongoing     Problem: Daily Care:  Goal: Daily care needs are met  10/25/2020 1554 by Santy Garcia RN  Outcome: Ongoing  10/25/2020 0426 by Delight Sever, RN  Outcome: Ongoing  Note: ADLs met throughout shift.       Problem: Pain:  Goal: Patient's pain/discomfort is manageable  10/25/2020 1554 by Santy Garcia RN  Outcome: Ongoing  10/25/2020 0426 by Delight Sever, RN  Outcome: Ongoing  Note: Patient given pain medication as ordered. Problem: Skin Integrity:  Goal: Skin integrity will stabilize  Outcome: Ongoing     Problem: Discharge Planning:  Goal: Patients continuum of care needs are met  Outcome: Met This Shift: Karli Islas . RN DC planner addressed all needs and concerns; VNS and commode .

## 2020-10-25 NOTE — CARE COORDINATION
Charlie Dominguez requires a bedside commode due to being confined to one level of the home, and is physically incapable of utilizing regular toilet facilities. Current body weight is Weight: 114 lb 6.7 oz (51.9 kg).

## 2020-10-25 NOTE — PROGRESS NOTES
RN messaged Dr. Efren Young about dvt proph. She stated that she will review the chart and place orders as needed.

## 2020-10-25 NOTE — PLAN OF CARE
Problem: Falls - Risk of:  Goal: Will remain free from falls  Description: Will remain free from falls  10/25/2020 0426 by Amina Cisneros RN  Outcome: Ongoing  Note: Patient remained free from falls. Call light within reach. Problem: Falls - Risk of:  Goal: Will remain free from falls  Description: Will remain free from falls  10/25/2020 0425 by Amina Cisneros RN  Outcome: Ongoing  Note: Patient remained free from falls. Call light within reach. Problem: Skin Integrity:  Goal: Will show no infection signs and symptoms  Description: Will show no infection signs and symptoms  10/25/2020 0426 by Amina Cisneros RN  Outcome: Ongoing  Note: No new signs or symptoms of infection noted this shift. Problem: Skin Integrity:  Goal: Will show no infection signs and symptoms  Description: Will show no infection signs and symptoms  10/25/2020 0425 by Amina Cisneros RN  Outcome: Ongoing     Problem: Skin Integrity:  Goal: Absence of new skin breakdown  Description: Absence of new skin breakdown  10/25/2020 0426 by Amina Cisneros RN  Outcome: Ongoing  Note: No new alterations in skin integrity. Problem: Safety:  Goal: Free from accidental physical injury  Description: Free from accidental physical injury  Outcome: Ongoing  Note: Patient free from accidental physical injury. Bed alarm on. Problem: Daily Care:  Goal: Daily care needs are met  Description: Daily care needs are met  Outcome: Ongoing  Note: ADLs met throughout shift. Problem: Pain:  Goal: Patient's pain/discomfort is manageable  Description: Patient's pain/discomfort is manageable  Outcome: Ongoing  Note: Patient given pain medication as ordered.

## 2020-10-25 NOTE — PROGRESS NOTES
[facility] CARDIOLOGY Progress Note    10/25/2020 12:36 PM      Subjective:  Mr. Maren Calderón complaining of back of the neck pain. Patient denies any chest pain or worsening shortness of breath or palpitations or lightheadedness or dizziness. Review of systems:  No fever or chills. LABS:     Recent Results (from the past 24 hour(s))   POC Glucose Fingerstick    Collection Time: 10/24/20  4:44 PM   Result Value Ref Range    POC Glucose 261 (H) 75 - 110 mg/dL   POC Glucose Fingerstick    Collection Time: 10/24/20  7:15 PM   Result Value Ref Range    POC Glucose 366 (H) 75 - 110 mg/dL   POC Glucose Fingerstick    Collection Time: 10/24/20 10:12 PM   Result Value Ref Range    POC Glucose 240 (H) 75 - 110 mg/dL   CREATININE, SERUM    Collection Time: 10/25/20  5:33 AM   Result Value Ref Range    CREATININE 0.71 0.70 - 1.20 mg/dL    GFR Non-African American >60 >60 mL/min    GFR African American >60 >60 mL/min    GFR Comment          GFR Staging NOT REPORTED    POC Glucose Fingerstick    Collection Time: 10/25/20  7:16 AM   Result Value Ref Range    POC Glucose 159 (H) 75 - 110 mg/dL       Pulse Ox:  SpO2  Av.3 %  Min: 94 %  Max: 96 %    Supplemental O2:       Current Meds:    [START ON 10/26/2020] glimepiride  1 mg Oral QAM AC    levoFLOXacin  750 mg Oral Q48H    metoprolol succinate  12.5 mg Oral Daily    alogliptin  12.5 mg Oral Daily    aspirin  81 mg Oral Daily    atorvastatin  20 mg Oral Nightly    docusate sodium  100 mg Oral Nightly    finasteride  5 mg Oral Daily    furosemide  20 mg Oral Daily    levothyroxine  137 mcg Oral Daily    tamsulosin  0.4 mg Oral Daily    insulin lispro  0-12 Units Subcutaneous TID WC    insulin lispro  0-6 Units Subcutaneous Nightly    clotrimazole   Topical BID            VITAL SIGNS:    BP (!) 114/57   Pulse 74   Temp 98.2 °F (36.8 °C) (Oral)   Resp 16   Ht 5' 2\" (1.575 m)   Wt 114 lb 6.7 oz (51.9 kg)   SpO2 96%   BMI 20.93 kg/m² signed by Rani Jaimes MD, [facility]        PLEASE NOTE:  This progress note was completed using a voice transcription system. Every effort was made to ensure accuracy. However, inadvertent computerized transcription errors may be present.

## 2020-10-25 NOTE — PROGRESS NOTES
Pharmacy Note  Vancomycin Consult  Vancomycin Day: 4  Dose = 1000 mg every 24 hours. Plan:  trough level 2300 hrs on 10/26    Patient's labs, cultures, vitals, and vancomycin regimen reviewed. No changes today. Genie Sotelo. Ph.  10/25/2020  8:29 AM

## 2020-10-26 NOTE — PLAN OF CARE
Problem: Falls - Risk of:  Goal: Will remain free from falls  Description: Will remain free from falls  10/26/2020 0407 by Aleksandra Lr RN  Outcome: Ongoing  Note: Patient remained free from falls. Call light within reach. Problem: Skin Integrity:  Goal: Will show no infection signs and symptoms  Description: Will show no infection signs and symptoms  10/26/2020 0407 by Aleksandra Lr RN  Outcome: Ongoing  Note: No new signs or symptoms of infection noted this shift. Problem: Skin Integrity:  Goal: Absence of new skin breakdown  Description: Absence of new skin breakdown  10/26/2020 0407 by Aleksandra Lr RN  Outcome: Ongoing  Note: No new alterations in skin integrity. Problem: Safety:  Goal: Free from accidental physical injury  Description: Free from accidental physical injury  10/26/2020 0407 by Aleksandra Lr RN  Outcome: Ongoing  Note: Patient free from accidental physical injury this shift. Problem: Daily Care:  Goal: Daily care needs are met  Description: Daily care needs are met  10/26/2020 0407 by Aleksandra Lr RN  Outcome: Ongoing  Note: All ADLs met throughout shift. Problem: Pain:  Goal: Patient's pain/discomfort is manageable  Description: Patient's pain/discomfort is manageable  10/26/2020 0407 by Aleksandra Lr RN  Outcome: Ongoing  Note: Patient given pain medication as ordered.

## 2020-10-26 NOTE — PROGRESS NOTES
Physical Therapy  [facility]    Physical Therapy Progress Note    Date: 10/26/20  Patient Name: Gavin Martinez       Room:   MRN: [id number]   Account: [de-identified]   : 1928  (80 y.o.) Gender: male           Past Medical History:  has a past medical history of Abnormal ECG, Acquired hypothyroidism, Acute cholecystitis, Atherosclerosis of coronary artery bypass graft(s) without angina pectoris, Atrioventricular block, complete ([address] Biliary colic, Block, bundle branch, left, Calculus of bile duct without cholecystitis and without obstruction, Calculus of gallbladder, Cancer ([address] Carotid artery stenosis, Cataract, Cerebrovascular disease, Cervical disc herniation, Cervical radiculopathy, chronic, Cervical spondylosis, CHF (congestive heart failure) ([address] Chronic systolic CHF (congestive heart failure), NYHA class 2 ([address] Coronary arteriosclerosis in native artery, COVID-19 virus infection, Degeneration of cervical intervertebral disc, Elevated LFTs, Elevated liver enzymes, H/O malignant neoplasm of thyroid, Heart attack ([address] Heart block AV second degree, Hypercholesteremia, Hypertension, Hypothyroidism, Ischemic cardiomyopathy, Metastatic cancer ([address] Mild aortic regurgitation, Pneumonia due to COVID-19 virus, Pulmonary nodule, Pulmonary nodules/lesions, multiple, S/P CABG x 3, Sick sinus syndrome (HCC), SOB (shortness of breath), Spinal stenosis in cervical region, Type 2 diabetes mellitus ([address] and Type II or unspecified type diabetes mellitus without mention of complication, not stated as uncontrolled. Past Surgical History:   has a past surgical history that includes shoulder surgery; Cardiac surgery; Coronary artery bypass graft; Appendectomy; Total Thyroidectomy; Pacemaker insertion (2015); pr lap,cholecystectomy (N/A, 3/2/2018); Cholecystectomy; and Endoscopic ultrasonography, GI.           Restrictions/Precautions  Restrictions/Precautions: Up as Tolerated;Contact Precautions            Vital Signs  Patient Currently in Pain: No        Oxygen Therapy  O2 Device: None (Room air)          Bed Mobility:   Bed Mobility  Supine to Sit: Supervision(HOB elevated, used bed rail)  Sit to Supine: Supervision(bed flat)  Scooting: Supervision(to EOB)  Bed mobility  Scooting: Supervision(to EOB)    Transfers:  Sit to Stand: Contact guard assistance;Stand by assistance  Stand to sit: Stand by assistance                 Ambulation 1  Surface: level tile  Device: Rolling Walker  Assistance: Stand by assistance  Quality of Gait: Slow steady pace, no LOB, no c/o SOB. Gait Deviations: Slow Arlette;Decreased step length  Distance: 150ft  Ambulation 2  Surface - 2: level tile  Device 2: Rolling Walker  Assistance 2: Stand by assistance  Quality of Gait 2: same as above  Gait Deviations: Slow Arlette;Decreased step length  Distance: 150ft  Comments: a little dyspenea noted after second amb. Stairs/Curb  Stairs?: Yes  Stairs  # Steps : 2  Stairs Height: 6\"  Rails: None  Device: Rolling walker  Assistance: Contact guard assistance  Comment: Pt reports having 2 steps to get into home with handrails. Pt able to demonstrate ascending/decending 6\" box step x 2 using RW for support with CGA. No LOB noted.        BALANCE Posture: Good  Sitting - Static: Good  Sitting - Dynamic: Good;-  Standing - Static: Fair;+(w/RW)  Standing - Dynamic: Fair;+(w/RW)  Comments: standing with RW    EXERCISES    Other exercises?: Yes  Other exercises 1: (B) LE supine ex x 10-15  Other exercises 2: (B) LE seated ex x 15  Other exercises 3: (B) LE seated lime green t-band ex x 15  Other exercises 4: Reviewed and gave pt handout of energy conservation techniques           Activity Tolerance: Patient Tolerated treatment well          Patient Education  New Education Provided:  general strengthening ex, gait training, stair training, energy conservation techniques  Learner:patient  Method: demonstration,

## 2020-10-26 NOTE — PROGRESS NOTES
of need for assistance  Insights: Decreased awareness of deficits       ADL  Feeding: Independent  Grooming: Setup  UE Bathing: Setup  LE Bathing: Stand by assistance, Minimal assistance  UE Dressing: Setup  LE Dressing: Setup, Minimal assistance  Toileting: Minimal assistance    UE Function           LUE Strength  Gross LUE Strength: Exceptions to [facility]  L Shoulder Flex: 3+/5           LUE AROM (degrees)  LUE AROM : WFL                     RUE AROM (degrees)  RUE AROM : WFL          Fine Motor Skills  Coordination  Movements Are Fluid And Coordinated:  Yes                           Mobility  Supine to Sit: Independent  Sit to Supine: Independent       Balance  Sitting Balance: Supervision(15 min)  Standing Balance: Contact guard assistance  Standing Balance  Time: 5 min  Activity: adls with walker  Functional Mobility  Functional - Mobility Device: Rolling Walker  Activity: To/from bathroom  Assist Level: Contact guard assistance  Bed mobility  Supine to Sit: Independent  Sit to Supine: Independent  Scooting: Independent(to HOB in supine)     Transfers  Stand Step Transfers: Contact guard assistance(walker)  Sit to stand: Contact guard assistance  Stand to sit: Stand by assistance      Assessment  Performance deficits / Impairments: Decreased functional mobility , Decreased ADL status, Decreased endurance, Decreased strength, Decreased balance, Decreased safe awareness  Prognosis: Good  Decision Making: Low Complexity  History: sepsis, pneumonia, NSTEMI, Metastasis from thyroid cancer  Exam: 6 performance deficits  Assistance / Modification: low  REQUIRES OT FOLLOW UP: Yes  Activity Tolerance: Patient Tolerated treatment well                 Goals  Patient Goals   Patient goals : return home, was independent ambulate to bathroom with walker  Short term goals  Time Frame for Short term goals: 1 week  Short term goal 1: tolerate 7-9 min stand, ambulate with walker and SBA and good safety and balance  Short term goal 2: SBA toileting and stand for LE self care with walker  Short term goal 3: tolerate 10 repetitions L shoulder AAROM    Plan  Safety Devices  Safety Devices in place: Yes  Type of devices: Call light within reach, Left in bed, Bed alarm in place     Plan  Times per week: 4-5  Times per day: Daily  Current Treatment Recommendations: Strengthening, Safety Education & Training, Balance Training, Patient/Caregiver Education & Training, Self-Care / ADL, Functional Mobility Training, Equipment Evaluation, Education, & procurement, Endurance Training  OT Education  OT Education: OT Role, Plan of Care, Precautions  Patient Education: reason for honey thick liquids for safe swallow- prevent pneumonia    OT Equipment Recommendations  Equipment Needed: Yes  Other: per chart, bedside commode is needed.   OT Individual Minutes  Time In: 1008  Time Out: 1000  Minutes: 25    Electronically signed by Sonia Lux OT on 10/26/20 at 6:33 PM EDT

## 2020-10-26 NOTE — CARE COORDINATION
DISCHARGE PLANNING NOTE:    Follow up appointment made for patient with Dr. [employer] on 10/30 at 9:00 AM.  Notified patient of date and time. Patient verbalizes understanding. Appointment entered into discharge navigator. Plan is for this patient to discharge to home and will have services through [address] [place] [address].      Electronically signed by Ave Hansen RN on 10/26/2020 at 10:14 AM

## 2020-10-26 NOTE — PROGRESS NOTES
Today's Date: 10/26/2020  Patient Name: Johnny Carroll  Date of admission: 10/22/2020  6:06 PM  Patient's age: 80 y.o., 2/13/1928  Admission Dx: UTI (urinary tract infection) [N39.0]      Requesting Physician: Linda Whalen MD    CHIEF COMPLAINT: Excessive weakness and fatigue. Consult for metastatic papillary thyroid cancer. Bone metastasis    SUBJECTIVE:    The patient is seen and examined  Pain controlled  No new events  Pt appears comfortable  NO NV   No fever chills    HISTORY OF PRESENT ILLNESS IN BRIEF:    The patient is a 80 y.o.  male who is admitted to the hospital for further management of extreme weakness and fatigue and possible sepsis and possible cardiac events. The patient is known to our practice. He has history of metastatic papillary thyroid cancer. He has bone metastasis. He had significant pain and he had follow-up in the pain clinic. Recent scans showed evidence of destructive lesion at T6 vertebra. Patient completed radiation therapy earlier this week. Patient is not giving any further history. Most information is from the son. Patient's performance status is getting worse. He was admitted for further management. He was evaluated by cardiology and due to his poor performance and is they elected conservative management.     Past Medical History:   has a past medical history of Abnormal ECG, Acquired hypothyroidism, Acute cholecystitis, Atherosclerosis of coronary artery bypass graft(s) without angina pectoris, Atrioventricular block, complete ([address] Biliary colic, Block, bundle branch, left, Calculus of bile duct without cholecystitis and without obstruction, Calculus of gallbladder, Cancer ([address] Carotid artery stenosis, Cataract, Cerebrovascular disease, Cervical disc herniation, Cervical radiculopathy, chronic, Cervical spondylosis, CHF (congestive heart failure) (HCC), Chronic systolic CHF (congestive heart failure), NYHA class 2 ([address] Coronary arteriosclerosis in native artery, COVID-19 virus infection, Degeneration of cervical intervertebral disc, Elevated LFTs, Elevated liver enzymes, H/O malignant neoplasm of thyroid, Heart attack ([address] Heart block AV second degree, Hypercholesteremia, Hypertension, Hypothyroidism, Ischemic cardiomyopathy, Metastatic cancer ([address] Mild aortic regurgitation, Pneumonia due to COVID-19 virus, Pulmonary nodule, Pulmonary nodules/lesions, multiple, S/P CABG x 3, Sick sinus syndrome (HCC), SOB (shortness of breath), Spinal stenosis in cervical region, Type 2 diabetes mellitus ([address] and Type II or unspecified type diabetes mellitus without mention of complication, not stated as uncontrolled. Past Surgical History:   has a past surgical history that includes shoulder surgery; Cardiac surgery; Coronary artery bypass graft; Appendectomy; Total Thyroidectomy; Pacemaker insertion (03/2015); pr lap,cholecystectomy (N/A, 3/2/2018); Cholecystectomy; and Endoscopic ultrasonography, GI. Family History: family history includes Cancer in his mother; Other in his father. Social History:   reports that he has never smoked. He has never used smokeless tobacco. He reports that he does not drink alcohol or use drugs. Medications:    Prior to Admission medications    Medication Sig Start Date End Date Taking? Authorizing Provider   SITagliptin (JANUVIA) 100 MG tablet Take 1 tablet by mouth daily 10/22/20  Yes BRIAN Summers CNP   mupirocin (BACTROBAN) 2 % ointment Apply topically to affected area  3 times daily.  10/22/20 10/29/20 Yes BRIAN Summers CNP   polyethylene glycol (MIRALAX) 17 g packet Take 17 g by mouth daily as needed for Constipation 10/15/20  Yes Leila Workman MD   mirabegron [facility]) 50 MG TB24 Take 50 mg by mouth daily 10/15/20  Yes BRIAN Ken CNP   HYDROcodone-acetaminophen (NORCO) 5-325 MG per tablet Take 1 tablet by mouth every 6 hours as needed for Pain for up to 30 succinate (TOPROL XL) extended release tablet 12.5 mg  12.5 mg Oral Daily Сергей Lai MD   12.5 mg at 10/26/20 1012    alogliptin (NESINA) tablet 12.5 mg  12.5 mg Oral Daily Carlton Saldivar MD   12.5 mg at 10/26/20 1009    aspirin EC tablet 81 mg  81 mg Oral Daily Ranjana Negrete MD   81 mg at 10/26/20 1009    atorvastatin (LIPITOR) tablet 20 mg  20 mg Oral Nightly Ranjana Negrete MD   20 mg at 10/25/20 2154    diphenhydrAMINE (BENADRYL) tablet 25 mg  25 mg Oral Q6H PRN Ranjana Negrete MD        docusate sodium (COLACE) capsule 100 mg  100 mg Oral Nightly Ranjana Negrete MD   100 mg at 10/25/20 2154    finasteride (PROSCAR) tablet 5 mg  5 mg Oral Daily Ranjana Negrete MD   5 mg at 10/26/20 1008    furosemide (LASIX) tablet 20 mg  20 mg Oral Daily Ranjana Negrete MD   20 mg at 10/26/20 1008    HYDROcodone-acetaminophen (NORCO) 5-325 MG per tablet 1 tablet  1 tablet Oral Q6H PRN Ranjana Negrete MD   1 tablet at 10/24/20 2212    levothyroxine (SYNTHROID) tablet 137 mcg  137 mcg Oral Daily Ranjana Negrete MD   137 mcg at 10/26/20 0542    nitroGLYCERIN (NITROSTAT) SL tablet 0.4 mg  0.4 mg Sublingual Q5 Min PRN Ranjana Negrete MD        polyethylene glycol (GLYCOLAX) packet 17 g  17 g Oral Daily PRN Ranjana Negrete MD        tamsulosin (FLOMAX) capsule 0.4 mg  0.4 mg Oral Daily Ranjana Negrete MD   0.4 mg at 10/26/20 1012    insulin lispro (HUMALOG) injection vial 0-12 Units  0-12 Units Subcutaneous TID WC Ranjana Negrete MD   4 Units at 10/26/20 1131    insulin lispro (HUMALOG) injection vial 0-6 Units  0-6 Units Subcutaneous Nightly Ranjana Negrete MD   2 Units at 10/25/20 2154    glucose (GLUTOSE) 40 % oral gel 15 g  15 g Oral PRN Ranjana Negrete MD        dextrose 50 % IV solution  12.5 g Intravenous PRN Ranjana Negrete MD        glucagon (rDNA) injection 1 mg  1 mg Intramuscular PRN Ranjana Negrete MD        dextrose 5 % solution  100 mL/hr Intravenous PRN Rnajana Negrete MD        clotrimazole (Nathalie Speak) 1 % cream   Topical BID Alex Arzate MD           Allergies:  Seasonal and Keflex [cephalexin]    REVIEW OF SYSTEMS:    · Difficult to obtain considering patient's general mental status. Most information from patient's son. No other complaints about what is mentioned above. PHYSICAL EXAM:      BP (!) 114/58   Pulse 81   Temp 97.9 °F (36.6 °C) (Oral)   Resp 18   Ht 5' 2\" (1.575 m)   Wt 114 lb 6.7 oz (51.9 kg)   SpO2 92%   BMI 20.93 kg/m²    Temp (24hrs), Av.3 °F (36.8 °C), Min:97.9 °F (36.6 °C), Max:98.6 °F (37 °C)  General appearance - not in pain or distress  Mental status - a patient is sleepy and slight disorientation. Eyes - pupils equal and reactive, extraocular eye movements intact  Ears - bilateral TM's and external ear canals normal  Nose - normal and patent, no erythema, discharge or polyps  Mouth - mucous membranes moist, pharynx normal without lesions  Neck - supple, no significant adenopathy  Lymphatics - no palpable lymphadenopathy, no hepatosplenomegaly  Chest - clear to auscultation, no wheezes, rales or rhonchi, symmetric air entry  Heart - normal rate, regular rhythm, normal S1, S2, no murmurs, rubs, clicks or gallops  Abdomen - soft, nontender, nondistended, no masses or organomegaly  Neurological -slightly disoriented, no focal deficit  Musculoskeletal - no joint tenderness, deformity or swelling  Extremities - peripheral pulses normal, no pedal edema, no clubbing or cyanosis  Skin - normal coloration and turgor, no rashes, no suspicious skin lesions noted     DATA:    Labs:   CBC:   No results for input(s): WBC, HGB, HCT, PLT in the last 72 hours. BMP:   Recent Labs     10/25/20  0533 10/26/20  0516   CREATININE 0.71 0.77   LABGLOM >60 >60     PT/INR:   No results for input(s): PROTIME, INR in the last 72 hours. APTT:  No results for input(s): APTT in the last 72 hours. LIVER PROFILE:  No results for input(s): AST, ALT, LABALBU in the last 72 hours.       IMPRESSION: Primary Problem  Sepsis [facility])    Active Hospital Problems    Diagnosis Date Noted    UTI (urinary tract infection) [N39.0] 10/23/2020    Sepsis ([facility] [A41.9] 10/23/2020    NSTEMI (non-ST elevated myocardial infarction) ([facility] [I21.4]     Fever [R50.9]     Pneumonia due to organism [J18.9]     Metastasis from thyroid cancer ([facility] [C79.9, C73] 09/19/2017   Advanced metastatic papillary thyroid cancer  Bone metastasis    RECOMMENDATIONS:  1. Records and labs and images were reviewed and discussed with the patient and his son. 2. Considering patient's advanced age and advanced disease, prognosis is poor. 3. I had discussion with family With consideration of prognosis, they elected to follow conservative management only. 4. At this time   No active treatment planned. 5. Discharge as per primary and follow up with oncology as scheduled in few weeks  6. Thank you for allowing us to participate in the care of this pleasant patient. Discussed with patient and Nurse. MD Dustin Prakash MD  Hematologist/Medical Oncologist    Cell: [phone]      This note is created with the assistance of a speech recognition program.  While intending to generate a document that actually reflects the content of the visit, the document can still have some errors including those of syntax and sound a like substitutions which may escape proof reading. It such instances, actual meaning can be extrapolated by contextual diversion.

## 2020-10-26 NOTE — PLAN OF CARE
Problem: Falls - Risk of:  Goal: Will remain free from falls  Description: Will remain free from falls  Outcome: Ongoing  Note: Free from falls, bed alarm on, non skid socks on, located close to nurses station   Goal: Absence of physical injury  Description: Absence of physical injury  Outcome: Ongoing     Problem: Skin Integrity:  Goal: Will show no infection signs and symptoms  Description: Will show no infection signs and symptoms  Outcome: Ongoing  Note: No signs of skin breakdown  Goal: Absence of new skin breakdown  Description: Absence of new skin breakdown  Outcome: Ongoing     Problem: Infection:  Goal: Will remain free from infection  Description: Will remain free from infection  Outcome: Ongoing     Problem: Safety:  Goal: Free from accidental physical injury  Description: Free from accidental physical injury  Outcome: Ongoing  Goal: Free from intentional harm  Description: Free from intentional harm  Outcome: Ongoing     Problem: Daily Care:  Goal: Daily care needs are met  Description: Daily care needs are met  Outcome: Ongoing  Note: Given bath today     Problem: Pain:  Goal: Patient's pain/discomfort is manageable  Description: Patient's pain/discomfort is manageable  Outcome: Ongoing     Problem: Skin Integrity:  Goal: Skin integrity will stabilize  Description: Skin integrity will stabilize  Outcome: Ongoing     Problem: Discharge Planning:  Goal: Patients continuum of care needs are met  Description: Patients continuum of care needs are met  Outcome: Ongoing  Note: Plan for discharge with home health care tomorrow     Problem: Musculor/Skeletal Functional Status  Goal: Highest potential functional level  Outcome: Ongoing  Goal: Absence of falls  Outcome: Ongoing

## 2020-10-27 NOTE — DISCHARGE SUMMARY
Hospitalist Discharge Summary    Debbie Fragoso  :  1928  MRN:  [id number]    Admit date:  10/22/2020  Discharge date:  10/27/20    Admitting Physician:  Alicia Rodney MD    Discharge Diagnoses:   Patient Active Problem List   Diagnosis    Degeneration of cervical intervertebral disc    Acquired hypothyroidism    Spinal stenosis in cervical region    Cervical spondylosis    Cervical disc herniation    Cervical radiculopathy, chronic    Hypercholesteremia    Heart block AV second degree    H/O malignant neoplasm of thyroid    Mild aortic regurgitation    Type 2 diabetes mellitus (HCC)    Atherosclerosis of coronary artery bypass graft(s) without angina pectoris    Pulmonary nodules/lesions, multiple    Calculus of gallbladder    Elevated LFTs    Metastasis from thyroid cancer ([address]    Pulmonary nodule    Calculus of bile duct without cholecystitis and without obstruction    Acute cholecystitis    Elevated liver enzymes    Biliary colic    Abnormal ECG    Atrioventricular block, complete (HCC)    Block, bundle branch, left    Coronary arteriosclerosis in native artery    Chronic systolic CHF (congestive heart failure), NYHA class 2 (HCC)    Ischemic cardiomyopathy    S/P CABG x 3    Sick sinus syndrome (HCC)    SOB (shortness of breath)    Pneumonia due to COVID-19 virus    Presence of cardiac pacemaker    Chest pain    Chronic back pain    Papillary thyroid carcinoma (HCC)    Bone metastases (HCC)    UTI (urinary tract infection)    Sepsis (HCC)    NSTEMI (non-ST elevated myocardial infarction) ([address]    Fever    Pneumonia due to organism        Admission Condition:  fair      Discharged Condition:  fair    Hospital Course/Treatments   80 yr old w/m who was admitted with sepsis/pt had uti and was placed on iv antibiotics,pt also had nstemi,pt has h/o of papillary cancer with metstasis,pt did well, more alert, pt d/c home with home care    Discharge Medications:       [facility] Studies:  Ct Abdomen Pelvis W Iv Contrast Additional Contrast? None    Result Date: 10/22/2020  EXAMINATION: CT OF THE ABDOMEN AND PELVIS WITH CONTRAST 10/22/2020 9:31 pm TECHNIQUE: CT of the abdomen and pelvis was performed with the administration of intravenous contrast. Multiplanar reformatted images are provided for review. Dose modulation, iterative reconstruction, and/or weight based adjustment of the mA/kV was utilized to reduce the radiation dose to as low as reasonably achievable. COMPARISON: CT abdomen pelvis 10/07/2018, CT chest 09/17/2020, CT and pelvis 02/27/2018 HISTORY: ORDERING SYSTEM PROVIDED HISTORY: abdominal pain, diarrhea TECHNOLOGIST PROVIDED HISTORY: abdominal pain, diarrhea Reason for Exam: abdominal pain, diarrhea Acuity: Acute Type of Exam: Initial Relevant Medical/Surgical History: surg- gallbladder, appendix FINDINGS: Lower Chest: Minimal pulmonary nodules again identified large within the left lower lobe 1.5 cm greatest dimension. Pacemaker leads identified with heart. Heart is enlarged. Coronary calcifications. Organs: Multiple hepatic masses again identified consistent with metastatic disease. Large within the right hepatic lobe 3.0 x 3.6 cm in size. Large within left hepatic lobe 3.9 x 4.1 cm in size. These demonstrate ill-defined margins. Soft tissue density identified along the posterior aspect of the right hepatic lobe again identified with central area of hypoattenuation may represent central necrosis. This measures 5.4 x 1.9 cm in size. Spleen, adrenal glands, are unremarkable. There are masses identified involving the distal pancreatic tail again identified these appears slightly progressed since the previous exam up to 4.4 x 3.0 cm in size. Multiple bilateral renal cysts. Left lateral pole renal density again identified likely proteinaceous or hemorrhagic cyst measuring 2.1 cm. GI/Bowel: Mild-to-moderate retained stool throughout the colon. No bowel obstruction. Moderate sigmoid colonic diverticulosis. Right inguinal hernia containing fat and small knuckle of bowel again identified. No CT findings acute appendicitis. Pelvis: Prostate is enlarged. Mild bladder distention. Right inguinal hernia noted. Peritoneum/Retroperitoneum: No free fluid. No free air. Aortic vascular calcifications. No suspicious adenopathy. Bones/Soft Tissues: Question hemangioma involving L1 vertebral body otherwise no definite suspicious osseous lesion identified. No acute inflammatory process or bowel obstruction. Redemonstration of multiple hepatic metastases. , these have progressed from prior contrast-enhanced exam dating back to 2018 however most recent exams have been performed without contrast so difficult to compare with more recent exams Colonic diverticulosis noted. Distal pancreatic metastases not significant changed. Multiple bilateral metastatic pulmonary nodules question mild progression     Xr Chest Portable    Result Date: 10/22/2020  EXAMINATION: ONE XRAY VIEW OF THE CHEST 10/22/2020 6:14 pm COMPARISON: 09/17/2020 HISTORY: ORDERING SYSTEM PROVIDED HISTORY: Cough, fever. +COVID in september. Hx of CHF, CABG, pulm nodule, Aortic regurge TECHNOLOGIST PROVIDED HISTORY: Cough, fever. +COVID in september. Hx of CHF, CABG, pulm nodule, Aortic regurge Reason for Exam: Cough, fever. Positive COVID in september. Hx of CHF, CABG, pulmonary nodule, aortic regurgitation. Acuity: Acute Type of Exam: Initial Additional signs and symptoms: Cough, fever. Positive COVID in september. Hx of CHF, CABG, pulmonary nodule, aortic regurgitation. FINDINGS: A permanent left-sided AV sequential bipolar pacemaker was noted. Postoperative changes were noted from prior coronary artery bypass surgery. Parenchymal nodularity was again noted. No cardiomegaly, interstitial edema or pleural effusion were noted. A metallic clip was noted in the right shoulder from prior rotator cuff repair.   Bilateral lower lobe non consolidating infiltration was again noted. This is either due to chronic pneumonia or underlying bronchiectasis. Little change has occurred from 09/17/2020. Permanent left-sided AV sequential bipolar pacemaker. CABG. No cardiomegaly, interstitial edema or pleural effusion. Bilateral lower lobe non consolidating infiltration was again noted. This is either due to chronic pneumonia or underlying bronchiectasis. Pulmonary parenchymal nodularity was again identified. No significant change has occurred from 09/17/2020. Disposition:   home    Discharge Instructions: Activity: activity as tolerated  Diet:  regular diet    Follow up with Garry Mendez MD in 1 weeks.     Signed:  Marlo Blackburn  10/27/2020, 8:19 AM    Time spent in discharge of this pt is more than 30 minutes in examination,evaluvation,  counseling and review of medication and discharge plan

## 2020-10-27 NOTE — PROGRESS NOTES
Hospitalist Progress Note  10/26/2020 10:04 PM  Subjective:   Admit Date: 10/22/2020  PCP: Du Jones MD     Full Code      C/c:  Chief Complaint   Patient presents with    Chest Pain    Cough         Interval History: continues to hv weakness/h/o of papillary thyroid cancer bone metsstasis    Diet: DIET GENERAL; Dysphagia Pureed; Moderately Thick (Honey)                                ip days:3  Medications:   Scheduled Meds:   glimepiride  1 mg Oral QAM AC    levoFLOXacin  750 mg Oral Q48H    enoxaparin  40 mg Subcutaneous Daily    metoprolol succinate  12.5 mg Oral Daily    alogliptin  12.5 mg Oral Daily    aspirin  81 mg Oral Daily    atorvastatin  20 mg Oral Nightly    docusate sodium  100 mg Oral Nightly    finasteride  5 mg Oral Daily    furosemide  20 mg Oral Daily    levothyroxine  137 mcg Oral Daily    tamsulosin  0.4 mg Oral Daily    insulin lispro  0-12 Units Subcutaneous TID WC    insulin lispro  0-6 Units Subcutaneous Nightly    clotrimazole   Topical BID     Continuous Infusions:   dextrose       PRN Meds:.diphenhydrAMINE, HYDROcodone-acetaminophen, nitroGLYCERIN, polyethylene glycol, glucose, dextrose, glucagon (rDNA), dextrose     CBC: No results for input(s): WBC, HGB, PLT in the last 72 hours. BMP:    Recent Labs     10/24/20  0931 10/25/20  0533 10/26/20  0516   CREATININE 0.70 0.71 0.77     Hepatic: No results for input(s): AST, ALT, ALB, BILITOT, ALKPHOS in the last 72 hours. Troponin: No results for input(s): TROPONINI in the last 72 hours. BNP: No results for input(s): BNP in the last 72 hours. Lipids: No results for input(s): CHOL, HDL in the last 72 hours. Invalid input(s): LDLCALCU  INR: No results for input(s): INR in the last 72 hours.     Objective:   Vitals: /61   Pulse 73   Temp 97.7 °F (36.5 °C) (Axillary)   Resp 16   Ht 5' 2\" (1.575 m)   Wt 114 lb 6.7 oz (51.9 kg)   SpO2 96%   BMI 20.93 kg/m²   General appearance: alert, appears stated hypoattenuation may represent central necrosis. This measures 5.4 x 1.9 cm in size. Spleen, adrenal glands, are unremarkable. There are masses identified involving the distal pancreatic tail again identified these appears slightly progressed since the previous exam up to 4.4 x 3.0 cm in size. Multiple bilateral renal cysts. Left lateral pole renal density again identified likely proteinaceous or hemorrhagic cyst measuring 2.1 cm. GI/Bowel: Mild-to-moderate retained stool throughout the colon. No bowel obstruction. Moderate sigmoid colonic diverticulosis. Right inguinal hernia containing fat and small knuckle of bowel again identified. No CT findings acute appendicitis. Pelvis: Prostate is enlarged. Mild bladder distention. Right inguinal hernia noted. Peritoneum/Retroperitoneum: No free fluid. No free air. Aortic vascular calcifications. No suspicious adenopathy. Bones/Soft Tissues: Question hemangioma involving L1 vertebral body otherwise no definite suspicious osseous lesion identified. No acute inflammatory process or bowel obstruction. Redemonstration of multiple hepatic metastases. , these have progressed from prior contrast-enhanced exam dating back to 2018 however most recent exams have been performed without contrast so difficult to compare with more recent exams Colonic diverticulosis noted. Distal pancreatic metastases not significant changed. Multiple bilateral metastatic pulmonary nodules question mild progression     Xr Chest Portable    Result Date: 10/22/2020  EXAMINATION: ONE XRAY VIEW OF THE CHEST 10/22/2020 6:14 pm COMPARISON: 09/17/2020 HISTORY: ORDERING SYSTEM PROVIDED HISTORY: Cough, fever. +COVID in september. Hx of CHF, CABG, pulm nodule, Aortic regurge TECHNOLOGIST PROVIDED HISTORY: Cough, fever. +COVID in september. Hx of CHF, CABG, pulm nodule, Aortic regurge Reason for Exam: Cough, fever. Positive COVID in september.  Hx of CHF, CABG, pulmonary nodule, aortic regurgitation. Acuity: Acute Type of Exam: Initial Additional signs and symptoms: Cough, fever. Positive COVID in september. Hx of CHF, CABG, pulmonary nodule, aortic regurgitation. FINDINGS: A permanent left-sided AV sequential bipolar pacemaker was noted. Postoperative changes were noted from prior coronary artery bypass surgery. Parenchymal nodularity was again noted. No cardiomegaly, interstitial edema or pleural effusion were noted. A metallic clip was noted in the right shoulder from prior rotator cuff repair. Bilateral lower lobe non consolidating infiltration was again noted. This is either due to chronic pneumonia or underlying bronchiectasis. Little change has occurred from 09/17/2020. Permanent left-sided AV sequential bipolar pacemaker. CABG. No cardiomegaly, interstitial edema or pleural effusion. Bilateral lower lobe non consolidating infiltration was again noted. This is either due to chronic pneumonia or underlying bronchiectasis. Pulmonary parenchymal nodularity was again identified. No significant change has occurred from 09/17/2020. Assessment :   1. Sepsis/better/uti  2. Advanced metastasis papillary cancer     Plan:   1.  stable  2.   Continue present care/possible d/c soon    Patient Active Problem List:     Degeneration of cervical intervertebral disc     Acquired hypothyroidism     Spinal stenosis in cervical region     Cervical spondylosis     Cervical disc herniation     Cervical radiculopathy, chronic     Hypercholesteremia     Heart block AV second degree     H/O malignant neoplasm of thyroid     Mild aortic regurgitation     Type 2 diabetes mellitus ([address]     Atherosclerosis of coronary artery bypass graft(s) without angina pectoris     Pulmonary nodules/lesions, multiple     Calculus of gallbladder     Elevated LFTs     Metastasis from thyroid cancer ([address]     Pulmonary nodule     Calculus of bile duct without cholecystitis and without obstruction     Acute cholecystitis     Elevated liver enzymes     Biliary colic     Abnormal ECG     Atrioventricular block, complete (HCC)     Block, bundle branch, left     Coronary arteriosclerosis in native artery     Chronic systolic CHF (congestive heart failure), NYHA class 2 (HCC)     Ischemic cardiomyopathy     S/P CABG x 3     Sick sinus syndrome (HCC)     SOB (shortness of breath)     Pneumonia due to COVID-19 virus     Presence of cardiac pacemaker     Chest pain     Chronic back pain     Papillary thyroid carcinoma (HCC)     Bone metastases (HCC)     UTI (urinary tract infection)     Sepsis (HCC)     NSTEMI (non-ST elevated myocardial infarction) ([facility]     Fever     Pneumonia due to organism      Anticipated Disposition upon discharge: [] Home                                                                         [] Home with Home Health                                                                         [] [facility]                                                                         [] [address] ,[address]      Patient is admitted as inpatient status because of co-morbidities listed above, severity of signs and symptoms as outlined, requirement for current medical therapies and most importantly because of direct risk to patient if care not provided in a hospital setting.           Rodriguez Daugherty MD  Rounding Hospitalist

## 2020-10-27 NOTE — CARE COORDINATION
by mouth daily    Quantity: 30 tablet  Refills: 3        levoFLOXacin (LEVAQUIN) 750 MG tablet  750 mg    Take 1 tablet by mouth every 48 hours for 7 days    Quantity: 3 tablet  Refills: 0            CONTINUE these medications which have CHANGED or have new prescriptions     Medication  Dose    SITagliptin (JANUVIA) 100 MG tablet  100 mg    Take 1 tablet by mouth daily    Quantity: 90 tablet  Refills: 0        glimepiride (AMARYL) 2 MG tablet  1 mg    Take 0.5 tablets by mouth every morning (before breakfast)    Quantity: 30 tablet  Refills: 3            CONTINUE these medications which have NOT CHANGED     Medication  Dose    mupirocin (BACTROBAN) 2 % ointment     Apply topically to affected area  3 times daily. Quantity: 22 g  Refills: 0        polyethylene glycol (MIRALAX) 17 g packet  17 g    Take 17 g by mouth daily as needed for Constipation    Quantity: 527 g  Refills: 1        mirabegron (MYRBETRIQ) 50 MG TB24  50 mg    Take 50 mg by mouth daily    Quantity: 28 tablet  Refills: 0        HYDROcodone-acetaminophen (NORCO) 5-325 MG per tablet  1 tablet    Take 1 tablet by mouth every 6 hours as needed for Pain for up to 30 days.     Quantity: 60 tablet  Refills: 0        Comments: Reduce doses taken as pain becomes manageable       diphenhydrAMINE (BENADRYL) 25 MG capsule  25 mg    Take 1 capsule by mouth every 6 hours as needed for Itching    Quantity: 30 capsule  Refills: 0        tamsulosin (FLOMAX) 0.4 MG capsule  0.4 mg    Take 1 capsule by mouth daily    Quantity: 90 capsule  Refills: 3        finasteride (PROSCAR) 5 MG tablet     TAKE 1 TABLET DAILY    Quantity: 90 tablet  Refills: 3        furosemide (LASIX) 20 MG tablet  20 mg    Take 1 tablet by mouth daily    Quantity: 30 tablet  Refills: 1        vitamin D (ERGOCALCIFEROL) 49092 units CAPS capsule  50,000 Units    Take 50,000 Units by mouth once a week        Multiple Vitamins-Minerals (THERAPEUTIC MULTIVITAMIN-MINERALS) tablet  1 tablet    Take 1 tablet by mouth daily        calcium carbonate (TUMS) 500 MG chewable tablet  500 mg    Take 1 tablet by mouth 2 times daily as needed for Heartburn    Quantity: 30 tablet  Refills: 0        levothyroxine (LEVOXYL) 137 MCG tablet  137 mcg    Take 137 mcg by mouth Daily        nitroGLYCERIN (NITROSTAT) 0.4 MG SL tablet  0.4 mg    Place 0.4 mg under the tongue. As directed        docusate sodium (COLACE) 100 MG capsule  100 mg    Take 100 mg by mouth nightly        atorvastatin (LIPITOR) 20 MG tablet  20 mg      Take 20 mg by mouth nightly        aspirin 81 MG EC tablet  81 mg    Take 81 mg by mouth daily          Continuity of Care Form    Patient Name: Janeen Loya   :  1928  MRN:  [id number]    Admit date:  10/22/2020  Discharge date:  10/27/2020    Code Status Order: Full Code   Advance Directives:   [facility] Documentation       Date/Time Healthcare Directive Type of Healthcare Directive Copy in [facility] [address] Agent's Name Healthcare Agent's Phone Number    10/23/20 0151  No, patient does not have an advance directive for healthcare treatment -- -- -- -- --            Admitting Physician:  Doreen Peterson MD  PCP: Gianna Bowen MD    Discharging Nurse: [facility]/Room#: 2098/2098-01  Discharging Unit Phone Number: [phone]    Emergency Contact:   Extended Emergency Contact Information  Primary Emergency Contact: Lucretia Altamirano   [address] Phone: [phone]  Work Phone: [phone]  Mobile Phone: [phone]  Relation: Child   needed? No  Secondary Emergency Contact: Naz Sabillon  Address: [address] [place] [address] Phone: [phone]  Work Phone: [phone]  Mobile Phone: [phone]  Relation: Child   needed?  No    Past Surgical History:  Past Surgical History:   Procedure Laterality Date    APPENDECTOMY      CARDIAC SURGERY      without obstruction K80.50    Acute cholecystitis K81.0    Elevated liver enzymes W32.0    Biliary colic M83.16    Abnormal ECG R94.31    Atrioventricular block, complete (HCC) I44.2    Block, bundle branch, left I44.7    Coronary arteriosclerosis in native artery I25.10    Chronic systolic CHF (congestive heart failure), NYHA class 2 (HCC) I50.22    Ischemic cardiomyopathy I25.5    S/P CABG x 3 Z95.1    Sick sinus syndrome (HCC) I49.5    SOB (shortness of breath) R06.02    Pneumonia due to COVID-19 virus U07.1, J12.89    Presence of cardiac pacemaker Z95.0    Chest pain R07.9    Chronic back pain M54.9, G89.29    Papillary thyroid carcinoma (HCC) C73    Bone metastases (HCC) C79.51    UTI (urinary tract infection) N39.0    Sepsis (HCC) A41.9       Isolation/Infection:   Isolation            No Isolation          Patient Infection Status       Infection Onset Added Last Indicated Last Indicated By Review Planned Expiration Resolved Resolved By    MDRO (multi-drug resistant organism)  04/30/18 04/30/18 Magdalena Massey RN        5/2018 proteus    VRE  04/02/18 04/02/18 Magdalena Massey RN        03/29/2018 Urine    Resolved    COVID-19 Rule Out 10/05/20 10/06/20 10/05/20 Covid-19 Ambulatory (Ordered)   10/07/20 Rule-Out Test Resulted    COVID-19 08/29/20 08/29/20 08/29/20 COVID-19   08/30/20 Estephania Bernardo RN    Positive on 7/25/2020, per CDC and Dr. Verneda Cheadle no need for isolation     COVID-19 Rule Out 08/28/20 08/28/20 08/29/20 COVID-19 (Ordered)   08/29/20 Rule-Out Test Resulted            Nurse Assessment:  Last Vital Signs: BP (!) 96/48   Pulse 66   Temp 98.2 °F (36.8 °C) (Oral)   Resp 12   Ht 5' 2\" (1.575 m)   Wt 116 lb 13.5 oz (53 kg)   SpO2 96%   BMI 21.37 kg/m²     Last documented pain score (0-10 scale): Pain Level: 0  Last Weight:   Wt Readings from Last 1 Encounters:   10/23/20 116 lb 13.5 oz (53 kg)     Mental Status:  oriented and alert    IV Access:  - None    Nursing Mobility/ADLs:  Walking   Assisted  Transfer  Assisted  Bathing  Assisted  Dressing  Assisted  Toileting  Assisted  Feeding  Assisted  Med Admin  Dependent  Med Delivery   crushed and prefers mixed with applesauce    Wound Care Documentation and Therapy:  Wound 03/29/18 Blister Foot Left;Plantar #2 left distal foot/noticed this weekend (Active)   Number of days: 938        Elimination:  Continence:   · Bowel: Yes  · Bladder: Yes  Urinary Catheter: None   Colostomy/Ileostomy/Ileal Conduit: No       Date of Last BM: 10/25/20    Intake/Output Summary (Last 24 hours) at 10/23/2020 1223  Last data filed at 10/23/2020 1043  Gross per 24 hour   Intake 710 ml   Output 440 ml   Net 270 ml     I/O last 3 completed shifts: In: 710 [P.O.:100; I.V.:610]  Out: -     Safety Concerns: At Risk for Falls    Impairments/Disabilities:      None    Nutrition Therapy:  Current Nutrition Therapy:   - Oral Diet:  Dysphagia 1 pureed    Routes of Feeding: Oral  Liquids: Honey Thick Liquids  Daily Fluid Restriction: no  Last Modified Barium Swallow with Video (Video Swallowing Test): not done    Treatments at the Time of Hospital Discharge:   Respiratory Treatments: n/a  Oxygen Therapy:  is not on home oxygen therapy. Ventilator:    - No ventilator support    Rehab Therapies: Physical Therapy, Occupational Therapy and Speech/Language Therapy  Weight Bearing Status/Restrictions: No weight bearing restirctions  Other Medical Equipment (for information only, NOT a DME order):  wheelchair, cane and walker, SC, Bedside Commode  Other Treatments: Skilled Nursing Assessment Per Protocol. Medication Education & Monitoring. Family would like information on Palliative Care Services. Home health care agency's  to evaluate patient two weeks prior to discharge from home health to determine post-discharge services.      Patient's personal belongings (please select all that are sent with patient):  None    RN SIGNATURE:  Electronically signed by Nuvia Frazier on 10/27/20 at 9:26 AM EDT    CASE MANAGEMENT/SOCIAL WORK SECTION    Inpatient Status Date: 10/22/2020    Readmission Risk Assessment Score:  Readmission Risk              Risk of Unplanned Readmission:        30           Discharging to Facility/ [address] [place] [address] Amy   Phone: [phone] Fax [phone]      / signature: Electronically signed by Darrel Bradley RN on 10/23/20 at 12:24 PM EDT    PHYSICIAN SECTION    Prognosis: Fair    Condition at Discharge: Stable    Rehab Potential (if transferring to Rehab): Fair    Recommended Labs or Other Treatments After Discharge: none    Physician Certification: I certify the above information and transfer of Wali Carlos  is necessary for the continuing treatment of the diagnosis listed and that he requires [address] for greater 30 days.      Update Admission H&P: No change in H&P    PHYSICIAN SIGNATURE:  Electronically signed by Jasvir Vo MD on 10/27/20 at 8:18 AM EDT

## 2020-10-27 NOTE — PLAN OF CARE
Problem: Falls - Risk of:  Goal: Will remain free from falls  Description: Will remain free from falls  10/27/2020 0419 by Pradeep Cordon RN  Outcome: Met This Shift  Pt. Remained free from falls this shift. Bed in lowest position, call light within reach, bed alarm on and frequent RN rounds made. Problem: Skin Integrity:  Goal: Absence of new skin breakdown  Description: Absence of new skin breakdown  10/27/2020 0419 by Pradeep Cordon RN  Outcome: Met This Shift  Pt. Remained free from absence of new skin breakdown this shift. Skin kept clean and dry. Problem: Daily Care:  Goal: Daily care needs are met  Description: Daily care needs are met  10/27/2020 0419 by Praedep Cordon RN  Outcome: Met This Shift  Pt. Daily care needs were met this shift. Problem: Pain:  Goal: Patient's pain/discomfort is manageable  Description: Patient's pain/discomfort is manageable  10/27/2020 0419 by Pradeep Cordon RN  Outcome: Met This Shift  Patients pain was managed this pain assessments done and relaxing environment promoted.

## 2020-10-27 NOTE — CARE COORDINATION
ONGOING DISCHARGE PLAN:    Writer faxed facesheet, discharge medication list and [place] to [place] and was in communication with Raheem Howe from [address] [place] [address] to notify her of the discharge. Will continue to follow for additional discharge needs.     Electronically signed by Trupti Paniagua RN on 10/27/2020 at 9:52 AM

## 2020-10-27 NOTE — PROGRESS NOTES
Physician Progress Note      PATIENT:               Charity SCHMIDT #:                  [id number]  :                       1928  ADMIT DATE:       10/22/2020 6:06 PM  [address] [place] DATE:        10/27/2020 2:06 PM  RESPONDING  PROVIDER #:        Yuki Cavazos MD          QUERY TEXT:    Patient admitted with sepsis. Noted documentation of NSTEMI in daily progress   notes on active problem list with date of 10/23/2020. If possible, please   document in progress notes and discharge summary:    The medical record reflects the following:  Risk Factors: CAD, ICMP  Clinical Indicators: 10/23 Cardiology consult note:  Minimal nonspecific   high-sensitivity troponin with intermittent chest pain (which could represent   angina pectoris) by history. Doubt any acute coronary syndrome or acute   myocardial infarction. ASCVD, prior CABG. Stable angina 10/22 EKG:  ST,   LBBB, T wave inversion in lateral leads  Treatment: cardiology consult  Options provided:  -- NSTEMI present as evidenced by, Please document evidence. -- NSTEMI was ruled out  -- Other - I will add my own diagnosis  -- Disagree - Not applicable / Not valid  -- Disagree - Clinically unable to determine / Unknown  -- Refer to Clinical Documentation Reviewer    PROVIDER RESPONSE TEXT:    NSTEMI is present as evidenced by nstemi    Query created by: Gopi Aponte on 10/27/2020 10:06 AM      QUERY TEXT:    Pt admitted with sepsis. Pt noted to have documented pneumonia If possible,   please document in the progress notes and discharge summary if you are   evaluating and/or treating any of the following: The medical record reflects the following:  Risk Factors: advanced age, recent Covid-25, reported hx of aspiration  Clinical Indicators: Per 10/25 progress note:  Daughter mentions that patient   has h/o aspiration and he has been receiving speech therapy at home; 10/22   CXR: Bilateral lower lobe non consolidating infiltration was again noted.

## 2020-10-27 NOTE — PROGRESS NOTES
10/22/20 10/29/20 Yes BRIAN Hurt CNP   polyethylene glycol (MIRALAX) 17 g packet Take 17 g by mouth daily as needed for Constipation 10/15/20  Yes Brennon Davenport MD   mirabegron [facility]) 50 MG TB24 Take 50 mg by mouth daily 10/15/20  Yes BRIAN Treadwell CNP   HYDROcodone-acetaminophen (NORCO) 5-325 MG per tablet Take 1 tablet by mouth every 6 hours as needed for Pain for up to 30 days. 10/7/20 11/6/20 Yes Amy Daugherty MD   diphenhydrAMINE (BENADRYL) 25 MG capsule Take 1 capsule by mouth every 6 hours as needed for Itching 9/21/20  Yes Brennon Davenport MD   tamsulosin [facility]) 0.4 MG capsule Take 1 capsule by mouth daily 2/19/20  Yes Mindy Rebollar MD   finasteride (PROSCAR) 5 MG tablet TAKE 1 TABLET DAILY 2/19/20  Yes Mindy Rebollar MD   furosemide (LASIX) 20 MG tablet Take 1 tablet by mouth daily 11/30/18  Yes Jessica Ryan MD   vitamin D (ERGOCALCIFEROL) 46649 units CAPS capsule Take 50,000 Units by mouth once a week   Yes Historical Provider, MD   Multiple Vitamins-Minerals (THERAPEUTIC MULTIVITAMIN-MINERALS) tablet Take 1 tablet by mouth daily   Yes Historical Provider, MD   calcium carbonate (TUMS) 500 MG chewable tablet Take 1 tablet by mouth 2 times daily as needed for Heartburn 3/7/18  Yes Chana Singh MD   levothyroxine (LEVOXYL) 137 MCG tablet Take 137 mcg by mouth Daily  7/29/16  Yes Historical Provider, MD   nitroGLYCERIN (NITROSTAT) 0.4 MG SL tablet Place 0.4 mg under the tongue.  As directed   Yes Historical Provider, MD   docusate sodium (COLACE) 100 MG capsule Take 100 mg by mouth nightly    Yes Historical Provider, MD   atorvastatin (LIPITOR) 20 MG tablet   Take 20 mg by mouth nightly    Yes Historical Provider, MD   aspirin 81 MG EC tablet Take 81 mg by mouth daily    Yes Historical Provider, MD     Current Facility-Administered Medications   Medication Dose Route Frequency Provider Last Rate Last Dose    glimepiride (AMARYL) tablet 1 mg  1 mg Oral QAM Nick Sherman MD        dextrose 50 % IV solution  12.5 g Intravenous PRN Nick Sherman MD        glucagon (rDNA) injection 1 mg  1 mg Intramuscular PRN Nick Sherman MD        dextrose 5 % solution  100 mL/hr Intravenous PRN Nick Sherman MD        clotrimazole (LOTRIMIN) 1 % cream   Topical BID Eunice Meehan MD           Allergies:  Seasonal and Keflex [cephalexin]    REVIEW OF SYSTEMS:    · Difficult to obtain considering patient's general mental status. Most information from patient's son. No other complaints about what is mentioned above. PHYSICAL EXAM:      BP (!) 116/57   Pulse 78   Temp 98 °F (36.7 °C) (Oral)   Resp 16   Ht 5' 2\" (1.575 m)   Wt 116 lb 13.5 oz (53 kg)   SpO2 92%   BMI 21.37 kg/m²    Temp (24hrs), Av.8 °F (36.6 °C), Min:97.5 °F (36.4 °C), Max:98 °F (36.7 °C)  General appearance - not in pain or distress  Mental status - a patient is sleepy and slight disorientation. Eyes - pupils equal and reactive, extraocular eye movements intact  Ears - bilateral TM's and external ear canals normal  Nose - normal and patent, no erythema, discharge or polyps  Mouth - mucous membranes moist, pharynx normal without lesions  Neck - supple, no significant adenopathy  Lymphatics - no palpable lymphadenopathy, no hepatosplenomegaly  Chest - clear to auscultation, no wheezes, rales or rhonchi, symmetric air entry  Heart - normal rate, regular rhythm, normal S1, S2, no murmurs, rubs, clicks or gallops  Abdomen - soft, nontender, nondistended, no masses or organomegaly  Neurological -slightly disoriented, no focal deficit  Musculoskeletal - no joint tenderness, deformity or swelling  Extremities - peripheral pulses normal, no pedal edema, no clubbing or cyanosis  Skin - normal coloration and turgor, no rashes, no suspicious skin lesions noted     DATA:    Labs:   CBC:   No results for input(s): WBC, HGB, HCT, PLT in the last 72 hours.   BMP:   Recent Labs     10/26/20  0587 10/27/20  0515   CREATININE 0.77 0.86   LABGLOM >60 >60     PT/INR:   No results for input(s): PROTIME, INR in the last 72 hours. APTT:  No results for input(s): APTT in the last 72 hours. LIVER PROFILE:  No results for input(s): AST, ALT, LABALBU in the last 72 hours. IMPRESSION:    Primary Problem  Sepsis [facility])    Active Hospital Problems    Diagnosis Date Noted    UTI (urinary tract infection) [N39.0] 10/23/2020    [facility] [A41.9] 10/23/2020    NSTEMI (non-ST elevated myocardial infarction) ([facility] [I21.4]     Fever [R50.9]     Pneumonia due to organism [J18.9]     Metastasis from thyroid cancer ([facility] [C79.9, C73] 09/19/2017   Advanced metastatic papillary thyroid cancer  Bone metastasis    RECOMMENDATIONS:  1. Records and labs and images were reviewed and discussed with the patient and his son. 2. Considering patient's advanced age and advanced disease, prognosis is poor. 3. I had discussion with family With consideration of prognosis, they elected to follow conservative management only. 4. At this time   No active treatment planned. 5. Discharge today  6. Thank you for allowing us to participate in the care of this pleasant patient. Discussed with patient and Nurse. MD Dustin Lagos MD  Hematologist/Medical Oncologist    Cell: [phone]      This note is created with the assistance of a speech recognition program.  While intending to generate a document that actually reflects the content of the visit, the document can still have some errors including those of syntax and sound a like substitutions which may escape proof reading. It such instances, actual meaning can be extrapolated by contextual diversion.

## 2020-10-27 NOTE — PROGRESS NOTES
Physician Progress Note      PATIENT:               Shawna Lewis  CSN #:                  [id number]  :                       1928  ADMIT DATE:       10/22/2020 6:06 PM  [address] [place] DATE:        10/27/2020 2:06 PM  RESPONDING  PROVIDER #:        Crystal Gutiérrez MD          QUERY TEXT:    Patient admitted with sepsis/uti per 10/23 H & P. If possible, please document   in progress notes and discharge summary:    The medical record reflects the following:  Risk Factors: advanced age  Clinical Indicators: 10/22 UA few bacteria and small leukocyte esterase 10/22   no significant growth; 10/22 H & P exam notes: :  negative for frequency,   dysuria, urinary incontinence, hesitancy, decreased stream and hematuria  Treatment: diagnostic testing, IV Cefepime and Vancomycin through 10/25,   changed to oral Levaquin  Options provided:  -- UTI present as evidenced by, Please document evidence.   -- UTI was ruled out  -- Other - I will add my own diagnosis  -- Disagree - Not applicable / Not valid  -- Disagree - Clinically unable to determine / Unknown  -- Refer to Clinical Documentation Reviewer    PROVIDER RESPONSE TEXT:    UTI is present as evidenced by increased frequency    Query created by: Samir Shannon on 10/27/2020 10:06 AM      Electronically signed by:  Crystal Gutiérrez MD 10/27/2020 7:57 PM

## 2020-10-28 NOTE — CARE COORDINATION
[address] Transitions Initial Follow Up Call    Call within 2 business days of discharge: No    Patient: Vikas Maldonado Patient : 1928   MRN: [id number]  Reason for Admission: CP  Discharge Date: 10/27/20 RARS: Readmission Risk Score: 29      Last Discharge [facility]       Complaint Diagnosis Description Type Department Provider    10/22/20 Chest Pain; Cough NSTEMI (non-ST elevated myocardial infarction) ([facility] . .. ED to Hosp-Admission (Discharged) (ADMITTED) Breanne Cabrera MD; Jessy Izaguirre . .. Date/Time:  10/28/2020 12:41 PM  Attempted to reach patient by telephone. Call within 2 business days of discharge: Yes Left HIPPA compliant message requesting a return call. Will attempt to reach patient again. Facility: [facility]    Non-face-to-face services provided:  Communication with home health agencies or other community services the patient is currently using-writer contacted OL spoke to Doron, confirmed referral was received    Care Transitions 24 Hour Call    Do you have all of your prescriptions and are they filled?:  No (Comment: son is picking up vibratabs & get antibiotic started )  Post Acute Services:  Home Health (Comment:  [id number])  Patient DME:  Billlouisa Roberth cane, Walker, Wheelchair, [employer] chair, Other  Other Patient DME:  grab bars  Do you have support at home?:  Child  Are you an active caregiver in your home?:  No  Care Transitions Interventions     Other Services:   (Comment: [place] [facility])          Follow Up  Future Appointments   Date Time Provider Olga Duffy   10/30/2020  9:00 AM Cleve Ruiz APRN - [address]   2020  9:30 AM DO [facility] Vargas Neuro [facility]   2020 12:45 PM Reed Guaman MD [facility]   2020  1:00 PM [facility] [address] [facility] ST[facility] MED [place]   2020  4:00 PM BRIAN Marrero - CNP [facility]   2020 11:15 AM Nargis Gonzalez MD [facility]

## 2020-10-29 NOTE — CARE COORDINATION
[facility] Transitions Initial Follow Up Call    Call within 2 business days of discharge: Yes    Patient: Wali Carlos Patient : 1928   MRN: [id number]  Reason for Admission: cough  Discharge Date: 10/27/20 RARS: Readmission Risk Score: 29      Last Discharge [facility]       Complaint Diagnosis Description Type Department Provider    10/22/20 Chest Pain; Cough NSTEMI (non-ST elevated myocardial infarction) ([facility] . .. ED to Hosp-Admission (Discharged) (ADMITTED) Emelia Ramires MD; Milo Valencia . .. Spoke with: [id number]: [address]    Non-face-to-face services provided:  Writer spoke to patient's daughter Shannon Lackey, reviewed discharge medications and instructions, reviewed f/u appointments, has hospital f/u with Tigist Dumont on 10/30/2020, patient has OL VNS, provided contact information. Challenges to be reviewed by the provider   Additional needs identified to be addressed with provider No  none    Discussed COVID-19 related testing which was not done at this time. Test results were not done. Patient informed of results, if available? Method of communication with provider : none    Advance Care Planning:   Does patient have an Advance Directive:  reviewed and current. Was this a readmission? No  Patient stated reason for admission:   Patients top risk factors for readmission: medical condition    Care Transition Nurse (CTN) contacted the patient by telephone to perform post hospital discharge assessment. Verified name and  with family as identifiers. Provided introduction to self, and explanation of the CTN role. CTN reviewed discharge instructions, medical action plan and red flags with family who verbalized understanding. Family given an opportunity to ask questions and does not have any further questions or concerns at this time. Were discharge instructions available to patient? Yes.  Reviewed appropriate site of care based on symptoms and resources available to patient including: PCP, Specialist, Urgent care clinics, Home health, When to call 911 and Loop. The family agrees to contact the PCP office for questions related to their healthcare. Medication reconciliation was performed with family, who verbalizes understanding of administration of home medications. Advised obtaining a 90-day supply of all daily and as-needed medications. Covid Risk Education    Patient has following risk factors of: heart failure and diabetes. Education provided regarding infection prevention, and signs and symptoms of COVID-19 and when to seek medical attention with family who verbalized understanding. Discussed exposure protocols and quarantine From CDC: Are you at higher risk for severe illness?   and given an opportunity for questions and concerns. The family agrees to contact the COVID-19 hotline [phone] or PCP office for questions related to COVID-19. For more information on steps you can take to protect yourself, see CDC's How to Protect Yourself     Patient/family/caregiver given information for Janneth Campbell and agrees to enroll yes  Patient's preferred e-mail: [email or web address]  Patient's preferred phone number: [phone]    Discussed follow-up appointments. If no appointment was previously scheduled, appointment scheduling offered: Yes. Is follow up appointment scheduled within 7 days of discharge? Yes  Non-[facility] follow up appointment(s):     Loop based on severity of symptoms and risk factors. Plan for next call: Loop  CTN provided contact information for future needs.         Care Transitions 24 Hour Call    Do you have any ongoing symptoms?:  No  Do you have a copy of your discharge instructions?:  Yes  Do you have all of your prescriptions and are they filled?:  No (Comment: son is picking up vibratabs & get antibiotic started )  Have you scheduled your follow up appointment?:  Yes  How are you going to get to your appointment?:  Car - family or friend to transport  Were you discharged with any Home Care or Post Acute Services:  Yes  Post Acute Services:  Home Health (Comment:  [id number])  Patient DME:  Georgette Gonzalez cane, Walker, Wheelchair, [address] chair, Other  Other Patient DME:  grab bars  Do you have support at home?:  Child  Do you feel like you have everything you need to keep you well at home?:  Yes  Are you an active caregiver in your home?:  No  Care Transitions Interventions     Other Services:   (Comment: [facility])          Follow Up  Future Appointments   Date Time Provider Olga Duffy   10/30/2020  9:00 AM Sandra Roper APRN - [address]   11/4/2020  9:30 AM DO [facility] Neuro [facility]   11/18/2020 12:45 PM Arden Cranker, MD [facility]   11/18/2020  1:00 PM [facility] ST[facility] [place]   11/18/2020  4:00 PM BRIAN Ramirez - CNP [facility]   11/19/2020 11:15 AM Deric Sandoval MD [facility]   3/12/2021 10:00 AM MD Poli Hodgsongeorge Wilson, RN

## 2020-10-30 NOTE — PROGRESS NOTES
Subjective:      Patient ID: Ashia Rothman is a 80 y.o. male. HPI     80year old male accompanied by his daughter presents with follow up s/p ER visit for sepsis pneumonia uncontrolled DM HTN HLD. Was admitted a week ago for fever cough being lethargic and CXR showed bilateral infiltration. Currently is on levaquin every 48 hours. CT abd also showed multiple hepatic metastasis and worsening pancreatic mass. Pt follows up with end at [facility] for thyroid cancer which metastasized and currently follows up with Dr. Sabina Peres. Daughter states palliative care is involved due to pt's advanced age and disease. Recent A1c was 10.4 today ( was 8.6 in Aug) and currently is on amaryl with janvunia added last visit. Daughter states they have given pt Ensure with high sugar. States he doesn't take metoprolol which was added at discharge. Currently is on losix and bp is stable. Pt states he is feeling better since discharge. Hx of CHF. Review of Systems   Constitutional: Negative for chills, diaphoresis and fever. Eyes: Negative for visual disturbance. Respiratory: Positive for cough. Negative for shortness of breath. Cardiovascular: Negative for chest pain and palpitations. Gastrointestinal: Negative for abdominal pain and nausea. Skin: Negative for wound. Neurological: Negative for dizziness, weakness and numbness. Psychiatric/Behavioral: Negative for agitation and behavioral problems. Objective:   Physical Exam  Vitals signs and nursing note reviewed. Constitutional:       General: He is not in acute distress. Appearance: Normal appearance. Comments: Appears weak    HENT:      Nose: Nose normal.   Eyes:      Conjunctiva/sclera: Conjunctivae normal.   Neck:      Musculoskeletal: Neck supple. Cardiovascular:      Rate and Rhythm: Normal rate and regular rhythm. Heart sounds: Normal heart sounds. Pulmonary:      Effort: Pulmonary effort is normal. No respiratory distress.       Breath sounds: Normal breath sounds. Abdominal:      Palpations: Abdomen is soft. Tenderness: There is no abdominal tenderness. Musculoskeletal:         General: No tenderness. Lymphadenopathy:      Cervical: No cervical adenopathy. Skin:     General: Skin is warm and dry. Neurological:      Mental Status: He is alert and oriented to person, place, and time. Cranial Nerves: No cranial nerve deficit. Comments: Ambulates with a wheelchair    Psychiatric:         Mood and Affect: Mood normal.         Behavior: Behavior normal.         Assessment:      1. Pneumonia of both lower lobes due to infectious organism    2. Type 2 diabetes mellitus without complication, without long-term current use of insulin ([facility]    3. Essential hypertension    4. Mixed hyperlipidemia            Plan:      BP Readings from Last 3 Encounters:   10/30/20 100/60   10/27/20 (!) 116/57   10/22/20 138/68     /60   Pulse 88   Temp 98 °F (36.7 °C)   Resp 16   Lab Results   Component Value Date    WBC 7.5 10/23/2020    HGB 11.0 (L) 10/23/2020    HCT 34.0 (L) 10/23/2020     10/23/2020    CHOL 123 08/23/2019    TRIG 207 (H) 08/23/2019    HDL 29 (L) 08/23/2019    ALT 16 10/22/2020    AST 21 10/22/2020     (L) 10/23/2020    K 4.2 10/23/2020     10/23/2020    CREATININE 0.86 10/27/2020    BUN 18 10/23/2020    CO2 24 10/23/2020    TSH 0.26 (L) 09/18/2017    PSA 6.18 (H) 04/04/2018    INR 1.2 10/22/2020    LABA1C 11.2 (H) 10/22/2020    LABMICR 22 (H) 09/25/2017     Lab Results   Component Value Date    CALCIUM 7.6 (L) 10/23/2020    PHOS 2.8 09/24/2017     Lab Results   Component Value Date    LDLCHOLESTEROL 53 08/23/2019         1. Pneumonia of both lower lobes due to infectious organism  - cont levaquin as prescribed. - repeat XR CHEST (2 VW); Future  - follow up with pulmonary asap     2. Type 2 diabetes mellitus without complication, without long-term current use of insulin (HCC)  - uncontrolled.  Cont januvia and amaryl   - SITagliptin (JANUVIA) 100 MG tablet; Take 1 tablet by mouth daily  Dispense: 90 tablet; Refill: 0  - advised Ensure with low sugar   - refer pt to endo for further management. 3. Essential hypertension  - stable. Cont lasix. - follow up with cardiologist as scheduled     4. Mixed hyperlipidemia  - cont statin therapy       Requested Prescriptions     Signed Prescriptions Disp Refills    SITagliptin (JANUVIA) 100 MG tablet 90 tablet 0     Sig: Take 1 tablet by mouth daily       Medications Discontinued During This Encounter   Medication Reason    SITagliptin (JANUVIA) 100 MG tablet REORDER     Discussed use, benefit, and side effects of prescribed medications. Barriers to medication compliance addressed. All patient questions answered. Pt voiced understanding. Return in about 3 months (around 1/30/2021) for pneumonia, diabetes, HTN, HLD with  [employer].           BRIAN Morrison - CNP

## 2020-11-02 PROBLEM — Z86.16 HISTORY OF 2019 NOVEL CORONAVIRUS DISEASE (COVID-19): Status: ACTIVE | Noted: 2020-01-01

## 2020-11-02 NOTE — PROGRESS NOTES
PULMONARY MEDICINE OUTPATIENT  FOLLOW UP NOTE                                                                       PATIENT:  Carla Saldaña  YOB: 1928  MRN: [id number]     REFERRED BY: Brennon Davenport MD   CHIEF COMPLIANT: Follow-Up from Hospital ([facility])       HISTORY     HISTORY OF PRESENT ILLNESS:   Carla Saldaña is a 80y.o. year old male here for follow-up. Patient has history of metastatic thyroid cancer, status post thyroidectomy. He was previously seen by Dr. Dian Mcardle for multiple pulmonary nodules that had been managed conservatively. He was recently hospitalized at [facility] with worsening shortness of breath and cough and was treated on lines of pneumonia and was discharged on Levaquin. Patient's daughter reported that patient has had issues with swallowing for a while and they are giving him puréed diet. He is taking 750 mg of Levaquin every other day and has 1 more dose left patient daughter was unsure about the length of antimicrobials. CT scan reviewed and findings discussed in detail. He denies any fever, chills, wheezing or shortness of breath. Patient also had a COVID-19 infection in July 2020. He did not require any oxygen at time of discharge. 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 He is currently not on any bronchodilators. His previous PFT was suggestive of restrictive ventilatory impairment with normal diffusion capacity.      PAST MEDICAL HISTORY:      Diagnosis Date    Abnormal ECG 8/30/2019    Acquired hypothyroidism 9/19/2014    Acute cholecystitis 2/28/2018    Atherosclerosis of coronary artery bypass graft(s) without angina pectoris 10/4/2016    Atrioventricular block, complete ([address] 2/05/9373    Biliary colic     Block, bundle branch, left 3/30/2010    Calculus of bile duct without cholecystitis and without obstruction     Calculus of gallbladder 9/19/2017    Cancer ([address] 2013 thyroid    Carotid artery stenosis     Cataract     Cerebrovascular disease     Cervical disc herniation 11/13/2014    Cervical radiculopathy, chronic 11/14/2014    Cervical spondylosis 11/13/2014    CHF (congestive heart failure) (HCC)     Chronic systolic CHF (congestive heart failure), NYHA class 2 ([address] 6/21/2018    Coronary arteriosclerosis in native artery 3/30/2010    Overview:  H/O Acute anteriorseptal MI  11/8/99 Severe 3 vessel CAD , LM coronary dissection  S/P CABG  1999 L-LAD, SVG Ramus, SVG OM, SVG L PDA Stress test  10/2007  EF 49% no reversible ischemia Predominantly fixed infeior apical septal defects Low normal to mildly reduced LV sys function with :VEF 49%  Septal wall motion abnormlaity Compared with prior study no new reveresible perfusion defects    COVID-19 virus infection 07/25/2020    Degeneration of cervical intervertebral disc 9/25/2012    Elevated LFTs 9/19/2017    Elevated liver enzymes 2/28/2018    H/O malignant neoplasm of thyroid 12/10/2013    Heart attack ([address]     Heart block AV second degree     Hypercholesteremia 3/30/2010    Hypertension     Hypothyroidism     Ischemic cardiomyopathy 8/30/2019    Metastatic cancer ([address] 9/19/2017    Mild aortic regurgitation 3/30/2010    Overview:  Mild moderate MR and AR  Overview:  Mild moderate MR and AR    Pneumonia due to COVID-19 virus     07/25/2020    Pulmonary nodule 6/21/2018    Pulmonary nodules/lesions, multiple 9/19/2017    S/P CABG x 3 8/30/2019    Sick sinus syndrome ([address] 8/30/2019    SOB (shortness of breath) 8/30/2019    Spinal stenosis in cervical region 11/6/2014    Type 2 diabetes mellitus ([address] 3/30/2010    Overview:  without complications    Type II or unspecified type diabetes mellitus without mention of complication, not stated as uncontrolled      PAST SURGICAL HISTORY:      Procedure Laterality Date    APPENDECTOMY      CARDIAC SURGERY      CHOLECYSTECTOMY      CORONARY ARTERY BYPASS GRAFT      ENDOSCOPIC ULTRASOUND (LOWER)      PACEMAKER INSERTION  03/2015    [place] LAP,CHOLECYSTECTOMY N/A 3/2/2018    CHOLECYSTECTOMY LAPAROSCOPIC performed by Frank Ann DO at [address] TOTAL THYROIDECTOMY       FAMILY HISTORY:      Problem Relation Age of Onset    Cancer Mother         stomach    Other Father         pneumonia     SOCIAL HISTORY:  TOBACCO:   reports that he has never smoked. He has never used smokeless tobacco.  ETOH:   reports no history of alcohol use. DRUGS: reports no history of drug use. AVOCATION/OCCUPATIONAL EXPOSURE:  Previously worked in automotive industry, reports potential environmental exposure    IMMUNIZATION HISTORY:  Immunization History   Administered Date(s) Administered    Influenza A ([id number]) Vaccine IM 02/23/2010    Influenza A (Y3Q8-65) Vaccine PF IM 01/15/2010    Influenza Virus Vaccine 10/01/2013, 11/09/2015    Influenza Whole 09/15/2011    Influenza, Quadv, IM, (6 mo and older Fluzone, Flulaval, Fluarix and 3 yrs and older Afluria) 10/04/2016    Influenza, Quadv, IM, PF (6 mo and older Fluzone, Flulaval, Fluarix, and 3 yrs and older Afluria) 12/12/2017    Influenza, Quadv, adjuvanted, 65 yrs +, IM, PF (Fluad) 09/21/2020    Influenza, Triv, inactivated, subunit, adjuvanted, IM (Fluad 65 yrs and older) 12/21/2019    Pneumococcal Conjugate 13-valent ([id number]) 04/30/2019    Pneumococcal Polysaccharide ([id number]) 09/15/2011, 11/25/2012, 11/09/2015, 05/09/2017    Tdap (Boostrix, Adacel) 01/28/2018        ALLERGIES:  Allergies   Allergen Reactions    Seasonal      Other reaction(s):  Other: See Comments  seasonal upper respiratory irritation    Keflex [Cephalexin] Nausea And Vomiting      MEDICATIONS:  Outpatient Medications Prior to Visit   Medication Sig Dispense Refill    SITagliptin (JANUVIA) 100 MG tablet Take 1 tablet by mouth daily 90 tablet 0    levoFLOXacin (LEVAQUIN) 750 MG tablet Take 1 tablet by mouth every 48 and Immunology: negative   Hematological and Lymphatic: negative for - bleeding problems, jaundice, or swollen lymph nodes   Endocrine: negative for - polydipsia/polyuria or temperature intolerance   Respiratory: positive for - cough and shortness of breath   Cardiovascular: negative for - chest pain or palpitations   Gastrointestinal: negative for - change in bowel habits, nausea/vomiting, or swallowing difficulty/pain   Genito-Urinary: negative for - dysuria or urinary frequency/urgency   Musculoskeletal: negative for - joint pain or joint swelling   Neurological: negative for - numbness/tingling or weakness   Dermatological: negative for - rash or skin lesion changes      PHYSICAL EXAMINATION      VITAL SIGNS:   /60 (Site: Left Upper Arm, Position: Sitting, Cuff Size: Medium Adult)   Pulse 83   Temp 97.4 °F (36.3 °C) (Temporal)   Resp 14   Ht 5' 4\" (1.626 m)   Wt 128 lb (58.1 kg)   SpO2 96% Comment: room air  BMI 21.97 kg/m²   Wt Readings from Last 3 Encounters:   11/19/20 112 lb 4 oz (50.9 kg)   11/18/20 113 lb 9.6 oz (51.5 kg)   11/02/20 128 lb (58.1 kg)     SYSTEMIC EXAMINATION:   General appearance - alert, well appearing, and in no distress and overweight   Eyes - sclera anicteric, no conjunctival injection injection   ENT - hearing grossly normal bilaterally,  Nose normal and patent, no erythema, discharge or polyps, Oral mucous membranes moist, pharynx normal without lesions, Mallampati Airway Score - II (soft palate, uvula, fauces visible)   Neck/Lymphatics - supple, no significant adenopathy, carotids upstroke normal bilaterally, no bruits   Chest- clear to auscultation, no wheezes, rales or rhonchi, symmetric air entry   Heart - normal rate, regular rhythm, normal S1, S2, no murmurs, rubs, clicks or gallops   Abdomen - soft, nontender, non distended   Neurological/Psych- motor and sensory grossly normal bilaterally, alert, oriented to person, place, and time   Musculoskeletal/Extremities- no joint tenderness or swelling, peripheral pulses normal, no pedal edema, no clubbing or cyanosis   Skin - normal coloration and turgor, no rashes, no suspicious skin lesions noted     LABORATORY DATA      LABS:  ABG:   No results found for: PH, PHART, PCO2, QIH1SED, PO2, PO2ART, HCO3, QGJ6HKO, BE, BEART, THGB, M9OJWTMR  VBG:  No results found for: PHVEN, MNS4HSQ, BEVEN, W2SKMZLH  CBC:   Lab Results   Component Value Date    WBC 14.5 (H) 11/18/2020    RBC 5.12 11/18/2020    HGB 12.7 (L) 11/18/2020    HCT 42.6 11/18/2020     11/18/2020    MCV 83.2 11/18/2020    MCH 24.8 (L) 11/18/2020    MCHC 29.8 11/18/2020    RDW 15.4 (H) 11/18/2020    LYMPHOPCT 8 (L) 11/18/2020    MONOPCT 10 11/18/2020    BASOPCT 0 11/18/2020    MONOSABS 1.48 (H) 11/18/2020    LYMPHSABS 1.17 11/18/2020    EOSABS 0.15 11/18/2020    BASOSABS 0.06 11/18/2020    DIFFTYPE NOT REPORTED 11/18/2020     Eosinophils/IgE:   Lab Results   Component Value Date    EOSABS 0.15 11/18/2020     Alpha 1 antitrypsin: No results found for: A1A  CMP:   Lab Results   Component Value Date     (L) 11/18/2020    K 4.0 11/18/2020    CL 94 (L) 11/18/2020    CO2 27 11/18/2020    BUN 13 11/18/2020    CREATININE 0.99 11/18/2020    GLUCOSE 256 (H) 11/18/2020    MG 2.2 08/23/2019    CALCIUM 9.2 11/18/2020    PHOS 2.8 09/24/2017    URICACID 4.3 04/03/2018    PROT 9.6 (H) 11/18/2020    LABALBU 2.3 (L) 11/18/2020    BILITOT 0.57 11/18/2020    BILIDIR 0.19 04/02/2018    IBILI 0.53 04/02/2018    ALT 22 11/18/2020    AST 35 11/18/2020    ALKPHOS 216 (H) 11/18/2020    AMYLASE 68 10/09/2019    LIPASE 46 10/22/2020     Coagulation Profile:   Lab Results   Component Value Date    INR 1.2 10/22/2020    PROTIME 15.0 (H) 10/22/2020    APTT 33.7 10/22/2020     BNP:   Lab Results   Component Value Date    BNP 20 07/18/2012    PROBNP 1,161 (H) 10/22/2020     D-Dimer/Fibrinogen:  Lab Results   Component Value Date    DDIMER 1.05 08/29/2020 residual is noted with thin liquid consistency with associated cough. Multiple episodes of penetration as described. Trace aspiration of residual with thin liquid consistency. Please see separate speech pathology report for full discussion of findings and recommendations. PULMONARY FUNCTION TEST:    ECHOCARDIOGRAM:   No results found for this or any previous visit. CARDIAC CATHETERIZATION:  No results found for this or any previous visit. ASSESSMENT AND PLAN     ASSESSMENT:    ICD-10-CM    1. Pulmonary nodules/lesions, multiple  R91.8    2. H/O malignant neoplasm of thyroid  Z85.850    3. History of recent pneumonia  Z87.01    4. History of 2019 novel coronavirus disease (COVID-19)  Z86.19    5. Chronic systolic CHF (congestive heart failure), NYHA class 2 ([facility])  I50.22    6. S/P CABG x 3  Z95.1      PLAN/RECOMMENDATIONS:     Pulmonary function tests reviewed   Imaging data reviewed   Patient is currently on Levaquin and has 1 more day of therapy left   He is not on any bronchodilators   Patient received counseling on the following healthy behaviors: nutrition, exercise and medication adherence   Maintain an active lifestyle  Lung Cancer Screening: After reviewing the patient's smoking history, age and other clinical criteria, the low dose CT is not indicated (Nonsmoker/Smoking <30 pack-years)   Recommend influenza vaccination in the fall annually; Up-to-date   Recommendations were given regarding pneumococcal vaccination; Up-to-date    All the questions that the patient had were answered to his satisfaction  We'll see the patient back in three months  Thank you for having us involved in the care of your patient. Please call us if you have any questions or concerns. Rhys Fermin MD    Pulmonary and Critical Care Medicine            Please note that this chart was generated using voice recognition Dragon dictation software.   Although every effort was made to ensure the accuracy of this automated transcription, some errors in transcription may have occurred.

## 2020-11-04 NOTE — PROGRESS NOTES
Janice Carson  [facility] [place] [address]  [address]  Dept: [phone]    Patient:  Andi Butcher  YOB: 1928  Date: 11/4/20    The patient is a 80 y.o. male who presents today for consult of the following problems:     Chief Complaint   Patient presents with    New Patient     fracture T6             HPI:     Andi Butcher is a 80 y.o. male on whom neurosurgical consultation was requested by Rex Belcher MD for management of thoracic metastatic disease. The patient was sent to me by oncology with report of known thyroid disease metastatic to the body. Patient was symptom onset approximately in August with significant left paraspinal thoracic and rib cage pain that has been progressive in nature. He did present to the ED and subsequently was worked up for cardiac disease. CT in February does show destructive lesion of the T6 left pedicle and subsequent progression on subsequent CTs all the way to October of this year which shows a destructive lesion of the left hemibody. Denies any lower extremity weakness bowel bladder incontinence saddle anesthesia or difficulty in ambulation. He does have issues with fatigue with long distances but is able to ambulate with a cane with no significant decline in the interim. He has had 5 sequences of radiation therapy. Currently states that the pain does not change in basis of positioning from reclining to sitting to standing. Stable pain in all positions. Requires a half a hydrocodone pill approximately every 4-6 hours and at that time the pain is fairly significant with 8-10 out of 10.       History:     Past Medical History:   Diagnosis Date    Abnormal ECG 8/30/2019    Acquired hypothyroidism 9/19/2014    Acute cholecystitis 2/28/2018    Atherosclerosis of coronary artery bypass graft(s) without angina pectoris 10/4/2016    Atrioventricular block, complete ([address] 8/30/2019    Biliary colic     Block, bundle branch, left 3/30/2010    Calculus of bile duct without cholecystitis and without obstruction     Calculus of gallbladder 9/19/2017    Cancer ([address] 2013    thyroid    Carotid artery stenosis     Cataract     Cerebrovascular disease     Cervical disc herniation 11/13/2014    Cervical radiculopathy, chronic 11/14/2014    Cervical spondylosis 11/13/2014    CHF (congestive heart failure) (HCC)     Chronic systolic CHF (congestive heart failure), NYHA class 2 ([address] 6/21/2018    Coronary arteriosclerosis in native artery 3/30/2010    Overview:  H/O Acute anteriorseptal MI  11/8/99 Severe 3 vessel CAD , LM coronary dissection  S/P CABG  1999 L-LAD, SVG Ramus, SVG OM, SVG L PDA Stress test  10/2007  EF 49% no reversible ischemia Predominantly fixed infeior apical septal defects Low normal to mildly reduced LV sys function with :VEF 49%  Septal wall motion abnormlaity Compared with prior study no new reveresible perfusion defects    COVID-19 virus infection 07/25/2020    Degeneration of cervical intervertebral disc 9/25/2012    Elevated LFTs 9/19/2017    Elevated liver enzymes 2/28/2018    H/O malignant neoplasm of thyroid 12/10/2013    Heart attack ([address]     Heart block AV second degree     Hypercholesteremia 3/30/2010    Hypertension     Hypothyroidism     Ischemic cardiomyopathy 8/30/2019    Metastatic cancer ([address] 9/19/2017    Mild aortic regurgitation 3/30/2010    Overview:  Mild moderate MR and AR  Overview:  Mild moderate MR and AR    Pneumonia due to COVID-19 virus     07/25/2020    Pulmonary nodule 6/21/2018    Pulmonary nodules/lesions, multiple 9/19/2017    S/P CABG x 3 8/30/2019    Sick sinus syndrome ([address] 8/30/2019    SOB (shortness of breath) 8/30/2019    Spinal stenosis in cervical region 11/6/2014    Type 2 diabetes mellitus ([address] 3/30/2010    Overview:  without complications    Type II or unspecified type diabetes mellitus without mention of complication, not stated as uncontrolled      Past Surgical History:   Procedure Laterality Date    APPENDECTOMY      CARDIAC SURGERY      CHOLECYSTECTOMY      CORONARY ARTERY BYPASS GRAFT      ENDOSCOPIC ULTRASOUND (LOWER)      PACEMAKER INSERTION  03/2015    [place] LAP,CHOLECYSTECTOMY N/A 3/2/2018    CHOLECYSTECTOMY LAPAROSCOPIC performed by Simran Matos DO at [address]      TOTAL THYROIDECTOMY       Family History   Problem Relation Age of Onset    Cancer Mother         stomach    Other Father         pneumonia     Current Outpatient Medications on File Prior to Visit   Medication Sig Dispense Refill    SITagliptin (JANUVIA) 100 MG tablet Take 1 tablet by mouth daily 90 tablet 0    metoprolol succinate (TOPROL XL) 25 MG extended release tablet Take 0.5 tablets by mouth daily 30 tablet 3    glimepiride (AMARYL) 2 MG tablet Take 0.5 tablets by mouth every morning (before breakfast) 30 tablet 3    polyethylene glycol (MIRALAX) 17 g packet Take 17 g by mouth daily as needed for Constipation 527 g 1    mirabegron (MYRBETRIQ) 50 MG TB24 Take 50 mg by mouth daily 28 tablet 0    HYDROcodone-acetaminophen (NORCO) 5-325 MG per tablet Take 1 tablet by mouth every 6 hours as needed for Pain for up to 30 days.  60 tablet 0    diphenhydrAMINE (BENADRYL) 25 MG capsule Take 1 capsule by mouth every 6 hours as needed for Itching 30 capsule 0    tamsulosin (FLOMAX) 0.4 MG capsule Take 1 capsule by mouth daily 90 capsule 3    finasteride (PROSCAR) 5 MG tablet TAKE 1 TABLET DAILY 90 tablet 3    furosemide (LASIX) 20 MG tablet Take 1 tablet by mouth daily 30 tablet 1    vitamin D (ERGOCALCIFEROL) 07732 units CAPS capsule Take 50,000 Units by mouth once a week      Multiple Vitamins-Minerals (THERAPEUTIC MULTIVITAMIN-MINERALS) tablet Take 1 tablet by mouth daily      calcium carbonate (TUMS) 500 MG chewable tablet Take 1 tablet by mouth 2 times daily as needed for Heartburn 30 tablet 0    80y.o. year old male who appears his stated age. HEENT: Normocephalic atraumatic. Neck supple. Chest: regular rate; pulses equal  Abdomen: Soft nontender nondistended. Normoactive bowel sounds. Ext: DP and PT pulses 2+, good cap refill  Neuro    Mentation  Appropriate affect  Registration intact  Orientation intact  3 item recall intact  Judgement intact to situation    Cranial Nerves:   Pupils equal and reactive to light  Extraocular motion intact  Face and shrug symmetric  Tongue midline  No dysarthria  v1-3 sensation symmetric, masseter tone symmetric  Hearing symmetric and intact to finger rub    Sensation:   Intact    Motor  L deltoid 5/5; R deltoid 5/5  L biceps 5/5; R biceps 5/5  L triceps 5/5; R triceps 5/5  L wrist extension 5/5; R wrist extension 5/5  L intrinsics 5/5; R intrinsics 5/5     L iliopsoas 5/5 , R iliopsoas 5/5  L quadriceps 5/5; R quadriceps 5/5  L Dorsiflexion 5/5; R dorsiflexion 5/5  L Plantarflexion 5/5; R plantarflexion 5/5  L EHL 5/5; R EHL 5/5    Reflexes  1 out of 4 globally reflexes    hoffmans L: neg  hoffmans R: neg  Clonus L: neg  Clonus R: neg  Babinski L: up  Babinski R; up    Tenderness to palpation none of the paraspinal region but little bit further over the rib head on the left side approximately T6-T7 region    Studies Review:     CT of the thoracic spine and a CT of the chest most recently showed destructive lesion of the left hemibody of T6 involving the left pedicle and part of the rib head is well    Assessment and Plan:      1. 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 Thoracic radiculitis          Plan: Explained to the patient that surgical intervention typically is restricted for 3 specific reasons involving localized control with debulking, pain control, and finally nerve decompression and spinal cord decompression and stabilization to prevent neurological injury.     From the standpoint of debulking there really is not much of a role considering he does have diffuse disease and advanced age. Secondarily for the purposes of neurological preservation at this time it does not appear to be any gross instability even though there is destruction of the left side of her second hemibody. Will obtain upright x-rays to ensure that there is no progressive collapse or kyphosis. Regarding pain control there is no evidence that he has biomechanical control considering the lack of change from supine to standing as well as no evidence of pathologic fracture. He does appear to have biological pain related to pain at rest due to the lytic lesion as well as a radiculopathic pain in the thoracic nerve root likely T6 or T7. We will obtain an MRI thoracic spine to better delineate any cord compression or neurological compromise or foraminal disease. Ultimately considering advanced age we will have to take into account any aggressive measures even in a palliative sense. He may ultimately benefit from focal foraminotomy with minimally invasive fixation and cement augmentation for pain control. We will discuss options after MRI completed    Followup: No follow-ups on file. Prescriptions Ordered:  No orders of the defined types were placed in this encounter. Orders Placed:  No orders of the defined types were placed in this encounter. Electronically signed by Yamilet Dhillon DO on 11/4/2020 at 10:44 AM    Please note that this chart was generated using voice recognition Dragon dictation software. Although every effort was made to ensure the accuracy of this automated transcription, some errors in transcription may have occurred.

## 2020-11-06 NOTE — TELEPHONE ENCOUNTER
Mariangel Avelar, a physical therapist from [facility] called and stated they will be starting in home physical therapy with patient. Patient will have one evaluation this week, then will have PT 2 times a week for 3 weeks and 1 time a week for a week for discharge. Mariangel Avelar wanted us to know they will be concentrating on energy conservation.

## 2020-11-16 NOTE — CARE COORDINATION
[address] Transitions Follow Up Call    2020    Patient: Carla Saldaña  Patient : 1928   MRN: [id number]  Reason for Admission:   Discharge Date: 10/27/20 RARS: Readmission Risk Score: 34         Spoke with: Maryann Diaz patient's daughter  Meli Scott spoke to patient's daughter Maryann Diaz, she stated he father is doing ok, has some pain, was to see a neurosurgeon earlier this month for management of thoracis metastatic disease, he has mets from his tyroid cancer. Daughter said he is also going to have some radiation treatment, has vns/OL but she stated she is going to talk to Dr. Olympia Essex about some palliative care at patient's visit on 2020, encourage her to call writer with any needs or concerns and when she is ready writer can get her in contact with palliative care, daughter expressed gratitude, will continue to follow//JU    Care Transitions Subsequent and Final Call    Subsequent and Final Calls  Do you have any ongoing symptoms?:  Yes  Onset of Patient-reported symptoms: In the past 7 days  Patient-reported symptoms:  Pain  Do you currently have any active services?:  Yes  Are you currently active with any services?:  Home Health  Do you have any needs or concerns that I can assist you with?:  No  Care Transitions Interventions     Other Services:   (Comment: [facility])   Other Interventions:             Follow Up  Future Appointments   Date Time Provider Olga Duffy   2020 12:45 PM Sonia Mendosa MD [facility]   2020  1:00 PM [facility]   2020  4:00 PM BRIAN Treadwell - CNP [facility]   2020 11:15 AM Danial Mcclendon MD [facility] ST[facility] RAD [facility]   2020  8:50 AM DO [facility]   2021  3:30 PM Brennon Davenport MD [facility]   3/12/2021 10:00 AM Brennon Davenport MD [facility] Imelda Pollock RN

## 2020-11-17 NOTE — TELEPHONE ENCOUNTER
Name: Mel Scott  MRN: [id number]  Date: 11/17/2020    Diagnosis: Cancer Staging  No matching staging information was found for the patient. Treatment Completion Date: 10-19-20    Subjective: Spoke to Daughter Mary Quintana and patient in the last two weeks was discharged from the hospital for pneumonia. Patient has pain on his side and taking pain meds every 6 hours and is getting constipation from the med, but that has been worked out. He has had trouble swallowing and had to puree his food, but not losing any weight. He had a CT scan and found spots in his liver and pancrease. Patient sees Dr. Tona Shah tomorrow and will get IV infusion. Confirmed RO follow-up appointment:   [x]   Yes  []   No  []   N/A    Confirmed follow-up appointments with other referring physicians:   [x]   Yes  []   No  []   N/A    Instructions: will see pt this week.     Reviewed and approved by:

## 2020-11-18 NOTE — PROGRESS NOTES
Patient arrive via wheelchair from front office having met with physician. Denies complaint or concern to writer. Denies jaw pain or dental problem. Peripheral IV established per policy. Zometa infused with no sign of adverse reaction; line flushed. Intact IV catheter removed with pressure dressing applied.   Patient off unit via wheelchair with family at discharge; instructed to stop at  for AVS.

## 2020-11-18 NOTE — PROGRESS NOTES
gallbladder 9/19/2017    Cancer ([address] 2013    thyroid    Carotid artery stenosis     Cataract     Cerebrovascular disease     Cervical disc herniation 11/13/2014    Cervical radiculopathy, chronic 11/14/2014    Cervical spondylosis 11/13/2014    CHF (congestive heart failure) (HCC)     Chronic systolic CHF (congestive heart failure), NYHA class 2 ([address] 6/21/2018    Coronary arteriosclerosis in native artery 3/30/2010    Overview:  H/O Acute anteriorseptal MI  11/8/99 Severe 3 vessel CAD , LM coronary dissection  S/P CABG  1999 L-LAD, SVG Ramus, SVG OM, SVG L PDA Stress test  10/2007  EF 49% no reversible ischemia Predominantly fixed infeior apical septal defects Low normal to mildly reduced LV sys function with :VEF 49%  Septal wall motion abnormlaity Compared with prior study no new reveresible perfusion defects    COVID-19 virus infection 07/25/2020    Degeneration of cervical intervertebral disc 9/25/2012    Elevated LFTs 9/19/2017    Elevated liver enzymes 2/28/2018    H/O malignant neoplasm of thyroid 12/10/2013    Heart attack ([address]     Heart block AV second degree     Hypercholesteremia 3/30/2010    Hypertension     Hypothyroidism     Ischemic cardiomyopathy 8/30/2019    Metastatic cancer ([address] 9/19/2017    Mild aortic regurgitation 3/30/2010    Overview:  Mild moderate MR and AR  Overview:  Mild moderate MR and AR    Pneumonia due to COVID-19 virus     07/25/2020    Pulmonary nodule 6/21/2018    Pulmonary nodules/lesions, multiple 9/19/2017    S/P CABG x 3 8/30/2019    Sick sinus syndrome ([address] 8/30/2019    SOB (shortness of breath) 8/30/2019    Spinal stenosis in cervical region 11/6/2014    Type 2 diabetes mellitus ([address] 3/30/2010    Overview:  without complications    Type II or unspecified type diabetes mellitus without mention of complication, not stated as uncontrolled        Past Surgical History:   Procedure Laterality Date    APPENDECTOMY      CARDIAC SURGERY      CHOLECYSTECTOMY      CORONARY ARTERY BYPASS GRAFT      ENDOSCOPIC ULTRASOUND (LOWER)      PACEMAKER INSERTION  03/2015    [place] LAP,CHOLECYSTECTOMY N/A 3/2/2018    CHOLECYSTECTOMY LAPAROSCOPIC performed by Fredrick Peres DO at [address]      TOTAL THYROIDECTOMY         Allergies   Allergen Reactions    Seasonal      Other reaction(s): Other: See Comments  seasonal upper respiratory irritation    Keflex [Cephalexin] Nausea And Vomiting       Current Outpatient Medications   Medication Sig Dispense Refill    SITagliptin (JANUVIA) 100 MG tablet Take 1 tablet by mouth daily 90 tablet 0    metoprolol succinate (TOPROL XL) 25 MG extended release tablet Take 0.5 tablets by mouth daily 30 tablet 3    glimepiride (AMARYL) 2 MG tablet Take 0.5 tablets by mouth every morning (before breakfast) 30 tablet 3    polyethylene glycol (MIRALAX) 17 g packet Take 17 g by mouth daily as needed for Constipation 527 g 1    mirabegron (MYRBETRIQ) 50 MG TB24 Take 50 mg by mouth daily 28 tablet 0    diphenhydrAMINE (BENADRYL) 25 MG capsule Take 1 capsule by mouth every 6 hours as needed for Itching 30 capsule 0    tamsulosin (FLOMAX) 0.4 MG capsule Take 1 capsule by mouth daily 90 capsule 3    finasteride (PROSCAR) 5 MG tablet TAKE 1 TABLET DAILY 90 tablet 3    furosemide (LASIX) 20 MG tablet Take 1 tablet by mouth daily 30 tablet 1    vitamin D (ERGOCALCIFEROL) 33723 units CAPS capsule Take 50,000 Units by mouth once a week      Multiple Vitamins-Minerals (THERAPEUTIC MULTIVITAMIN-MINERALS) tablet Take 1 tablet by mouth daily      calcium carbonate (TUMS) 500 MG chewable tablet Take 1 tablet by mouth 2 times daily as needed for Heartburn 30 tablet 0    levothyroxine (LEVOXYL) 137 MCG tablet Take 137 mcg by mouth Daily       nitroGLYCERIN (NITROSTAT) 0.4 MG SL tablet Place 0.4 mg under the tongue.  As directed      docusate sodium (COLACE) 100 MG capsule Take 100 mg by mouth nightly       atorvastatin (LIPITOR) 20 MG tablet   Take 20 mg by mouth nightly       aspirin 81 MG EC tablet Take 81 mg by mouth daily        No current facility-administered medications for this visit. Social History     Socioeconomic History    Marital status:      Spouse name: Not on file    Number of children: Not on file    Years of education: Not on file    Highest education level: Not on file   Occupational History    Occupation: Retired     Employer: [address] resource strain: Not hard at all   [employer] insecurity     Worry: Never true     Inability: Never true   [employer] needs     Medical: Not on file     Non-medical: Not on file   Tobacco Use    Smoking status: Never Smoker    Smokeless tobacco: Never Used   Substance and Sexual Activity    Alcohol use: No    Drug use: No    Sexual activity: Not on file   Lifestyle    Physical activity     Days per week: Not on file     Minutes per session: Not on file    Stress: Not on file   Relationships    Social connections     Talks on phone: Not on file     Gets together: Not on file     Attends Roman [employer] service: Not on file     Active member of club or organization: Not on file     Attends meetings of clubs or organizations: Not on file     Relationship status: Not on file    Intimate partner violence     Fear of current or ex partner: Not on file     Emotionally abused: Not on file     Physically abused: Not on file     Forced sexual activity: Not on file   Other Topics Concern    Not on file   Social History Narrative    Not on file       Family History   Problem Relation Age of Onset    Cancer Mother         stomach    Other Father         pneumonia      REVIEW OF SYSTEM:   Constitutional: No fever or chills.  No night sweats, no weight loss   Eyes: No eye discharge, double vision, or eye pain   HEENT: negative for sore mouth, sore throat, hoarseness and voice change   Respiratory: negative for cough , sputum, dyspnea, wheezing, hemoptysis, chest pain   Cardiovascular: negative for chest pain, dyspnea, palpitations, orthopnea, PND   Gastrointestinal: negative for nausea, vomiting, diarrhea, constipation, abdominal pain, Dysphagia, hematemesis and hematochezia   Genitourinary: negative for frequency, dysuria, nocturia, urinary incontinence, and hematuria   Integument: negative for rash, skin lesions, bruises.    Hematologic/Lymphatic: negative for easy bruising, bleeding, lymphadenopathy, petechiae and swelling/edema   Endocrine: negative for heat or cold intolerance, tremor, weight changes, change in bowel habits and hair loss   Musculoskeletal: negative for myalgias, arthralgias, pain, joint swelling,and bone pain   Neurological: negative for headaches, dizziness, seizures, weakness, numbness  OBJECTIVE:         Vitals:    11/18/20 1252   BP: (!) 107/59   Pulse: 106   Temp: 98.4 °F (36.9 °C)   PHYSICAL EXAM:   General appearance - well appearing, no in pain or distress   Mental status - alert and cooperative   Eyes - pupils equal and reactive, extraocular eye movements intact   Ears - bilateral TM's and external ear canals normal   Mouth - mucous membranes moist, pharynx normal without lesions   Neck - supple, no significant adenopathy   Lymphatics - no palpable lymphadenopathy, no hepatosplenomegaly   Chest - clear to auscultation, no wheezes, rales or rhonchi, symmetric air entry   Heart - normal rate, regular rhythm, normal S1, S2, no murmurs, rubs, clicks or gallops   Abdomen - soft, nontender, nondistended, no masses or organomegaly   Neurological - alert, oriented, normal speech, no focal findings or movement disorder noted   Musculoskeletal - no joint tenderness, deformity or swelling   Extremities - peripheral pulses normal, no pedal edema, no clubbing or cyanosis   Skin - normal coloration and turgor, no rashes, no suspicious skin lesions noted   LABORATORY DATA:     Lab Results   Component Value Date    WBC 14.5 (H) 11/18/2020    HGB 12.7 (L) 11/18/2020    HCT 42.6 11/18/2020    MCV 83.2 11/18/2020     11/18/2020    LYMPHOPCT 8 (L) 11/18/2020    RBC 5.12 11/18/2020    MCH 24.8 (L) 11/18/2020    MCHC 29.8 11/18/2020    RDW 15.4 (H) 11/18/2020    MONOPCT 10 11/18/2020    BASOPCT 0 11/18/2020    NEUTROABS 11.42 (H) 11/18/2020    LYMPHSABS 1.17 11/18/2020    MONOSABS 1.48 (H) 11/18/2020    EOSABS 0.15 11/18/2020    BASOSABS 0.06 11/18/2020       Chemistry        Component Value Date/Time     (L) 11/18/2020 1151    K 4.0 11/18/2020 1151    CL 94 (L) 11/18/2020 1151    CO2 27 11/18/2020 1151    BUN 13 11/18/2020 1151    CREATININE 0.99 11/18/2020 1151        Component Value Date/Time    CALCIUM 9.2 11/18/2020 1151    ALKPHOS 216 (H) 11/18/2020 1151    AST 35 11/18/2020 1151    ALT 22 11/18/2020 1151    BILITOT 0.57 11/18/2020 1151        PATHOLOGY DATA:     Collected: 9/21/2017     -- Diagnosis --   FINE NEEDLE ASPIRATION (LEFT LOWER LOBE LUNG, CT-GUIDED):       - POSITIVE FOR MALIGNANCY     -- Diagnosis Comment --   THE HISTOMORPHOLOGY AND IMMUNOSTAIN FINDINGS ARE CONSISTENT WITH   METASTATIC PAPILLARY THYROID CARCINOMA. IMAGING DATA:    CT of the chest  Impression:      Innumerable small pulmonary nodules new from 11/24/2012.  Metastatic disease is suspected.  Follow-up chest CT is recommended to further evaluate the extent of the metastatic disease and in search for a primary tumor. No evidence of metastatic disease in the abdomen/ pelvis with the limitations of a noncontrast study. Cholelithiasis.  The gallbladder is moderately dilated, the gallbladder wall is slightly thickened and there is suspicion of minimal pericholecystic fat stranding.  Follow-up gallbladder ultrasound examination or nuclear medicine hepatobiliary imaging study may be helpful.    CT abd pelvis 10/7/19  FINDINGS:   Lower Chest: Progression of the multiple pulmonary nodules identified   throughout both lung bases which have both increased in size and number. Lung nodules right lower lobe 1.7 x 1.0 cm in size.  1 nodule identified   within the lingula 1.7 x 1.0 cm in size.  Heart is enlarged.  Coronary   calcifications.  Pacemaker leads identified.       Organs: Previous cholecystectomy.  Scattered hypodense identified involving   the liver noted on prior contrast examination and appear unchanged. Gallbladder is absent.  Multiple bilateral renal cysts appear similar to the   prior exam.  No hydronephrosis.  No evidence of nephrolithiasis.  Otherwise   the spleen, adrenal glands, are unremarkable.  There is mild focal thickening   identified involving the pancreatic tail measuring 2.9 x 2.4 cm in size.       GI/Bowel: Retained stool throughout the colon.  No bowel obstruction. Appendix unremarkable.       Pelvis: Prostate is large with this dystrophic calcifications   posterolaterally.  Bladder is mildly distended.  Probable bilateral bladder   diverticulum on the right posteriorly.       Peritoneum/Retroperitoneum: No free air or free fluid.  Aortic vascular   calcifications.  Aorta appears aneurysmal.  Right epicardial phrenic lymph   nodes subcentimeter in size.  A few scattered retroperitoneal lymph nodes are   not enlarged per CT criteria.       Bones/Soft Tissues: .  Moderate size right inguinal fat containing hernia. Moderate multilevel spondylosis involving the thoracic lumbar spine. Moderate severe facet arthropathy lower lumbar spine.  No acute compression   fracture.  Mild thickening of the right intercostal muscle at T10-11.    Question minimal focal erosion involving the right 11th rib medially and   superiorly best seen on the coronal images.           Impression   Negative nephrolithiasis or obstructive uropathy.       Focal thickening involving the pancreatic tail may be related to developing   pancreatitis or could be due to underlying mass.  Please correlate with   pancreatic enzymes.  Postcontrast imaging could be reasonable questions which were answered to their satisfaction. They verbalized understanding of the information provided and they agreed to proceed as outlined above. PLAN:   I reviewed the results of recent CT scan. His CT scan showing progression of disease with T6 destructive lesion. Completed palliative RT  Patient and family wants to focus on quality of life and not interested in aggressive care. He is following with pain clinic  Continue on Zometa for bone metastasis and patient and family agreeable  I discussed option of targeted therapy with lenvatinib. I discussed risk-benefit and side effects. I think he is too frail for systemic therapy. And Palliative /hospice care would be more appropriate. I discussed option of palliative care/hospice care with pt and family. They are willing get information and then make a decision  I will see him in 4-6 weeks once family makes decision    Dustin Gill MD  Hematologist/Medical Oncologist      This note is created with the assistance of a speech recognition program.  While intending to generate a document that actually reflects the content of the visit, the document can still have some errors including those of syntax and sound a like substitutions which may escape proof reading. It such instances, actual meaning can be extrapolated by contextual diversion.

## 2020-11-18 NOTE — TELEPHONE ENCOUNTER
RAVINDER ARRIVES VIA WHEELCHAIR FOR MD VISIT & [address] IN TO SEE PATIENT  ORDERS RECEIVED  HOSPICE REFERRAL  [address] AS PLANNED  RV 6-8 WEEKS  CALLED [facility] @ [phone] AND SPOKE TO MALIKA DALY THEN TRANSFERRED ME TO [address] [place] [address]. MICHAEL REQUESTED THAT THE REFERRAL & RECORDS ARE FAXED THEN THEY WILL CALL THE PATIENT THIS AFTERNOON TO SCHEDULE AN APPOINTMENT.  FAXED REFERRAL, MD NOTES & DEMOGRAPHICS -[phone], CONFIRMATION SCANNED TO CHART  MD VISIT 1/13/21 @1:45PM  Kitr de paz @2PM  LABS CDP CMP 1/13/21  AVS PRINTED AND GIVEN TO PATIENT WITH INSTRUCTIONS  PATIENT DISCHARGED TO [address] [place] [address]

## 2020-11-18 NOTE — TELEPHONE ENCOUNTER
Dtr called stating that pt has a pacemaker and got word back from Cardiology that his pacemaker is not MRI compatible. MRI Thoracic needs to be changed. Please review and advise.

## 2020-11-19 NOTE — PROGRESS NOTES
Pete Reyes  11/19/2020  11:24 AM    Patient here for follow up visit. Patient failed swallow test and on pureed diet currently. Speech therapy stopped in October when he was hospitalized for aspiration pneumonia. Patient denies pain.  Dr. Breanna Eubanks to eval.    Vitals:    11/19/20 1121   BP: 124/62   Pulse: 95   Resp: 16   Temp: 98.2 °F (36.8 °C)   SpO2: 95%    :  Patient Currently in Pain: No  Pain Assessment: 0-10  Pain Level: 0       Wt Readings from Last 1 Encounters:   11/19/20 112 lb 4 oz (50.9 kg)                Current Outpatient Medications:     HYDROcodone-acetaminophen (NORCO) 5-325 MG per tablet, Take 1 tablet by mouth every 6 hours as needed for Pain., Disp: , Rfl:     SITagliptin (JANUVIA) 100 MG tablet, Take 1 tablet by mouth daily, Disp: 90 tablet, Rfl: 0    glimepiride (AMARYL) 2 MG tablet, Take 0.5 tablets by mouth every morning (before breakfast), Disp: 30 tablet, Rfl: 3    polyethylene glycol (MIRALAX) 17 g packet, Take 17 g by mouth daily as needed for Constipation, Disp: 527 g, Rfl: 1    mirabegron (MYRBETRIQ) 50 MG TB24, Take 50 mg by mouth daily, Disp: 28 tablet, Rfl: 0    diphenhydrAMINE (BENADRYL) 25 MG capsule, Take 1 capsule by mouth every 6 hours as needed for Itching, Disp: 30 capsule, Rfl: 0    tamsulosin (FLOMAX) 0.4 MG capsule, Take 1 capsule by mouth daily, Disp: 90 capsule, Rfl: 3    finasteride (PROSCAR) 5 MG tablet, TAKE 1 TABLET DAILY, Disp: 90 tablet, Rfl: 3    furosemide (LASIX) 20 MG tablet, Take 1 tablet by mouth daily, Disp: 30 tablet, Rfl: 1    vitamin D (ERGOCALCIFEROL) 60522 units CAPS capsule, Take 50,000 Units by mouth once a week, Disp: , Rfl:     Multiple Vitamins-Minerals (THERAPEUTIC MULTIVITAMIN-MINERALS) tablet, Take 1 tablet by mouth daily, Disp: , Rfl:     calcium carbonate (TUMS) 500 MG chewable tablet, Take 1 tablet by mouth 2 times daily as needed for Heartburn, Disp: 30 tablet, Rfl: 0    levothyroxine (LEVOXYL) 137 MCG tablet, Take 137 mcg by mouth Daily Taking 1/2 tablet daily 11/18/20, Disp: , Rfl:     nitroGLYCERIN (NITROSTAT) 0.4 MG SL tablet, Place 0.4 mg under the tongue. As directed, Disp: , Rfl:     docusate sodium (COLACE) 100 MG capsule, Take 100 mg by mouth nightly , Disp: , Rfl:     atorvastatin (LIPITOR) 20 MG tablet,  Take 20 mg by mouth nightly , Disp: , Rfl:     aspirin 81 MG EC tablet, Take 81 mg by mouth daily , Disp: , Rfl:     SYNTHROID 100 MCG tablet, , Disp: , Rfl:     Immunizations:    Influenza status:    [x]   Current   []   Patient declined    Pneumococcal status:  [x]   Current  []   Patient declined    Smoking Status:    [] Smoker - PPD:  Smoking cessation education: Provided []   Declined []    [] Nonsmoker - Quit Date:               [x] Never a smoker           Cancer Screening:  Colonoscopy [] Current [] Not current   [] Not current, but scheduled   [x] NA  Mammogram [] Current [] Not current   [] Not current, but scheduled   [x] NA  Prostate [] Current [] Not current   [] Not current, but scheduled   [x] NA  PAP/Pelvic [] Current [] Not current   [] Not current, but scheduled   [x] NA  Skin  [] Current  [] Not current   [] Not current, but scheduled   [x] NA    Hormone:  Lupron []   Last dose given:           Next dose due:   Eligard []   Last dose given:           Next dose due:   Aromatase Inhibitors []   Medication name:   N/A:  [x]              *BREAST Patient only:    Lymphedema Eval:   [] left arm      [] right arm  Location:     Measurement (cm)    Upper Bicep :    Lower Bicep :         FALLS RISK SCREEN  Instructions:  Assess the patient and enter the appropriate indicators that are present for fall risk identification. Total the numbers entered and assign a fall risk score from Table 2.  Reassess patient at a minimum every 12 weeks or with status change. Assessment   Date  11/19/2020     1. Mental Ability: confusion/cognitively impaired 0     2. Elimination Issues: incontinence, frequency 0       3. Ambulatory: use of assistive devices (walker, cane, off-loading devices),        attached to equipment (IV pole, oxygen) 2     4. Sensory Limitations: dizziness, vertigo, impaired vision 0     5. Age less than 65        0     6. Age 72 or greater 1     7. Medication: diuretics, strong analgesics, hypnotics, sedatives,        antihypertensive agents 0   8. Falls:  recent history of falls within the last 3 months (not to include slipping or        tripping) 0   TOTAL 3    If score of 4 or greater was education given? No           TABLE 2   Risk Score Risk Level Plan of Care   0-3 Little or  No Risk 1. Provide assistance as indicated for ambulation activities  2. Reorient confused/cognitively impaired patient  3. Chair/bed in low position, stretcher/bed with siderails up except when performing patient care activities  5. Educate patient/family/caregiver on falls prevention  6.  Reassess in 12 weeks or with any noted change in patient condition which places them at a risk for a fall   4-6 Moderate Risk 1. Provide assistance as indicated for ambulation activities  2. Reorient confused/cognitively impaired patient  3. Chair/bed in low position, stretcher/bed with siderails up except when performing patient care activities  4. Educate patient/family/caregiver on falls prevention     7 or   Higher High Risk 1. Place patient in easily observable treatment room  2. Patient attended at all times by family member or staff  3. Provide assistance as indicated for ambulation activities  4. Reorient confused/cognitively impaired patient  5. Chair/bed in low position, stretcher/bed with siderails up except when performing patient care activities  6.   Educate patient/family/caregiver on falls prevention         PLAN: Patient is seen today in follow up        Bridgette Benjamin

## 2020-11-22 PROBLEM — N39.0 UTI (URINARY TRACT INFECTION): Status: RESOLVED | Noted: 2020-01-01 | Resolved: 2020-01-01

## 2020-11-23 NOTE — TELEPHONE ENCOUNTER
Attempted to schedule pt for myelogram. LM advising pts daughter to contact [phone] to schedule.

## 2020-11-24 NOTE — CARE COORDINATION
[facility] [facility] Transitions Follow Up Call    2020    Patient: Bridget Manzano  Patient : 1928   MRN: [id number]  Reason for Admission:   Discharge Date: 10/27/20 RARS: Readmission Risk Score: 29         Patient decided to go with  [facility], care transitions episode resolved//JU    Care Transitions Subsequent and Final Call    Subsequent and Final Calls  Do you currently have any active services?:  Yes  Are you currently active with any services?:  Home Health  Care Transitions Interventions     Other Services:   (Comment: [facility])   Other Interventions:             Follow Up  Future Appointments   Date Time Provider Olga Duffy   2020  1:00 PM STC IR  [facility] SPECIAL [facility]   2020  1:30 PM STC CT RM 2 FAST SCANNER STCZ CT SCAN [facility]   2020  2:00 PM STV STA CHAIR 18 [facility]Z STA MED [place]   2020  9:30 AM DO Solis [facility]   2021  1:45 PM Lorie Bowman MD [facility]   2021  2:00 PM STV STA CHAIR 17 STVZ STA MED [facility]   2021  3:30 PM Dunia Pollard MD [facility]   2021  2:15 PM Laura Herrera MD STVZ STA RAD [facility]   3/12/2021 10:00 AM MD Poli Mcgeegeorge Wilson RN

## 2020-11-30 NOTE — TELEPHONE ENCOUNTER
Sent a prescription to [facility] we need to see him next time before we can prescribe any further

## 2020-11-30 NOTE — TELEPHONE ENCOUNTER
Received a call from this patient's daughter asking for a refill on this patient's [place]. Patient had one Rx filled by Dr. Trina Haro. [place] In October 10/7/2020 for 60 tabs. Dr Bridgette Ling notes indicated patient  would need a 4 week follow up appointment. Noted in Epic patient was receiving [address] from Dr Min Ratliff prior to his consult here. . States Dr. Min Ratliff asked that he call this office for refills. Will forward this note to Dr. Trina Ba to see if he would be willing to prescribe. Spoke with daughter who states they are considering Hospice Care and would like a Rx until they get involved.

## 2020-12-02 NOTE — ED NOTES
Bed: 23  Expected date:   Expected time:   Means of arrival:   Comments:  Piotr Stockton, RN  12/02/20 [id number]

## 2020-12-02 NOTE — ED NOTES
Pt came into ED by EMS for abdominal pain and constipation. Pt has end stage cancer with a hospice nurse coming to check in on him. Pt would only answer yes or no questions. Pt is A&O x1. Pt is in no cardiac or respiratory distress.        Joshua Ramos RN  12/02/20 [id number]

## 2020-12-02 NOTE — ED NOTES
Pt has been discharged and paperwork was explained to son. Pt awaits [facility] to transport home per request of son.       Todd Cornejo RN  12/02/20 8546

## 2020-12-02 NOTE — ED PROVIDER NOTES
[address] [facility]  ED  eMERGENCY dEPARTMENT eNCOUnter      Pt Name: Loki Cruz  MRN: [id number]  [place] 2/13/1928  Date of evaluation: 12/2/2020  Provider: Dari Oden MD    CHIEF COMPLAINT     Chief Complaint   Patient presents with    Constipation         HISTORY OF PRESENT ILLNESS   (Location/Symptom, Timing/Onset, Context/Setting,Quality, Duration, Modifying Factors, Severity)  Note limiting factors. Loki Cruz is a80 y.o. male who presents to the emergency department      HPI    20-year-old male who lives at home, he has metastatic cancer as well as recurrent bouts of constipation. Family called 46 today as patient was stating he had not moved his bowels in 5 days and they were concerned. In discussion with the son, patient has fluctuating mental status however appears to be at baseline. He speak [place] and [place] and does respond to questions, does know his first and last name however does not know where he is. He cannot provide much other history however his son is able to provide history for him. He has had a slow decline over the last several months, he does eat sometimes however has not been able to get up and around as well over the last several weeks. He does have a wheelchair and multiple family members at home that help him get around. He was brought in several months ago for constipation and was disimpacted and sent home with MiraLAX. Son states he is able to swallow food and liquids however has been dabbling with thickened liquids. Patient does not like the thickened liquids. Denies any nausea or vomiting, no diarrhea, no fevers or falls. Nursing Notes werereviewed. REVIEW OF SYSTEMS    (2-9 systems for level 4, 10 or more for level 5)     Review of Systems   Unable to perform ROS: Mental status change       Except as noted above the remainder of the review of systems was reviewed and negative.        PAST MEDICAL HISTORY     Past Medical History: Diagnosis Date    Abnormal ECG 8/30/2019    Acquired hypothyroidism 9/19/2014    Acute cholecystitis 2/28/2018    Atherosclerosis of coronary artery bypass graft(s) without angina pectoris 10/4/2016    Atrioventricular block, complete ([address] 1/80/4653    Biliary colic     Block, bundle branch, left 3/30/2010    Calculus of bile duct without cholecystitis and without obstruction     Calculus of gallbladder 9/19/2017    Cancer ([address] 2013    thyroid    Carotid artery stenosis     Cataract     Cerebrovascular disease     Cervical disc herniation 11/13/2014    Cervical radiculopathy, chronic 11/14/2014    Cervical spondylosis 11/13/2014    CHF (congestive heart failure) (HCC)     Chronic systolic CHF (congestive heart failure), NYHA class 2 ([address] 6/21/2018    Coronary arteriosclerosis in native artery 3/30/2010    Overview:  H/O Acute anteriorseptal MI  11/8/99 Severe 3 vessel CAD , LM coronary dissection  S/P CABG  1999 L-LAD, SVG Ramus, SVG OM, SVG L PDA Stress test  10/2007  EF 49% no reversible ischemia Predominantly fixed infeior apical septal defects Low normal to mildly reduced LV sys function with :VEF 49%  Septal wall motion abnormlaity Compared with prior study no new reveresible perfusion defects    COVID-19 virus infection 07/25/2020    Degeneration of cervical intervertebral disc 9/25/2012    Elevated LFTs 9/19/2017    Elevated liver enzymes 2/28/2018    H/O malignant neoplasm of thyroid 12/10/2013    Heart attack ([address]     Heart block AV second degree     Hypercholesteremia 3/30/2010    Hypertension     Hypothyroidism     Ischemic cardiomyopathy 8/30/2019    Metastatic cancer ([address] 9/19/2017    Mild aortic regurgitation 3/30/2010    Overview:  Mild moderate MR and AR  Overview:  Mild moderate MR and AR    Pneumonia due to COVID-19 virus     07/25/2020    Pulmonary nodule 6/21/2018    Pulmonary nodules/lesions, multiple 9/19/2017    S/P CABG x 3 8/30/2019    Sick sinus syndrome ([facility] 8/30/2019    SOB (shortness of breath) 8/30/2019    Spinal stenosis in cervical region 11/6/2014    Type 2 diabetes mellitus ([facility] 3/30/2010    Overview:  without complications    Type II or unspecified type diabetes mellitus without mention of complication, not stated as uncontrolled          SURGICALHISTORY       Past Surgical History:   Procedure Laterality Date    APPENDECTOMY      CARDIAC SURGERY      CHOLECYSTECTOMY      CORONARY ARTERY BYPASS GRAFT      ENDOSCOPIC ULTRASOUND (LOWER)      PACEMAKER INSERTION  03/2015    [place] LAP,CHOLECYSTECTOMY N/A 3/2/2018    CHOLECYSTECTOMY LAPAROSCOPIC performed by Ernestina Wall DO at [facility]       Previous Medications    ASPIRIN 81 MG EC TABLET    Take 81 mg by mouth daily     ATORVASTATIN (LIPITOR) 20 MG TABLET      Take 20 mg by mouth nightly     CALCIUM CARBONATE (TUMS) 500 MG CHEWABLE TABLET    Take 1 tablet by mouth 2 times daily as needed for Heartburn    DIPHENHYDRAMINE (BENADRYL) 25 MG CAPSULE    Take 1 capsule by mouth every 6 hours as needed for Itching    DOCUSATE SODIUM (COLACE) 100 MG CAPSULE    Take 100 mg by mouth nightly     FINASTERIDE (PROSCAR) 5 MG TABLET    TAKE 1 TABLET DAILY    FUROSEMIDE (LASIX) 20 MG TABLET    Take 1 tablet by mouth daily    GLIMEPIRIDE (AMARYL) 2 MG TABLET    Take 0.5 tablets by mouth every morning (before breakfast)    HYDROCODONE-ACETAMINOPHEN (NORCO) 5-325 MG PER TABLET    Take 1 tablet by mouth 2 times daily as needed for Pain for up to 30 days. LEVOTHYROXINE (LEVOXYL) 137 MCG TABLET    Take 137 mcg by mouth Daily Taking 1/2 tablet daily 11/18/20    MIRABEGRON (MYRBETRIQ) 50 MG TB24    Take 50 mg by mouth daily    MULTIPLE VITAMINS-MINERALS (THERAPEUTIC MULTIVITAMIN-MINERALS) TABLET    Take 1 tablet by mouth daily    NITROGLYCERIN (NITROSTAT) 0.4 MG SL TABLET    Place 0.4 mg under the tongue.  As directed    POLYETHYLENE GLYCOL (MIRALAX) 17 G PACKET    Take 17 g by mouth daily as needed for Constipation    SITAGLIPTIN (JANUVIA) 100 MG TABLET    Take 1 tablet by mouth daily    SYNTHROID 100 MCG TABLET        TAMSULOSIN (FLOMAX) 0.4 MG CAPSULE    Take 1 capsule by mouth daily    VITAMIN D (ERGOCALCIFEROL) 87619 UNITS CAPS CAPSULE    Take 50,000 Units by mouth once a week       ALLERGIES     Seasonal and Keflex [cephalexin]    FAMILY HISTORY       Family History   Problem Relation Age of Onset    Cancer Mother         stomach    Other Father         pneumonia          SOCIAL HISTORY       Social History     Socioeconomic History    Marital status:      Spouse name: None    Number of children: None    Years of education: None    Highest education level: None   Occupational History    Occupation: Retired     Employer: [address] resource strain: Not hard at all   [address] insecurity     Worry: Never true     Inability: Never true   "[employer]" needs     Medical: None     Non-medical: None   Tobacco Use    Smoking status: Never Smoker    Smokeless tobacco: Never Used   Substance and Sexual Activity    Alcohol use: No    Drug use: No    Sexual activity: None   Lifestyle    Physical activity     Days per week: None     Minutes per session: None    Stress: None   Relationships    Social connections     Talks on phone: None     Gets together: None     Attends Yazidi service: None     Active member of club or organization: None     Attends meetings of clubs or organizations: None     Relationship status: None    Intimate partner violence     Fear of current or ex partner: None     Emotionally abused: None     Physically abused: None     Forced sexual activity: None   Other Topics Concern    None   Social History Narrative    None       SCREENINGS    [place] Coma Scale  Eye Opening:  To speech  Best Verbal Response: Confused  Best Motor Response: Localizes pain  [place] Coma Scale Score: 12 @FLOW([id number])@      PHYSICAL EXAM    (up to 7 for level 4, 8 or more for level 5)     ED Triage Vitals   BP Temp Temp Source Pulse Resp SpO2 Height Weight   12/02/20 0858 12/02/20 0909 12/02/20 0909 12/02/20 0846 12/02/20 0846 12/02/20 0846 12/02/20 0846 12/02/20 0846   111/65 97.9 °F (36.6 °C) Oral 93 25 97 % 5' 5\" (1.651 m) 110 lb (49.9 kg)       Physical Exam  Constitutional:       General: He is not in acute distress. Appearance: He is well-developed. Comments: Chronically ill-appearing 80year-old, no acute distress, eyes open and interactive   HENT:      Head: Normocephalic and atraumatic. Mouth/Throat:      Mouth: Mucous membranes are moist.   Eyes:      Conjunctiva/sclera: Conjunctivae normal.      Pupils: Pupils are equal, round, and reactive to light. Neck:      Musculoskeletal: Normal range of motion and neck supple. Vascular: No JVD. Cardiovascular:      Rate and Rhythm: Normal rate and regular rhythm. Heart sounds: Normal heart sounds. Pulmonary:      Effort: Pulmonary effort is normal. No respiratory distress. Breath sounds: No stridor. No wheezing or rhonchi. Abdominal:      General: There is no distension. Palpations: Abdomen is soft. Tenderness: There is no abdominal tenderness. There is no guarding. Genitourinary:     Comments: Rectal exam performed with nurse as chaperone, patient does have large stool burden within the vault, stool is rubbery and not hard, no obvious bleeding. Musculoskeletal: Normal range of motion. Skin:     General: Skin is warm and dry. Neurological:      Mental Status: He is alert. Comments: Patient is interactive and follows commands, does state his name first and last, does not know where he is, can indicate that he has generalized abdominal discomfort however denies any other pain or concerns.          DIAGNOSTIC RESULTS     EKG: All EKG's are interpreted by the Emergency Department Physician who either signs orCo-signs this chart in the absence of a cardiologist.      RADIOLOGY:   Non-plainfilm images such as CT, Ultrasound and MRI are read by the radiologist. Plain radiographic images are visualized and preliminarily interpreted by the emergency physician with the below findings:      Interpretationper the Radiologist below, if available at the time of this note:    XR CHEST PORTABLE   Final Result   Slightly increased opacity left base, either atelectasis or developing   infiltrate/consolidation. Extensive metastatic disease and chronic findings   again seen. XR ABDOMEN (KUB) (SINGLE AP VIEW)   Final Result   Constipation. No bowel obstruction. Worsening lumbar compression deformities. ED BEDSIDE ULTRASOUND:   Performed by ED Physician - none    LABS:  Labs Reviewed   CBC WITH AUTO DIFFERENTIAL - Abnormal; Notable for the following components:       Result Value    WBC 22.8 (*)     Hemoglobin 12.3 (*)     MCV 80.1 (*)     MCH 23.5 (*)     RDW 16.4 (*)     Seg Neutrophils 84 (*)     Lymphocytes 4 (*)     Monocytes 11 (*)     Segs Absolute 19.15 (*)     Absolute Lymph # 0.91 (*)     Absolute Mono # 2.51 (*)     All other components within normal limits   COMPREHENSIVE METABOLIC PANEL W/ REFLEX TO MG FOR LOW K - Abnormal; Notable for the following components:    Glucose 265 (*)     BUN 26 (*)     CREATININE 1.38 (*)     Alkaline Phosphatase 270 (*)     AST 52 (*)     Total Protein 9.1 (*)     Alb 1.8 (*)     Albumin/Globulin Ratio 0.2 (*)     GFR Non- 48 (*)     GFR  58 (*)     All other components within normal limits   CULTURE, URINE   URINALYSIS       All other labs were within normal range or not returned as of this dictation.     EMERGENCY DEPARTMENT COURSE and DIFFERENTIAL DIAGNOSIS/MDM:   Vitals:    Vitals:    12/02/20 0846 12/02/20 0858 12/02/20 0909   BP:  111/65    Pulse: 93     Resp: 25     Temp:   97.9 °F (36.6 °C)   TempSrc:   Oral SpO2: 97%     Weight: 110 lb (49.9 kg)     Height: 5' 5\" (1.651 m)           MDM  Number of Diagnoses or Management Options  Bone metastases ([address]):   Chronic midline thoracic back pain:   Constipation, unspecified constipation type:   Pneumonia due to organism:   Diagnosis management comments: Spent a significant Kayli Alford time with the patient, his son, and case management. They have been discussing hospice as a family, they have an appointment with hospice tomorrow. After conversation with case management the son feels that the patient wishes for comfort care only. We discussed further evaluation and treatment options and son stated he would like to bring his father home with family to care for him if at all possible. We discussed the findings of likely pneumonia as well as constipation. I shared that I disimpacted the patient with a significant amount of stool. Also discussed that the patient was dehydrated but son states he is able to swallow liquids when he is interested. We also discussed goals of care and the active and passive process of dying, son is aware that the patient is reaching the end of his life and is eager to learn more about the hospice process. Using shared decision making decision was made between case management, the son, the patient, and myself that discharge at this time would be desired by the patient. Will prescribe azithromycin, Dulcolax suppositories, and Roxanol oral pain solution. We discussed risk and benefits of Roxanol including sedation and respiratory depression, son was aware of this. At time of discharge son stated that family would come to the hospital to help the patient into the vehicle, as well as help the patient get out of the vehicle and get into his home. They felt this is a safe plan and that they would be able to assist the patient with his needs as well as mobility.     Recommend close follow-up with the patient's primary care doctor as well as with to 3 days. This medication may cause drowsiness, do not take this medication if the patient is drowsy or has trouble swallowing. Follow-up:  Gerda Galo MD  [facility]. [address]    Schedule an appointment as soon as possible for a visit in 1 day  As needed, If symptoms worsen        Final Impression:   1. Chronic midline thoracic back pain    2. Pneumonia due to organism    3. Constipation, unspecified constipation type    4.  Bone metastases ([address]               (Please note that portions of this note were completed with a voice recognition program.  Efforts were made to edit the dictations but occasionally words are mis-transcribed.)           Jazmyn Murphy MD  12/02/20 3957

## 2020-12-02 NOTE — CARE COORDINATION
Message left for son Trudy Lopez to call writer regarding discharge planning. 10:30 am Writer and Dr Tanvi Gutierrez  had extensive conversation with pts son Trudy Lopez. Family would like to take pt home and keep him comfortable at home. Family has appt with [address] tomorrow 12/3/20 . Trudy Lopez verbalized to writer and Dr Tanvi Gutierrez that pt and family do no want feeding tube.

## 2020-12-04 NOTE — TELEPHONE ENCOUNTER
RECEIVED FAX NOTICE FROM [facility]OS[facility] PT IS ON HOME CARE EFFECTIVE 12/03/2020. DR Diamante Wright. CANCER CENTER APPT'S CANCELLED.

## 2020-12-10 ENCOUNTER — TELEPHONE (OUTPATIENT)
Dept: FAMILY MEDICINE CLINIC | Age: 85
End: 2020-12-10

## 2020-12-10 NOTE — TELEPHONE ENCOUNTER
[facility] OF [facility] CALLED TO LET YOU KNOW THAT THE PATIENT PASSED ON 12/09/20 AT 12:44 AM.

## 2021-08-17 NOTE — PROGRESS NOTES
[facility] Radiation Oncology  Follow-Up note    Date of Service: 2020    Location: [facility]      Patient ID:   Andi Butcher  : 1928   MRN: [id number]    DIAGNOSIS:   Cancer Staging  No matching staging information was found for the patient. Chief Complaint: \" His pain has improved after radiation however it started to be the same. Patient sitting all the time chair with arched back. He stated when he woke the pain is less. INTERVAL HISTORY:   Andi Butcher returns in a previously scheduled follow-up visit. Patient was last seen in our clinic . I closely reviewed the medical, surgical, social and family history as per the detailed physician notes from our department. Interim changes as noted above.     MEDICATIONS:    Current Outpatient Medications:     HYDROcodone-acetaminophen (NORCO) 5-325 MG per tablet, Take 1 tablet by mouth every 6 hours as needed for Pain., Disp: , Rfl:     SITagliptin (JANUVIA) 100 MG tablet, Take 1 tablet by mouth daily, Disp: 90 tablet, Rfl: 0    glimepiride (AMARYL) 2 MG tablet, Take 0.5 tablets by mouth every morning (before breakfast), Disp: 30 tablet, Rfl: 3    polyethylene glycol (MIRALAX) 17 g packet, Take 17 g by mouth daily as needed for Constipation, Disp: 527 g, Rfl: 1    mirabegron (MYRBETRIQ) 50 MG TB24, Take 50 mg by mouth daily, Disp: 28 tablet, Rfl: 0    diphenhydrAMINE (BENADRYL) 25 MG capsule, Take 1 capsule by mouth every 6 hours as needed for Itching, Disp: 30 capsule, Rfl: 0    tamsulosin (FLOMAX) 0.4 MG capsule, Take 1 capsule by mouth daily, Disp: 90 capsule, Rfl: 3    finasteride (PROSCAR) 5 MG tablet, TAKE 1 TABLET DAILY, Disp: 90 tablet, Rfl: 3    furosemide (LASIX) 20 MG tablet, Take 1 tablet by mouth daily, Disp: 30 tablet, Rfl: 1    vitamin D (ERGOCALCIFEROL) 94791 units CAPS capsule, Take 50,000 Units by mouth once a week, Disp: , Rfl:     Multiple Vitamins-Minerals (THERAPEUTIC MULTIVITAMIN-MINERALS) tablet, Take 1 tablet by mouth daily, Disp: , Rfl:     calcium carbonate (TUMS) 500 MG chewable tablet, Take 1 tablet by mouth 2 times daily as needed for Heartburn, Disp: 30 tablet, Rfl: 0    levothyroxine (LEVOXYL) 137 MCG tablet, Take 137 mcg by mouth Daily Taking 1/2 tablet daily 11/18/20, Disp: , Rfl:     nitroGLYCERIN (NITROSTAT) 0.4 MG SL tablet, Place 0.4 mg under the tongue. As directed, Disp: , Rfl:     docusate sodium (COLACE) 100 MG capsule, Take 100 mg by mouth nightly , Disp: , Rfl:     atorvastatin (LIPITOR) 20 MG tablet,  Take 20 mg by mouth nightly , Disp: , Rfl:     aspirin 81 MG EC tablet, Take 81 mg by mouth daily , Disp: , Rfl:     SYNTHROID 100 MCG tablet, , Disp: , Rfl:     REVIEW OF SYSTEMS: I reviewed the complete 14-Point ROS with the patient. Pertinent ones noted in the HPI. PHYSICAL EXAMINATION:  /62   Pulse 95   Temp 98.2 °F (36.8 °C) (Oral)   Resp 16   Wt 112 lb 4 oz (50.9 kg)   SpO2 95%   BMI 19.27 kg/m²   Pain 0/10, KPS  GENERAL: Sitting in wheelchair appears tired  HEENT:  Nabnormal mass lesions appreciated. NECK:  No cervical or supraclavicular lymphadenopathy. Sitting in the wheelchair very mild swelling of the legs. LYMPH:  No appreciable adenopathy. NEURO: Responds slowly to commands. Labs:    RADS:    PATHOLOGY: No recent studies. ASSESSMENT: Metastatic thyroid cancer to the back status post 2000 cGy in 5 treatment. He continues to be on pain currently receiving pain medications every  hours Tylenol 3 equivalent. With control of his pain. PLAN: Patient is under the care of Dr. Clarissa Parry. I gave him 3 months follow-up appointment and he was informed if his pain persist I would give him additional short course of radiation to his spine. Time spent with patient 25 minutes. >50% of time allotted to education, answering questions, and coordinating care.     Electronically signed by Scarlett Montero MD on 11/19/2020 at 6:54 PM    Patient Care Team:  Du Jones MD as PCP - General (Family Medicine)  Du Jones [facility]  Murphy Morales MD as Consulting Physician (Gastroenterology)  Darvin Ann MD as Consulting Physician (Infectious Diseases)  Bret Moeller MD as Consulting Physician (Pulmonary Disease)  Maribel Bain MD as Consulting Physician (Hematology and Oncology)  Guevara Danielle MD as Consulting Physician (Radiation Oncology)  Jam Bustamante RN as Care Transitions Nurse Previously Declined (within the last year)

## 2023-02-23 NOTE — TELEPHONE ENCOUNTER
Patient's daughter Herminia Bello called stating that they would like to continue speech therapy with [address] since they come to the home and due to his test results. Please return call to discuss at [phone]. Prednisone Pregnancy And Lactation Text: This medication is Pregnancy Category C and it isn't know if it is safe during pregnancy. This medication is excreted in breast milk.

## (undated) DEVICE — Device

## (undated) DEVICE — STRIP,CLOSURE,WOUND,MEDI-STRIP,1/2X4: Brand: MEDLINE

## (undated) DEVICE — GLOVE ORANGE PI 7   MSG9070

## (undated) DEVICE — PRESSURE MONITORING SET: Brand: TRUWAVE, VAMP PLUS

## (undated) DEVICE — TROCAR: Brand: KII® SLEEVE

## (undated) DEVICE — AGENT HEMSTAT W2XL14IN OXIDIZED REGENERATED CELOS ABSRB FOR

## (undated) DEVICE — 35 ML SYRINGE LUER-LOCK TIP: Brand: MONOJECT

## (undated) DEVICE — MASTISOL ADHESIVE LIQ 2/3ML

## (undated) DEVICE — DRAIN CHN 19FR L0.25IN DIA6.3MM SIL RND HUBLESS FULL FLUT

## (undated) DEVICE — ST CHARLES GEN LAPAROSCOPY PK: Brand: MEDLINE INDUSTRIES, INC.

## (undated) DEVICE — THIS PRODUCT IS SINGLE USE AND INTENDED TO BE USED FOR BLUNT DISSECTION OF TISSUE.: Brand: ASPEN® ENDOSCOPIC KITTNER, SINGLE TIP

## (undated) DEVICE — CATHETER IV NDL L1.25IN 16GA GRY POLYUR WNG HUB DISP FOR

## (undated) DEVICE — GLOVE ORANGE PI 7 1/2   MSG9075

## (undated) DEVICE — SOLUTION IV IRRIG WATER 1000ML POUR BRL 2F7114

## (undated) DEVICE — BAG SPEC LAP H6IN DIA3IN 250ML 10 12MM CANN ATTCH MEM WIRE

## (undated) DEVICE — CATHETER URET 5FR L70CM OPN END SGL LUMN INJ HUB FLEXIMA

## (undated) DEVICE — INSTRUMENT WARMER: Brand: SCOPE WARMER DISPOSABLE SEAL

## (undated) DEVICE — SUTURE VCRL + SZ 0 L27IN ABSRB VLT L26MM UR-6 5/8 CIR VCP603H

## (undated) DEVICE — SUTURE PERMAHAND SZ 0 L30IN NONABSORBABLE BLK FSL L30MM 3/8 680H

## (undated) DEVICE — TROCAR: Brand: KII FIOS FIRST ENTRY

## (undated) DEVICE — LOOP LIG SUT SZ 0 L18IN ABSRB POLYDIOXANONE MFIL PDS II

## (undated) DEVICE — 3M™ WARMING BLANKET, UPPER BODY, 10 PER CASE, 42268: Brand: BAIR HUGGER™

## (undated) DEVICE — SINGLE PORT MANIFOLD: Brand: NEPTUNE 2

## (undated) DEVICE — SCISSOR SURG CRV ENDOCUT TIP FOR LAP DISP

## (undated) DEVICE — Z INACTIVE USE 2660664 SOLUTION IRRIG 3000ML 0.9% SOD CHL USP UROMATIC PLAS CONT

## (undated) DEVICE — INTENDED FOR TISSUE SEPARATION, AND OTHER PROCEDURES THAT REQUIRE A SHARP SURGICAL BLADE TO PUNCTURE OR CUT.: Brand: BARD-PARKER ® CARBON RIB-BACK BLADES

## (undated) DEVICE — APPLIER CLP M/L SHFT DIA5MM 15 LIG LIGAMAX 5

## (undated) DEVICE — SPONGE DRN W4XL4IN RAYON/POLYESTER 6 PLY NONWOVEN PRECUT

## (undated) DEVICE — SUTURE VCRL + SZ 4-0 L27IN ABSRB WHT FS-2 3/8 CIR REV CUT VCP422H

## (undated) DEVICE — PENCIL ES L3M BTTN SWCH HOLSTER W/ BLDE ELECTRD EDGE

## (undated) DEVICE — DISSECTOR LAP DIA5MM BLNT TIP ENDOPATH

## (undated) DEVICE — TROCARS: Brand: KII® BALLOON BLUNT TIP SYSTEM